# Patient Record
Sex: FEMALE | Race: WHITE | NOT HISPANIC OR LATINO | Employment: OTHER | URBAN - METROPOLITAN AREA
[De-identification: names, ages, dates, MRNs, and addresses within clinical notes are randomized per-mention and may not be internally consistent; named-entity substitution may affect disease eponyms.]

---

## 2017-01-09 ENCOUNTER — HOSPITAL ENCOUNTER (OUTPATIENT)
Dept: PHYSICAL THERAPY | Facility: HOSPITAL | Age: 72
Setting detail: THERAPIES SERIES
Discharge: HOME | End: 2017-01-09

## 2017-01-09 DIAGNOSIS — M76.62 TENDONITIS, ACHILLES, LEFT: ICD-10-CM

## 2017-01-09 DIAGNOSIS — G89.29 CHRONIC PAIN OF LEFT ANKLE: Primary | ICD-10-CM

## 2017-01-09 DIAGNOSIS — M25.572 CHRONIC PAIN OF LEFT ANKLE: Primary | ICD-10-CM

## 2017-01-09 PROCEDURE — G8979 MOBILITY GOAL STATUS: HCPCS

## 2017-01-09 PROCEDURE — 97110 THERAPEUTIC EXERCISES: CPT

## 2017-01-09 PROCEDURE — G8978 MOBILITY CURRENT STATUS: HCPCS

## 2017-01-09 PROCEDURE — 97161 PT EVAL LOW COMPLEX 20 MIN: CPT

## 2017-01-09 PROCEDURE — G8980 MOBILITY D/C STATUS: HCPCS

## 2017-01-09 NOTE — PROGRESS NOTES
Outpatient Physical Therapy Ortho Initial Evaluation  [facility]     Patient Name: Iris Yee  : 1945  MRN: [id number]  Today's Date: 2017      Visit Date: 2017    Patient Active Problem List   Diagnosis   • History of pulmonary infarction   • Hypothyroid   • ASCUS favor benign   • Ankle pain   • Achilles tendinitis of left lower extremity   • Tendonitis, Achilles, left        Past Medical History   Diagnosis Date   • Clotting disorder    • Disease of thyroid gland    • Hx of blood clots    • Ovarian cyst    • Pulmonary embolism, bilateral    • Thrombopenia         Past Surgical History   Procedure Laterality Date   • Cholecystectomy     • Augmentation mammaplasty     • Breast biopsy         Visit Dx:     ICD-10-CM ICD-9-CM   1. Chronic pain of left ankle M25.572 719.47    G89.29 338.29   2. Tendonitis, Achilles, left M76.62 726.71             Patient History       17 1500          History    Chief Complaint Difficulty Walking;Difficulty with daily activities;Joint stiffness;Muscle weakness;Pain  -CN      Type of Pain Ankle pain  -CN      Date Current Problem(s) Began 16  -CN      Brief Description of Current Complaint Pt reports history of L ankle pain which began in August after pt began walking on treadmill daily. Pt had PT several months after onset which helped relieve pain and restore function, however pt never attained full recovery. Pt went back to MD and had MRI which showed small tear in achilles tendon. Pt has been in walking book and night splint to immobilize and help with tendon healing. Pt does not have difficulty ambulating in boot unless she is up for long periods of time.   -CN      Previous treatment for THIS PROBLEM --   PT  -CN      Onset Date- PT 17  -CN      Patient/Caregiver Goals Relieve pain;Return to prior level of function;Improve mobility;Improve strength  -CN      What clinical tests have you had for this problem? MRI  -CN       Results of Clinical Tests Small tear in achilles tendon  -CN      Pain     Pain Location Ankle  -CN      Pain at Present 2  -CN      Pain at Best 0  -CN      Pain at Worst 2  -CN      Pain Frequency Intermittent  -CN      Pain Description Aching;Burning  -CN      What Performance Factors Make the Current Problem(s) WORSE? walking, standing are worse. Careful with all other activities.   -CN      What Performance Factors Make the Current Problem(s) BETTER? Ice, boot  -CN      Is your sleep disturbed? Yes   Careful when turning over  -CN      Difficulties at work?  for [employer], even amount of sitting to standing. Hurts when up on it more.   -CN      Difficulties with ADL's? Try to limit amount of time standing while cooking meals, etc.   -CN      Difficulties with recreational activities? Walking, shopping  -CN      Fall Risk Assessment    Any falls in the past year: Yes  -CN      Number of falls reported in the last 12 months 1  -CN      Factors that contributed to the fall: Slippery surface  -CN      Daily Activities    Primary Language English  -CN      Are you able to read Yes  -CN      Are you able to write Yes  -CN      How does patient learn best? Reading  -CN      Teaching needs identified Home Exercise Program;Management of Condition  -CN      Pt Participated in POC and Goals Yes  -CN      Safety    Are you being hurt, hit, or frightened by anyone at home or in your life? No  -CN      Are you being neglected by a caregiver No  -CN        User Key  (r) = Recorded By, (t) = Taken By, (c) = Cosigned By    Initials Name Provider Type    CN Sweta Orellana, PT Physical Therapist                PT Ortho       01/09/17 1500    Posture/Observations    Observations Edema  -CN    Posture/Observations Comments L hip elevated secondary to boot  -CN    Sensation    Sensation WNL? WNL  -CN    Foot/Ankle Palpation    Medial Malleolus Left:;Tender;Swollen  -CN    Medial Gastroc  Left:;Tender;Guarded/taut  -CN    Soleus Left:;Tender;Guarded/taut  -CN    Achilles' Tendon Left:;Tender;Guarded/taut;Swollen  -CN    Left Ankle    Dorsiflexion ROM Details 5/10  -CN    Plantarflexion ROM Details 52/56  -CN    Inversion ROM Details 38/41  -CN    Eversion ROM Details 22/24  -CN    Right Ankle    Dorsiflexion ROM Details 6/9  -CN    Plantarflexion ROM Details 55/57  -CN    Inversion ROM Details 38/43  -CN    Eversion ROM Details 25/29  -CN    Left Ankle/Foot    Ankle PF Gross Movement (4/5) good  -CN    Ankle Dorsiflexion Gross Movement (4/5) good  -CN    Subtalar Inversion Gross Movement (4+/5) good plus  -CN    Subtalar Eversion Gross Movement (4/5) good  -CN    Right Ankle/Foot    Ankle PF Gross Movement (5/5) normal  -CN    Ankle Dorsiflexion Gross Movement (5/5) normal  -CN    Subtalar Inversion Gross Movement (5/5) normal  -CN    Subtalar Eversion Gross Movement (4+/5) good plus  -CN    Lower Extremity Flexibility    Gastrocnemius Mildly limited  -CN    Soleus Mildly limited  -CN    Gait Assessment/Treatment    Gait, Comment Pt demonstrates increased hip and knee flexion with gait with slight circumduction.   -CN      User Key  (r) = Recorded By, (t) = Taken By, (c) = Cosigned By    Initials Name Provider Type    GIBRAN Orellana, EDITH Physical Therapist                            Therapy Education       01/09/17 1731    Therapy Education    Given HEP;Symptoms/condition management;Pain management   Anatomy, eval findings, goals of PT, eccentric contractions  -CN    Program New  -CN    How Provided Verbal;Demonstration;Written  -CN    Provided to Patient  -CN    Level of Understanding Teach back education performed  -CN      User Key  (r) = Recorded By, (t) = Taken By, (c) = Cosigned By    Initials Name Provider Type    GIBRAN Orellana PT Physical Therapist                PT OP Goals       01/09/17 1700          PT Short Term Goals    STG Date to Achieve 01/30/17  -CN      STG 1  Patient will be independent with education for symptom management, joint protection and strategies to minimize stress on affected tissues  -CN      STG 1 Progress New  -CN      STG 2 Pt will report subjective pain at /10 or less with ADLs and ambulation.    -CN      STG 2 Progress New  -CN      STG 3 Pt able to transition out of the boot for 1/2 of the day without more than 3/10 pain  -CN      STG 3 Progress New  -CN      Long Term Goals    LTG Date to Achieve 02/20/17  -CN      LTG 1 Pt will report subjective reports of tenderness L achilles tendon reduced by 50%.   -CN      LTG 1 Progress New  -CN      LTG 2 Pt to improve gross ankle strength on left side to 4+/5 in order to increase stability and function.  -CN      LTG 2 Progress New  -CN      LTG 3 Pt will improve LEFS score to 80% for overall functional improvement.  -CN      LTG 3 Progress New  -CN      Time Calculation    PT Goal Re-Cert Due Date 02/09/17  -CN        User Key  (r) = Recorded By, (t) = Taken By, (c) = Cosigned By    Initials Name Provider Type    CN Sweta Orelalna, PT Physical Therapist                PT Assessment/Plan       01/09/17 1738          PT Assessment    Functional Limitations Decreased safety during functional activities;Limitations in functional capacity and performance;Impaired gait;Performance in leisure activities;Impaired locomotion;Limitations in community activities;Performance in work activities  -CN      Impairments Balance;Range of motion;Impaired muscle endurance;Muscle strength;Pain;Gait  -CN      Assessment Comments Pt is a 71 year old female presenting to therapy with c/o L ankle pain secondary to small Achilles tear and tendonitis which began in August. Pt previously had PT which helped with pain reduction and functional activities, however never fully recovered. Pt went back to MD and had MRI showing small tear in Achilles tendon and has been in walking boot and night splint for immobilization for 6 weeks.  Pt demonstrates ROM measured at DF=5/10, PF=52/56, IN=38/41 and EV=22/24 as well as impaired ankle strength. Pt demonstrates edema in inferior aspect of L achilles. Pt ambulating with boot and exhibitis increased hip and knee flexion to increase toe clearance. Pt scored 67.5% on the LEFS where 100% is no disability. Pt would benefit from skilled PT to address the deficits and return to PLOF.   -CN      Please refer to paper survey for additional self-reported information Yes  -CN      Rehab Potential Good  -CN      Patient/caregiver participated in establishment of treatment plan and goals Yes  -CN      Patient would benefit from skilled therapy intervention Yes  -CN      PT Plan    PT Frequency 2x/week  -CN      Predicted Duration of Therapy Intervention (days/wks) 6 weeks  -CN      Planned CPT's? PT EVAL: 50252;PT MANUAL THERAPY EA 15 MIN: 59927;PT RE-EVAL: 24509;PT THER PROC EA 15 MIN: 81263;PT GAIT TRAINING EA 15 MIN: 10847;PT ULTRASOUND EA 15 MIN: 84709;PT HOT OR COLD PACK TREAT MCARE;PT IONTOPHORESIS EA 15 MIN: 34619  -CN      Physical Therapy Interventions (Optional Details) ROM (Range of Motion);stair training;strengthening;stretching;modalities;manual therapy techniques;gait training;patient/family education;postural re-education;joint mobilization;home exercise program  -CN      PT Plan Comments PT to treat 2x/week for 6 weeks consisting of flexibility, stabilization and stregnthening activities. Assess tolerance and adherence to HEP.   -CN        User Key  (r) = Recorded By, (t) = Taken By, (c) = Cosigned By    Initials Name Provider Type    GIBRAN Orellana, EDITH Physical Therapist                  Exercises       01/09/17 1700          Exercise 1    Exercise Name 1 See exercise flowsheet in chart.   -CN        User Key  (r) = Recorded By, (t) = Taken By, (c) = Cosigned By    Initials Name Provider Type    GIBRAN Orellana, PT Physical Therapist                              Outcome  Measures       01/09/17 1700          Functional Assessment    Outcome Measure Options Lower Extremity Functional Scale (LEFS)   67.5% where 100% is no disability  -CN        User Key  (r) = Recorded By, (t) = Taken By, (c) = Cosigned By    Initials Name Provider Type    CN Sweta Orellana, PT Physical Therapist            Time Calculation:   Start Time: 1545  Stop Time: 1630  Time Calculation (min): 45 min     Therapy Charges for Today     Code Description Service Date Service Provider Modifiers Qty    02901019306 HC PT EVAL LOW COMPLEXITY 2 1/9/2017 Sweta Orellana, PT GP 1    01152560928 HC PT THER PROC EA 15 MIN 1/9/2017 Sweta Orellana, PT GP 1    69343498764 HC PT MOBILITY PROJECTED 1/9/2017 Sweta Orellana, PT GP, CJ 1    33475879001 HC PT MOBILITY DISCHARGE 1/9/2017 Sweta Orellana, PT GP, CJ 1          PT G-Codes  PT Professional Judgement Used?: Yes  Outcome Measure Options: Lower Extremity Functional Scale (LEFS)  Score: 67.5% where 100% is no disability  Functional Limitation: Mobility: Walking and moving around  Mobility: Walking and Moving Around Goal Status (): At least 20 percent but less than 40 percent impaired, limited or restricted  Mobility: Walking and Moving Around Discharge Status (): At least 20 percent but less than 40 percent impaired, limited or restricted         Sweta Orellana PT  1/9/2017

## 2017-01-16 ENCOUNTER — APPOINTMENT (OUTPATIENT)
Dept: PHYSICAL THERAPY | Facility: HOSPITAL | Age: 72
End: 2017-01-16

## 2017-01-19 ENCOUNTER — OFFICE VISIT (OUTPATIENT)
Dept: ORTHOPEDIC SURGERY | Facility: CLINIC | Age: 72
End: 2017-01-19

## 2017-01-19 VITALS — HEIGHT: 68 IN | TEMPERATURE: 98.9 F | BODY MASS INDEX: 32.13 KG/M2 | WEIGHT: 212 LBS

## 2017-01-19 DIAGNOSIS — M76.62 ACHILLES TENDINITIS OF LEFT LOWER EXTREMITY: Primary | ICD-10-CM

## 2017-01-19 PROCEDURE — 99212 OFFICE O/P EST SF 10 MIN: CPT | Performed by: ORTHOPAEDIC SURGERY

## 2017-01-19 NOTE — MR AVS SNAPSHOT
Iris Yee   1/19/2017 3:15 PM   Office Visit    Dept Phone:  [phone]   Encounter #:  [id number]    Provider:  Jose Daniel Jones MD   Department:  [facility] MEDICAL [facility]                Your Full Care Plan              Your Updated Medication List          This list is accurate as of: 1/19/17  4:17 PM.  Always use your most recent med list.                aspirin 81 MG EC tablet       betamethasone valerate 0.1 % cream   Commonly known as:  VALISONE       cetirizine 10 MG tablet   Commonly known as:  zyrTEC       diclofenac 1 % gel gel   Commonly known as:  VOLTAREN   Apply 4 g topically 2 (Two) Times a Day. Small amount to affected area       FLUCELVAX QUADRIVALENT 0.5 ML suspension prefilled syringe injection   Generic drug:  Influenza Vac Subunit Quad       levothyroxine 100 MCG tablet   Commonly known as:  SYNTHROID, LEVOTHROID       neomycin-polymyxin-hydrocortisone 3.5-26269-6 otic suspension   Commonly known as:  CORTISPORIN       PREVNAR 13 vaccine   Generic drug:  pneumococcal conj. 13-valent       XARELTO 20 MG tablet   Generic drug:  rivaroxaban               You Were Diagnosed With        Codes Comments    Achilles tendinitis of left lower extremity    -  Primary ICD-10-CM: M76.62  ICD-9-CM: 726.71       Instructions     None    Patient Instructions History      Upcoming Appointments     Visit Type Date Time Department    FOLLOW UP 1/19/2017  3:15 PM [facility]      [employer] Signup     Our records indicate that you have an active [employer] account.    You can view your After Visit Summary by going to [facility] and logging in with your [employer] username and password.  If you don't have a [employer] username and password but a parent or guardian has access to your record, the parent or guardian should login with their own [employer] username and password and access your record to view the After  "Visit Summary.    If you have questions, you can email [email or web address] or call [phone] to talk to our MyChart staff.  Remember, [employer] is NOT to be used for urgent needs.  For medical emergencies, dial 911.               Other Info from Your Visit           Allergies     Penicillins      Sulfur        Reason for Visit     Left Foot - Follow-up           Vital Signs     Temperature Height Weight Last Menstrual Period Body Mass Index Smoking Status    98.9 °F (37.2 °C) 68\" (172.7 cm) 212 lb (96.2 kg) (LMP Unknown) 32.23 kg/m2 Former Smoker      Problems and Diagnoses Noted     Achilles tendinitis        "

## 2017-01-19 NOTE — PROGRESS NOTES
"Ankle Follow Up      Patient: Iris Yee    YOB: 1945 71 y.o. female    Chief Complaints: Ankle feels better    History of Present Illness: All is up with improvement in the aching pain in the posterior aspect of her left Achilles is been going on now for at least 3 or 4 months.  She's been using her boot now for about 8 weeks and states that she has only minor intermittent achy pain in the posterior aspect of her left Achilles.  She is seen Dr. Lozada and a started formal physical therapy again.  She feels like her swelling has diminished  HPI    ROS: ankle pain  Past Medical History   Diagnosis Date   • Clotting disorder    • Disease of thyroid gland    • Hx of blood clots 2011   • Ovarian cyst    • Pulmonary embolism, bilateral    • Thrombopenia        Physical Exam:   Vitals:    01/19/17 1539   Temp: 98.9 °F (37.2 °C)   Weight: 212 lb (96.2 kg)   Height: 68\" (172.7 cm)     Well developed with normal mood.  Minimal discomfort at the mid substance of the left Achilles.  Neutral dorsiflexion a heel cord with 5 out of 5 plantarflexion strength no palpable defects.  There is only mild fullness to the mid substance of the Achilles      Radiology: None performed      Assessment/Plan:  Left non-insertional Achilles tendinosis seems to be improving albeit slowly.  She rates her current pain as 0 out of 10.  She will start weaning out of her boot continue use of athletic shoes with somewhat of an elevated heel.  She will continue with physical therapy and stretching exercises.  If symptoms worsen she will see Dr. Lozada for further nonoperative modalities as she is not interested in surgery at this time.  If nonoperative modalities fail I will see her back to evaluate for surgical treatment.  "

## 2017-02-27 ENCOUNTER — OFFICE VISIT (OUTPATIENT)
Dept: OBSTETRICS AND GYNECOLOGY | Facility: CLINIC | Age: 72
End: 2017-02-27

## 2017-02-27 VITALS
HEIGHT: 68 IN | WEIGHT: 227 LBS | SYSTOLIC BLOOD PRESSURE: 138 MMHG | DIASTOLIC BLOOD PRESSURE: 90 MMHG | BODY MASS INDEX: 34.4 KG/M2

## 2017-02-27 DIAGNOSIS — E66.9 OBESITY (BMI 30-39.9): ICD-10-CM

## 2017-02-27 DIAGNOSIS — Z13.9 SCREENING: ICD-10-CM

## 2017-02-27 DIAGNOSIS — R87.610 PAP SMEAR ABNORMALITY OF CERVIX WITH ASCUS FAVORING BENIGN: ICD-10-CM

## 2017-02-27 DIAGNOSIS — Z87.09 HISTORY OF PULMONARY INFARCTION: ICD-10-CM

## 2017-02-27 DIAGNOSIS — N95.0 POSTMENOPAUSAL BLEEDING: Primary | ICD-10-CM

## 2017-02-27 DIAGNOSIS — E03.9 ACQUIRED HYPOTHYROIDISM: ICD-10-CM

## 2017-02-27 LAB
BILIRUB BLD-MCNC: NEGATIVE MG/DL
CLARITY, POC: CLEAR
COLOR UR: YELLOW
GLUCOSE UR STRIP-MCNC: NEGATIVE MG/DL
KETONES UR QL: NEGATIVE
LEUKOCYTE EST, POC: ABNORMAL
NITRITE UR-MCNC: NEGATIVE MG/ML
PH UR: 5 [PH] (ref 5–8)
PROT UR STRIP-MCNC: NEGATIVE MG/DL
RBC # UR STRIP: ABNORMAL /UL
SP GR UR: 1.01 (ref 1–1.03)
UROBILINOGEN UR QL: NORMAL

## 2017-02-27 PROCEDURE — 81002 URINALYSIS NONAUTO W/O SCOPE: CPT | Performed by: OBSTETRICS & GYNECOLOGY

## 2017-02-27 PROCEDURE — 99214 OFFICE O/P EST MOD 30 MIN: CPT | Performed by: OBSTETRICS & GYNECOLOGY

## 2017-02-27 RX ORDER — KETOCONAZOLE 20 MG/ML
SHAMPOO TOPICAL
Status: ON HOLD | COMMUNITY
Start: 2016-12-22 | End: 2018-12-14

## 2017-02-27 RX ORDER — SULFAMETHOXAZOLE AND TRIMETHOPRIM 800; 160 MG/1; MG/1
TABLET ORAL
COMMUNITY
Start: 2016-12-27 | End: 2017-02-27

## 2017-02-27 NOTE — PROGRESS NOTES
[facility]     2017      Patient:  Iris Yee   MR#:[id number]    Office note    CC: vaginal spotting    Subjective     History of Present Illness  71 y.o. female  with a 6 month history of intermittent very minimal vaginal spotting mostly in the morning.  No activity seems to increase or diminish the bleeding.    Patient with a history of bilateral pulmonary infarcts 4-5 years ago that was started on surround toe at that time.  Previously had no problems or complications from the drug.    Family history is reviewed and no verónica factors, negative history of endometrial cancer in family     Patient Active Problem List   Diagnosis   • History of pulmonary infarction   • Hypothyroid   • ASCUS favor benign   • Ankle pain   • Achilles tendinitis of left lower extremity   • Tendonitis, Achilles, left       Past Medical History   Diagnosis Date   • Clotting disorder    • Hx of blood clots    • Hypothyroid    • Ovarian cyst    • Pulmonary embolism, bilateral    • Thrombopenia        Past Surgical History   Procedure Laterality Date   • Cholecystectomy     • Augmentation mammaplasty     • Breast biopsy         The following portions of the patient's history were reviewed and updated as appropriate: allergies, current medications, past family history, past medical history, past social history, past surgical history and problem list.    Review of Systems   Constitutional: Negative.    Respiratory: Negative.    Cardiovascular: Negative.    Gastrointestinal: Negative.    Genitourinary: Positive for vaginal bleeding.   Psychiatric/Behavioral: Negative.        BP Readings from Last 3 Encounters:   17 138/90   16 122/72   16 124/78      Wt Readings from Last 3 Encounters:   17 227 lb (103 kg)   17 212 lb (96.2 kg)   16 225 lb (102 kg)      BMI: Estimated body mass index is 34.52 kg/(m^2) as calculated from the following:    Height as of this  "encounter: 68\" (172.7 cm).    Weight as of this encounter: 227 lb (103 kg).  BSA: Estimated body surface area is 2.16 meters squared as calculated from the following:    Height as of this encounter: 68\" (172.7 cm).    Weight as of this encounter: 227 lb (103 kg).    Objective   Physical Exam   Constitutional: She is oriented to person, place, and time. She appears well-developed and well-nourished.   Cardiovascular: Normal rate and regular rhythm.    Pulmonary/Chest: Effort normal and breath sounds normal.   Abdominal: Soft. Bowel sounds are normal. She exhibits no distension and no mass. There is no tenderness.   Genitourinary: Vagina normal and uterus normal.   Genitourinary Comments: Scant minimal spotting appearing to originate from atrophic vaginitis.  Cervix is exceptionally atrophic    Bimanual exam is limited by habitus   Neurological: She is alert and oriented to person, place, and time.   Psychiatric: She has a normal mood and affect. Her behavior is normal. Judgment and thought content normal.   Nursing note and vitals reviewed.        Assessment/Plan     1.  Postmenopausal bleeding (new)-further workup is required plan gynecologic ultrasound and possible endometrial biopsy pending endometrial lining  2.  History of pulmonary infarct-stable on Xarelto   3.  Obesity (chronic) - no further workup  4.  History of ASCUS Pap smear HPV negative follow-up as scheduled  5.  BP elevation -       Jarrod Vasquez MD   2/27/2017 11:55 AM  "

## 2017-02-28 ENCOUNTER — PROCEDURE VISIT (OUTPATIENT)
Dept: OBSTETRICS AND GYNECOLOGY | Facility: CLINIC | Age: 72
End: 2017-02-28

## 2017-02-28 DIAGNOSIS — N95.0 POSTMENOPAUSAL BLEEDING: ICD-10-CM

## 2017-02-28 PROCEDURE — 76830 TRANSVAGINAL US NON-OB: CPT | Performed by: OBSTETRICS & GYNECOLOGY

## 2017-02-28 NOTE — PROGRESS NOTES
[facility]  Ultrasound Note     2017    Patient:  Irsi Yee      MR#:[id number]    71 y.o.      Patient Active Problem List   Diagnosis   • History of pulmonary infarction   • Hypothyroid   • Pap smear abnormality of cervix with ASCUS favoring benign   • Ankle pain   • Achilles tendinitis of left lower extremity   • Tendonitis, Achilles, left   • Postmenopausal bleeding       [See the scanned report in the media tab for more details]    Impression    1.  Normal uterus: 4.8 x 4.5 x 2.3  2.  Endometrium 4.3 mm  3.  Myometrium unremarkable  4.  Right ovary not visualized, left ovary normal  5.  Essentially unremarkable pelvic ultrasound    Discussed findings with the patient         Jarrod Vasquez MD  2017 11:36 AM

## 2017-03-06 ENCOUNTER — DOCUMENTATION (OUTPATIENT)
Dept: PHYSICAL THERAPY | Facility: HOSPITAL | Age: 72
End: 2017-03-06

## 2017-03-06 DIAGNOSIS — M76.62 ACHILLES TENDINITIS OF LEFT LOWER EXTREMITY: ICD-10-CM

## 2017-03-06 DIAGNOSIS — M25.572 LEFT ANKLE PAIN, UNSPECIFIED CHRONICITY: ICD-10-CM

## 2017-03-06 DIAGNOSIS — G89.29 CHRONIC PAIN OF LEFT ANKLE: Primary | ICD-10-CM

## 2017-03-06 DIAGNOSIS — M76.62 TENDONITIS, ACHILLES, LEFT: ICD-10-CM

## 2017-03-06 DIAGNOSIS — M25.572 CHRONIC PAIN OF LEFT ANKLE: Primary | ICD-10-CM

## 2017-03-06 NOTE — THERAPY DISCHARGE NOTE
Outpatient Physical Therapy Discharge Summary         Patient Name: Iris Yee  : 1945  MRN: [id number]    Today's Date: 3/6/2017    Visit Dx:    ICD-10-CM ICD-9-CM   1. Chronic pain of left ankle M25.572 719.47    G89.29 338.29   2. Tendonitis, Achilles, left M76.62 726.71   3. Left ankle pain, unspecified chronicity M25.572 719.47   4. Achilles tendinitis of left lower extremity M76.62 726.71             PT OP Goals       17 0900       PT Short Term Goals    STG Date to Achieve 17  -CN     STG 1 Patient will be independent with education for symptom management, joint protection and strategies to minimize stress on affected tissues  -CN     STG 1 Progress Not Met  -CN     STG 2 Pt will report subjective pain at /10 or less with ADLs and ambulation.    -CN     STG 2 Progress Not Met  -CN     STG 3 Pt able to transition out of the boot for 1/2 of the day without more than 3/10 pain  -CN     STG 3 Progress Not Met  -CN     Long Term Goals    LTG Date to Achieve 17  -CN     LTG 1 Pt will report subjective reports of tenderness L achilles tendon reduced by 50%.   -CN     LTG 1 Progress Not Met  -CN     LTG 2 Pt to improve gross ankle strength on left side to 4+/5 in order to increase stability and function.  -CN     LTG 2 Progress Not Met  -CN     LTG 3 Pt will improve LEFS score to 80% for overall functional improvement.  -CN     LTG 3 Progress Not Met  -CN       User Key  (r) = Recorded By, (t) = Taken By, (c) = Cosigned By    Initials Name Provider Type    GIBRAN Orellana, PT Physical Therapist          OP PT Discharge Summary  Date of Discharge: 17  Reason for Discharge: other (comment) (Pt did not return after evaluation)  Outcomes Achieved: Other (Pt did not achieve goals as she did not return to PT after evaluation. )  Discharge Destination: Home with home program  Discharge Instructions: Pt referred to PT for treatment of L ankle pain secondary to Achilles  tear and tendonitis. Pt did not return to PT after evaluation, however given initial HEP and advised on stretching and low level exercises to initiate.       Time Calculation:                    Sweta Orellana, PT  3/6/2017

## 2017-06-08 ENCOUNTER — HOSPITAL ENCOUNTER (OUTPATIENT)
Dept: GENERAL RADIOLOGY | Facility: HOSPITAL | Age: 72
Discharge: HOME | End: 2017-06-08

## 2017-06-08 ENCOUNTER — TRANSCRIBE ORDERS (OUTPATIENT)
Dept: ADMINISTRATIVE | Facility: HOSPITAL | Age: 72
End: 2017-06-08

## 2017-06-08 ENCOUNTER — HOSPITAL ENCOUNTER (OUTPATIENT)
Dept: CARDIOLOGY | Facility: HOSPITAL | Age: 72
Discharge: HOME | End: 2017-06-08
Admitting: INTERNAL MEDICINE

## 2017-06-08 DIAGNOSIS — M79.89 RIGHT LEG SWELLING: ICD-10-CM

## 2017-06-08 DIAGNOSIS — M79.604 RIGHT LEG PAIN: Primary | ICD-10-CM

## 2017-06-08 DIAGNOSIS — M79.604 RIGHT LEG PAIN: ICD-10-CM

## 2017-06-08 DIAGNOSIS — M25.561 RIGHT KNEE PAIN, UNSPECIFIED CHRONICITY: ICD-10-CM

## 2017-06-08 LAB
BH CV LOWER VASCULAR LEFT COMMON FEMORAL AUGMENT: NORMAL
BH CV LOWER VASCULAR LEFT COMMON FEMORAL COMPETENT: NORMAL
BH CV LOWER VASCULAR LEFT COMMON FEMORAL COMPRESS: NORMAL
BH CV LOWER VASCULAR LEFT COMMON FEMORAL PHASIC: NORMAL
BH CV LOWER VASCULAR LEFT COMMON FEMORAL SPONT: NORMAL
BH CV LOWER VASCULAR RIGHT COMMON FEMORAL AUGMENT: NORMAL
BH CV LOWER VASCULAR RIGHT COMMON FEMORAL COMPETENT: NORMAL
BH CV LOWER VASCULAR RIGHT COMMON FEMORAL COMPRESS: NORMAL
BH CV LOWER VASCULAR RIGHT COMMON FEMORAL PHASIC: NORMAL
BH CV LOWER VASCULAR RIGHT COMMON FEMORAL SPONT: NORMAL
BH CV LOWER VASCULAR RIGHT DISTAL FEMORAL COMPRESS: NORMAL
BH CV LOWER VASCULAR RIGHT GASTRONEMIUS COMPRESS: NORMAL
BH CV LOWER VASCULAR RIGHT GREATER SAPH AK COMPRESS: NORMAL
BH CV LOWER VASCULAR RIGHT GREATER SAPH BK COMPRESS: NORMAL
BH CV LOWER VASCULAR RIGHT MID FEMORAL AUGMENT: NORMAL
BH CV LOWER VASCULAR RIGHT MID FEMORAL COMPETENT: NORMAL
BH CV LOWER VASCULAR RIGHT MID FEMORAL COMPRESS: NORMAL
BH CV LOWER VASCULAR RIGHT MID FEMORAL PHASIC: NORMAL
BH CV LOWER VASCULAR RIGHT MID FEMORAL SPONT: NORMAL
BH CV LOWER VASCULAR RIGHT PERONEAL COMPRESS: NORMAL
BH CV LOWER VASCULAR RIGHT POPLITEAL AUGMENT: NORMAL
BH CV LOWER VASCULAR RIGHT POPLITEAL COMPETENT: NORMAL
BH CV LOWER VASCULAR RIGHT POPLITEAL COMPRESS: NORMAL
BH CV LOWER VASCULAR RIGHT POPLITEAL PHASIC: NORMAL
BH CV LOWER VASCULAR RIGHT POPLITEAL SPONT: NORMAL
BH CV LOWER VASCULAR RIGHT POSTERIOR TIBIAL COMPRESS: NORMAL
BH CV LOWER VASCULAR RIGHT PROXIMAL FEMORAL COMPRESS: NORMAL
BH CV LOWER VASCULAR RIGHT SAPHENOFEMORAL JUNCTION AUGMENT: NORMAL
BH CV LOWER VASCULAR RIGHT SAPHENOFEMORAL JUNCTION COMPETENT: NORMAL
BH CV LOWER VASCULAR RIGHT SAPHENOFEMORAL JUNCTION COMPRESS: NORMAL
BH CV LOWER VASCULAR RIGHT SAPHENOFEMORAL JUNCTION PHASIC: NORMAL
BH CV LOWER VASCULAR RIGHT SAPHENOFEMORAL JUNCTION SPONT: NORMAL

## 2017-06-08 PROCEDURE — 93971 EXTREMITY STUDY: CPT

## 2017-06-08 PROCEDURE — 73560 X-RAY EXAM OF KNEE 1 OR 2: CPT

## 2017-06-15 ENCOUNTER — TRANSCRIBE ORDERS (OUTPATIENT)
Dept: ADMINISTRATIVE | Facility: HOSPITAL | Age: 72
End: 2017-06-15

## 2017-06-15 DIAGNOSIS — Z12.31 VISIT FOR SCREENING MAMMOGRAM: Primary | ICD-10-CM

## 2017-06-30 ENCOUNTER — OFFICE VISIT (OUTPATIENT)
Dept: ORTHOPEDIC SURGERY | Facility: CLINIC | Age: 72
End: 2017-06-30

## 2017-06-30 VITALS — WEIGHT: 220 LBS | HEIGHT: 67 IN | BODY MASS INDEX: 34.53 KG/M2 | TEMPERATURE: 97.8 F

## 2017-06-30 DIAGNOSIS — M17.11 PRIMARY OSTEOARTHRITIS OF RIGHT KNEE: ICD-10-CM

## 2017-06-30 DIAGNOSIS — M70.51 KNEE BURSITIS, RIGHT: Primary | ICD-10-CM

## 2017-06-30 PROCEDURE — 20610 DRAIN/INJ JOINT/BURSA W/O US: CPT | Performed by: ORTHOPAEDIC SURGERY

## 2017-06-30 PROCEDURE — 20605 DRAIN/INJ JOINT/BURSA W/O US: CPT | Performed by: ORTHOPAEDIC SURGERY

## 2017-06-30 PROCEDURE — 99214 OFFICE O/P EST MOD 30 MIN: CPT | Performed by: ORTHOPAEDIC SURGERY

## 2017-06-30 RX ADMIN — METHYLPREDNISOLONE ACETATE 160 MG: 80 INJECTION, SUSPENSION INTRA-ARTICULAR; INTRALESIONAL; INTRAMUSCULAR; SOFT TISSUE at 15:18

## 2017-06-30 RX ADMIN — METHYLPREDNISOLONE ACETATE 80 MG: 80 INJECTION, SUSPENSION INTRA-ARTICULAR; INTRALESIONAL; INTRAMUSCULAR; SOFT TISSUE at 15:19

## 2017-06-30 RX ADMIN — BUPIVACAINE HYDROCHLORIDE 2 ML: 5 INJECTION, SOLUTION PERINEURAL at 15:18

## 2017-06-30 RX ADMIN — BUPIVACAINE HYDROCHLORIDE 1 ML: 5 INJECTION, SOLUTION PERINEURAL at 15:19

## 2017-06-30 RX ADMIN — LIDOCAINE HYDROCHLORIDE 2 ML: 10 INJECTION, SOLUTION INFILTRATION; PERINEURAL at 15:18

## 2017-06-30 RX ADMIN — LIDOCAINE HYDROCHLORIDE 1 ML: 10 INJECTION, SOLUTION INFILTRATION; PERINEURAL at 15:19

## 2017-07-04 RX ORDER — LIDOCAINE HYDROCHLORIDE 10 MG/ML
2 INJECTION, SOLUTION INFILTRATION; PERINEURAL
Status: COMPLETED | OUTPATIENT
Start: 2017-06-30 | End: 2017-06-30

## 2017-07-04 RX ORDER — LIDOCAINE HYDROCHLORIDE 10 MG/ML
1 INJECTION, SOLUTION INFILTRATION; PERINEURAL
Status: COMPLETED | OUTPATIENT
Start: 2017-06-30 | End: 2017-06-30

## 2017-07-04 RX ORDER — BUPIVACAINE HYDROCHLORIDE 5 MG/ML
1 INJECTION, SOLUTION PERINEURAL
Status: COMPLETED | OUTPATIENT
Start: 2017-06-30 | End: 2017-06-30

## 2017-07-04 RX ORDER — METHYLPREDNISOLONE ACETATE 80 MG/ML
160 INJECTION, SUSPENSION INTRA-ARTICULAR; INTRALESIONAL; INTRAMUSCULAR; SOFT TISSUE
Status: COMPLETED | OUTPATIENT
Start: 2017-06-30 | End: 2017-06-30

## 2017-07-04 RX ORDER — BUPIVACAINE HYDROCHLORIDE 5 MG/ML
2 INJECTION, SOLUTION PERINEURAL
Status: COMPLETED | OUTPATIENT
Start: 2017-06-30 | End: 2017-06-30

## 2017-07-04 RX ORDER — METHYLPREDNISOLONE ACETATE 80 MG/ML
80 INJECTION, SUSPENSION INTRA-ARTICULAR; INTRALESIONAL; INTRAMUSCULAR; SOFT TISSUE
Status: COMPLETED | OUTPATIENT
Start: 2017-06-30 | End: 2017-06-30

## 2017-07-04 NOTE — PROGRESS NOTES
Patient: Iris Yee    YOB: 1945    Medical Record Number: [id number]    Chief Complaints:  Right knee pain    History of Present Illness:     71 y.o. female patient who presents for evaluation of her right knee.  She reports an approximately two-month history of acute onset pain although she does not recall any specific inciting event or factor.  She describes her pain as moderate, constant, and both aching and burning.  The pain is along the medial side of the knee extending down the leg.  Denies any shooting pain, numbness, tingling or weakness.  Tylenol helps somewhat.  She rates her pain as 7 out of 10 in severity.  She has noticed some associated swelling.  Denies any catching or locking but has been experiencing occasional clicking in the knee.    Allergies:   Allergies   Allergen Reactions   • Penicillins Other (See Comments)     Passes out   • Sulfur Nausea And Vomiting       Medications:   Home Medications    Current Outpatient Prescriptions:   •  betamethasone valerate (VALISONE) 0.1 % cream, , Disp: , Rfl:   •  cetirizine (ZyrTEC) 10 MG tablet, Take 10 mg by mouth daily., Disp: , Rfl:   •  diclofenac (VOLTAREN) 1 % gel gel, Apply 4 g topically 2 (Two) Times a Day. Small amount to affected area, Disp: 100 g, Rfl: 1  •  ketoconazole (NIZORAL) 2 % shampoo, , Disp: , Rfl:   •  levothyroxine (SYNTHROID, LEVOTHROID) 100 MCG tablet, , Disp: , Rfl:   •  mupirocin (BACTROBAN) 2 % ointment, , Disp: , Rfl:   •  neomycin-polymyxin-hydrocortisone (CORTISPORIN) 3.5-26586-1 otic suspension, , Disp: , Rfl:   •  triamcinolone (KENALOG) 0.1 % ointment, , Disp: , Rfl:   •  XARELTO 20 MG tablet, , Disp: , Rfl:     Past Medical History:   Diagnosis Date   • Clotting disorder    • Hx of blood clots 2011   • Hypothyroid    • Ovarian cyst    • Pulmonary embolism, bilateral    • Thrombopenia        Past Surgical History:   Procedure Laterality Date   • AUGMENTATION MAMMAPLASTY     • BREAST BIOPSY     •  "CHOLECYSTECTOMY         Social History     Occupational History   • PRINCIPAL      Social History Main Topics   • Smoking status: Former Smoker     Quit date: 6/27/1966   • Smokeless tobacco: Never Used   • Alcohol use 1.8 oz/week     3 Glasses of wine per week      Comment: occasional   • Drug use: No   • Sexual activity: Yes     Partners: Male     Birth control/ protection: None      Social History     Social History Narrative       Family History   Problem Relation Age of Onset   • Hypertension Mother    • Hypertension Father    • Stroke Paternal Aunt        Review of Systems:      Constitutional: Denies fever, shaking or chills   Eyes: Denies change in visual acuity   HEENT: Denies nasal congestion or sore throat   Respiratory: Denies cough or shortness of breath   Cardiovascular: Denies chest pain or edema  Endocrine: Denies tremors, palpitations, intolerance of heat or cold, polyuria, polydipsia.  GI: Denies abdominal pain, nausea, vomiting, bloody stools or diarrhea  : Denies frequency, urgency, incontinence, retention, or nocturia.  Musculoskeletal: Denies numbness tingling or loss of motor function except as above  Integument: Denies rash, lesion or ulceration   Neurologic: Denies headache or focal weakness, deficits  Heme: Denies epistaxis, spontaneous or excessive bleeding, epistaxis, hematuria, melena, fatigue, enlarged or tender lymph nodes.      All other pertinent positives and negatives as noted above in HPI.    Physical Exam: 71 y.o. female  Vitals:    06/30/17 1025   Temp: 97.8 °F (36.6 °C)   Weight: 220 lb (99.8 kg)   Height: 67\" (170.2 cm)       General:  Patient is awake and alert.  Appears in no acute distress or discomfort.    Psych:  Affect and demeanor are appropriate.    Eyes:  Conjunctiva and sclera appear grossly normal.  Eyes track well and EOM seem to be intact.    Ears:  No gross abnormalities.  Hearing adequate for the exam.    Cardiovascular:  Regular rate and rhythm.    Lungs:  " Good chest expansion.  Breathing unlabored.    Spine:  Back appears grossly normal.  No palpable masses or adenopathy.  Good motion.  Straight leg raise and crossed straight leg raise manuever are both negative for lower leg and/or knee pain.    Extremities:  Skin is benign.  No obvious gross abnormalities about right knee.  No palpable masses or adenopathy.  Moderate tenderness noted over medial joint line.  Moderate tenderness over the pes bursa.  Motion is full and symmetric to the contralateral side.  No instability.  Strength is well preserved including hip flexion, knee extension, ankle and toe plantarflexion, ankle inversion and eversion.  Good sensory function throughout the leg and foot.  Palpable pulses.  Brisk cap refill.  Good skin turgor.         Radiology:  Outside AP and lateral views of the right knee are reviewed to evaluate the patient's complaint.  No comparison films are immediately available.  The x-rays show moderate degenerative arthritis including joint space narrowing, osteophyte formation, and subchondral sclerosis.  The majority of the degenerative changes appear to involve the medial compartment.    Assessment/Plan:  Right knee osteoarthritis with associated pes bursitis    We discussed treatment options in detail including the risks, benefits, and alternatives of conservative treatment versus surgical options.  Regarding conservative treatment, we discussed appropriate activity modifications, anti-inflammatories, injections (including both corticosteroids and viscosupplementation), and physical therapy.  We also discussed the option of an arthroplasty and all that would entail.  I have recommended that we start with a conservative approach and the patient agrees.    The patient has acknowledged understanding of the information and elected for injections of both the knee and pes bursa.  The risks, benefits, and alternatives were discussed and she consented.  Going forward, I will release  her to follow-up with me as needed.  If her symptoms persist or recur, I told her that I will be happy to see her back at any point.    Large Joint Arthrocentesis  Date/Time: 6/30/2017 3:18 PM  Consent given by: patient  Site marked: site marked  Timeout: Immediately prior to procedure a time out was called to verify the correct patient, procedure, equipment, support staff and site/side marked as required   Supporting Documentation  Indications: pain and joint swelling   Procedure Details  Location: knee - R knee  Preparation: Patient was prepped and draped in the usual sterile fashion  Needle size: 25 G (21 G)  Approach: anterolateral  Medications administered: 2 mL lidocaine 1 %; 160 mg methylPREDNISolone acetate 80 MG/ML; 2 mL bupivacaine  Patient tolerance: patient tolerated the procedure well with no immediate complications    Medium Joint Arthrocentesis  Date/Time: 6/30/2017 3:19 PM  Consent given by: patient  Site marked: site marked  Timeout: Immediately prior to procedure a time out was called to verify the correct patient, procedure, equipment, support staff and site/side marked as required   Supporting Documentation  Indications: pain   Procedure Details  Location: Right knee pes bursa.  Preparation: Patient was prepped and draped in the usual sterile fashion  Needle size: 25 G  Approach: anteromedial  Medications administered: 1 mL lidocaine 1 %; 80 mg methylPREDNISolone acetate 80 MG/ML; 1 mL bupivacaine  Aspirate amount: 20 mL  Aspirate: bloody  Patient tolerance: patient tolerated the procedure well with no immediate complications          Clarence Suarez MD    06/30/2017    CC to Sri Conner MD

## 2017-07-14 ENCOUNTER — TELEPHONE (OUTPATIENT)
Dept: ORTHOPEDIC SURGERY | Facility: CLINIC | Age: 72
End: 2017-07-14

## 2017-07-14 DIAGNOSIS — G89.29 CHRONIC PAIN OF RIGHT KNEE: Primary | ICD-10-CM

## 2017-07-14 DIAGNOSIS — M25.561 CHRONIC PAIN OF RIGHT KNEE: Primary | ICD-10-CM

## 2017-07-21 ENCOUNTER — HOSPITAL ENCOUNTER (OUTPATIENT)
Dept: MRI IMAGING | Facility: HOSPITAL | Age: 72
Discharge: HOME | End: 2017-07-21
Attending: ORTHOPAEDIC SURGERY | Admitting: ORTHOPAEDIC SURGERY

## 2017-07-21 DIAGNOSIS — G89.29 CHRONIC PAIN OF RIGHT KNEE: ICD-10-CM

## 2017-07-21 DIAGNOSIS — M25.561 CHRONIC PAIN OF RIGHT KNEE: ICD-10-CM

## 2017-07-21 PROCEDURE — 73721 MRI JNT OF LWR EXTRE W/O DYE: CPT

## 2017-07-24 ENCOUNTER — HOSPITAL ENCOUNTER (OUTPATIENT)
Dept: MAMMOGRAPHY | Facility: HOSPITAL | Age: 72
Discharge: HOME | End: 2017-07-24
Attending: OBSTETRICS & GYNECOLOGY | Admitting: OBSTETRICS & GYNECOLOGY

## 2017-07-24 DIAGNOSIS — Z12.31 VISIT FOR SCREENING MAMMOGRAM: ICD-10-CM

## 2017-07-24 PROCEDURE — G0202 SCR MAMMO BI INCL CAD: HCPCS

## 2017-07-24 PROCEDURE — 77063 BREAST TOMOSYNTHESIS BI: CPT

## 2017-07-25 ENCOUNTER — TELEPHONE (OUTPATIENT)
Dept: ORTHOPEDIC SURGERY | Facility: CLINIC | Age: 72
End: 2017-07-25

## 2017-07-26 ENCOUNTER — TELEPHONE (OUTPATIENT)
Dept: OBSTETRICS AND GYNECOLOGY | Facility: CLINIC | Age: 72
End: 2017-07-26

## 2017-07-26 NOTE — TELEPHONE ENCOUNTER
----- Message from Jarrod Vasquez MD sent at 7/26/2017 11:00 AM EDT -----  Call patient: Normal mammogram

## 2017-07-27 ENCOUNTER — TELEPHONE (OUTPATIENT)
Dept: ORTHOPEDIC SURGERY | Facility: CLINIC | Age: 72
End: 2017-07-27

## 2017-07-27 NOTE — TELEPHONE ENCOUNTER
I spoke with her and answered all questions.  She tells me that her pain is almost resolved at this point.  We went over the results of her MRI including the stress fracture.  She will follow up as needed.

## 2017-08-14 ENCOUNTER — TELEPHONE (OUTPATIENT)
Dept: ORTHOPEDIC SURGERY | Facility: CLINIC | Age: 72
End: 2017-08-14

## 2017-10-05 ENCOUNTER — TRANSCRIBE ORDERS (OUTPATIENT)
Dept: ADMINISTRATIVE | Facility: HOSPITAL | Age: 72
End: 2017-10-05

## 2017-10-05 DIAGNOSIS — Z13.820 OSTEOPOROSIS SCREENING: ICD-10-CM

## 2017-10-05 DIAGNOSIS — R00.2 PALPITATIONS: ICD-10-CM

## 2017-10-05 DIAGNOSIS — Z78.0 POSTMENOPAUSAL STATE: ICD-10-CM

## 2017-10-05 DIAGNOSIS — Z12.39 BREAST SCREENING: Primary | ICD-10-CM

## 2017-10-09 ENCOUNTER — HOSPITAL ENCOUNTER (OUTPATIENT)
Dept: CARDIOLOGY | Facility: HOSPITAL | Age: 72
Discharge: HOME | End: 2017-10-09

## 2017-10-09 ENCOUNTER — HOSPITAL ENCOUNTER (OUTPATIENT)
Dept: CARDIOLOGY | Facility: HOSPITAL | Age: 72
Discharge: HOME | End: 2017-10-09
Admitting: INTERNAL MEDICINE

## 2017-10-09 VITALS — WEIGHT: 220 LBS | HEIGHT: 67 IN | BODY MASS INDEX: 34.53 KG/M2

## 2017-10-09 DIAGNOSIS — R00.2 PALPITATIONS: ICD-10-CM

## 2017-10-09 LAB
ASCENDING AORTA: 3.3 CM
BH CV ECHO MEAS - ACS: 1.9 CM
BH CV ECHO MEAS - AO MAX PG (FULL): 0.6 MMHG
BH CV ECHO MEAS - AO MAX PG: 6.6 MMHG
BH CV ECHO MEAS - AO MEAN PG (FULL): 0 MMHG
BH CV ECHO MEAS - AO MEAN PG: 3 MMHG
BH CV ECHO MEAS - AO ROOT AREA (BSA CORRECTED): 1.5
BH CV ECHO MEAS - AO ROOT AREA: 8 CM^2
BH CV ECHO MEAS - AO ROOT DIAM: 3.2 CM
BH CV ECHO MEAS - AO V2 MAX: 128 CM/SEC
BH CV ECHO MEAS - AO V2 MEAN: 83.9 CM/SEC
BH CV ECHO MEAS - AO V2 VTI: 28.9 CM
BH CV ECHO MEAS - ASC AORTA: 3.3 CM
BH CV ECHO MEAS - AVA(I,A): 3.3 CM^2
BH CV ECHO MEAS - AVA(I,D): 3.3 CM^2
BH CV ECHO MEAS - AVA(V,A): 3.3 CM^2
BH CV ECHO MEAS - AVA(V,D): 3.3 CM^2
BH CV ECHO MEAS - BSA(HAYCOCK): 2.2 M^2
BH CV ECHO MEAS - BSA: 2.1 M^2
BH CV ECHO MEAS - BZI_BMI: 34.5 KILOGRAMS/M^2
BH CV ECHO MEAS - BZI_METRIC_HEIGHT: 170.2 CM
BH CV ECHO MEAS - BZI_METRIC_WEIGHT: 99.8 KG
BH CV ECHO MEAS - CONTRAST EF (2CH): 55.5 ML/M^2
BH CV ECHO MEAS - CONTRAST EF 4CH: 63.7 ML/M^2
BH CV ECHO MEAS - EDV(CUBED): 103.8 ML
BH CV ECHO MEAS - EDV(MOD-SP2): 137 ML
BH CV ECHO MEAS - EDV(MOD-SP4): 124 ML
BH CV ECHO MEAS - EDV(TEICH): 102.4 ML
BH CV ECHO MEAS - EF(CUBED): 62.1 %
BH CV ECHO MEAS - EF(MOD-SP2): 55.5 %
BH CV ECHO MEAS - EF(MOD-SP4): 63.7 %
BH CV ECHO MEAS - EF(TEICH): 53.7 %
BH CV ECHO MEAS - ESV(CUBED): 39.3 ML
BH CV ECHO MEAS - ESV(MOD-SP2): 61 ML
BH CV ECHO MEAS - ESV(MOD-SP4): 45 ML
BH CV ECHO MEAS - ESV(TEICH): 47.4 ML
BH CV ECHO MEAS - FS: 27.7 %
BH CV ECHO MEAS - IVS/LVPW: 1.1
BH CV ECHO MEAS - IVSD: 1 CM
BH CV ECHO MEAS - LAT PEAK E' VEL: 7 CM/SEC
BH CV ECHO MEAS - LV DIASTOLIC VOL/BSA (35-75): 58.9 ML/M^2
BH CV ECHO MEAS - LV MASS(C)D: 153.4 GRAMS
BH CV ECHO MEAS - LV MASS(C)DI: 72.9 GRAMS/M^2
BH CV ECHO MEAS - LV MAX PG: 6 MMHG
BH CV ECHO MEAS - LV MEAN PG: 3 MMHG
BH CV ECHO MEAS - LV SYSTOLIC VOL/BSA (12-30): 21.4 ML/M^2
BH CV ECHO MEAS - LV V1 MAX: 122 CM/SEC
BH CV ECHO MEAS - LV V1 MEAN: 77.8 CM/SEC
BH CV ECHO MEAS - LV V1 VTI: 27.5 CM
BH CV ECHO MEAS - LVIDD: 4.7 CM
BH CV ECHO MEAS - LVIDS: 3.4 CM
BH CV ECHO MEAS - LVLD AP2: 8.2 CM
BH CV ECHO MEAS - LVLD AP4: 8.2 CM
BH CV ECHO MEAS - LVLS AP2: 6.8 CM
BH CV ECHO MEAS - LVLS AP4: 6.9 CM
BH CV ECHO MEAS - LVOT AREA (M): 3.5 CM^2
BH CV ECHO MEAS - LVOT AREA: 3.5 CM^2
BH CV ECHO MEAS - LVOT DIAM: 2.1 CM
BH CV ECHO MEAS - LVPWD: 0.9 CM
BH CV ECHO MEAS - MED PEAK E' VEL: 8 CM/SEC
BH CV ECHO MEAS - MR MAX PG: 25.6 MMHG
BH CV ECHO MEAS - MR MAX VEL: 253 CM/SEC
BH CV ECHO MEAS - MV A DUR: 0.15 SEC
BH CV ECHO MEAS - MV A MAX VEL: 96.1 CM/SEC
BH CV ECHO MEAS - MV DEC SLOPE: 368 CM/SEC^2
BH CV ECHO MEAS - MV DEC TIME: 0.26 SEC
BH CV ECHO MEAS - MV E MAX VEL: 74.7 CM/SEC
BH CV ECHO MEAS - MV E/A: 0.78
BH CV ECHO MEAS - MV MAX PG: 6.2 MMHG
BH CV ECHO MEAS - MV MEAN PG: 2 MMHG
BH CV ECHO MEAS - MV P1/2T MAX VEL: 90.8 CM/SEC
BH CV ECHO MEAS - MV P1/2T: 72.3 MSEC
BH CV ECHO MEAS - MV V2 MAX: 124 CM/SEC
BH CV ECHO MEAS - MV V2 MEAN: 57.4 CM/SEC
BH CV ECHO MEAS - MV V2 VTI: 29 CM
BH CV ECHO MEAS - MVA P1/2T LCG: 2.4 CM^2
BH CV ECHO MEAS - MVA(P1/2T): 3 CM^2
BH CV ECHO MEAS - MVA(VTI): 3.3 CM^2
BH CV ECHO MEAS - PA ACC TIME: 0.1 SEC
BH CV ECHO MEAS - PA MAX PG (FULL): 2.3 MMHG
BH CV ECHO MEAS - PA MAX PG: 3.8 MMHG
BH CV ECHO MEAS - PA PR(ACCEL): 36.3 MMHG
BH CV ECHO MEAS - PA V2 MAX: 97.7 CM/SEC
BH CV ECHO MEAS - PULM A REVS DUR: 0.19 SEC
BH CV ECHO MEAS - PULM A REVS VEL: 34.4 CM/SEC
BH CV ECHO MEAS - PULM DIAS VEL: 42.2 CM/SEC
BH CV ECHO MEAS - PULM S/D: 1.3
BH CV ECHO MEAS - PULM SYS VEL: 56 CM/SEC
BH CV ECHO MEAS - PVA(V,A): 2 CM^2
BH CV ECHO MEAS - PVA(V,D): 2 CM^2
BH CV ECHO MEAS - QP/QS: 0.56
BH CV ECHO MEAS - RAP SYSTOLE: 3 MMHG
BH CV ECHO MEAS - RV MAX PG: 1.5 MMHG
BH CV ECHO MEAS - RV MEAN PG: 1 MMHG
BH CV ECHO MEAS - RV V1 MAX: 61.5 CM/SEC
BH CV ECHO MEAS - RV V1 MEAN: 41 CM/SEC
BH CV ECHO MEAS - RV V1 VTI: 16.9 CM
BH CV ECHO MEAS - RVOT AREA: 3.1 CM^2
BH CV ECHO MEAS - RVOT DIAM: 2 CM
BH CV ECHO MEAS - RVSP: 16.2 MMHG
BH CV ECHO MEAS - SI(AO): 110.4 ML/M^2
BH CV ECHO MEAS - SI(CUBED): 30.6 ML/M^2
BH CV ECHO MEAS - SI(LVOT): 45.2 ML/M^2
BH CV ECHO MEAS - SI(MOD-SP2): 36.1 ML/M^2
BH CV ECHO MEAS - SI(MOD-SP4): 37.5 ML/M^2
BH CV ECHO MEAS - SI(TEICH): 26.1 ML/M^2
BH CV ECHO MEAS - SUP REN AO DIAM: 1.8 CM
BH CV ECHO MEAS - SV(AO): 232.4 ML
BH CV ECHO MEAS - SV(CUBED): 64.5 ML
BH CV ECHO MEAS - SV(LVOT): 95.2 ML
BH CV ECHO MEAS - SV(MOD-SP2): 76 ML
BH CV ECHO MEAS - SV(MOD-SP4): 79 ML
BH CV ECHO MEAS - SV(RVOT): 53.1 ML
BH CV ECHO MEAS - SV(TEICH): 54.9 ML
BH CV ECHO MEAS - TAPSE (>1.6): 2.4 CM2
BH CV ECHO MEAS - TR MAX VEL: 182 CM/SEC
BH CV VAS BP RIGHT ARM: NORMAL MMHG
BH CV XLRA - RV BASE: 2.1 CM
BH CV XLRA - TDI S': 13 CM/SEC
E/E' RATIO: 11
LEFT ATRIUM VOLUME INDEX: 17 ML/M2

## 2017-10-09 PROCEDURE — 93226 XTRNL ECG REC<48 HR SCAN A/R: CPT

## 2017-10-09 PROCEDURE — 25010000002 PERFLUTREN (DEFINITY) 8.476 MG IN SODIUM CHLORIDE 0.9 % 10 ML INJECTION: Performed by: INTERNAL MEDICINE

## 2017-10-09 PROCEDURE — 93225 XTRNL ECG REC<48 HRS REC: CPT

## 2017-10-09 PROCEDURE — 93306 TTE W/DOPPLER COMPLETE: CPT

## 2017-10-09 PROCEDURE — 93306 TTE W/DOPPLER COMPLETE: CPT | Performed by: INTERNAL MEDICINE

## 2017-10-09 RX ADMIN — SODIUM CHLORIDE 3 ML: 9 INJECTION INTRAMUSCULAR; INTRAVENOUS; SUBCUTANEOUS at 10:50

## 2017-10-12 PROCEDURE — 93227 XTRNL ECG REC<48 HR R&I: CPT | Performed by: INTERNAL MEDICINE

## 2017-11-06 ENCOUNTER — HOSPITAL ENCOUNTER (OUTPATIENT)
Dept: BONE DENSITY | Facility: HOSPITAL | Age: 72
Discharge: HOME | End: 2017-11-06
Admitting: INTERNAL MEDICINE

## 2017-11-06 DIAGNOSIS — Z13.820 OSTEOPOROSIS SCREENING: ICD-10-CM

## 2017-11-06 DIAGNOSIS — Z78.0 POSTMENOPAUSAL STATE: ICD-10-CM

## 2017-11-06 PROCEDURE — 77080 DXA BONE DENSITY AXIAL: CPT

## 2017-11-16 ENCOUNTER — OFFICE VISIT (OUTPATIENT)
Dept: OBSTETRICS AND GYNECOLOGY | Facility: CLINIC | Age: 72
End: 2017-11-16

## 2017-11-16 VITALS
BODY MASS INDEX: 33.59 KG/M2 | SYSTOLIC BLOOD PRESSURE: 138 MMHG | WEIGHT: 214 LBS | HEIGHT: 67 IN | DIASTOLIC BLOOD PRESSURE: 70 MMHG

## 2017-11-16 DIAGNOSIS — Z01.419 WELL WOMAN EXAM WITH ROUTINE GYNECOLOGICAL EXAM: Primary | ICD-10-CM

## 2017-11-16 PROCEDURE — 99397 PER PM REEVAL EST PAT 65+ YR: CPT | Performed by: OBSTETRICS & GYNECOLOGY

## 2018-01-26 ENCOUNTER — HOSPITAL ENCOUNTER (OUTPATIENT)
Dept: GENERAL RADIOLOGY | Facility: HOSPITAL | Age: 73
Discharge: HOME | End: 2018-01-26
Admitting: INTERNAL MEDICINE

## 2018-01-26 ENCOUNTER — HOSPITAL ENCOUNTER (OUTPATIENT)
Dept: GENERAL RADIOLOGY | Facility: HOSPITAL | Age: 73
Discharge: HOME | End: 2018-01-26

## 2018-01-26 DIAGNOSIS — S93.401A SPRAIN OF LIGAMENT OF RIGHT ANKLE: ICD-10-CM

## 2018-01-26 PROCEDURE — 73610 X-RAY EXAM OF ANKLE: CPT

## 2018-01-26 PROCEDURE — 73630 X-RAY EXAM OF FOOT: CPT

## 2018-05-11 ENCOUNTER — TELEPHONE (OUTPATIENT)
Dept: ORTHOPEDIC SURGERY | Facility: CLINIC | Age: 73
End: 2018-05-11

## 2018-11-07 ENCOUNTER — PREP FOR SURGERY (OUTPATIENT)
Dept: OTHER | Facility: HOSPITAL | Age: 73
End: 2018-11-07

## 2018-11-07 DIAGNOSIS — K21.9 GASTROESOPHAGEAL REFLUX DISEASE, ESOPHAGITIS PRESENCE NOT SPECIFIED: ICD-10-CM

## 2018-11-07 DIAGNOSIS — Z12.11 SCREEN FOR COLON CANCER: Primary | ICD-10-CM

## 2018-11-13 ENCOUNTER — PREP FOR SURGERY (OUTPATIENT)
Dept: OTHER | Facility: HOSPITAL | Age: 73
End: 2018-11-13

## 2018-11-13 DIAGNOSIS — K21.9 GASTROESOPHAGEAL REFLUX DISEASE, ESOPHAGITIS PRESENCE NOT SPECIFIED: ICD-10-CM

## 2018-11-13 DIAGNOSIS — Z12.11 SCREEN FOR COLON CANCER: Primary | ICD-10-CM

## 2018-12-04 ENCOUNTER — TELEPHONE (OUTPATIENT)
Dept: GASTROENTEROLOGY | Facility: CLINIC | Age: 73
End: 2018-12-04

## 2018-12-04 NOTE — TELEPHONE ENCOUNTER
Spoke with Kezia at Dr Conner's office regarding Dr Dooley's request for patient to hold her Xarelto for 48 hours prior to her colonoscopy scheduled 12/14/18.  Kezia will check with Dr Conner and fax a medical clearance.

## 2018-12-04 NOTE — TELEPHONE ENCOUNTER
----- Message from Jose Daniel PERDOMO MD sent at 11/8/2018  5:36 PM EST -----  Regarding: RE: OA PAPERWORK  Okay for open access colonoscopy (off Xarelto ×48 hours if okayed by prescribing M.D.)  ----- Message -----  From: Paz Golden RN  Sent: 11/8/2018   9:47 AM  To: Jose Daniel PERDOMO MD  Subject: RE: OA PAPERWORK                                 Spoke with patient and she stated that her GERD is better and she does not feel that she needs an EGD, just a colonoscopy.  Please note:  Patient takes Xarelto daily.  ----- Message -----  From: Jose Daniel Dooley MD  Sent: 11/7/2018   4:35 PM  To: Paz Golden RN  Subject: RE: OA PAPERWORK                                 Okay for open access colonoscopy, patient lists reflux as a current problem but not on any medication to treat this.  Would inquire as to whether she has been treated in the past.  If so and still symptomatic, can add EGD.  ----- Message -----  From: Paz Golden RN  Sent: 11/7/2018   9:02 AM  To: Jose Daniel PERDOMO MD  Subject: OA PAPERWORK                                     Open Access/Recall paperwork scanned in under the media tab.  Please review and return to Paz.

## 2018-12-10 NOTE — TELEPHONE ENCOUNTER
Received surgical clearance letter from Dr Conner.  Informed patient that Dr Dooley recommends that she hold her Xarelto for 2 days prior to her scheduled scope on 12/14/18(and this was okayed by Dr Conner).  Patient voiced understanding.

## 2018-12-14 ENCOUNTER — HOSPITAL ENCOUNTER (OUTPATIENT)
Facility: HOSPITAL | Age: 73
Setting detail: HOSPITAL OUTPATIENT SURGERY
Discharge: HOME | End: 2018-12-14
Attending: INTERNAL MEDICINE | Admitting: INTERNAL MEDICINE

## 2018-12-14 ENCOUNTER — ANESTHESIA (OUTPATIENT)
Dept: GASTROENTEROLOGY | Facility: HOSPITAL | Age: 73
End: 2018-12-14

## 2018-12-14 ENCOUNTER — ANESTHESIA EVENT (OUTPATIENT)
Dept: GASTROENTEROLOGY | Facility: HOSPITAL | Age: 73
End: 2018-12-14

## 2018-12-14 VITALS
TEMPERATURE: 98.1 F | HEIGHT: 67 IN | DIASTOLIC BLOOD PRESSURE: 80 MMHG | SYSTOLIC BLOOD PRESSURE: 125 MMHG | WEIGHT: 228.4 LBS | OXYGEN SATURATION: 96 % | RESPIRATION RATE: 18 BRPM | BODY MASS INDEX: 35.85 KG/M2 | HEART RATE: 70 BPM

## 2018-12-14 DIAGNOSIS — K21.9 GASTROESOPHAGEAL REFLUX DISEASE, ESOPHAGITIS PRESENCE NOT SPECIFIED: ICD-10-CM

## 2018-12-14 DIAGNOSIS — Z12.11 SCREEN FOR COLON CANCER: ICD-10-CM

## 2018-12-14 PROCEDURE — 43239 EGD BIOPSY SINGLE/MULTIPLE: CPT | Performed by: INTERNAL MEDICINE

## 2018-12-14 PROCEDURE — 88305 TISSUE EXAM BY PATHOLOGIST: CPT | Performed by: INTERNAL MEDICINE

## 2018-12-14 PROCEDURE — 87081 CULTURE SCREEN ONLY: CPT | Performed by: INTERNAL MEDICINE

## 2018-12-14 PROCEDURE — 45380 COLONOSCOPY AND BIOPSY: CPT | Performed by: INTERNAL MEDICINE

## 2018-12-14 PROCEDURE — S0260 H&P FOR SURGERY: HCPCS | Performed by: INTERNAL MEDICINE

## 2018-12-14 PROCEDURE — 25010000002 PROPOFOL 10 MG/ML EMULSION: Performed by: ANESTHESIOLOGY

## 2018-12-14 RX ORDER — LIDOCAINE HYDROCHLORIDE 10 MG/ML
0.5 INJECTION, SOLUTION INFILTRATION; PERINEURAL ONCE AS NEEDED
Status: DISCONTINUED | OUTPATIENT
Start: 2018-12-14 | End: 2018-12-14 | Stop reason: HOSPADM

## 2018-12-14 RX ORDER — PROPOFOL 10 MG/ML
VIAL (ML) INTRAVENOUS AS NEEDED
Status: DISCONTINUED | OUTPATIENT
Start: 2018-12-14 | End: 2018-12-14 | Stop reason: SURG

## 2018-12-14 RX ORDER — SODIUM CHLORIDE, SODIUM LACTATE, POTASSIUM CHLORIDE, CALCIUM CHLORIDE 600; 310; 30; 20 MG/100ML; MG/100ML; MG/100ML; MG/100ML
1000 INJECTION, SOLUTION INTRAVENOUS CONTINUOUS
Status: DISCONTINUED | OUTPATIENT
Start: 2018-12-14 | End: 2018-12-14 | Stop reason: HOSPADM

## 2018-12-14 RX ORDER — LIDOCAINE HYDROCHLORIDE 20 MG/ML
INJECTION, SOLUTION INFILTRATION; PERINEURAL AS NEEDED
Status: DISCONTINUED | OUTPATIENT
Start: 2018-12-14 | End: 2018-12-14 | Stop reason: SURG

## 2018-12-14 RX ORDER — PROPOFOL 10 MG/ML
VIAL (ML) INTRAVENOUS CONTINUOUS PRN
Status: DISCONTINUED | OUTPATIENT
Start: 2018-12-14 | End: 2018-12-14 | Stop reason: SURG

## 2018-12-14 RX ORDER — SODIUM CHLORIDE 0.9 % (FLUSH) 0.9 %
3 SYRINGE (ML) INJECTION AS NEEDED
Status: DISCONTINUED | OUTPATIENT
Start: 2018-12-14 | End: 2018-12-14 | Stop reason: HOSPADM

## 2018-12-14 RX ADMIN — PROPOFOL 120 MG: 10 INJECTION, EMULSION INTRAVENOUS at 11:13

## 2018-12-14 RX ADMIN — LIDOCAINE HYDROCHLORIDE 60 MG: 20 INJECTION, SOLUTION INFILTRATION; PERINEURAL at 11:12

## 2018-12-14 RX ADMIN — SODIUM CHLORIDE, POTASSIUM CHLORIDE, SODIUM LACTATE AND CALCIUM CHLORIDE: 600; 310; 30; 20 INJECTION, SOLUTION INTRAVENOUS at 10:55

## 2018-12-14 RX ADMIN — PROPOFOL 160 MCG/KG/MIN: 10 INJECTION, EMULSION INTRAVENOUS at 11:13

## 2018-12-14 RX ADMIN — SODIUM CHLORIDE, POTASSIUM CHLORIDE, SODIUM LACTATE AND CALCIUM CHLORIDE 1000 ML: 600; 310; 30; 20 INJECTION, SOLUTION INTRAVENOUS at 10:29

## 2018-12-14 NOTE — ANESTHESIA PREPROCEDURE EVALUATION
Anesthesia Evaluation     NPO Solid Status: > 8 hours  NPO Liquid Status: > 8 hours           Airway   Mallampati: II  Dental      Pulmonary    (+) pulmonary embolism,   Cardiovascular - normal exam        Neuro/Psych  GI/Hepatic/Renal/Endo    (+)  GERD,      Musculoskeletal     Abdominal    Substance History      OB/GYN          Other                      Anesthesia Plan    ASA 3     MAC     intravenous induction   Anesthetic plan, all risks, benefits, and alternatives have been provided, discussed and informed consent has been obtained with: patient.

## 2018-12-14 NOTE — ANESTHESIA POSTPROCEDURE EVALUATION
Patient: Iris Yee    Procedure Summary     Date:  12/14/18 Room / Location:   [facility]    Anesthesia Start:  1107 Anesthesia Stop:  1147    Procedures:       COLONOSCOPY into cecum/termial ileum with polypectomy (N/A )      ESOPHAGOGASTRODUODENOSCOPY with biopsy (N/A Esophagus) Diagnosis:       Esophagitis      Hiatal hernia      Gastritis      Duodenitis      Diverticulosis      Tortuous colon      Colon polyps      Hemorrhoids      (Screen for colon cancer [Z12.11])      (Gastroesophageal reflux disease, esophagitis presence not specified [K21.9])    Surgeon:  Jose Daniel Dooley MD Provider:  Dayo Fuentes MD    Anesthesia Type:  MAC ASA Status:  3          Anesthesia Type: MAC  Last vitals  BP   125/80 (12/14/18 1205)   Temp   36.7 °C (98.1 °F) (12/14/18 1156)   Pulse   70 (12/14/18 1205)   Resp   18 (12/14/18 1205)     SpO2   96 % (12/14/18 1205)     Post Anesthesia Care and Evaluation      Comments: Patient discharged before being evaluated by an Anesthesiologist.  No apparent complications per the record.  THIS CASE WAS NOT MEDICALLY DIRECTED

## 2018-12-14 NOTE — H&P
"[facility]  Pre Procedure History & Physical    Chief Complaint:   GERD, screening for colorectal cancer    Subjective     HPI:   This 73-year-old female presents to the endoscopy suite for EGD and colonoscopy.  She's had previous colonoscopy dating back 10 years or more.  Her upper examination is on the basis of reflux.    Past Medical History:   Past Medical History:   Diagnosis Date   • Clotting disorder (CMS/HCC)    • GERD (gastroesophageal reflux disease)    • Hx of blood clots 2011   • Hypothyroid    • Ovarian cyst    • PONV (postoperative nausea and vomiting)    • Pulmonary embolism, bilateral (CMS/HCC)    • Thrombopenia (CMS/HCC)        Past Surgical History:  Past Surgical History:   Procedure Laterality Date   • AUGMENTATION MAMMAPLASTY     • BREAST BIOPSY     • CHOLECYSTECTOMY     • EXPLORATORY LAPAROTOMY      bleeding from ruptured ovarian cyst   • HERNIA REPAIR      umbilical x 2       Family History:  Family History   Problem Relation Age of Onset   • Hypertension Mother    • Hypertension Father    • Stroke Paternal Aunt        Social History:   reports that she quit smoking about 52 years ago. she has never used smokeless tobacco. She reports that she drinks about 1.8 oz of alcohol per week. She reports that she does not use drugs.    Medications:   Medications Prior to Admission   Medication Sig Dispense Refill Last Dose   • levothyroxine (SYNTHROID, LEVOTHROID) 100 MCG tablet    12/13/2018 at 2100   • XARELTO 20 MG tablet    12/12/2018 at 2100       Allergies:  Penicillins and Sulfur    ROS:    Pertinent items are noted in HPI, all other systems reviewed and negative     Objective     Blood pressure 169/95, pulse 80, temperature 98.1 °F (36.7 °C), temperature source Oral, resp. rate 16, height 170.2 cm (67\"), weight 104 kg (228 lb 6.4 oz), SpO2 96 %, not currently breastfeeding.    Physical Exam   Constitutional: Pt is oriented to person, place, and time and well-developed, " well-nourished, and in no distress.   Mouth/Throat: Oropharynx is clear and moist.   Neck: Normal range of motion.   Cardiovascular: Normal rate, regular rhythm and normal heart sounds.    Pulmonary/Chest: Effort normal and breath sounds normal.   Abdominal: Soft. Nontender  Skin: Skin is warm and dry.   Psychiatric: Mood, memory, affect and judgment normal.     Assessment/Plan     Diagnosis:  GERD  Screening for colorectal cancer    Anticipated Surgical Procedure:  EGD, colonoscopy    The risks, benefits, and alternatives of this procedure have been discussed with the patient or the responsible party- the patient understands and agrees to proceed.

## 2018-12-15 LAB — UREASE TISS QL: NEGATIVE

## 2018-12-17 LAB
CYTO UR: NORMAL
LAB AP CASE REPORT: NORMAL
PATH REPORT.FINAL DX SPEC: NORMAL
PATH REPORT.GROSS SPEC: NORMAL

## 2018-12-19 ENCOUNTER — TELEPHONE (OUTPATIENT)
Dept: GASTROENTEROLOGY | Facility: CLINIC | Age: 73
End: 2018-12-19

## 2018-12-19 RX ORDER — FLUCONAZOLE 100 MG/1
100 TABLET ORAL DAILY
Qty: 21 TABLET | Refills: 0 | Status: SHIPPED | OUTPATIENT
Start: 2018-12-19 | End: 2020-01-03

## 2018-12-19 NOTE — TELEPHONE ENCOUNTER
----- Message from Jose Daniel PERDOMO MD sent at 12/17/2018 12:40 PM EST -----  Regarding: Biopsy results  Okay to call results, recommend initiation of PPI or H2 blocker.  Would also treat with antifungal such as Diflucan 100 mg daily for 3 weeks.  Would offer follow-up colonoscopy in 5 years.  Office follow-up annually or sooner as needed.  ----- Message -----  From: Lab, Background User  Sent: 12/15/2018  10:55 AM  To: Jose Daniel PERDOMO MD

## 2018-12-19 NOTE — TELEPHONE ENCOUNTER
Called pt and advised per Dr Dooely that the duodenal bx was benign.  The stomach bx was also benign and was neg for h pylori.  The ge junction bx showed mod acute and chronic inflammation and was positive for candida .  The colon bx and colon polyp were both benign.      He recommends to initiate a ppi or h2 blocker.  Pt has started Tagamet.  Advised pt he will treat the fungus with diflucan 100mg po daily for 3 wks.  He recommends a repeat c/s in 5 yrs.  F/u yearly.  Pt verb understanding.    Diflucan sent to pt's pharmacy.     C/s placed in recall for 12/14/2023 and f/u placed in recall for 12/14/2019.

## 2019-10-09 ENCOUNTER — TELEPHONE (OUTPATIENT)
Dept: OBSTETRICS AND GYNECOLOGY | Age: 74
End: 2019-10-09

## 2019-10-09 ENCOUNTER — TRANSCRIBE ORDERS (OUTPATIENT)
Dept: ADMINISTRATIVE | Facility: HOSPITAL | Age: 74
End: 2019-10-09

## 2019-10-09 DIAGNOSIS — Z12.31 SCREENING MAMMOGRAM, ENCOUNTER FOR: Primary | ICD-10-CM

## 2019-11-04 ENCOUNTER — TRANSCRIBE ORDERS (OUTPATIENT)
Dept: ADMINISTRATIVE | Facility: HOSPITAL | Age: 74
End: 2019-11-04

## 2019-11-04 ENCOUNTER — HOSPITAL ENCOUNTER (OUTPATIENT)
Dept: GENERAL RADIOLOGY | Facility: HOSPITAL | Age: 74
Discharge: HOME | End: 2019-11-04
Admitting: INTERNAL MEDICINE

## 2019-11-04 ENCOUNTER — HOSPITAL ENCOUNTER (OUTPATIENT)
Dept: GENERAL RADIOLOGY | Facility: HOSPITAL | Age: 74
Discharge: HOME | End: 2019-11-04

## 2019-11-04 DIAGNOSIS — M40.00 ACQUIRED POSTURAL KYPHOSIS: ICD-10-CM

## 2019-11-04 DIAGNOSIS — Z78.0 POSTMENOPAUSAL: Primary | ICD-10-CM

## 2019-11-04 PROCEDURE — 72072 X-RAY EXAM THORAC SPINE 3VWS: CPT

## 2019-11-04 PROCEDURE — 72050 X-RAY EXAM NECK SPINE 4/5VWS: CPT

## 2019-12-10 ENCOUNTER — HOSPITAL ENCOUNTER (OUTPATIENT)
Dept: BONE DENSITY | Facility: HOSPITAL | Age: 74
Discharge: HOME | End: 2019-12-10

## 2019-12-10 ENCOUNTER — HOSPITAL ENCOUNTER (OUTPATIENT)
Dept: MAMMOGRAPHY | Facility: HOSPITAL | Age: 74
Discharge: HOME | End: 2019-12-10
Admitting: OBSTETRICS & GYNECOLOGY

## 2019-12-10 DIAGNOSIS — Z12.31 SCREENING MAMMOGRAM, ENCOUNTER FOR: ICD-10-CM

## 2019-12-10 DIAGNOSIS — M40.00 ACQUIRED POSTURAL KYPHOSIS: ICD-10-CM

## 2019-12-10 DIAGNOSIS — Z78.0 POSTMENOPAUSAL: ICD-10-CM

## 2019-12-10 PROCEDURE — 77063 BREAST TOMOSYNTHESIS BI: CPT

## 2019-12-10 PROCEDURE — 77067 SCR MAMMO BI INCL CAD: CPT

## 2019-12-10 PROCEDURE — 77080 DXA BONE DENSITY AXIAL: CPT

## 2019-12-11 ENCOUNTER — TELEPHONE (OUTPATIENT)
Dept: OBSTETRICS AND GYNECOLOGY | Age: 74
End: 2019-12-11

## 2019-12-11 NOTE — TELEPHONE ENCOUNTER
----- Message from Jarrod Vasquez MD sent at 12/11/2019  8:04 AM EST -----  Call patient: Normal mammogram

## 2020-01-03 ENCOUNTER — OFFICE VISIT (OUTPATIENT)
Dept: OBSTETRICS AND GYNECOLOGY | Age: 75
End: 2020-01-03

## 2020-01-03 VITALS
BODY MASS INDEX: 30.29 KG/M2 | HEIGHT: 67 IN | WEIGHT: 193 LBS | SYSTOLIC BLOOD PRESSURE: 136 MMHG | DIASTOLIC BLOOD PRESSURE: 82 MMHG

## 2020-01-03 DIAGNOSIS — M85.852 OSTEOPENIA OF BOTH HIPS: ICD-10-CM

## 2020-01-03 DIAGNOSIS — Z01.419 WELL FEMALE EXAM WITH ROUTINE GYNECOLOGICAL EXAM: Primary | ICD-10-CM

## 2020-01-03 DIAGNOSIS — M85.851 OSTEOPENIA OF BOTH HIPS: ICD-10-CM

## 2020-01-03 PROBLEM — Z12.11 SCREEN FOR COLON CANCER: Status: RESOLVED | Noted: 2018-11-13 | Resolved: 2020-01-03

## 2020-01-03 PROCEDURE — 99397 PER PM REEVAL EST PAT 65+ YR: CPT | Performed by: OBSTETRICS & GYNECOLOGY

## 2020-01-03 RX ORDER — LEVOTHYROXINE SODIUM 112 MCG
112 TABLET ORAL NIGHTLY
COMMUNITY
Start: 2019-10-27

## 2020-01-03 RX ORDER — ALENDRONATE SODIUM 70 MG/1
TABLET ORAL
COMMUNITY
Start: 2019-12-23 | End: 2020-01-03

## 2020-01-03 NOTE — PROGRESS NOTES
Routine Annual Visit    1/3/2020    Patient: Iris Yee          MR#:[id number]      History of Present Illness    Chief Complaint   Patient presents with   • Gynecologic Exam     Annual.  Last pap 16, ASCUS/hpv negative. Last mammo 12/10/19, negative.  Dexa 12/10/19. (Couldn't take Fosamax, unable to tolerate.)Colonoscopy .  Retired 2 yrs ago.        74 y.o. female  who presents for annual exam.    The patient presents for routine annual exam feeling well without major complaints.  She was prescribed Fosamax last year for DEXA scan showing fairly significant osteopenia.  She was unable to tolerate the medication because of jaw pain.  She has been vigilant about weightbearing exercise calcium and vitamin D intake.    No LMP recorded (lmp unknown). Patient is postmenopausal.  Obstetric History:  OB History        3    Para   3    Term   3            AB        Living   3       SAB        TAB        Ectopic        Molar        Multiple        Live Births   3               Menstrual History:     No LMP recorded (lmp unknown). Patient is postmenopausal.       Sexual History:       ________________________________________  Patient Active Problem List   Diagnosis   • History of pulmonary infarction   • Acquired hypothyroidism   • Pap smear abnormality of cervix with ASCUS favoring benign   • Postmenopausal bleeding   • Gastroesophageal reflux disease   • Osteopenia of both hips       Past Medical History:   Diagnosis Date   • Clotting disorder (CMS/HCC)    • GERD (gastroesophageal reflux disease)    • Hx of blood clots    • Hypothyroid    • Ovarian cyst    • PONV (postoperative nausea and vomiting)    • Pulmonary embolism, bilateral (CMS/HCC)    • Thrombopenia (CMS/HCC)        Past Surgical History:   Procedure Laterality Date   • AUGMENTATION MAMMAPLASTY     • BREAST BIOPSY     • CHOLECYSTECTOMY     • COLONOSCOPY N/A 2018    Procedure: COLONOSCOPY into cecum/termial ileum  "with polypectomy;  Surgeon: Jose Daniel Dooley MD;  Location: [facility] ENDOSCOPY;  Service: Gastroenterology   • ENDOSCOPY N/A 2018    Procedure: ESOPHAGOGASTRODUODENOSCOPY with biopsy;  Surgeon: Jose Daniel Dooley MD;  Location: [facility] ENDOSCOPY;  Service: Gastroenterology   • EXPLORATORY LAPAROTOMY      bleeding from ruptured ovarian cyst   • HERNIA REPAIR      umbilical x 2       Social History     Tobacco Use   Smoking Status Former Smoker   • Last attempt to quit: 1966   • Years since quittin.5   Smokeless Tobacco Never Used       Family History   Problem Relation Age of Onset   • Hypertension Mother    • Hypertension Father    • Stroke Paternal Aunt        Prior to Admission medications    Medication Sig Start Date End Date Taking? Authorizing Provider   SYNTHROID 112 MCG tablet Take 112 mcg by mouth Daily. 10/27/19  Yes Dany Brewster MD   XARELTO 20 MG tablet  16  Yes Dany Brewster MD   alendronate (FOSAMAX) 70 MG tablet TK 1 T PO ONCE A WEEK 12/23/19 1/3/20  Dany Brewster MD   fluconazole (DIFLUCAN) 100 MG tablet Take 1 tablet by mouth Daily. 12/19/18 1/3/20  Jose Daniel Dooley MD   levothyroxine (SYNTHROID, LEVOTHROID) 100 MCG tablet  5/13/16 1/3/20  Dany Brewster MD     ________________________________________    Current contraception: post menopausal status  History of abnormal Pap smear: no  Family history of uterine or ovarian cancer: no  Family History of colon cancer/colon polyps: no  History of abnormal mammogram: no  History of abnormal lipids: no    The following portions of the patient's history were reviewed and updated as appropriate: allergies, current medications, past family history, past medical history, past social history, past surgical history and problem list.    Review of Systems    Pertinent items are noted in HPI.     Objective   Physical Exam    /82   Ht 170.2 cm (67\")   Wt 87.5 kg (193 lb)   LMP  (LMP Unknown)   " "Breastfeeding No   BMI 30.23 kg/m²    BP Readings from Last 3 Encounters:   01/03/20 136/82   12/14/18 125/80   11/16/17 138/70      Wt Readings from Last 3 Encounters:   01/03/20 87.5 kg (193 lb)   12/14/18 104 kg (228 lb 6.4 oz)   11/16/17 97.1 kg (214 lb)        BMI: Estimated body mass index is 30.23 kg/m² as calculated from the following:    Height as of this encounter: 170.2 cm (67\").    Weight as of this encounter: 87.5 kg (193 lb).       General: alert, appears stated age and cooperative   Heart: regular rate and rhythm, S1, S2 normal, no murmur, click, rub or gallop   Lungs: clear to auscultation bilaterally   Abdomen: soft, non-tender, without masses, no organomegaly   Breast: inspection negative, no nipple discharge or bleeding, no masses or nodularity palpable   Vulva: normal and bartholin's, Urethra, [place]'s normal   Vagina: normal mucosa, normal discharge, atrophic appearance    Cervix: no lesions   Uterus: normal size   Adnexa: exam limited by habitus     As part of wellness and prevention, the following topics were discussed with the patient:     []  Nutrition  []  Physical activity/regular exercise   []  Healthy weight  []  Injury prevention  []  Smoking cessation  []  Substance misuse/abuse  []  Sexual behavior  []  STD prevention  []  Contaception  []  Dental health  []  Mental health  []  Immunization  [x]  Encouraged SBE        Assessment:    Iris was seen today for gynecologic exam.    Diagnoses and all orders for this visit:    Well female exam with routine gynecological exam    Osteopenia of both hips          Plan:  Return in about 2 years (around 1/3/2022) for Annual GYN exam.      Jarrod Vasquez MD  1/3/2020 9:09 AM  "

## 2020-12-14 ENCOUNTER — APPOINTMENT (OUTPATIENT)
Dept: CT IMAGING | Facility: HOSPITAL | Age: 75
End: 2020-12-14

## 2020-12-14 ENCOUNTER — HOSPITAL ENCOUNTER (EMERGENCY)
Facility: HOSPITAL | Age: 75
Discharge: HOME | End: 2020-12-14
Attending: EMERGENCY MEDICINE | Admitting: EMERGENCY MEDICINE

## 2020-12-14 VITALS
HEIGHT: 67 IN | BODY MASS INDEX: 31.39 KG/M2 | WEIGHT: 200 LBS | RESPIRATION RATE: 16 BRPM | OXYGEN SATURATION: 97 % | HEART RATE: 99 BPM | TEMPERATURE: 98.1 F | DIASTOLIC BLOOD PRESSURE: 72 MMHG | SYSTOLIC BLOOD PRESSURE: 151 MMHG

## 2020-12-14 DIAGNOSIS — K57.12 DIVERTICULITIS OF SMALL INTESTINE WITHOUT PERFORATION OR ABSCESS WITHOUT BLEEDING: Primary | ICD-10-CM

## 2020-12-14 LAB
ALBUMIN SERPL-MCNC: 3.9 G/DL (ref 3.5–5.2)
ALBUMIN/GLOB SERPL: 1.3 G/DL
ALP SERPL-CCNC: 91 U/L (ref 39–117)
ALT SERPL W P-5'-P-CCNC: 21 U/L (ref 1–33)
ANION GAP SERPL CALCULATED.3IONS-SCNC: 9.1 MMOL/L (ref 5–15)
AST SERPL-CCNC: 27 U/L (ref 1–32)
BACTERIA UR QL AUTO: ABNORMAL /HPF
BASOPHILS # BLD AUTO: 0.04 10*3/MM3 (ref 0–0.2)
BASOPHILS NFR BLD AUTO: 0.4 % (ref 0–1.5)
BILIRUB SERPL-MCNC: 0.9 MG/DL (ref 0–1.2)
BILIRUB UR QL STRIP: NEGATIVE
BUN SERPL-MCNC: 11 MG/DL (ref 8–23)
BUN/CREAT SERPL: 14.3 (ref 7–25)
CALCIUM SPEC-SCNC: 9 MG/DL (ref 8.6–10.5)
CHLORIDE SERPL-SCNC: 102 MMOL/L (ref 98–107)
CLARITY UR: CLEAR
CO2 SERPL-SCNC: 24.9 MMOL/L (ref 22–29)
COLOR UR: YELLOW
CREAT SERPL-MCNC: 0.77 MG/DL (ref 0.57–1)
DEPRECATED RDW RBC AUTO: 44.4 FL (ref 37–54)
EOSINOPHIL # BLD AUTO: 0.06 10*3/MM3 (ref 0–0.4)
EOSINOPHIL NFR BLD AUTO: 0.7 % (ref 0.3–6.2)
ERYTHROCYTE [DISTWIDTH] IN BLOOD BY AUTOMATED COUNT: 12.5 % (ref 12.3–15.4)
GFR SERPL CREATININE-BSD FRML MDRD: 73 ML/MIN/1.73
GLOBULIN UR ELPH-MCNC: 2.9 GM/DL
GLUCOSE SERPL-MCNC: 106 MG/DL (ref 65–99)
GLUCOSE UR STRIP-MCNC: NEGATIVE MG/DL
HCT VFR BLD AUTO: 40.7 % (ref 34–46.6)
HGB BLD-MCNC: 13.7 G/DL (ref 12–15.9)
HGB UR QL STRIP.AUTO: ABNORMAL
HOLD SPECIMEN: NORMAL
HOLD SPECIMEN: NORMAL
HYALINE CASTS UR QL AUTO: ABNORMAL /LPF
IMM GRANULOCYTES # BLD AUTO: 0.04 10*3/MM3 (ref 0–0.05)
IMM GRANULOCYTES NFR BLD AUTO: 0.4 % (ref 0–0.5)
KETONES UR QL STRIP: NEGATIVE
LEUKOCYTE ESTERASE UR QL STRIP.AUTO: ABNORMAL
LIPASE SERPL-CCNC: 18 U/L (ref 13–60)
LYMPHOCYTES # BLD AUTO: 1.04 10*3/MM3 (ref 0.7–3.1)
LYMPHOCYTES NFR BLD AUTO: 11.4 % (ref 19.6–45.3)
MCH RBC QN AUTO: 32.8 PG (ref 26.6–33)
MCHC RBC AUTO-ENTMCNC: 33.7 G/DL (ref 31.5–35.7)
MCV RBC AUTO: 97.4 FL (ref 79–97)
MONOCYTES # BLD AUTO: 0.92 10*3/MM3 (ref 0.1–0.9)
MONOCYTES NFR BLD AUTO: 10.1 % (ref 5–12)
NEUTROPHILS NFR BLD AUTO: 7.04 10*3/MM3 (ref 1.7–7)
NEUTROPHILS NFR BLD AUTO: 77 % (ref 42.7–76)
NITRITE UR QL STRIP: NEGATIVE
NRBC BLD AUTO-RTO: 0 /100 WBC (ref 0–0.2)
PH UR STRIP.AUTO: 6.5 [PH] (ref 5–8)
PLATELET # BLD AUTO: 181 10*3/MM3 (ref 140–450)
PMV BLD AUTO: 11.4 FL (ref 6–12)
POTASSIUM SERPL-SCNC: 4.1 MMOL/L (ref 3.5–5.2)
PROT SERPL-MCNC: 6.8 G/DL (ref 6–8.5)
PROT UR QL STRIP: NEGATIVE
RBC # BLD AUTO: 4.18 10*6/MM3 (ref 3.77–5.28)
RBC # UR: ABNORMAL /HPF
REF LAB TEST METHOD: ABNORMAL
SODIUM SERPL-SCNC: 136 MMOL/L (ref 136–145)
SP GR UR STRIP: 1.01 (ref 1–1.03)
SQUAMOUS #/AREA URNS HPF: ABNORMAL /HPF
UROBILINOGEN UR QL STRIP: ABNORMAL
WBC # BLD AUTO: 9.14 10*3/MM3 (ref 3.4–10.8)
WBC UR QL AUTO: ABNORMAL /HPF
WHOLE BLOOD HOLD SPECIMEN: NORMAL
WHOLE BLOOD HOLD SPECIMEN: NORMAL

## 2020-12-14 PROCEDURE — 81001 URINALYSIS AUTO W/SCOPE: CPT

## 2020-12-14 PROCEDURE — 74177 CT ABD & PELVIS W/CONTRAST: CPT

## 2020-12-14 PROCEDURE — 80053 COMPREHEN METABOLIC PANEL: CPT

## 2020-12-14 PROCEDURE — 25010000002 CEFTRIAXONE PER 250 MG: Performed by: EMERGENCY MEDICINE

## 2020-12-14 PROCEDURE — 83690 ASSAY OF LIPASE: CPT

## 2020-12-14 PROCEDURE — 96365 THER/PROPH/DIAG IV INF INIT: CPT

## 2020-12-14 PROCEDURE — 99283 EMERGENCY DEPT VISIT LOW MDM: CPT

## 2020-12-14 PROCEDURE — 25010000002 IOPAMIDOL 61 % SOLUTION: Performed by: EMERGENCY MEDICINE

## 2020-12-14 PROCEDURE — 85025 COMPLETE CBC W/AUTO DIFF WBC: CPT

## 2020-12-14 RX ORDER — CEFTRIAXONE SODIUM 1 G/50ML
1 INJECTION, SOLUTION INTRAVENOUS ONCE
Status: DISCONTINUED | OUTPATIENT
Start: 2020-12-14 | End: 2020-12-14

## 2020-12-14 RX ORDER — SODIUM CHLORIDE 0.9 % (FLUSH) 0.9 %
10 SYRINGE (ML) INJECTION AS NEEDED
Status: DISCONTINUED | OUTPATIENT
Start: 2020-12-14 | End: 2020-12-14 | Stop reason: HOSPADM

## 2020-12-14 RX ORDER — METRONIDAZOLE 500 MG/1
500 TABLET ORAL 3 TIMES DAILY
Qty: 21 TABLET | Refills: 0 | Status: SHIPPED | OUTPATIENT
Start: 2020-12-14 | End: 2021-06-14

## 2020-12-14 RX ORDER — ONDANSETRON 4 MG/1
4 TABLET, ORALLY DISINTEGRATING ORAL EVERY 6 HOURS PRN
Qty: 14 TABLET | Refills: 0 | Status: SHIPPED | OUTPATIENT
Start: 2020-12-14 | End: 2021-06-14

## 2020-12-14 RX ORDER — CIPROFLOXACIN 500 MG/1
500 TABLET, FILM COATED ORAL 2 TIMES DAILY
Qty: 20 TABLET | Refills: 0 | Status: SHIPPED | OUTPATIENT
Start: 2020-12-14 | End: 2021-06-14

## 2020-12-14 RX ORDER — CEFTRIAXONE SODIUM 1 G/50ML
1 INJECTION, SOLUTION INTRAVENOUS ONCE
Status: COMPLETED | OUTPATIENT
Start: 2020-12-14 | End: 2020-12-14

## 2020-12-14 RX ADMIN — CEFTRIAXONE SODIUM 1 G: 1 INJECTION, SOLUTION INTRAVENOUS at 14:27

## 2020-12-14 RX ADMIN — IOPAMIDOL 100 ML: 612 INJECTION, SOLUTION INTRAVENOUS at 13:57

## 2020-12-14 NOTE — DISCHARGE INSTRUCTIONS
Take medications as directed and follow-up with your family doctor.  Please return to the emergency department symptoms worsen with high fever, increasing pain or vomiting.  Drink clear liquids for the next 2 to 3 days and then slowly increase your diet.

## 2020-12-14 NOTE — ED TRIAGE NOTES
Right sided flank and abd pain started thurs night.  Nausea today.  Denies urinary problems.  Had 101 temp earlier today.  Did not take tyl    Patient was placed in face mask during first look triage.  Patient was wearing a face mask throughout encounter.  I wore personal protective equipment throughout the encounter.  Hand hygiene was performed before and after patient encounter.

## 2020-12-14 NOTE — ED PROVIDER NOTES
EMERGENCY DEPARTMENT ENCOUNTER  Patient was placed in face mask in first look and the following protective measures were taken unless additional measures were taken and documented below in the ED course. Patient was wearing facemask when I entered the room and throughout our encounter. I wore full protective equipment throughout this patient encounter including a face mask, and gloves. Hand hygiene was performed before donning protective equipment and after removal when leaving the room.    Room Number:  31/31  Date of encounter:  12/14/2020  PCP: Sri Conner MD    HPI:  Context: Iris Yee is a 75 y.o. female who presents to the ED c/o chief complaint of lower back pain radiating to her lower abdomen since Thursday.  Yesterday, patient developed epigastric pain that radiated to her lower back.  She states this occurred after eating and she been afraid to eat today secondary to the pain.  She describes the pain as a sharp pain with nausea.  She denies vomiting, diarrhea, constipation.  She has noted that her urine has been dark for the past couple days and developed a temperature of 101 today.  She states the pain is been constant and is worse with movement and is better lying still.  He denies chills, diaphoresis or any new exposure to COVID-19.    MEDICAL HISTORY REVIEW  Reviewed in EPIC    PAST MEDICAL HISTORY  Active Ambulatory Problems     Diagnosis Date Noted   • History of pulmonary infarction 06/27/2016   • Acquired hypothyroidism 06/27/2016   • Pap smear abnormality of cervix with ASCUS favoring benign 07/01/2016   • Postmenopausal bleeding 02/27/2017   • Gastroesophageal reflux disease 11/13/2018   • Osteopenia of both hips 01/03/2020     Resolved Ambulatory Problems     Diagnosis Date Noted   • Ankle pain 09/26/2016   • Achilles tendinitis of left lower extremity 09/26/2016   • Tendonitis, Achilles, left 12/15/2016   • Screen for colon cancer 11/13/2018     Past Medical History:   Diagnosis  Date   • Clotting disorder (CMS/HCC)    • GERD (gastroesophageal reflux disease)    • Hx of blood clots    • Hypothyroid    • Ovarian cyst    • PONV (postoperative nausea and vomiting)    • Pulmonary embolism, bilateral (CMS/HCC)    • Thrombopenia (CMS/HCC)        PAST SURGICAL HISTORY  Past Surgical History:   Procedure Laterality Date   • AUGMENTATION MAMMAPLASTY     • BREAST BIOPSY     • CHOLECYSTECTOMY     • COLONOSCOPY N/A 2018    Procedure: COLONOSCOPY into cecum/termial ileum with polypectomy;  Surgeon: Sandra Dooley MD;  Location: [facility] ENDOSCOPY;  Service: Gastroenterology   • ENDOSCOPY N/A 2018    Procedure: ESOPHAGOGASTRODUODENOSCOPY with biopsy;  Surgeon: Sandra Dooley MD;  Location: [facility] ENDOSCOPY;  Service: Gastroenterology   • EXPLORATORY LAPAROTOMY      bleeding from ruptured ovarian cyst   • HERNIA REPAIR      umbilical x 2       FAMILY HISTORY  Family History   Problem Relation Age of Onset   • Hypertension Mother    • Hypertension Father    • Stroke Paternal Aunt        SOCIAL HISTORY  Social History     Socioeconomic History   • Marital status:      Spouse name: SANDRA   • Number of children: 3   • Years of education: COLLEGE   • Highest education level: Not on file   Occupational History   • Occupation: RETIRED   Tobacco Use   • Smoking status: Former Smoker     Quit date: 1966     Years since quittin.5   • Smokeless tobacco: Never Used   Substance and Sexual Activity   • Alcohol use: Yes     Alcohol/week: 3.0 standard drinks     Types: 3 Glasses of wine per week     Comment: occasional   • Drug use: No   • Sexual activity: Yes     Partners: Male     Birth control/protection: Post-menopausal       ALLERGIES  Penicillins and Sulfur    The patient's allergies have been reviewed    REVIEW OF SYSTEMS  All systems reviewed and negative except for those discussed in HPI.     PHYSICAL EXAM  I have reviewed the triage vital signs and nursing  notes.  ED Triage Vitals   Temp Heart Rate Resp BP SpO2   12/14/20 1139 12/14/20 1139 12/14/20 1139 12/14/20 1203 12/14/20 1139   98.1 °F (36.7 °C) 99 16 136/80 94 %      Temp src Heart Rate Source Patient Position BP Location FiO2 (%)   12/14/20 1139 12/14/20 1139 -- -- --   Tympanic Monitor          General: Mild distress  HENT: NCAT, PERRL, Nares patent  Eyes: no scleral icterus  Neck: trachea midline, no ROM limitations  CV: regular rhythm, regular rate  Respiratory: normal effort, CTAB  Abdomen: NABS, soft, mild epigastric and right upper quadrant tenderness without rebound or CVA tenderness.  He does have mild right lower quadrant tenderness without rebound.  : deferred  Musculoskeletal: no deformity  Neuro: alert, moves all extremities, follows commands  Skin: warm, dry    LAB RESULTS  Recent Results (from the past 24 hour(s))   Comprehensive Metabolic Panel    Collection Time: 12/14/20 12:06 PM    Specimen: Blood   Result Value Ref Range    Glucose 106 (H) 65 - 99 mg/dL    BUN 11 8 - 23 mg/dL    Creatinine 0.77 0.57 - 1.00 mg/dL    Sodium 136 136 - 145 mmol/L    Potassium 4.1 3.5 - 5.2 mmol/L    Chloride 102 98 - 107 mmol/L    CO2 24.9 22.0 - 29.0 mmol/L    Calcium 9.0 8.6 - 10.5 mg/dL    Total Protein 6.8 6.0 - 8.5 g/dL    Albumin 3.90 3.50 - 5.20 g/dL    ALT (SGPT) 21 1 - 33 U/L    AST (SGOT) 27 1 - 32 U/L    Alkaline Phosphatase 91 39 - 117 U/L    Total Bilirubin 0.9 0.0 - 1.2 mg/dL    eGFR Non African Amer 73 >60 mL/min/1.73    Globulin 2.9 gm/dL    A/G Ratio 1.3 g/dL    BUN/Creatinine Ratio 14.3 7.0 - 25.0    Anion Gap 9.1 5.0 - 15.0 mmol/L   Lipase    Collection Time: 12/14/20 12:06 PM    Specimen: Blood   Result Value Ref Range    Lipase 18 13 - 60 U/L   Light Blue Top    Collection Time: 12/14/20 12:06 PM   Result Value Ref Range    Extra Tube hold for add-on    Green Top (Gel)    Collection Time: 12/14/20 12:06 PM   Result Value Ref Range    Extra Tube Hold for add-ons.    Lavender Top     Collection Time: 12/14/20 12:06 PM   Result Value Ref Range    Extra Tube hold for add-on    Gold Top - SST    Collection Time: 12/14/20 12:06 PM   Result Value Ref Range    Extra Tube Hold for add-ons.    CBC Auto Differential    Collection Time: 12/14/20 12:06 PM    Specimen: Blood   Result Value Ref Range    WBC 9.14 3.40 - 10.80 10*3/mm3    RBC 4.18 3.77 - 5.28 10*6/mm3    Hemoglobin 13.7 12.0 - 15.9 g/dL    Hematocrit 40.7 34.0 - 46.6 %    MCV 97.4 (H) 79.0 - 97.0 fL    MCH 32.8 26.6 - 33.0 pg    MCHC 33.7 31.5 - 35.7 g/dL    RDW 12.5 12.3 - 15.4 %    RDW-SD 44.4 37.0 - 54.0 fl    MPV 11.4 6.0 - 12.0 fL    Platelets 181 140 - 450 10*3/mm3    Neutrophil % 77.0 (H) 42.7 - 76.0 %    Lymphocyte % 11.4 (L) 19.6 - 45.3 %    Monocyte % 10.1 5.0 - 12.0 %    Eosinophil % 0.7 0.3 - 6.2 %    Basophil % 0.4 0.0 - 1.5 %    Immature Grans % 0.4 0.0 - 0.5 %    Neutrophils, Absolute 7.04 (H) 1.70 - 7.00 10*3/mm3    Lymphocytes, Absolute 1.04 0.70 - 3.10 10*3/mm3    Monocytes, Absolute 0.92 (H) 0.10 - 0.90 10*3/mm3    Eosinophils, Absolute 0.06 0.00 - 0.40 10*3/mm3    Basophils, Absolute 0.04 0.00 - 0.20 10*3/mm3    Immature Grans, Absolute 0.04 0.00 - 0.05 10*3/mm3    nRBC 0.0 0.0 - 0.2 /100 WBC   Urinalysis With Microscopic If Indicated (No Culture) - Urine, Clean Catch    Collection Time: 12/14/20 12:16 PM    Specimen: Urine, Clean Catch   Result Value Ref Range    Color, UA Yellow Yellow, Straw    Appearance, UA Clear Clear    pH, UA 6.5 5.0 - 8.0    Specific Gravity, UA 1.013 1.005 - 1.030    Glucose, UA Negative Negative    Ketones, UA Negative Negative    Bilirubin, UA Negative Negative    Blood, UA Small (1+) (A) Negative    Protein, UA Negative Negative    Leuk Esterase, UA Moderate (2+) (A) Negative    Nitrite, UA Negative Negative    Urobilinogen, UA 1.0 E.U./dL 0.2 - 1.0 E.U./dL   Urinalysis, Microscopic Only - Urine, Clean Catch    Collection Time: 12/14/20 12:16 PM    Specimen: Urine, Clean Catch   Result Value  Ref Range    RBC, UA 6-12 (A) None Seen, 0-2 /HPF    WBC, UA 21-30 (A) None Seen, 0-2 /HPF    Bacteria, UA None Seen None Seen /HPF    Squamous Epithelial Cells, UA 3-6 (A) None Seen, 0-2 /HPF    Hyaline Casts, UA 3-6 None Seen /LPF    Methodology Automated Microscopy        I ordered the above labs and reviewed the results.    RADIOLOGY  Ct Abdomen Pelvis With Contrast    Result Date: 12/14/2020  CT ABDOMEN AND PELVIS WITH CONTRAST  HISTORY: Right flank pain. Back pain.  TECHNIQUE: Axial CT images of the abdomen and pelvis were obtained following administration of intravenous contrast. The patient was not given oral contrast Coronal and sagittal reformats were obtained.  COMPARISON: None  FINDINGS: There is an acute inflammatory process seen within the bowel loops within the left midabdomen. There appears to be a diverticulum in this region which is thick walled and with mucosal hyperenhancement. Surrounding mesenteric stranding is identified. This is favored to represent an area of small bowel diverticulitis. A couple of additional small bowel diverticula identified. No evidence of bowel obstruction. Colonic diverticuli are present. Small amount of layering fluid is seen within the pelvis.  The liver demonstrates normal attenuation. Gallbladder is surgically absent. Small hypoattenuating caudate lobe lesion measuring 12 mm too small to accurately characterize however, favored to represent a cyst. Mild intra and extrahepatic biliary dilatation likely represents benign biliary ectasia. No diverticuli are identified. The pancreas is normal without ductal dilatation. The spleen is normal. Bilateral adrenal glands are normal. Both kidneys are normal in size and attenuation. Bilateral renal cortical and parapelvic cysts are present. The urinary bladder is partially distended and normal. The uterus is anteverted and normal. No abnormal adnexal mass. Partly imaged right breast implant.       Thick walled inflamed  diverticulum seen within the proximal small bowel suggestive of small bowel diverticulitis. Small ascites and mesenteric stranding is present.  These findings were discussed with Dr. Rajan by telephone at the time of dictation.  Radiation dose reduction techniques were utilized, including automated exposure control and exposure modulation based on body size.         I ordered the above noted radiological studies. I reviewed the images and results. I agree with the radiologist interpretation.    PROCEDURES  Procedures    MEDICATIONS GIVEN IN ER  Medications   iopamidol (ISOVUE-300) 61 % injection 100 mL (100 mL Intravenous Given by Other 12/14/20 1357)   cefTRIAXone (ROCEPHIN) IVPB 1 g (0 g Intravenous Stopped 12/14/20 0705)       PROGRESS, DATA ANALYSIS, CONSULTS, AND MEDICAL DECISION MAKING  A complete history and physical exam have been performed.  All available laboratory and imaging results have been reviewed by myself prior to disposition.    MDM  After the initial H&P, I discussed pertinent information from history and physical exam with patient/family.  Discussed differential diagnosis.  Discussed plan for ED evaluation/work-up/treatment.  All questions answered.  Patient/family is agreeable with plan.  ED Course as of Dec 14 2104   Mon Dec 14, 2020   1301 Patient does not want pain medications at this time.  Her urine does suggest a UTI.  However, she has no bacteria in her urine.  Her CBC is normal as are her liver enzymes.  We will obtain a CT abdomen pelvis with IV contrast to rule out colitis.    [DE]   1697 I discussed the case with Dr. Garcia, radiology.  Patient CT the abdomen pelvis shows a small bowel diverticulitis without perforation or abscess.  She reviewed a previous CTA of the abdomen pelvis which did show the small bowel diverticulosis in 2011.    [DE]   7596 I have updated patient on the results of her CAT scan and need for antibiotics with close follow-up.  Patient states she does not  want any pain medications at home.  We will discharge patient on antibiotics and nausea medications.    [DE]      ED Course User Index  [DE] Lon Rajan MD       AS OF 21:04 EST VITALS:    BP - 151/72  HR - 99  TEMP - 98.1 °F (36.7 °C) (Tympanic)  O2 SATS - 97%    DIAGNOSIS  Final diagnoses:   Diverticulitis of small intestine without perforation or abscess without bleeding         DISPOSITION    DISCHARGE    Patient discharged in stable condition.    Reviewed implications of results, diagnosis, meds, responsibility to follow up, warning signs and symptoms of possible worsening, potential complications and reasons to return to ER.    Patient/Family voiced understanding of above instructions.    Discussed plan for discharge, as there is no emergent indication for admission. Patient referred to primary care provider for BP management due to today's BP. Pt/family is agreeable and understands need for follow up and repeat testing.  Pt is aware that discharge does not mean that nothing is wrong but it indicates no emergency is present that requires admission and they must continue care with follow-up as given below or physician of their choice.     FOLLOW-UP  Sri Conner MD  [address] [id number] [phone]    Schedule an appointment as soon as possible for a visit           Medication List      New Prescriptions    ciprofloxacin 500 MG tablet  Commonly known as: CIPRO  Take 1 tablet by mouth 2 (Two) Times a Day.     metroNIDAZOLE 500 MG tablet  Commonly known as: FLAGYL  Take 1 tablet by mouth 3 (Three) Times a Day.     ondansetron ODT 4 MG disintegrating tablet  Commonly known as: ZOFRAN-ODT  Place 1 tablet on the tongue Every 6 (Six) Hours As Needed for Nausea.           Where to Get Your Medications      These medications were sent to [id number] - [place], [place] - [address]  AT [facility] OF [facility] &  - [phone] [facility] [phone]   [address] [facility] [address]    Phone: [phone]   · ciprofloxacin 500 MG tablet  · metroNIDAZOLE 500 MG tablet  · ondansetron ODT 4 MG disintegrating tablet          Lon Rajan MD  12/14/20 5432

## 2021-01-11 ENCOUNTER — OFFICE VISIT (OUTPATIENT)
Dept: ORTHOPEDIC SURGERY | Facility: CLINIC | Age: 76
End: 2021-01-11

## 2021-01-11 VITALS — WEIGHT: 205 LBS | HEIGHT: 66 IN | BODY MASS INDEX: 32.95 KG/M2 | TEMPERATURE: 97.2 F

## 2021-01-11 DIAGNOSIS — M17.10 ARTHRITIS OF KNEE: ICD-10-CM

## 2021-01-11 DIAGNOSIS — M25.562 LEFT KNEE PAIN, UNSPECIFIED CHRONICITY: Primary | ICD-10-CM

## 2021-01-11 PROCEDURE — 99203 OFFICE O/P NEW LOW 30 MIN: CPT | Performed by: ORTHOPAEDIC SURGERY

## 2021-01-11 PROCEDURE — 73562 X-RAY EXAM OF KNEE 3: CPT | Performed by: ORTHOPAEDIC SURGERY

## 2021-01-11 PROCEDURE — 20610 DRAIN/INJ JOINT/BURSA W/O US: CPT | Performed by: ORTHOPAEDIC SURGERY

## 2021-01-11 RX ORDER — METHYLPREDNISOLONE ACETATE 80 MG/ML
80 INJECTION, SUSPENSION INTRA-ARTICULAR; INTRALESIONAL; INTRAMUSCULAR; SOFT TISSUE
Status: COMPLETED | OUTPATIENT
Start: 2021-01-11 | End: 2021-01-11

## 2021-01-11 RX ADMIN — METHYLPREDNISOLONE ACETATE 80 MG: 80 INJECTION, SUSPENSION INTRA-ARTICULAR; INTRALESIONAL; INTRAMUSCULAR; SOFT TISSUE at 09:45

## 2021-01-11 NOTE — PROGRESS NOTES
Patient:Iris Yee    YOB: 1945    Medical Record Number:[id number]    Chief Complaints: New complaint of left knee pain    History of Present Illness:     75 y.o. female patient who presents for her left knee.  This is a new issue.  It started bothering her about 6 weeks ago without any discrete injury.  Her pain is mostly lateral and posterior.  She describes her pain is moderate, intermittent and aching.  Denies any mechanical catching or popping.  No locking.  Symptoms are worse with twisting maneuvers.  Rest and ice help somewhat.    Allergies:  Allergies   Allergen Reactions   • Penicillins Other (See Comments)     Passes out   • Sulfur Nausea And Vomiting     Sulpha       Home Medications:    Current Outpatient Medications:   •  ciprofloxacin (CIPRO) 500 MG tablet, Take 1 tablet by mouth 2 (Two) Times a Day., Disp: 20 tablet, Rfl: 0  •  metroNIDAZOLE (FLAGYL) 500 MG tablet, Take 1 tablet by mouth 3 (Three) Times a Day., Disp: 21 tablet, Rfl: 0  •  ondansetron ODT (ZOFRAN-ODT) 4 MG disintegrating tablet, Place 1 tablet on the tongue Every 6 (Six) Hours As Needed for Nausea., Disp: 14 tablet, Rfl: 0  •  SYNTHROID 112 MCG tablet, Take 112 mcg by mouth Daily., Disp: , Rfl:   •  XARELTO 20 MG tablet, , Disp: , Rfl:     Past Medical History:   Diagnosis Date   • Clotting disorder (CMS/HCC)    • GERD (gastroesophageal reflux disease)    • Hx of blood clots 2011   • Hypothyroid    • Ovarian cyst    • PONV (postoperative nausea and vomiting)    • Pulmonary embolism, bilateral (CMS/HCC)    • Thrombopenia (CMS/HCC)        Past Surgical History:   Procedure Laterality Date   • AUGMENTATION MAMMAPLASTY     • BREAST BIOPSY     • CHOLECYSTECTOMY     • COLONOSCOPY N/A 12/14/2018    Procedure: COLONOSCOPY into cecum/termial ileum with polypectomy;  Surgeon: Jose Daniel Dooley MD;  Location: [facility] ENDOSCOPY;  Service: Gastroenterology   • ENDOSCOPY N/A 12/14/2018    Procedure:  "ESOPHAGOGASTRODUODENOSCOPY with biopsy;  Surgeon: Jose Daniel Dooley MD;  Location: [facility] ENDOSCOPY;  Service: Gastroenterology   • EXPLORATORY LAPAROTOMY      bleeding from ruptured ovarian cyst   • HERNIA REPAIR      umbilical x 2       Social History     Occupational History   • Occupation: RETIRED   Tobacco Use   • Smoking status: Former Smoker     Quit date: 1966     Years since quittin.5   • Smokeless tobacco: Never Used   Substance and Sexual Activity   • Alcohol use: Yes     Alcohol/week: 3.0 standard drinks     Types: 3 Glasses of wine per week     Comment: occasional   • Drug use: No   • Sexual activity: Yes     Partners: Male     Birth control/protection: Post-menopausal      Social History     Social History Narrative   • Not on file       Family History   Problem Relation Age of Onset   • Hypertension Mother    • Hypertension Father    • Stroke Paternal Aunt        Review of Systems:      Constitutional: Denies fever, shaking or chills   Eyes: Denies change in visual acuity   HEENT: Denies nasal congestion or sore throat   Respiratory: Denies cough or shortness of breath   Cardiovascular: Denies chest pain or edema  Endocrine: Denies tremors, palpitations, intolerance of heat or cold, polyuria, polydipsia.  GI: Denies abdominal pain, nausea, vomiting, bloody stools or diarrhea  : Denies frequency, urgency, incontinence, retention, or nocturia.  Musculoskeletal: Denies numbness, tingling or loss of motor function except as above  Integument: Denies rash, lesion or ulceration   Neurologic: Denies headache or focal weakness, deficits  Heme: Denies spontaneous or excessive bleeding, epistaxis, hematuria, melena, fatigue, enlarged or tender lymph nodes.      All other pertinent positives and negatives as noted above in HPI.    Physical Exam:75 y.o. female  Vitals:    21 0928   Temp: 97.2 °F (36.2 °C)   Weight: 93 kg (205 lb)   Height: 167.6 cm (66\")       General:  Patient is awake and " alert.  Appears in no acute distress or discomfort.    Psych:  Affect and demeanor are appropriate.    Extremities: Left knee is examined.  Skin is benign.  No palpable masses or effusion.  Mild posterior tenderness.  Moderate lateral joint line tenderness.  Good knee motion but hyperflexion is uncomfortable.  Lateral Chester's causes her significant pain.  No instability.  Her calf is soft.  Normal motor and sensory function throughout the leg and foot.  Brisk capillary refill.    Imaging: AP, merchant and lateral views of the left knee are ordered and reviewed.  No comparison films are available.  She has advanced right knee arthritis.  The left knee has mild medial and patellofemoral compartment osteoarthritis with joint space narrowing, subchondral sclerosis and early osteophyte formation.  No other significant findings.    Assessment/Plan: Left knee osteoarthritis, possible degenerative meniscal tear    We discussed the natural history of this condition and her options.  She would prefer to pursue conservative treatment options.  I recommended an injection.  The risk, benefits and alternatives were discussed.  She consented and the injection was performed as described below.  Going forward, she will follow-up with me as needed.    Clarence Suarez MD    01/11/2021    Large Joint Arthrocentesis: L knee  Date/Time: 1/11/2021 9:45 AM  Consent given by: patient  Site marked: site marked  Timeout: Immediately prior to procedure a time out was called to verify the correct patient, procedure, equipment, support staff and site/side marked as required   Supporting Documentation  Indications: pain   Procedure Details  Location: knee - L knee  Preparation: Patient was prepped and draped in the usual sterile fashion  Approach: anterolateral  Medications administered: 2 mL lidocaine (cardiac); 80 mg methylPREDNISolone acetate 80 MG/ML  Patient tolerance: patient tolerated the procedure well with no immediate  complications

## 2021-01-26 ENCOUNTER — TELEPHONE (OUTPATIENT)
Dept: ORTHOPEDIC SURGERY | Facility: CLINIC | Age: 76
End: 2021-01-26

## 2021-01-26 DIAGNOSIS — M25.562 LEFT KNEE PAIN, UNSPECIFIED CHRONICITY: Primary | ICD-10-CM

## 2021-01-26 NOTE — TELEPHONE ENCOUNTER
Okay to order MRI as requested.  Please tell her that I will call her once I see the results.  Thank you.

## 2021-01-26 NOTE — TELEPHONE ENCOUNTER
Provider: KAMLA SWEENEY MD  Caller: HELEN ALCOCER  Relationship to Patient: SELF  Phone Number: [phone]  Reason for Call: AT LAST VISIT SHE DISCUSSED MRI AND HAS DECIDED TO GO FORWARD, PLEASE SCHEDULE  When was the patient last seen: 1/11/2021

## 2021-02-09 ENCOUNTER — HOSPITAL ENCOUNTER (OUTPATIENT)
Dept: MRI IMAGING | Facility: HOSPITAL | Age: 76
Discharge: HOME | End: 2021-02-09
Admitting: ORTHOPAEDIC SURGERY

## 2021-02-09 DIAGNOSIS — M25.562 LEFT KNEE PAIN, UNSPECIFIED CHRONICITY: ICD-10-CM

## 2021-02-09 PROCEDURE — 73721 MRI JNT OF LWR EXTRE W/O DYE: CPT

## 2021-02-11 ENCOUNTER — TELEPHONE (OUTPATIENT)
Dept: ORTHOPEDIC SURGERY | Facility: CLINIC | Age: 76
End: 2021-02-11

## 2021-02-12 ENCOUNTER — TELEPHONE (OUTPATIENT)
Dept: ORTHOPEDIC SURGERY | Facility: CLINIC | Age: 76
End: 2021-02-12

## 2021-02-12 NOTE — TELEPHONE ENCOUNTER
I spoke with her and we discussed her MRI results.  She says she is not really having very much pain right now.  Her primary complaint is that she cannot walk long distances.  I explained that a scope to clean out the meniscus may or may not help.  She does have arthritis and it is hard to tell how much of her symptomatology is coming from that as opposed to the meniscus tear.  For now, she says she can live with it.  She says she will call me if she changes her mind.

## 2021-03-04 DIAGNOSIS — Z23 IMMUNIZATION DUE: ICD-10-CM

## 2021-03-22 ENCOUNTER — OFFICE VISIT (OUTPATIENT)
Dept: ORTHOPEDIC SURGERY | Facility: CLINIC | Age: 76
End: 2021-03-22

## 2021-03-22 VITALS — TEMPERATURE: 98.6 F | HEIGHT: 67 IN | WEIGHT: 207 LBS | BODY MASS INDEX: 32.49 KG/M2

## 2021-03-22 DIAGNOSIS — M17.10 ARTHRITIS OF KNEE: Primary | ICD-10-CM

## 2021-03-22 PROCEDURE — 73562 X-RAY EXAM OF KNEE 3: CPT | Performed by: ORTHOPAEDIC SURGERY

## 2021-03-22 PROCEDURE — 99214 OFFICE O/P EST MOD 30 MIN: CPT | Performed by: ORTHOPAEDIC SURGERY

## 2021-03-23 PROBLEM — M17.10 ARTHRITIS OF KNEE: Status: ACTIVE | Noted: 2021-03-23

## 2021-03-23 RX ORDER — MELOXICAM 15 MG/1
15 TABLET ORAL ONCE
Status: CANCELLED | OUTPATIENT
Start: 2021-06-24 | End: 2021-03-23

## 2021-03-23 RX ORDER — PREGABALIN 75 MG/1
150 CAPSULE ORAL ONCE
Status: CANCELLED | OUTPATIENT
Start: 2021-06-24 | End: 2021-03-23

## 2021-03-23 RX ORDER — ACETAMINOPHEN 325 MG/1
1000 TABLET ORAL ONCE
Status: CANCELLED | OUTPATIENT
Start: 2021-06-24 | End: 2021-03-23

## 2021-03-23 RX ORDER — CEFAZOLIN SODIUM 2 G/100ML
2 INJECTION, SOLUTION INTRAVENOUS ONCE
Status: CANCELLED | OUTPATIENT
Start: 2021-06-24 | End: 2021-03-23

## 2021-03-23 NOTE — PROGRESS NOTES
Patient: Iris Yee    YOB: 1945    Medical Record Number: [id number]    Chief Complaints: Follow-up regarding bilateral knee pain    History of Present Illness:     75 y.o. female patient who presents for follow-up of both knees but particularly the left.   She reports progressively worsening pain and dysfunction.  Conservative treatment has been ineffective thus far.  The last injection did not help nearly as much.   She reports moderate constant aching and/or throbbing pain.  Denies any catching, popping, locking or other mechanical symptoms.  She reports difficulty walking prolonged distances and has expressed interest in pursuing surgical options.  She recently got an MRI of the left knee and we had discussed the results over the phone previously.    Allergies:   Allergies   Allergen Reactions   • Penicillins Other (See Comments)     Passes out   • Sulfur Nausea And Vomiting     Sulpha       Home Medications:    Current Outpatient Medications:   •  Multiple Vitamins-Minerals (ZINC PO), Take  by mouth., Disp: , Rfl:   •  SYNTHROID 112 MCG tablet, Take 112 mcg by mouth Daily., Disp: , Rfl:   •  vitamin D3 125 MCG (5000 UT) capsule capsule, Take 5,000 Units by mouth Daily., Disp: , Rfl:   •  XARELTO 20 MG tablet, , Disp: , Rfl:   •  ciprofloxacin (CIPRO) 500 MG tablet, Take 1 tablet by mouth 2 (Two) Times a Day., Disp: 20 tablet, Rfl: 0  •  metroNIDAZOLE (FLAGYL) 500 MG tablet, Take 1 tablet by mouth 3 (Three) Times a Day., Disp: 21 tablet, Rfl: 0  •  ondansetron ODT (ZOFRAN-ODT) 4 MG disintegrating tablet, Place 1 tablet on the tongue Every 6 (Six) Hours As Needed for Nausea., Disp: 14 tablet, Rfl: 0    Past Medical History:   Diagnosis Date   • Clotting disorder (CMS/HCC)    • GERD (gastroesophageal reflux disease)    • Hx of blood clots 2011   • Hypothyroid    • Ovarian cyst    • PONV (postoperative nausea and vomiting)    • Pulmonary embolism, bilateral (CMS/HCC)    • Thrombopenia  (CMS/HCC)        Past Surgical History:   Procedure Laterality Date   • AUGMENTATION MAMMAPLASTY     • BREAST BIOPSY     • CHOLECYSTECTOMY     • COLONOSCOPY N/A 2018    Procedure: COLONOSCOPY into cecum/termial ileum with polypectomy;  Surgeon: Jose Daniel Dooley MD;  Location: [facility] ENDOSCOPY;  Service: Gastroenterology   • ENDOSCOPY N/A 2018    Procedure: ESOPHAGOGASTRODUODENOSCOPY with biopsy;  Surgeon: Jose Daniel Dooley MD;  Location: [facility] ENDOSCOPY;  Service: Gastroenterology   • EXPLORATORY LAPAROTOMY      bleeding from ruptured ovarian cyst   • HERNIA REPAIR      umbilical x 2       Social History     Occupational History   • Occupation: RETIRED   Tobacco Use   • Smoking status: Former Smoker     Quit date: 1966     Years since quittin.7   • Smokeless tobacco: Never Used   Substance and Sexual Activity   • Alcohol use: Yes     Alcohol/week: 3.0 standard drinks     Types: 3 Glasses of wine per week     Comment: occasional   • Drug use: No   • Sexual activity: Yes     Partners: Male     Birth control/protection: Post-menopausal      Social History     Social History Narrative   • Not on file       Family History   Problem Relation Age of Onset   • Hypertension Mother    • Hypertension Father    • Stroke Paternal Aunt        Review of Systems:      Constitutional: Denies fever, shaking or chills   Eyes: Denies change in visual acuity   HEENT: Denies nasal congestion or sore throat   Respiratory: Denies cough or shortness of breath   Cardiovascular: Denies chest pain or edema  Endocrine: Denies tremors, palpitations, intolerance of heat or cold, polyuria, polydipsia.  GI: Denies abdominal pain, nausea, vomiting, bloody stools or diarrhea  : Denies frequency, urgency, incontinence, retention, or nocturia.  Musculoskeletal: Denies numbness, tingling or loss of motor function except as above  Integument: Denies rash, lesion or ulceration   Neurologic: Denies headache or focal  "weakness, deficits  Heme: Denies spontaneous or excessive bleeding, epistaxis, hematuria, melena, fatigue, enlarged or tender lymph nodes.      All other pertinent positives and negatives as noted above in HPI.    Physical Exam:   75 y.o. female  Vitals:    03/22/21 1619   Temp: 98.6 °F (37 °C)   TempSrc: Temporal   Weight: 93.9 kg (207 lb)   Height: 170.2 cm (67\")     General:  Patient is awake and alert.  Appears in no acute distress or discomfort.    Psych:  Affect and demeanor are appropriate.    Eyes:  Conjunctiva and sclera appear grossly normal.  Eyes track well and EOM seem to be intact.    Ears:  No gross abnormalities.  Hearing adequate for the exam.    Cardiovascular:  Regular rate and rhythm.    Lungs:  Good chest expansion.  Breathing unlabored.    Extremities:  Left knee is examined.  Skin is benign.  Moderate medial compartment tenderness.  Motion is from full extension to about 120 degrees of flexion.  Normal motor and sensory function in the lower leg and foot.  Palpable pedal pulses.  Brisk capillary refill.    Imaging: Previous x-rays of the left knee are reviewed and show mild to moderate medial and patellofemoral compartment osteoarthritis.  No profound degenerative changes.    MRI left knee is reviewed along with the social report.  The radiologist findings are listed below.  My interpretation is that she has a complex medial meniscal tear with significant medial compartment osteoarthritis with full-thickness chondral loss on both sides of the joint.  She also appears to have chondral loss in the lateral compartment consistent with more diffuse osteoarthritis.     IMPRESSION:  Large complex left medial meniscus tear. Chronic appearing  chondral loss in the medial compartment and to a lesser extent the  lateral compartment    Assessment/Plan:  Left knee osteoarthritis with medial meniscal tear    We discussed the natural history of this condition and all available treatment options, both " surgical and nonsurgical.  I told her that I do not think it is likely that an arthroscopy would provide her lasting benefit.  She appears to have significant arthritis and most of her symptoms match up with the arthritis more so than the meniscus tear.  The risk, benefits and alternatives to a total knee arthroplasty were carefully discussed all questions posed by the patient were answered in detail.  She wants to move forward with a total knee arthroplasty.  We will look at getting this scheduled.  I will have him follow-up with either myself or Adilene for a preoperative visit.  She will need medical clearance and permission to come off the Xarelto.    Clarence Suarez MD    03/22/2021

## 2021-04-16 ENCOUNTER — TRANSCRIBE ORDERS (OUTPATIENT)
Dept: ADMINISTRATIVE | Facility: HOSPITAL | Age: 76
End: 2021-04-16

## 2021-04-16 ENCOUNTER — HOSPITAL ENCOUNTER (OUTPATIENT)
Dept: CT IMAGING | Facility: HOSPITAL | Age: 76
Discharge: HOME | End: 2021-04-16
Admitting: INTERNAL MEDICINE

## 2021-04-16 DIAGNOSIS — R10.9 RIGHT FLANK PAIN: Primary | ICD-10-CM

## 2021-04-16 DIAGNOSIS — R10.9 RIGHT FLANK PAIN: ICD-10-CM

## 2021-04-16 PROCEDURE — 74176 CT ABD & PELVIS W/O CONTRAST: CPT

## 2021-04-16 NOTE — NURSING NOTE
"Per call from Dr. Rinaldi, confirmed no obstruction; there are bilateral renal cysts; RN called pt provider and left general message on v.mail requesting return call; Discussed that there was nothing found acute with patient, stated that sometimes the MD wants to discuss findings with patient; pt waited another (approx.) 20 minutes, then wanted to leave; stated \"they have my number\" if any further discussion was needed with the MD  "

## 2021-06-08 ENCOUNTER — TRANSCRIBE ORDERS (OUTPATIENT)
Dept: PREADMISSION TESTING | Facility: HOSPITAL | Age: 76
End: 2021-06-08

## 2021-06-08 DIAGNOSIS — Z01.818 OTHER SPECIFIED PRE-OPERATIVE EXAMINATION: Primary | ICD-10-CM

## 2021-06-14 ENCOUNTER — PRE-ADMISSION TESTING (OUTPATIENT)
Dept: PREADMISSION TESTING | Facility: HOSPITAL | Age: 76
End: 2021-06-14

## 2021-06-14 ENCOUNTER — TRANSCRIBE ORDERS (OUTPATIENT)
Dept: ADMINISTRATIVE | Facility: HOSPITAL | Age: 76
End: 2021-06-14

## 2021-06-14 VITALS
HEIGHT: 66 IN | RESPIRATION RATE: 16 BRPM | HEART RATE: 75 BPM | BODY MASS INDEX: 35.03 KG/M2 | DIASTOLIC BLOOD PRESSURE: 84 MMHG | SYSTOLIC BLOOD PRESSURE: 136 MMHG | WEIGHT: 218 LBS | TEMPERATURE: 97.1 F | OXYGEN SATURATION: 98 %

## 2021-06-14 DIAGNOSIS — M17.10 ARTHRITIS OF KNEE: ICD-10-CM

## 2021-06-14 DIAGNOSIS — Z12.31 ENCOUNTER FOR SCREENING MAMMOGRAM FOR BREAST CANCER: Primary | ICD-10-CM

## 2021-06-14 LAB
ANION GAP SERPL CALCULATED.3IONS-SCNC: 8.7 MMOL/L (ref 5–15)
BACTERIA UR QL AUTO: ABNORMAL /HPF
BILIRUB UR QL STRIP: NEGATIVE
BUN SERPL-MCNC: 22 MG/DL (ref 8–23)
BUN/CREAT SERPL: 26.8 (ref 7–25)
CALCIUM SPEC-SCNC: 9 MG/DL (ref 8.6–10.5)
CHLORIDE SERPL-SCNC: 106 MMOL/L (ref 98–107)
CLARITY UR: CLEAR
CO2 SERPL-SCNC: 25.3 MMOL/L (ref 22–29)
COLOR UR: YELLOW
CREAT SERPL-MCNC: 0.82 MG/DL (ref 0.57–1)
DEPRECATED RDW RBC AUTO: 48 FL (ref 37–54)
ERYTHROCYTE [DISTWIDTH] IN BLOOD BY AUTOMATED COUNT: 13 % (ref 12.3–15.4)
GFR SERPL CREATININE-BSD FRML MDRD: 68 ML/MIN/1.73
GLUCOSE SERPL-MCNC: 94 MG/DL (ref 65–99)
GLUCOSE UR STRIP-MCNC: NEGATIVE MG/DL
HCT VFR BLD AUTO: 43.3 % (ref 34–46.6)
HGB BLD-MCNC: 14.4 G/DL (ref 12–15.9)
HGB UR QL STRIP.AUTO: ABNORMAL
HYALINE CASTS UR QL AUTO: ABNORMAL /LPF
KETONES UR QL STRIP: NEGATIVE
LEUKOCYTE ESTERASE UR QL STRIP.AUTO: ABNORMAL
MCH RBC QN AUTO: 33 PG (ref 26.6–33)
MCHC RBC AUTO-ENTMCNC: 33.3 G/DL (ref 31.5–35.7)
MCV RBC AUTO: 99.3 FL (ref 79–97)
NITRITE UR QL STRIP: NEGATIVE
PH UR STRIP.AUTO: 6 [PH] (ref 5–8)
PLATELET # BLD AUTO: 184 10*3/MM3 (ref 140–450)
PMV BLD AUTO: 11.7 FL (ref 6–12)
POTASSIUM SERPL-SCNC: 4.4 MMOL/L (ref 3.5–5.2)
PROT UR QL STRIP: NEGATIVE
QT INTERVAL: 413 MS
RBC # BLD AUTO: 4.36 10*6/MM3 (ref 3.77–5.28)
RBC # UR: ABNORMAL /HPF
REF LAB TEST METHOD: ABNORMAL
SODIUM SERPL-SCNC: 140 MMOL/L (ref 136–145)
SP GR UR STRIP: 1.02 (ref 1–1.03)
SQUAMOUS #/AREA URNS HPF: ABNORMAL /HPF
UROBILINOGEN UR QL STRIP: ABNORMAL
WBC # BLD AUTO: 4.37 10*3/MM3 (ref 3.4–10.8)
WBC UR QL AUTO: ABNORMAL /HPF

## 2021-06-14 PROCEDURE — 80048 BASIC METABOLIC PNL TOTAL CA: CPT

## 2021-06-14 PROCEDURE — 87086 URINE CULTURE/COLONY COUNT: CPT

## 2021-06-14 PROCEDURE — 36415 COLL VENOUS BLD VENIPUNCTURE: CPT

## 2021-06-14 PROCEDURE — 93010 ELECTROCARDIOGRAM REPORT: CPT | Performed by: INTERNAL MEDICINE

## 2021-06-14 PROCEDURE — 93005 ELECTROCARDIOGRAM TRACING: CPT

## 2021-06-14 PROCEDURE — 81001 URINALYSIS AUTO W/SCOPE: CPT

## 2021-06-14 PROCEDURE — 85027 COMPLETE CBC AUTOMATED: CPT

## 2021-06-14 RX ORDER — MELATONIN
1000 EVERY MORNING
COMMUNITY

## 2021-06-14 ASSESSMENT — KOOS JR
KOOS JR SCORE: 36.931
KOOS JR SCORE: 20

## 2021-06-15 LAB — BACTERIA SPEC AEROBE CULT: NORMAL

## 2021-06-18 ENCOUNTER — OFFICE VISIT (OUTPATIENT)
Dept: ORTHOPEDIC SURGERY | Facility: CLINIC | Age: 76
End: 2021-06-18

## 2021-06-18 VITALS — HEIGHT: 66 IN | WEIGHT: 221 LBS | TEMPERATURE: 98.4 F | BODY MASS INDEX: 35.52 KG/M2

## 2021-06-18 DIAGNOSIS — Z01.818 PREOP EXAMINATION: Primary | ICD-10-CM

## 2021-06-18 PROCEDURE — 77077 JOINT SURVEY SINGLE VIEW: CPT | Performed by: ORTHOPAEDIC SURGERY

## 2021-06-18 PROCEDURE — S0260 H&P FOR SURGERY: HCPCS | Performed by: NURSE PRACTITIONER

## 2021-06-18 NOTE — PROGRESS NOTES
History & Physical       Patient: Iris Yee    YOB: 1945    Medical Record Number: [id number]    Chief Complaints: Left knee endstage osteoarthritis    History of Present Illness: 75 y.o. female presents today in anticipation of upcoming knee replacement surgery.  Patient has a long history of worsening symptoms.  Describes the pain as severe, constant, and typically dull and achy.  She has tried and failed prolonged conservative treatment.  She is struggling with routine daily activities.  This has become a significant issue for overall quality of life.  She cannot walk any prolonged distances without having to rest.  Reports she has experienced skin irritation with inexpensive post type earrings in the past.       Allergies:   Allergies   Allergen Reactions   • Penicillins Other (See Comments)     Passes out   • Sulfa Antibiotics Nausea And Vomiting       Medications:   Home Medications:    Current Outpatient Medications:   •  Chlorhexidine Gluconate 2 % pads, Apply  topically. As directed pre op, Disp: , Rfl:   •  cholecalciferol (VITAMIN D3) 25 MCG (1000 UT) tablet, Take 1,000 Units by mouth Daily., Disp: , Rfl:   •  Multiple Vitamins-Minerals (ZINC PO), Take 1 tablet by mouth Every Morning., Disp: , Rfl:   •  mupirocin (BACTROBAN) 2 % nasal ointment, into the nostril(s) as directed by provider., Disp: , Rfl:   •  SYNTHROID 112 MCG tablet, Take 112 mcg by mouth Every Night., Disp: , Rfl:   •  XARELTO 20 MG tablet, Take 20 mg by mouth Every Night. PT STATES TO HOLD 2 DAYS BEFORE SURGERY, Disp: , Rfl:   No current facility-administered medications for this visit.    Facility-Administered Medications Ordered in Other Visits:   •  Chlorhexidine Gluconate 2 % pads 1 each, 1 each, Apply externally, Take As Directed, Clarence Suarez MD    Past Medical History:   Diagnosis Date   • Arthritis    • Bulging lumbar disc     L 3-4   • Clotting disorder (CMS/[facility])    • GERD (gastroesophageal reflux  disease)    • History of kidney stones    • History of MRSA infection 2020    MICHEL EARS   • History of transfusion    • Hx of blood clots    • Hypothyroid    • Left knee pain    • Ovarian cyst    • PONV (postoperative nausea and vomiting)    • Pulmonary embolism, bilateral (CMS/HCC)    • Thrombopenia (CMS/HCC)           Past Surgical History:   Procedure Laterality Date   • AUGMENTATION MAMMAPLASTY     • BREAST BIOPSY     • CHOLECYSTECTOMY     • COLONOSCOPY N/A 2018    Procedure: COLONOSCOPY into cecum/termial ileum with polypectomy;  Surgeon: Jose Daniel Dooley MD;  Location: [facility] ENDOSCOPY;  Service: Gastroenterology   • ENDOSCOPY N/A 2018    Procedure: ESOPHAGOGASTRODUODENOSCOPY with biopsy;  Surgeon: Jose Daniel Dooley MD;  Location: [facility] ENDOSCOPY;  Service: Gastroenterology   • EXPLORATORY LAPAROTOMY      bleeding from ruptured ovarian cyst   • HERNIA REPAIR      umbilical x 2          Social History     Occupational History   • Occupation: RETIRED   Tobacco Use   • Smoking status: Former Smoker     Packs/day: 1.00     Years: 10.00     Pack years: 10.00     Types: Cigarettes     Quit date: 1966     Years since quittin.0   • Smokeless tobacco: Never Used   Substance and Sexual Activity   • Alcohol use: Yes     Alcohol/week: 3.0 standard drinks     Types: 3 Glasses of wine per week     Comment: occasional   • Drug use: No   • Sexual activity: Defer     Partners: Male     Birth control/protection: Post-menopausal      Social History     Social History Narrative   • Not on file          Family History   Problem Relation Age of Onset   • Hypertension Mother    • Hypertension Father    • Stroke Paternal Aunt    • Malig Hyperthermia Neg Hx        Review of Systems:  A 14 point review of systems is reviewed with the patient.  Pertinent positives are listed above.  All others are negative.    Physical Exam: 75 y.o. female    Vitals:    21 1336   Temp: 98.4 °F (36.9 °C)  "  Weight: 100 kg (221 lb)   Height: 167.6 cm (66\")       General:  Patient is awake and alert.  Appears in no acute distress or discomfort.    Psych:  Affect and demeanor are appropriate.    Eyes:  Conjunctiva and sclera appear grossly normal.  Eyes track well and EOM seem to be intact.    Ears:  No gross abnormalities.  Hearing adequate for the exam.    Cardiovascular:  Regular rate and rhythm.    Lungs:  Good chest expansion.  Breathing unlabored.    Lymph:  No palpable adenopathy about neck or axilla.    Left lower extremity:  Skin benign and intact without evidence for swelling, masses or atrophy.  No palpable masses.  Focal tenderness noted over medial joint line.  ROM is from full extension to 120° of flexion.   Knee is stable on exam.  Good strength throughout the lower leg and foot.  Intact sensation throughout.  Palpable pedal pulses with brisk cap refill.    Diagnostic Tests:  Lab Results   Component Value Date    GLUCOSE 94 06/14/2021    CALCIUM 9.0 06/14/2021     06/14/2021    K 4.4 06/14/2021    CO2 25.3 06/14/2021     06/14/2021    BUN 22 06/14/2021    CREATININE 0.82 06/14/2021    EGFRIFNONA 68 06/14/2021    BCR 26.8 (H) 06/14/2021    ANIONGAP 8.7 06/14/2021     Lab Results   Component Value Date    WBC 4.37 06/14/2021    HGB 14.4 06/14/2021    HCT 43.3 06/14/2021    MCV 99.3 (H) 06/14/2021     06/14/2021     No results found for: INR, PROTIME    Imaging:  Previous x-rays of the knee are reviewed.  Full length alignment x-ray of the left leg is ordered and reviewed by me.  This is compared to previous x-rays.  The x-rays show mild to moderate medial and patellofemoral compartment osteoarthritis.  The weightbearing axis passes through the lateral aspect of the medial compartment.    The left knee MRI report from 2/9/2021 is reviewed.  The findings are as follows:    IMPRESSION:  Large complex left medial meniscus tear. Chronic appearing chondral loss in the medial compartment and to " a lesser extent the lateral compartment.    Assessment:  1.  Left knee osteoarthritis with medial meniscal tear  2.  History of DVT  3.  Possible allergy to nickel    Plan: We will plan on proceeding with a left total knee arthroplasty at the patient's request.  I reviewed details of procedure with patient today and discussed all the risks, benefits, alternatives, and limitations of the procedure in laymen's terms with the risks including but not limited to:  neurovascular damage resulting in permanent dysfunction or footdrop and potential need for further surgery, bleeding, infection, hematoma, chronic pain, worsening of pain, persistent symptoms potentially necessitating revision, prosthesis related problems including loosening or allergy, swelling, loss of motion and arthrofibrosis, weakness, stiffness, instability, DVT, pulmonary embolus, death, stroke, complex regional pain syndrome, and need for additional procedures.  Patient verbalized understanding, and was given the opportunity to ask and have all questions answered today.  No guarantees were given regarding results of surgery.      We discussed her lab results.  Patient states she has a history of blood in her urine and is followed for this by Dr. Conner.  Patient is currently anticoagulated on Xarelto 20 mg daily for DVT prophylaxis.  She will discontinue this medication 2 days prior to surgery.  Patient is planning for hospital dismissal same day of surgery.    Adilene Mena, APRN     06/18/21

## 2021-06-22 ENCOUNTER — TELEPHONE (OUTPATIENT)
Dept: ORTHOPEDIC SURGERY | Facility: HOSPITAL | Age: 76
End: 2021-06-22

## 2021-06-22 ENCOUNTER — LAB (OUTPATIENT)
Dept: LAB | Facility: HOSPITAL | Age: 76
End: 2021-06-22

## 2021-06-22 DIAGNOSIS — Z01.818 OTHER SPECIFIED PRE-OPERATIVE EXAMINATION: ICD-10-CM

## 2021-06-22 LAB — SARS-COV-2 ORF1AB RESP QL NAA+PROBE: NOT DETECTED

## 2021-06-22 PROCEDURE — C9803 HOPD COVID-19 SPEC COLLECT: HCPCS

## 2021-06-22 PROCEDURE — U0004 COV-19 TEST NON-CDC HGH THRU: HCPCS

## 2021-06-22 NOTE — TELEPHONE ENCOUNTER
Pain Risk:  ? Currently on narcotics  ? ETOH > 3 drinks/day  ? Pain management patient  ? Current cannaboid use  No issues   Cardiac-Neuro Risk:  ? Arrhythmias  ? Stent/MI  ? Pacer  ? Heart Failure  ? Stroke with residual Choose an item.  No issues  Respiratory Risk:  ? Sleep apnea  ? CPAP machine use  ? Nightly snoring  ? Asthma/COPD  No issues  Diabetic Risk:  HgA1C (within 90 days prior to PAT):  Click or tap here to enter text.  ? Insulin use  ? More than 1 diabetic medication  No issues  Urinary retention:  No    Caregiver 24-48hrs post-discharge: Home with     Home needs:  ? None/will need before discharge  ? Have walker and/or cane  ? Steps 3 steps to enter     Discharge Plan:  Home with [facility]    Prescriptions: Meds to bed:    Enter Pharmacy    Home medications:   ? Blood thinner/anti-coag therapy  Xarelto-Stopped today  ? BPH or diuretic- Flomax  ? BP meds    Educate patient on spinal anesthesia/pain control:  ? patient verbalize understanding    Educate patient on hospital course/timeline:  ?  patient verbalize understanding    Joint Care Class:  ?  yes ? no

## 2021-06-24 ENCOUNTER — ANESTHESIA (OUTPATIENT)
Dept: PERIOP | Facility: HOSPITAL | Age: 76
End: 2021-06-24

## 2021-06-24 ENCOUNTER — HOSPITAL ENCOUNTER (OUTPATIENT)
Facility: HOSPITAL | Age: 76
Discharge: HOME | End: 2021-06-24
Attending: ORTHOPAEDIC SURGERY | Admitting: ORTHOPAEDIC SURGERY

## 2021-06-24 ENCOUNTER — ANESTHESIA EVENT (OUTPATIENT)
Dept: PERIOP | Facility: HOSPITAL | Age: 76
End: 2021-06-24

## 2021-06-24 ENCOUNTER — APPOINTMENT (OUTPATIENT)
Dept: GENERAL RADIOLOGY | Facility: HOSPITAL | Age: 76
End: 2021-06-24

## 2021-06-24 VITALS
RESPIRATION RATE: 16 BRPM | TEMPERATURE: 97.2 F | SYSTOLIC BLOOD PRESSURE: 126 MMHG | WEIGHT: 220.46 LBS | BODY MASS INDEX: 35.43 KG/M2 | HEIGHT: 66 IN | OXYGEN SATURATION: 94 % | HEART RATE: 67 BPM | DIASTOLIC BLOOD PRESSURE: 73 MMHG

## 2021-06-24 DIAGNOSIS — M17.10 ARTHRITIS OF KNEE: ICD-10-CM

## 2021-06-24 PROCEDURE — 25010000002 HYDROMORPHONE PER 4 MG: Performed by: NURSE ANESTHETIST, CERTIFIED REGISTERED

## 2021-06-24 PROCEDURE — 25010000002 ROPIVACAINE PER 1 MG: Performed by: ORTHOPAEDIC SURGERY

## 2021-06-24 PROCEDURE — 97161 PT EVAL LOW COMPLEX 20 MIN: CPT

## 2021-06-24 PROCEDURE — 25010000003 CEFAZOLIN IN DEXTROSE 2-4 GM/100ML-% SOLUTION: Performed by: ORTHOPAEDIC SURGERY

## 2021-06-24 PROCEDURE — 25010000002 KETOROLAC TROMETHAMINE PER 15 MG: Performed by: ORTHOPAEDIC SURGERY

## 2021-06-24 PROCEDURE — 25010000002 ROPIVACAINE PER 1 MG: Performed by: ANESTHESIOLOGY

## 2021-06-24 PROCEDURE — G0378 HOSPITAL OBSERVATION PER HR: HCPCS

## 2021-06-24 PROCEDURE — 97530 THERAPEUTIC ACTIVITIES: CPT

## 2021-06-24 PROCEDURE — C1889 IMPLANT/INSERT DEVICE, NOC: HCPCS | Performed by: ORTHOPAEDIC SURGERY

## 2021-06-24 PROCEDURE — 73560 X-RAY EXAM OF KNEE 1 OR 2: CPT

## 2021-06-24 PROCEDURE — 27447 TOTAL KNEE ARTHROPLASTY: CPT | Performed by: NURSE PRACTITIONER

## 2021-06-24 PROCEDURE — 25010000002 FENTANYL CITRATE (PF) 50 MCG/ML SOLUTION: Performed by: ANESTHESIOLOGY

## 2021-06-24 PROCEDURE — 25010000002 MORPHINE PER 10 MG: Performed by: ORTHOPAEDIC SURGERY

## 2021-06-24 PROCEDURE — 25010000002 MIDAZOLAM PER 1 MG: Performed by: ANESTHESIOLOGY

## 2021-06-24 PROCEDURE — 25010000002 VANCOMYCIN 10 G RECONSTITUTED SOLUTION: Performed by: ORTHOPAEDIC SURGERY

## 2021-06-24 PROCEDURE — 25010000002 PROPOFOL 10 MG/ML EMULSION: Performed by: NURSE ANESTHETIST, CERTIFIED REGISTERED

## 2021-06-24 PROCEDURE — 25010000002 ONDANSETRON PER 1 MG: Performed by: NURSE ANESTHETIST, CERTIFIED REGISTERED

## 2021-06-24 PROCEDURE — 25010000002 EPINEPHRINE 30 MG/30ML SOLUTION: Performed by: ORTHOPAEDIC SURGERY

## 2021-06-24 PROCEDURE — 27447 TOTAL KNEE ARTHROPLASTY: CPT | Performed by: ORTHOPAEDIC SURGERY

## 2021-06-24 PROCEDURE — 25010000002 FENTANYL CITRATE (PF) 50 MCG/ML SOLUTION: Performed by: NURSE ANESTHETIST, CERTIFIED REGISTERED

## 2021-06-24 PROCEDURE — 76942 ECHO GUIDE FOR BIOPSY: CPT | Performed by: ORTHOPAEDIC SURGERY

## 2021-06-24 PROCEDURE — 97110 THERAPEUTIC EXERCISES: CPT

## 2021-06-24 PROCEDURE — C1776 JOINT DEVICE (IMPLANTABLE): HCPCS | Performed by: ORTHOPAEDIC SURGERY

## 2021-06-24 PROCEDURE — 25010000003 MEPIVACAINE PER 10 ML: Performed by: ANESTHESIOLOGY

## 2021-06-24 PROCEDURE — 25010000002 PHENYLEPHRINE PER 1 ML: Performed by: NURSE ANESTHETIST, CERTIFIED REGISTERED

## 2021-06-24 PROCEDURE — 25010000002 ONDANSETRON PER 1 MG: Performed by: ORTHOPAEDIC SURGERY

## 2021-06-24 PROCEDURE — C1713 ANCHOR/SCREW BN/BN,TIS/BN: HCPCS | Performed by: ORTHOPAEDIC SURGERY

## 2021-06-24 DEVICE — LEGION NARROW CRUCIATE RETAINING                                    OXINIUM SIZE 5N LEFT
Type: IMPLANTABLE DEVICE | Site: KNEE | Status: FUNCTIONAL
Brand: LEGION

## 2021-06-24 DEVICE — CMT BONE PALACOS R HI/VISC 1X40: Type: IMPLANTABLE DEVICE | Site: KNEE | Status: FUNCTIONAL

## 2021-06-24 DEVICE — GENESIS II NON-POROUS TIBIAL                                    BASEPLATE SIZE 4 LEFT
Type: IMPLANTABLE DEVICE | Site: KNEE | Status: FUNCTIONAL
Brand: GENESIS II

## 2021-06-24 DEVICE — GEN II 7.5MM RESUR PAT 35MM
Type: IMPLANTABLE DEVICE | Site: KNEE | Status: FUNCTIONAL
Brand: GENESIS II

## 2021-06-24 DEVICE — IMPLANTABLE DEVICE: Type: IMPLANTABLE DEVICE | Site: KNEE | Status: FUNCTIONAL

## 2021-06-24 DEVICE — DEV CONTRL TISS STRATAFIX SPIRAL MNCRYL UD 3/0 PLS 30CM: Type: IMPLANTABLE DEVICE | Site: KNEE | Status: FUNCTIONAL

## 2021-06-24 DEVICE — LEGION HIGHLY CROSS LINKED                                    POLYETHYLENE DISHED INSERT SIZE 3-4 13MM
Type: IMPLANTABLE DEVICE | Site: KNEE | Status: FUNCTIONAL
Brand: LEGION

## 2021-06-24 DEVICE — DEV CONTRL TISS STRATAFIX SYMM PDS PLUS VIL CT-1 45CM: Type: IMPLANTABLE DEVICE | Site: KNEE | Status: FUNCTIONAL

## 2021-06-24 RX ORDER — SODIUM CHLORIDE 0.9 % (FLUSH) 0.9 %
3 SYRINGE (ML) INJECTION EVERY 12 HOURS SCHEDULED
Status: DISCONTINUED | OUTPATIENT
Start: 2021-06-24 | End: 2021-06-24 | Stop reason: HOSPADM

## 2021-06-24 RX ORDER — ONDANSETRON 2 MG/ML
INJECTION INTRAMUSCULAR; INTRAVENOUS AS NEEDED
Status: DISCONTINUED | OUTPATIENT
Start: 2021-06-24 | End: 2021-06-24 | Stop reason: SURG

## 2021-06-24 RX ORDER — HYDROCODONE BITARTRATE AND ACETAMINOPHEN 7.5; 325 MG/1; MG/1
1 TABLET ORAL EVERY 4 HOURS PRN
Status: DISCONTINUED | OUTPATIENT
Start: 2021-06-24 | End: 2021-06-24 | Stop reason: HOSPADM

## 2021-06-24 RX ORDER — DIPHENHYDRAMINE HCL 25 MG
25 CAPSULE ORAL
Status: DISCONTINUED | OUTPATIENT
Start: 2021-06-24 | End: 2021-06-24 | Stop reason: HOSPADM

## 2021-06-24 RX ORDER — SODIUM CHLORIDE, SODIUM LACTATE, POTASSIUM CHLORIDE, CALCIUM CHLORIDE 600; 310; 30; 20 MG/100ML; MG/100ML; MG/100ML; MG/100ML
9 INJECTION, SOLUTION INTRAVENOUS CONTINUOUS
Status: DISCONTINUED | OUTPATIENT
Start: 2021-06-24 | End: 2021-06-24 | Stop reason: HOSPADM

## 2021-06-24 RX ORDER — DOCUSATE SODIUM 100 MG/1
100 CAPSULE, LIQUID FILLED ORAL 2 TIMES DAILY
Qty: 60 CAPSULE | Refills: 0 | Status: SHIPPED | OUTPATIENT
Start: 2021-06-24 | End: 2021-12-09

## 2021-06-24 RX ORDER — FAMOTIDINE 10 MG/ML
20 INJECTION, SOLUTION INTRAVENOUS ONCE
Status: COMPLETED | OUTPATIENT
Start: 2021-06-24 | End: 2021-06-24

## 2021-06-24 RX ORDER — FENTANYL CITRATE 50 UG/ML
50 INJECTION, SOLUTION INTRAMUSCULAR; INTRAVENOUS
Status: DISCONTINUED | OUTPATIENT
Start: 2021-06-24 | End: 2021-06-24 | Stop reason: HOSPADM

## 2021-06-24 RX ORDER — ROPIVACAINE HYDROCHLORIDE 5 MG/ML
INJECTION, SOLUTION EPIDURAL; INFILTRATION; PERINEURAL
Status: COMPLETED | OUTPATIENT
Start: 2021-06-24 | End: 2021-06-24

## 2021-06-24 RX ORDER — IBUPROFEN 600 MG/1
600 TABLET ORAL ONCE AS NEEDED
Status: DISCONTINUED | OUTPATIENT
Start: 2021-06-24 | End: 2021-06-24 | Stop reason: HOSPADM

## 2021-06-24 RX ORDER — LIDOCAINE HYDROCHLORIDE 10 MG/ML
0.5 INJECTION, SOLUTION EPIDURAL; INFILTRATION; INTRACAUDAL; PERINEURAL ONCE AS NEEDED
Status: DISCONTINUED | OUTPATIENT
Start: 2021-06-24 | End: 2021-06-24 | Stop reason: HOSPADM

## 2021-06-24 RX ORDER — ONDANSETRON 4 MG/1
4 TABLET, FILM COATED ORAL ONCE AS NEEDED
Status: DISCONTINUED | OUTPATIENT
Start: 2021-06-24 | End: 2021-06-24 | Stop reason: HOSPADM

## 2021-06-24 RX ORDER — CEFAZOLIN SODIUM 2 G/100ML
2 INJECTION, SOLUTION INTRAVENOUS ONCE
Status: COMPLETED | OUTPATIENT
Start: 2021-06-24 | End: 2021-06-24

## 2021-06-24 RX ORDER — HYDROMORPHONE HCL 110MG/55ML
PATIENT CONTROLLED ANALGESIA SYRINGE INTRAVENOUS AS NEEDED
Status: DISCONTINUED | OUTPATIENT
Start: 2021-06-24 | End: 2021-06-24 | Stop reason: SURG

## 2021-06-24 RX ORDER — LABETALOL HYDROCHLORIDE 5 MG/ML
5 INJECTION, SOLUTION INTRAVENOUS
Status: DISCONTINUED | OUTPATIENT
Start: 2021-06-24 | End: 2021-06-24 | Stop reason: HOSPADM

## 2021-06-24 RX ORDER — FENTANYL CITRATE 50 UG/ML
INJECTION, SOLUTION INTRAMUSCULAR; INTRAVENOUS
Status: COMPLETED | OUTPATIENT
Start: 2021-06-24 | End: 2021-06-24

## 2021-06-24 RX ORDER — HYDROCODONE BITARTRATE AND ACETAMINOPHEN 7.5; 325 MG/1; MG/1
2 TABLET ORAL EVERY 4 HOURS PRN
Status: DISCONTINUED | OUTPATIENT
Start: 2021-06-24 | End: 2021-06-24 | Stop reason: HOSPADM

## 2021-06-24 RX ORDER — HYDROCODONE BITARTRATE AND ACETAMINOPHEN 7.5; 325 MG/1; MG/1
1 TABLET ORAL ONCE AS NEEDED
Status: DISCONTINUED | OUTPATIENT
Start: 2021-06-24 | End: 2021-06-24 | Stop reason: HOSPADM

## 2021-06-24 RX ORDER — PREGABALIN 75 MG/1
150 CAPSULE ORAL ONCE
Status: DISCONTINUED | OUTPATIENT
Start: 2021-06-24 | End: 2021-06-24 | Stop reason: HOSPADM

## 2021-06-24 RX ORDER — ONDANSETRON 4 MG/1
4 TABLET, FILM COATED ORAL EVERY 8 HOURS PRN
Qty: 12 TABLET | Refills: 0 | Status: SHIPPED | OUTPATIENT
Start: 2021-06-24 | End: 2021-07-07

## 2021-06-24 RX ORDER — NALOXONE HCL 0.4 MG/ML
0.2 VIAL (ML) INJECTION AS NEEDED
Status: DISCONTINUED | OUTPATIENT
Start: 2021-06-24 | End: 2021-06-24 | Stop reason: HOSPADM

## 2021-06-24 RX ORDER — MIDAZOLAM HYDROCHLORIDE 1 MG/ML
INJECTION INTRAMUSCULAR; INTRAVENOUS
Status: COMPLETED | OUTPATIENT
Start: 2021-06-24 | End: 2021-06-24

## 2021-06-24 RX ORDER — KETOROLAC TROMETHAMINE 30 MG/ML
15 INJECTION, SOLUTION INTRAMUSCULAR; INTRAVENOUS ONCE AS NEEDED
Status: DISCONTINUED | OUTPATIENT
Start: 2021-06-24 | End: 2021-06-24 | Stop reason: HOSPADM

## 2021-06-24 RX ORDER — MELOXICAM 15 MG/1
15 TABLET ORAL ONCE
Status: COMPLETED | OUTPATIENT
Start: 2021-06-24 | End: 2021-06-24

## 2021-06-24 RX ORDER — HYDROCODONE BITARTRATE AND ACETAMINOPHEN 7.5; 325 MG/1; MG/1
TABLET ORAL
Qty: 42 TABLET | Refills: 0 | Status: SHIPPED | OUTPATIENT
Start: 2021-06-24 | End: 2021-07-07

## 2021-06-24 RX ORDER — ACETAMINOPHEN 325 MG/1
1000 TABLET ORAL ONCE
Status: COMPLETED | OUTPATIENT
Start: 2021-06-24 | End: 2021-06-24

## 2021-06-24 RX ORDER — GLYCOPYRROLATE 0.2 MG/ML
INJECTION INTRAMUSCULAR; INTRAVENOUS AS NEEDED
Status: DISCONTINUED | OUTPATIENT
Start: 2021-06-24 | End: 2021-06-24 | Stop reason: SURG

## 2021-06-24 RX ORDER — ONDANSETRON 2 MG/ML
4 INJECTION INTRAMUSCULAR; INTRAVENOUS ONCE AS NEEDED
Status: DISCONTINUED | OUTPATIENT
Start: 2021-06-24 | End: 2021-06-24 | Stop reason: HOSPADM

## 2021-06-24 RX ORDER — EPHEDRINE SULFATE 50 MG/ML
5 INJECTION, SOLUTION INTRAVENOUS ONCE AS NEEDED
Status: DISCONTINUED | OUTPATIENT
Start: 2021-06-24 | End: 2021-06-24 | Stop reason: HOSPADM

## 2021-06-24 RX ORDER — MAGNESIUM HYDROXIDE 1200 MG/15ML
LIQUID ORAL AS NEEDED
Status: DISCONTINUED | OUTPATIENT
Start: 2021-06-24 | End: 2021-06-24 | Stop reason: HOSPADM

## 2021-06-24 RX ORDER — PROMETHAZINE HYDROCHLORIDE 25 MG/1
25 TABLET ORAL ONCE AS NEEDED
Status: DISCONTINUED | OUTPATIENT
Start: 2021-06-24 | End: 2021-06-24 | Stop reason: HOSPADM

## 2021-06-24 RX ORDER — PROPOFOL 10 MG/ML
VIAL (ML) INTRAVENOUS AS NEEDED
Status: DISCONTINUED | OUTPATIENT
Start: 2021-06-24 | End: 2021-06-24 | Stop reason: SURG

## 2021-06-24 RX ORDER — DIPHENHYDRAMINE HYDROCHLORIDE 50 MG/ML
12.5 INJECTION INTRAMUSCULAR; INTRAVENOUS
Status: DISCONTINUED | OUTPATIENT
Start: 2021-06-24 | End: 2021-06-24 | Stop reason: HOSPADM

## 2021-06-24 RX ORDER — SODIUM CHLORIDE 0.9 % (FLUSH) 0.9 %
3-10 SYRINGE (ML) INJECTION AS NEEDED
Status: DISCONTINUED | OUTPATIENT
Start: 2021-06-24 | End: 2021-06-24 | Stop reason: HOSPADM

## 2021-06-24 RX ORDER — TRANEXAMIC ACID 100 MG/ML
INJECTION, SOLUTION INTRAVENOUS AS NEEDED
Status: DISCONTINUED | OUTPATIENT
Start: 2021-06-24 | End: 2021-06-24 | Stop reason: SURG

## 2021-06-24 RX ORDER — ACETAMINOPHEN 500 MG
500 TABLET ORAL EVERY 6 HOURS PRN
COMMUNITY
End: 2021-06-24 | Stop reason: HOSPADM

## 2021-06-24 RX ORDER — MIDAZOLAM HYDROCHLORIDE 1 MG/ML
0.5 INJECTION INTRAMUSCULAR; INTRAVENOUS
Status: DISCONTINUED | OUTPATIENT
Start: 2021-06-24 | End: 2021-06-24 | Stop reason: HOSPADM

## 2021-06-24 RX ORDER — PROMETHAZINE HYDROCHLORIDE 25 MG/1
25 SUPPOSITORY RECTAL ONCE AS NEEDED
Status: DISCONTINUED | OUTPATIENT
Start: 2021-06-24 | End: 2021-06-24 | Stop reason: HOSPADM

## 2021-06-24 RX ORDER — FLUMAZENIL 0.1 MG/ML
0.2 INJECTION INTRAVENOUS AS NEEDED
Status: DISCONTINUED | OUTPATIENT
Start: 2021-06-24 | End: 2021-06-24 | Stop reason: HOSPADM

## 2021-06-24 RX ORDER — ONDANSETRON 2 MG/ML
4 INJECTION INTRAMUSCULAR; INTRAVENOUS ONCE AS NEEDED
Status: COMPLETED | OUTPATIENT
Start: 2021-06-24 | End: 2021-06-24

## 2021-06-24 RX ORDER — LIDOCAINE HYDROCHLORIDE 20 MG/ML
INJECTION, SOLUTION INFILTRATION; PERINEURAL AS NEEDED
Status: DISCONTINUED | OUTPATIENT
Start: 2021-06-24 | End: 2021-06-24 | Stop reason: SURG

## 2021-06-24 RX ORDER — OXYCODONE AND ACETAMINOPHEN 10; 325 MG/1; MG/1
1 TABLET ORAL EVERY 4 HOURS PRN
Status: DISCONTINUED | OUTPATIENT
Start: 2021-06-24 | End: 2021-06-24 | Stop reason: HOSPADM

## 2021-06-24 RX ORDER — HYDROMORPHONE HYDROCHLORIDE 1 MG/ML
0.5 INJECTION, SOLUTION INTRAMUSCULAR; INTRAVENOUS; SUBCUTANEOUS
Status: DISCONTINUED | OUTPATIENT
Start: 2021-06-24 | End: 2021-06-24 | Stop reason: HOSPADM

## 2021-06-24 RX ADMIN — SODIUM CHLORIDE, POTASSIUM CHLORIDE, SODIUM LACTATE AND CALCIUM CHLORIDE: 600; 310; 30; 20 INJECTION, SOLUTION INTRAVENOUS at 08:38

## 2021-06-24 RX ADMIN — ACETAMINOPHEN 975 MG: 325 TABLET, FILM COATED ORAL at 06:04

## 2021-06-24 RX ADMIN — ROPIVACAINE HYDROCHLORIDE 30 ML: 5 INJECTION, SOLUTION EPIDURAL; INFILTRATION; PERINEURAL at 06:18

## 2021-06-24 RX ADMIN — FENTANYL CITRATE 100 MCG: 50 INJECTION INTRAMUSCULAR; INTRAVENOUS at 06:59

## 2021-06-24 RX ADMIN — FENTANYL CITRATE 50 MCG: 50 INJECTION, SOLUTION INTRAMUSCULAR; INTRAVENOUS at 09:16

## 2021-06-24 RX ADMIN — PHENYLEPHRINE HYDROCHLORIDE 100 MCG: 10 INJECTION INTRAVENOUS at 07:13

## 2021-06-24 RX ADMIN — FENTANYL CITRATE 50 MCG: 50 INJECTION, SOLUTION INTRAMUSCULAR; INTRAVENOUS at 09:22

## 2021-06-24 RX ADMIN — TRANEXAMIC ACID 1000 MG: 100 INJECTION, SOLUTION INTRAVENOUS at 08:17

## 2021-06-24 RX ADMIN — ONDANSETRON 4 MG: 2 INJECTION INTRAMUSCULAR; INTRAVENOUS at 07:09

## 2021-06-24 RX ADMIN — HYDROMORPHONE HYDROCHLORIDE 0.5 MG: 2 INJECTION, SOLUTION INTRAMUSCULAR; INTRAVENOUS; SUBCUTANEOUS at 07:27

## 2021-06-24 RX ADMIN — LIDOCAINE HYDROCHLORIDE 40 MG: 20 INJECTION, SOLUTION INFILTRATION; PERINEURAL at 07:01

## 2021-06-24 RX ADMIN — PROPOFOL 230 MG: 10 INJECTION, EMULSION INTRAVENOUS at 07:01

## 2021-06-24 RX ADMIN — FENTANYL CITRATE 50 MCG: 50 INJECTION INTRAMUSCULAR; INTRAVENOUS at 06:18

## 2021-06-24 RX ADMIN — GLYCOPYRROLATE 0.2 MG: 0.2 INJECTION INTRAMUSCULAR; INTRAVENOUS at 07:09

## 2021-06-24 RX ADMIN — VANCOMYCIN HYDROCHLORIDE 1500 MG: 10 INJECTION, POWDER, LYOPHILIZED, FOR SOLUTION INTRAVENOUS at 06:00

## 2021-06-24 RX ADMIN — HYDROMORPHONE HYDROCHLORIDE 0.5 MG: 1 INJECTION, SOLUTION INTRAMUSCULAR; INTRAVENOUS; SUBCUTANEOUS at 09:29

## 2021-06-24 RX ADMIN — SODIUM CHLORIDE, POTASSIUM CHLORIDE, SODIUM LACTATE AND CALCIUM CHLORIDE 9 ML/HR: 600; 310; 30; 20 INJECTION, SOLUTION INTRAVENOUS at 06:19

## 2021-06-24 RX ADMIN — FAMOTIDINE 20 MG: 10 INJECTION INTRAVENOUS at 06:19

## 2021-06-24 RX ADMIN — CEFAZOLIN SODIUM 2 G: 2 INJECTION, SOLUTION INTRAVENOUS at 07:01

## 2021-06-24 RX ADMIN — CEFAZOLIN SODIUM 2 G: 2 INJECTION, SOLUTION INTRAVENOUS at 06:50

## 2021-06-24 RX ADMIN — MEPIVACAINE HYDROCHLORIDE 10 ML: 15 INJECTION, SOLUTION EPIDURAL; INFILTRATION at 06:18

## 2021-06-24 RX ADMIN — MELOXICAM 15 MG: 15 TABLET ORAL at 06:04

## 2021-06-24 RX ADMIN — PHENYLEPHRINE HYDROCHLORIDE 200 MCG: 10 INJECTION INTRAVENOUS at 07:19

## 2021-06-24 RX ADMIN — SODIUM CHLORIDE, POTASSIUM CHLORIDE, SODIUM LACTATE AND CALCIUM CHLORIDE 500 ML: 600; 310; 30; 20 INJECTION, SOLUTION INTRAVENOUS at 06:00

## 2021-06-24 RX ADMIN — KETOROLAC TROMETHAMINE 15 MG: 30 INJECTION, SOLUTION INTRAMUSCULAR at 09:19

## 2021-06-24 RX ADMIN — HYDROMORPHONE HYDROCHLORIDE 0.5 MG: 2 INJECTION, SOLUTION INTRAMUSCULAR; INTRAVENOUS; SUBCUTANEOUS at 07:38

## 2021-06-24 RX ADMIN — ONDANSETRON 4 MG: 2 INJECTION INTRAMUSCULAR; INTRAVENOUS at 14:58

## 2021-06-24 RX ADMIN — MIDAZOLAM 2 MG: 1 INJECTION INTRAMUSCULAR; INTRAVENOUS at 06:18

## 2021-06-24 NOTE — ANESTHESIA PROCEDURE NOTES
Peripheral Block    Pre-sedation assessment completed: 6/24/2021 6:18 AM    Patient reassessed immediately prior to procedure    Patient location during procedure: holding area  Start time: 6/24/2021 6:18 AM  Stop time: 6/24/2021 6:28 AM  Reason for block: at surgeon's request and post-op pain management  Performed by  Anesthesiologist: Teo Arguelles MD  Preanesthetic Checklist  Completed: patient identified, IV checked, site marked, risks and benefits discussed, surgical consent, monitors and equipment checked, pre-op evaluation and timeout performed  Prep:  Pt Position: supine  Sterile barriers:cap, gloves, gown, mask and sterile barriers  Prep: ChloraPrep  Patient monitoring: blood pressure monitoring, continuous pulse oximetry and EKG  Procedure  Sedation:yes    Guidance:ultrasound guided  ULTRASOUND INTERPRETATION.  Using ultrasound guidance a 21 G gauge needle was placed in close proximity to the femoral nerve, at which point, under ultrasound guidance anesthetic was injected in the area of the nerve and spread of the anesthesia was seen on ultrasound in close proximity thereto.  There were no abnormalities seen on ultrasound; a digital image was taken; and the patient tolerated the procedure with no complications. Images:still images obtained    Laterality:left  Block Type:adductor canal block  Injection Technique:single-shot  Needle Type:echogenic  Needle Gauge:21 G      Medications Used: ropivacaine (NAROPIN) 0.5 % injection, 30 mL  mepivacaine (CARBOCAINE) 1.5 % injection, 10 mL      Post Assessment  Injection Assessment: negative aspiration for heme, no paresthesia on injection and incremental injection  Patient Tolerance:comfortable throughout block  Complications:no

## 2021-06-24 NOTE — ANESTHESIA PREPROCEDURE EVALUATION
Anesthesia Evaluation     Patient summary reviewed and Nursing notes reviewed   history of anesthetic complications: PONV               Airway   Mallampati: II  TM distance: >3 FB  Neck ROM: full  Dental      Pulmonary    (+) a smoker Former,   Cardiovascular - negative cardio ROS    ECG reviewed  PT is on anticoagulation therapy  Rhythm: regular  Rate: normal        Neuro/Psych- negative ROS  GI/Hepatic/Renal/Endo    (+) morbid obesity, GERD,  renal disease stones,     Musculoskeletal     Abdominal    Substance History - negative use     OB/GYN negative ob/gyn ROS         Other   arthritis,                    Anesthesia Plan    ASA 3     general with block   (H/O PE treated with xarelto which was held for this procedure   H/O N/V  BMI  Left ACBx PSR for POPC    I have reviewed the patient's history with the patient and the chart, including all pertinent laboratory results and imaging. I have explained the risks of anesthesia including but not limited to dental damage, corneal abrasion, nerve injury, MI, stroke, and death. Questions asked and answered. Anesthetic plan discussed with patient and team as indicated. Patient expressed understanding of the above.  )  intravenous induction     Anesthetic plan, all risks, benefits, and alternatives have been provided, discussed and informed consent has been obtained with: patient.

## 2021-06-24 NOTE — ANESTHESIA PROCEDURE NOTES
Airway  Urgency: elective    Date/Time: 6/24/2021 7:03 AM  Airway not difficult    General Information and Staff    Patient location during procedure: OR  Anesthesiologist: Teo Arguelles MD  CRNA: Emily Montague CRNA    Indications and Patient Condition  Indications for airway management: airway protection    Preoxygenated: yes  Mask difficulty assessment: 1 - vent by mask    Final Airway Details  Final airway type: supraglottic airway      Successful airway: unique  Size 4    Number of attempts at approach: 1  Assessment: lips, teeth, and gum same as pre-op    Additional Comments  Smooth IV/mask induction and placement of LMA. Atraumatic, lips/teeth/mouth intact, as preop. +ETCO2, bilateral breath sounds and equal.

## 2021-06-24 NOTE — ANESTHESIA POSTPROCEDURE EVALUATION
Patient: Iris Yee    Procedure Summary     Date: 06/24/21 Room / Location: [facility] OR [address] [place] [address] MAIN OR    Anesthesia Start: 0653 Anesthesia Stop: 0858    Procedure: TOTAL KNEE ARTHROPLASTY (Left Knee) Diagnosis:       Arthritis of knee      (Arthritis of knee [M17.10])    Surgeons: Clarence Suarez MD Provider: Teo Arguelles MD    Anesthesia Type: general with block ASA Status: 3          Anesthesia Type: general with block    Vitals  Vitals Value Taken Time   /68 06/24/21 0930   Temp 36.4 °C (97.6 °F) 06/24/21 0900   Pulse 68 06/24/21 0936   Resp 14 06/24/21 0930   SpO2 97 % 06/24/21 0936   Vitals shown include unvalidated device data.        Post Anesthesia Care and Evaluation    Patient location during evaluation: bedside  Patient participation: complete - patient participated  Level of consciousness: sleepy but conscious  Pain score: 0  Pain management: adequate  Airway patency: patent  Anesthetic complications: No anesthetic complications    Cardiovascular status: acceptable  Respiratory status: acceptable  Hydration status: acceptable    Comments: /68   Pulse 72   Temp 36.4 °C (97.6 °F) (Oral)   Resp 14   Wt 100 kg (220 lb 14.4 oz)   LMP  (LMP Unknown)   SpO2 96%   BMI 35.65 kg/m²

## 2021-06-24 NOTE — ADDENDUM NOTE
Addendum  created 06/24/21 1020 by Teo Arguelles MD    Attestation recorded in Intraprocedure, Intraprocedure Attestations filed

## 2021-06-25 ENCOUNTER — TELEPHONE (OUTPATIENT)
Dept: ORTHOPEDIC SURGERY | Facility: CLINIC | Age: 76
End: 2021-06-25

## 2021-06-25 RX ORDER — PROMETHAZINE HYDROCHLORIDE 12.5 MG/1
12.5 TABLET ORAL EVERY 6 HOURS PRN
Qty: 30 TABLET | Refills: 0 | Status: SHIPPED | OUTPATIENT
Start: 2021-06-25 | End: 2021-07-07

## 2021-06-25 NOTE — TELEPHONE ENCOUNTER
Patient had LTKA on 6/24 and her daughter Nel called and stated that since patient has come home from the hospital she has been vomiting and the zofran is not helping. Please advise

## 2021-06-25 NOTE — TELEPHONE ENCOUNTER
I spoke with her.  It sounds like she is doing okay.  I did agree to call in a prescription for Phenergan for her.  Risks were discussed.

## 2021-06-28 ENCOUNTER — TELEPHONE (OUTPATIENT)
Dept: ORTHOPEDIC SURGERY | Facility: HOSPITAL | Age: 76
End: 2021-06-28

## 2021-06-28 NOTE — TELEPHONE ENCOUNTER
Post op day 4  Discharge Instructions:  Ask patient about his or her discharge instructions  ?  Patient confirmed understanding   ?  Further instruction needed   What, if any, recommendations, teaching, or interventions did you provide? Click or tap here to enter text.  Health status:  Pain controlled Yes   She has not taken any pain medication for several days   Recommended interventions:  Choose an item.  Incision/dressing status   ?  Clean without redness, drainage, odor  ?  Redness    ?  Drainage - color Click or tap here to enter text.  ?  Odor  MELISSA - Green light blinking Choose an item.  Difficulties urination No  Last BM 6/28/2021 (if no BM by day 3-recommend OTC suppository or fleets enema)  Medications:  ?Medications reviewed with patient/family/caregiver  Patient taking medications as prescribed?   Choose an item.  If not taking medications as prescribed, note specific medicine(s) and reason for each:  Click or tap here to enter text.  Hospital Follow Up Plan:  Follow up Appointment with Orthopedic surgeon:  ?Has f/u appointment                ?Scheduled f/u appointment  Home Care ordered at discharge?    Yes        Home Care started, or contact made?    Yes   If no, action taken: Click or tap here to enter text.  DME obtained/used in home?         Yes   Other information:  She had some issues with quite a bit of N/V after arriving home. Her daughter had called me to see what she could do. Advised to call MD for Phenergan if she is unable to keep anything down. Pt had not gotten the Rx filled but said she is doing much better. She stopped taking all pain medication, even Tylenol and that has helped. She doesn’t have much of an appetite but she is drinking a lot of fluids. She has taken the stool softeners and has had a BM the past 2 days. She has been able to work with PT and is progressing well. She is keeping ice on her knee and it has helped with some of the pain and swelling. She has no  questions/concerns for me at this time. She has my contact information should she need anything. She verbalized understanding.

## 2021-07-06 ENCOUNTER — TELEPHONE (OUTPATIENT)
Dept: ORTHOPEDIC SURGERY | Facility: HOSPITAL | Age: 76
End: 2021-07-06

## 2021-07-06 NOTE — TELEPHONE ENCOUNTER
Called and spoke with Ms. Yee as she is SP LTK. She states that she seems to be doing better. She is working with PT and they say she is progressing as she should. She does have most of her appetite back and is keeping food down, so that helps to make her stronger. She is having no issues with B/B. She states she is still having some pain but it's easing. She is to F/U with MD tomorrow and get the bandage removed. She states she is ready for that. She has no questions/concerns for me at this time. My contact information was given should she need anything. She verbalized understanding.

## 2021-07-07 ENCOUNTER — OFFICE VISIT (OUTPATIENT)
Dept: ORTHOPEDIC SURGERY | Facility: CLINIC | Age: 76
End: 2021-07-07

## 2021-07-07 VITALS — TEMPERATURE: 96.8 F | WEIGHT: 220 LBS | BODY MASS INDEX: 36.65 KG/M2 | HEIGHT: 65 IN

## 2021-07-07 DIAGNOSIS — Z09 SURGERY FOLLOW-UP: Primary | ICD-10-CM

## 2021-07-07 PROCEDURE — 73562 X-RAY EXAM OF KNEE 3: CPT | Performed by: ORTHOPAEDIC SURGERY

## 2021-07-07 PROCEDURE — 99024 POSTOP FOLLOW-UP VISIT: CPT | Performed by: ORTHOPAEDIC SURGERY

## 2021-07-07 NOTE — PROGRESS NOTES
Iris Yee     : 1945     MRN: [id number]     DATE: 2021    DIAGNOSIS: Follow-up 2 weeks status post total knee arthroplasty    SUBJECTIVE:  Patient returns today for 2 week follow up of recent knee replacement. Patient reports doing well with no unusual complaints.  Patient reports compliance with the anticoagulation.    OBJECTIVE:     Exam: The incision is healing appropriately.  No sign of infection.  Range of motion is progressing better than expected--3-105.  The calf is soft and nontender with a negative Homans sign.    DIAGNOSTIC STUDIES     Xrays: AP and lateral views of the left knee were ordered and reviewed for evaluation of recent knee replacement. They demonstrate a well positioned, well aligned knee replacement without complicating factors noted. In comparison with previous films there has been interval implant placement.    ASSESSMENT: 2 week status post left knee replacement.    PLAN:   1) Order given for outpatient PT   2) Continue to ice as needed.   3) Continue anticoagulation as discussed   4) Follow-up in 6 weeks     Clarence Suarez MD    2021

## 2021-08-06 ENCOUNTER — OFFICE VISIT (OUTPATIENT)
Dept: ORTHOPEDIC SURGERY | Facility: CLINIC | Age: 76
End: 2021-08-06

## 2021-08-06 VITALS — HEIGHT: 65 IN | WEIGHT: 214.6 LBS | TEMPERATURE: 98.4 F | BODY MASS INDEX: 35.75 KG/M2

## 2021-08-06 DIAGNOSIS — Z09 SURGERY FOLLOW-UP: Primary | ICD-10-CM

## 2021-08-06 PROCEDURE — 73562 X-RAY EXAM OF KNEE 3: CPT | Performed by: NURSE PRACTITIONER

## 2021-08-06 PROCEDURE — 99024 POSTOP FOLLOW-UP VISIT: CPT | Performed by: NURSE PRACTITIONER

## 2021-08-06 NOTE — PROGRESS NOTES
Iris Yee : 1945 MRN: [id number] DATE: 2021    DIAGNOSIS: 6 week follow up left TKA      SUBJECTIVE:  Patient returns today for 6 week follow up of left total knee replacement. Patient reports doing well with no unusual complaints.     Vitals:    21 1333   Temp: 98.4 °F (36.9 °C)       OBJECTIVE:     Exam:  The incision is well healed. No sign of infection. Range of motion is measured at near full extension to 115° of flexion. The calf is soft and nontender with a negative Homans sign.  Gait is reciprocal heel-to-toe and only mildly antalgic.    DIAGNOSTIC STUDIES    Xrays: 3 views of the left knee (AP, lateral, and sunrise) were ordered and reviewed for evaluation of recent knee replacement. They demonstrate a well positioned, well aligned knee replacement without complicating factors noted. In comparison with previous films there has been no change.    ASSESSMENT:  6 week status post left knee replacement.    PLAN:   1) Continue with PT exercises as prescribed  2) we discussed the need for prophylactic antibiotics prior to dental appointments.  3) Follow up in 6 weeks with Dr. Suarez.    Adilene Mena, APRN  2021  Answers for HPI/ROS submitted by the patient on 2021  Please describe your symptoms.: Post op check  Have you had these symptoms before?: Yes  How long have you been having these symptoms?: Greater than 2 weeks  Please list any medications you are currently taking for this condition.: None  Please describe any probable cause for these symptoms. : Knee replacement  What is the primary reason for your visit?: Other

## 2021-09-07 ENCOUNTER — IMMUNIZATION (OUTPATIENT)
Dept: VACCINE CLINIC | Facility: HOSPITAL | Age: 76
End: 2021-09-07

## 2021-09-07 PROCEDURE — 91300 HC SARSCOV02 VAC 30MCG/0.3ML IM: CPT | Performed by: INTERNAL MEDICINE

## 2021-09-07 PROCEDURE — 0001A: CPT | Performed by: INTERNAL MEDICINE

## 2021-09-17 ENCOUNTER — OFFICE VISIT (OUTPATIENT)
Dept: ORTHOPEDIC SURGERY | Facility: CLINIC | Age: 76
End: 2021-09-17

## 2021-09-17 VITALS — HEIGHT: 65 IN | TEMPERATURE: 98 F | WEIGHT: 214 LBS | BODY MASS INDEX: 35.65 KG/M2

## 2021-09-17 DIAGNOSIS — Z09 SURGERY FOLLOW-UP: Primary | ICD-10-CM

## 2021-09-17 PROCEDURE — 99212 OFFICE O/P EST SF 10 MIN: CPT | Performed by: ORTHOPAEDIC SURGERY

## 2021-09-17 PROCEDURE — 73562 X-RAY EXAM OF KNEE 3: CPT | Performed by: ORTHOPAEDIC SURGERY

## 2021-09-17 RX ORDER — CEPHALEXIN 500 MG/1
CAPSULE ORAL
Qty: 4 CAPSULE | Refills: 2 | Status: SHIPPED | OUTPATIENT
Start: 2021-09-17 | End: 2022-01-26

## 2021-09-17 NOTE — PROGRESS NOTES
"Iris Yee : 1945 MRN: [id number] DATE: 2021     DIAGNOSIS: 3 month follow up left TKA      SUBJECTIVE:  Patient returns today for 3 month follow up of left total knee replacement.  Patient reports doing well with no unusual complaints.     Vitals:    21 1331   Temp: 98 °F (36.7 °C)       OBJECTIVE:     Exam:  The incision is well healed. No sign of infection. Range of motion is measured at 0 to 120. The calf is soft and nontender with a negative Homans sign.  Gait is reciprocal heel-to-toe and nonantalgic.    DIAGNOSTIC STUDIES    Xrays: AP, lateral and merchant's views of the left knee are ordered and reviewed for evaluation of recent knee replacement. They demonstrate a well positioned, well aligned knee replacement without complicating factors noted.  In comparison with previous films there has been no change.    ASSESSMENT: 3 months status post left knee replacement.    PLAN:   1) Continue with PT exercises at home  2) Follow up in 6 months  3) Antibiotic prophylaxis recommendations were discussed.  She has an upcoming dental appointment.  I agreed to give her a prescription for Keflex.  She has a penicillin allergy but she says it was 60 years ago and it caused her to \"pass out\".  She thinks she has tolerated Keflex in the past.  Risk of the medicine were discussed.    Clarence Suarez MD    2021   "

## 2021-09-20 ENCOUNTER — TELEPHONE (OUTPATIENT)
Dept: ORTHOPEDIC SURGERY | Facility: CLINIC | Age: 76
End: 2021-09-20

## 2021-09-20 NOTE — TELEPHONE ENCOUNTER
Please let her know that she was given Keflex prior to her knee surgery.  Unless she has a history of allergy to Keflex, not just penicillins, it should be safe for her to take the Keflex again.

## 2021-09-20 NOTE — TELEPHONE ENCOUNTER
Caller: HELEN ALCOCER  Relationship to Patient: SELF    Phone Number: [phone]  Reason for Call: PT CALLED STATING THAT SHE WENT TO DENTIST AND WAS PRESCRIBED KEFLEX. PT BELIEVES SHE MAY HAVE AN ALLERGY TO KEFLEX. PT STATES THAT SHE BELIEVES DR. SWEENEY MAY HAVE USED KEFLEX DURING HER SX. PT STATES SHE NEEDS TO KNOW SO SHE WILL KNOW WHETHER SHE SHOULD TAKE WHAT DENTIST PRESCRIBED TO HER.

## 2021-09-21 ENCOUNTER — HOSPITAL ENCOUNTER (OUTPATIENT)
Dept: MAMMOGRAPHY | Facility: HOSPITAL | Age: 76
Discharge: HOME | End: 2021-09-21
Admitting: INTERNAL MEDICINE

## 2021-09-21 DIAGNOSIS — Z12.31 ENCOUNTER FOR SCREENING MAMMOGRAM FOR BREAST CANCER: ICD-10-CM

## 2021-09-21 PROCEDURE — 77063 BREAST TOMOSYNTHESIS BI: CPT

## 2021-09-21 PROCEDURE — 77067 SCR MAMMO BI INCL CAD: CPT

## 2021-09-28 ENCOUNTER — IMMUNIZATION (OUTPATIENT)
Dept: VACCINE CLINIC | Facility: HOSPITAL | Age: 76
End: 2021-09-28

## 2021-09-28 PROCEDURE — 91300 HC SARSCOV02 VAC 30MCG/0.3ML IM: CPT | Performed by: INTERNAL MEDICINE

## 2021-09-28 PROCEDURE — 0002A: CPT | Performed by: INTERNAL MEDICINE

## 2021-10-04 ENCOUNTER — TRANSCRIBE ORDERS (OUTPATIENT)
Dept: ADMINISTRATIVE | Facility: HOSPITAL | Age: 76
End: 2021-10-04

## 2021-10-04 DIAGNOSIS — R92.8 ABNORMAL MAMMOGRAM: Primary | ICD-10-CM

## 2021-11-02 ENCOUNTER — HOSPITAL ENCOUNTER (OUTPATIENT)
Dept: ULTRASOUND IMAGING | Facility: HOSPITAL | Age: 76
Discharge: HOME | End: 2021-11-02

## 2021-11-02 ENCOUNTER — HOSPITAL ENCOUNTER (OUTPATIENT)
Dept: MAMMOGRAPHY | Facility: HOSPITAL | Age: 76
Discharge: HOME | End: 2021-11-02

## 2021-11-02 DIAGNOSIS — R92.8 ABNORMAL MAMMOGRAM: ICD-10-CM

## 2021-11-02 PROCEDURE — 77065 DX MAMMO INCL CAD UNI: CPT

## 2021-11-02 PROCEDURE — G0279 TOMOSYNTHESIS, MAMMO: HCPCS

## 2021-11-02 PROCEDURE — 76642 ULTRASOUND BREAST LIMITED: CPT

## 2021-11-18 ENCOUNTER — HOSPITAL ENCOUNTER (OUTPATIENT)
Dept: MAMMOGRAPHY | Facility: HOSPITAL | Age: 76
Discharge: HOME | End: 2021-11-18

## 2021-11-18 ENCOUNTER — HOSPITAL ENCOUNTER (OUTPATIENT)
Dept: ULTRASOUND IMAGING | Facility: HOSPITAL | Age: 76
Discharge: HOME | End: 2021-11-18

## 2021-11-18 VITALS
DIASTOLIC BLOOD PRESSURE: 90 MMHG | HEIGHT: 65 IN | HEART RATE: 74 BPM | BODY MASS INDEX: 34.99 KG/M2 | RESPIRATION RATE: 16 BRPM | TEMPERATURE: 98 F | OXYGEN SATURATION: 98 % | SYSTOLIC BLOOD PRESSURE: 160 MMHG | WEIGHT: 210 LBS

## 2021-11-18 DIAGNOSIS — Z98.890 STATUS POST BIOPSY: ICD-10-CM

## 2021-11-18 DIAGNOSIS — R92.8 ABNORMAL MAMMOGRAM: ICD-10-CM

## 2021-11-18 PROCEDURE — 88305 TISSUE EXAM BY PATHOLOGIST: CPT | Performed by: INTERNAL MEDICINE

## 2021-11-18 PROCEDURE — 88360 TUMOR IMMUNOHISTOCHEM/MANUAL: CPT | Performed by: INTERNAL MEDICINE

## 2021-11-18 PROCEDURE — 0 LIDOCAINE 1 % SOLUTION: Performed by: RADIOLOGY

## 2021-11-18 PROCEDURE — A4648 IMPLANTABLE TISSUE MARKER: HCPCS

## 2021-11-18 PROCEDURE — 88342 IMHCHEM/IMCYTCHM 1ST ANTB: CPT | Performed by: INTERNAL MEDICINE

## 2021-11-18 PROCEDURE — 77065 DX MAMMO INCL CAD UNI: CPT

## 2021-11-18 PROCEDURE — 88341 IMHCHEM/IMCYTCHM EA ADD ANTB: CPT | Performed by: INTERNAL MEDICINE

## 2021-11-18 RX ORDER — LIDOCAINE HYDROCHLORIDE 10 MG/ML
10 INJECTION, SOLUTION INFILTRATION; PERINEURAL ONCE
Status: COMPLETED | OUTPATIENT
Start: 2021-11-18 | End: 2021-11-18

## 2021-11-18 RX ORDER — LIDOCAINE HYDROCHLORIDE AND EPINEPHRINE 10; 10 MG/ML; UG/ML
10 INJECTION, SOLUTION INFILTRATION; PERINEURAL ONCE
Status: COMPLETED | OUTPATIENT
Start: 2021-11-18 | End: 2021-11-18

## 2021-11-18 RX ORDER — DIAZEPAM 5 MG/1
5 TABLET ORAL ONCE AS NEEDED
Status: DISCONTINUED | OUTPATIENT
Start: 2021-11-18 | End: 2021-11-19 | Stop reason: HOSPADM

## 2021-11-18 RX ADMIN — LIDOCAINE HYDROCHLORIDE,EPINEPHRINE BITARTRATE 8 ML: 10; .01 INJECTION, SOLUTION INFILTRATION; PERINEURAL at 15:48

## 2021-11-18 RX ADMIN — LIDOCAINE HYDROCHLORIDE 10 ML: 10 INJECTION, SOLUTION INFILTRATION; PERINEURAL at 15:48

## 2021-11-18 NOTE — H&P
Name: Iris Yee ADMIT: 2021   : 1945  PCP: Sri Conner MD    MRN: [id number] LOS: 0 days   AGE/SEX: 76 y.o. female  ROOM: Room/bed info not found       Chief complaint 2 right breast lesions    Present Illness or Internal History:  Patient is a 76 y.o. female presents with 2 right breast lesions.     Past Surgical History:  Past Surgical History:   Procedure Laterality Date   • AUGMENTATION MAMMAPLASTY     • BREAST BIOPSY Left    • CHOLECYSTECTOMY     • COLONOSCOPY N/A 2018    Procedure: COLONOSCOPY into cecum/termial ileum with polypectomy;  Surgeon: Jose Daniel Dooley MD;  Location: [facility];  Service: Gastroenterology   • ENDOSCOPY N/A 2018    Procedure: ESOPHAGOGASTRODUODENOSCOPY with biopsy;  Surgeon: Jose Daniel Dooley MD;  Location: [facility];  Service: Gastroenterology   • EXPLORATORY LAPAROTOMY      bleeding from ruptured ovarian cyst   • HERNIA REPAIR      umbilical x 2   • TOTAL KNEE ARTHROPLASTY Left 2021    Procedure: TOTAL KNEE ARTHROPLASTY;  Surgeon: Clarence Suarez MD;  Location: [facility] OR;  Service: Orthopedics;  Laterality: Left;       Past Medical History:  Past Medical History:   Diagnosis Date   • Arthritis    • Arthritis of back    • Arthritis of neck    • Bulging lumbar disc     L 3-4   • Bursitis of hip    • Clotting disorder (HCC)    • Fracture, tibia and fibula     Stress fracture   • GERD (gastroesophageal reflux disease)    • Hip arthrosis    • History of kidney stones    • History of MRSA infection     MICHEL EARS   • History of transfusion    • Hx of blood clots    • Hypothyroid    • Knee swelling    • Left knee pain    • Lumbosacral disc disease    • Neck strain    • Neuroma of foot     Right foot   • Ovarian cyst    • Periarthritis of shoulder    • PONV (postoperative nausea and vomiting)    • Pulmonary embolism, bilateral (HCC)    • Scoliosis    • Stress fracture    • Tear of meniscus  of knee    • Thrombopenia (HCC)        Home Medications:  (Not in a hospital admission)      Allergies:  Penicillins and Sulfa antibiotics    Family History:  Family History   Problem Relation Age of Onset   • Hypertension Mother    • Osteoporosis Mother    • Scoliosis Mother    • Hypertension Father    • Stroke Paternal Aunt    • Malig Hyperthermia Neg Hx        Social History:  Social History     Tobacco Use   • Smoking status: Former Smoker     Packs/day: 1.00     Years: 10.00     Pack years: 10.00     Types: Cigarettes     Start date: 1964     Quit date: 1976     Years since quittin.2   • Smokeless tobacco: Never Used   Vaping Use   • Vaping Use: Never used   Substance Use Topics   • Alcohol use: Yes     Alcohol/week: 3.0 standard drinks     Types: 3 Glasses of wine per week     Comment: occasional   • Drug use: No        Objective     Physical Exam:    No exam performed today,    Vital Signs  Temp:  [98 °F (36.7 °C)] 98 °F (36.7 °C)  Heart Rate:  [85] 85  Resp:  [18] 18  BP: (154)/(90) 154/90    Anticipated Surgical Procedure:  ultrasound guided right breast biopsy with clip placement x 2 sites    The risks, benefits and alternatives of this procedure have been discussed with the patient or responsible party: Yes        Tye Ardon Jr., MD  21  14:40 EST

## 2021-11-18 NOTE — NURSING NOTE
Biopsy site to right outer breast clear with Exofin dry and intact. No firmness or swelling noted at or around biopsy site. (Two locations were sampled but Dr. Ardon was able to approach both through one skin nick). Denies pain. Ice pack with protective covering applied to biopsy site. Discharge instructions discussed with understanding voiced by patient. Copies provided to patient. No distress noted. To home via private vehicle.

## 2021-11-22 LAB
LAB AP CASE REPORT: NORMAL
LAB AP CLINICAL INFORMATION: NORMAL
LAB AP DIAGNOSIS COMMENT: NORMAL
LAB AP SPECIAL STAINS: NORMAL
LAB AP SYNOPTIC CHECKLIST: NORMAL
PATH REPORT.FINAL DX SPEC: NORMAL
PATH REPORT.GROSS SPEC: NORMAL

## 2021-11-23 ENCOUNTER — TELEPHONE (OUTPATIENT)
Dept: SURGERY | Facility: CLINIC | Age: 76
End: 2021-11-23

## 2021-11-23 NOTE — TELEPHONE ENCOUNTER
New patient appointment with Dr. Cardoso is scheduled on 12/9/2021 @ 9:00am.    Called and spoke to patient, patient expressed v/u of appointment times.    Sent patient a reminder letter in the mail

## 2021-11-30 ENCOUNTER — TELEPHONE (OUTPATIENT)
Dept: SURGERY | Facility: CLINIC | Age: 76
End: 2021-11-30

## 2021-11-30 NOTE — TELEPHONE ENCOUNTER
Breast MRI is scheduled on 12/3/2021 @ 4:45pm, arrive @ 4:15pm.    Called and spoke to patient, patient expressed v/u of appointment times.

## 2021-12-01 ENCOUNTER — TELEPHONE (OUTPATIENT)
Dept: ORTHOPEDIC SURGERY | Facility: CLINIC | Age: 76
End: 2021-12-01

## 2021-12-01 NOTE — TELEPHONE ENCOUNTER
CALLED TO INFORM PT THIS WOULD FINE SHE WOULD JUST NEED TO MAKE THE FACILITY AWARE OF THE REPLACEMENT. SHE GAVE VERBAL UNDERSTANDING.

## 2021-12-01 NOTE — TELEPHONE ENCOUNTER
Caller: HELEN ALCOCER    Relationship: SELF    Best call back number:     What is the best time to reach you: ANYTIME    What was the call regarding: THE PATIENT HAS A KNEE REPLACEMENT ON 6/24/21 AND IS SCHEDULED TO HAVE A CHEST MRI ON 12/3/21. THE PATIENT WOULD LIKE TO KNOW IF IT IS SAFE FOR HER TO GET THE MRI WITH HER KNEE REPLACEMENT.    Do you require a callback: YES

## 2021-12-03 ENCOUNTER — HOSPITAL ENCOUNTER (OUTPATIENT)
Dept: MRI IMAGING | Facility: HOSPITAL | Age: 76
Discharge: HOME | End: 2021-12-03
Admitting: SURGERY

## 2021-12-03 DIAGNOSIS — Z17.0 MALIGNANT NEOPLASM OF UPPER-OUTER QUADRANT OF RIGHT BREAST IN FEMALE, ESTROGEN RECEPTOR POSITIVE (HCC): ICD-10-CM

## 2021-12-03 DIAGNOSIS — C50.411 MALIGNANT NEOPLASM OF UPPER-OUTER QUADRANT OF RIGHT BREAST IN FEMALE, ESTROGEN RECEPTOR POSITIVE (HCC): ICD-10-CM

## 2021-12-03 LAB — CREAT BLDA-MCNC: 1.1 MG/DL (ref 0.6–1.3)

## 2021-12-03 PROCEDURE — C8937 CAD BREAST MRI: HCPCS

## 2021-12-03 PROCEDURE — 82565 ASSAY OF CREATININE: CPT

## 2021-12-03 PROCEDURE — C8908 MRI W/O FOL W/CONT, BREAST,: HCPCS

## 2021-12-03 PROCEDURE — 0 GADOBENATE DIMEGLUMINE 529 MG/ML SOLUTION: Performed by: SURGERY

## 2021-12-03 PROCEDURE — A9577 INJ MULTIHANCE: HCPCS | Performed by: SURGERY

## 2021-12-03 RX ADMIN — GADOBENATE DIMEGLUMINE 20 ML: 529 INJECTION, SOLUTION INTRAVENOUS at 17:25

## 2021-12-09 ENCOUNTER — PREP FOR SURGERY (OUTPATIENT)
Dept: OTHER | Facility: HOSPITAL | Age: 76
End: 2021-12-09

## 2021-12-09 ENCOUNTER — OFFICE VISIT (OUTPATIENT)
Dept: SURGERY | Facility: CLINIC | Age: 76
End: 2021-12-09

## 2021-12-09 ENCOUNTER — TELEPHONE (OUTPATIENT)
Dept: SURGERY | Facility: CLINIC | Age: 76
End: 2021-12-09

## 2021-12-09 VITALS
HEART RATE: 77 BPM | WEIGHT: 223 LBS | OXYGEN SATURATION: 97 % | HEIGHT: 66 IN | SYSTOLIC BLOOD PRESSURE: 144 MMHG | DIASTOLIC BLOOD PRESSURE: 76 MMHG | BODY MASS INDEX: 35.84 KG/M2

## 2021-12-09 DIAGNOSIS — Z17.0 MALIGNANT NEOPLASM OF UPPER-OUTER QUADRANT OF RIGHT BREAST IN FEMALE, ESTROGEN RECEPTOR POSITIVE: Primary | ICD-10-CM

## 2021-12-09 DIAGNOSIS — Z17.0 MALIGNANT NEOPLASM OF UPPER-OUTER QUADRANT OF RIGHT BREAST IN FEMALE, ESTROGEN RECEPTOR POSITIVE (HCC): Primary | ICD-10-CM

## 2021-12-09 DIAGNOSIS — C50.411 MALIGNANT NEOPLASM OF UPPER-OUTER QUADRANT OF RIGHT BREAST IN FEMALE, ESTROGEN RECEPTOR POSITIVE: Primary | ICD-10-CM

## 2021-12-09 DIAGNOSIS — C50.411 MALIGNANT NEOPLASM OF UPPER-OUTER QUADRANT OF RIGHT BREAST IN FEMALE, ESTROGEN RECEPTOR POSITIVE (HCC): Primary | ICD-10-CM

## 2021-12-09 PROCEDURE — 99205 OFFICE O/P NEW HI 60 MIN: CPT | Performed by: SURGERY

## 2021-12-09 PROCEDURE — G2212 PROLONG OUTPT/OFFICE VIS: HCPCS | Performed by: SURGERY

## 2021-12-09 RX ORDER — DIAZEPAM 5 MG/1
10 TABLET ORAL ONCE
Status: CANCELLED | OUTPATIENT
Start: 2022-01-03 | End: 2021-12-09

## 2021-12-09 RX ORDER — CETIRIZINE HYDROCHLORIDE 10 MG/1
10 TABLET ORAL NIGHTLY
COMMUNITY

## 2021-12-09 NOTE — PROGRESS NOTES
[facility]     Referring Provider: Sri Conner MD     Chief complaint: Newly diagnosed breast cancer     HPI: Ms. Iris Yee is a 75 yo woman, seen at the request of Dr. Sri Conner, for a new diagnosis of right breast cancer. This was initially detected as an imaging abnormality on routine screening. Her work-up is detailed in the oncologic history below. Prior to the biopsies, she denies any breast lumps, pain, skin changes, or nipple discharge. She has a past history of a benign left breast surgical biopsy in 2000. She also has a past history of prepectoral saline implants placed in 1976. She has a past history of an unprovoked pulmonary embolus in 2011, for which she is on Xarelto. She held this over the summer for her knee replacement surgery without any issues. She denies any family history of breast or ovarian cancer, however her brother did have prostate cancer. She was joined today in clinic by her two daughters, Nel and Makenna. She gave consent for both of them to be present during her examination and participate in the discussion.       Oncology/Hematology History Overview Note   12/10/2019, Screening MMG with Jorge ([facility]):  Negative mammogram with breast implants in place and showing no change from 07/24/2017.  BI-RADS 1: Negative.     Malignant neoplasm of upper-outer quadrant of right breast in female, estrogen receptor positive (HCC)   9/20/2021 Initial Diagnosis    Malignant neoplasm of upper-outer quadrant of right breast in female, estrogen receptor positive (HCC)     9/21/2021 Imaging    Screening MMG with Jorge ([facility]):  Scattered areas of fibroglandular density. There are bilateral retroglandular silicone breast implants with capsular calcifications. There is an area of architectural distortion in the slightly upper central middle to anterior right breast, best appreciated on Tomosynthesis. There are no suspicious masses, calcifications, or areas of architectural  distortion in the left breast.  BI-RADS 0: Incomplete.     11/2/2021 Imaging    Right Diagnostic MMG with [facility] & [facility]  MMG:  With additional imaging there is persistence of the area of architectural distortion in the middle third of the right breast lateral to the plane of the nipple.   US:  At the 11 o'clock position on the order 3 cm from the nipple there is a 1.2 cm ill-defined hypoechoic region with posterior acoustic shadowing and mild detectable internal vascularity.   There is an adjacent 0.6 cm ill-defined hypoechoic lesion that is also seen at the 11 o'clock position on the order of 3 cm from the nipple that demonstrates posterior acoustic shadowing.  BI-RADS 4: Suspicious.     11/18/2021 Biopsy    Right Breast, US-Guided Biopsy x 2 ( [facility]):    1. Right Breast at 11:00 o'clock, 3 cm from Nipple (not for calcifications), Core Biopsy:                 A. Multifocal atypical lobular hyperplasia (ALH) with foci of usual ductal hyperplasia (UDH).               B. No ductal carcinoma in situ nor invasive carcinoma identified  -Tribell clip.     2. Right Breast mass at 11:00 o'clock, 3 cm from Nipple (not for calcifications), Core Biopsy:                 A. Invasive lobular carcinoma:                            1. Overall Elberon grade I (tubular score=3, nuclear score=1, mitotic score=1).                            2. Invasive carcinoma measures at least 14 mm.                3. No lymphovascular space invasion identified.  B. Associated atypical lobular hyperplasia (ALH) noted.  C. No definitive in situ carcinoma component identified.  D. Focal columnar cell change, usual duct hyperplasia (UDH) and micropapilloma noted.  -Bowtie clip.     ER+ (%, strong)  MS+ (%, strong)  HER2 negative (IHC 1+)  Ki-67 7%     12/3/2021 Imaging    Bilateral Breast MRI ( [facility]):  In the right breast centered at the 11:30 position centered on the order of 3 cm from the nipple there are 2 focal  signal voids that are  by 1.1 cm. This represents the tri-bell-shaped and bowtie shaped metallic clips. The bowtie-shaped metallic clip is the more medial and slightly posterior metallic clip and represents the site of  invasive lobular carcinoma. There is evidence of an irregularly-shaped T1 hyperintense peripherally enhancing mass that surrounds the biopsy clips that measures on the order of 3.1 cm in anterior to posterior dimension, 1.5 cm in the superior to inferior dimension, and 2.6 cm in the medial to lateral dimension, and is consistent with a hematoma cavity. The bowtie-shaped metallic clip which represents the biopsy-proven site of malignancy is on the order of 0.6 cm anterior to an area of architectural distortion with mild enhancement measures on the order of 1.3 cm in superior to inferior dimension, 1.1 cm in the medial to lateral dimension and 1.0 cm in anterior to posterior dimension. This is most consistent with residual malignancy at the biopsy-proven site of malignancy. The tri-bell-shaped metallic clip is not surrounded by any abnormal enhancement but is within a hematoma  cavity.   No other areas of abnormal enhancement or morphology are seen in the right breast. I see no evidence for abnormal right breast skin, nipple or chest wall enhancement and there is no evidence for right axillary or  internal mammary chain adenopathy.   There are no areas of abnormal enhancement or morphology in the left breast. I see no evidence for abnormal left breast skin, nipple or chest wall enhancement and there is no evidence for left axillary or internal mammary chain adenopathy.  Bilateral retroglandular silicone implants are noted. The left silicone implant shows irregularity at the posterior margin of the implant, but no evidence for free silicone is appreciated.  BI-RADS 6: Known malignancy.         Review of Systems   Constitutional: Negative for appetite change, chills, diaphoresis, fatigue, fever  and unexpected weight change.   HENT:   Positive for tinnitus. Negative for hearing loss, lump/mass, mouth sores, nosebleeds, sore throat, trouble swallowing and voice change.    Eyes: Negative for eye problems and icterus.   Respiratory: Negative for chest tightness, cough, hemoptysis, shortness of breath and wheezing.    Cardiovascular: Negative for chest pain, leg swelling and palpitations.   Gastrointestinal: Negative for abdominal distention, abdominal pain, blood in stool, constipation, diarrhea, nausea, rectal pain and vomiting.   Endocrine: Negative for hot flashes.   Genitourinary: Positive for nocturia. Negative for bladder incontinence, difficulty urinating, dyspareunia, dysuria, frequency, hematuria, menstrual problem, pelvic pain, vaginal bleeding and vaginal discharge.    Musculoskeletal: Positive for arthralgias and back pain. Negative for flank pain, gait problem, myalgias, neck pain and neck stiffness.   Skin: Negative for itching, rash and wound.   Neurological: Positive for dizziness and headaches. Negative for extremity weakness, gait problem, light-headedness, numbness, seizures and speech difficulty.   Hematological: Negative for adenopathy. Bruises/bleeds easily.   Psychiatric/Behavioral: Negative for confusion, decreased concentration, depression, sleep disturbance and suicidal ideas. The patient is not nervous/anxious.    I personally reviewed and updated the ROS.      Medications:    Current Outpatient Medications:   •  cephalexin (KEFLEX) 500 MG capsule, 4 pills one hour prior to procedure, Disp: 4 capsule, Rfl: 2  •  cetirizine (zyrTEC) 10 MG tablet, Take 10 mg by mouth Daily., Disp: , Rfl:   •  cholecalciferol (VITAMIN D3) 25 MCG (1000 UT) tablet, Take 1,000 Units by mouth Daily., Disp: , Rfl:   •  rivaroxaban (Xarelto) 20 MG tablet, Take 1 tablet by mouth Daily With Dinner., Disp: 30 tablet, Rfl: 0  •  SYNTHROID 112 MCG tablet, Take 112 mcg by mouth Every Night., Disp: , Rfl:   •   docusate sodium (COLACE) 100 MG capsule, Take 1 capsule by mouth 2 (Two) Times a Day., Disp: 60 capsule, Rfl: 0  •  Multiple Vitamins-Minerals (ZINC PO), Take 1 tablet by mouth Every Morning., Disp: , Rfl:   No current facility-administered medications for this visit.    Facility-Administered Medications Ordered in Other Visits:   •  Chlorhexidine Gluconate 2 % pads 1 each, 1 each, Apply externally, Take As Directed, Clarence Suarez MD      Allergies   Allergen Reactions   • Penicillins Other (See Comments)     Passes out   • Sulfa Antibiotics Nausea And Vomiting       Past Medical History:   Diagnosis Date   • Arthritis    • Arthritis of back 2015   • Arthritis of neck    • Bulging lumbar disc     L 3-4   • Bursitis of hip    • Clotting disorder (HCC)    • Fracture, tibia and fibula     Stress fracture   • GERD (gastroesophageal reflux disease)    • Hip arthrosis    • History of kidney stones    • History of MRSA infection 2020    MICHEL EARS   • History of transfusion 1971   • Hx of blood clots 2011   • Hypothyroid    • Knee swelling    • Left knee pain    • Lumbosacral disc disease    • Malignant neoplasm of upper-outer quadrant of right breast in female, estrogen receptor positive (HCC) 12/9/2021   • Neck strain    • Neuroma of foot 2000    Right foot   • Ovarian cyst    • Periarthritis of shoulder    • PONV (postoperative nausea and vomiting)    • Pulmonary embolism, bilateral (HCC)    • Scoliosis 2020   • Stress fracture    • Tear of meniscus of knee    • Thrombopenia (HCC)        Past Surgical History:   Procedure Laterality Date   • AUGMENTATION MAMMAPLASTY  1976   • BREAST EXCISIONAL BIOPSY Left 2000   • CHOLECYSTECTOMY     • COLONOSCOPY N/A 12/14/2018    Procedure: COLONOSCOPY into cecum/termial ileum with polypectomy;  Surgeon: Jose Daniel Dooley MD;  Location: [facility] ENDOSCOPY;  Service: Gastroenterology   • ENDOSCOPY N/A 12/14/2018    Procedure: ESOPHAGOGASTRODUODENOSCOPY with biopsy;  Surgeon:  Sandra Dooley MD;  Location: [facility] ENDOSCOPY;  Service: Gastroenterology   • EXPLORATORY LAPAROTOMY      bleeding from ruptured ovarian cyst   • HERNIA REPAIR      umbilical x 2   • TOTAL KNEE ARTHROPLASTY Left 2021    Procedure: TOTAL KNEE ARTHROPLASTY;  Surgeon: Clarence Suarez MD;  Location: [facility] MAIN OR;  Service: Orthopedics;  Laterality: Left;       Family History   Problem Relation Age of Onset   • Hypertension Mother    • Osteoporosis Mother    • Scoliosis Mother    • Hypertension Father    • Stroke Paternal Aunt    • Cervical cancer Maternal Grandmother 68   • Prostate cancer Brother 74   • Malig Hyperthermia Neg Hx        Social History     Socioeconomic History   • Marital status:      Spouse name: SANDRA   • Number of children: 3   • Years of education: COLLEGE   Tobacco Use   • Smoking status: Former Smoker     Packs/day: 1.00     Years: 10.00     Pack years: 10.00     Types: Cigarettes     Start date: 1964     Quit date: 1976     Years since quittin.3   • Smokeless tobacco: Never Used   Vaping Use   • Vaping Use: Never used   Substance and Sexual Activity   • Alcohol use: Yes     Alcohol/week: 3.0 standard drinks     Types: 3 Glasses of wine per week     Comment: occasional   • Drug use: No   • Sexual activity: Not Currently     Partners: Male     Birth control/protection: Post-menopausal, None     Patient drinks 3-5 servings of caffeine per day.       GYNECOLOGIC HISTORY:   . P: 3. AB: 2.  Last menstrual period: Postmenopausal  Age at menarche: 16  Age at first childbirth: 21  Lactation/How lon weeks   Age at menopause: 57  Total years of oral contraceptive use: 3 months.   Total years of hormone replacement therapy: none       PHYSICAL EXAMINATION:   Vitals:    21 0855   BP: 144/76   Pulse: 77   SpO2: 97%     ECOG 0 - Asymptomatic  General: NAD, well appearing  Psych: a&o x 3, normal mood and affect  Eyes: EOMI, no scleral icterus  ENMT: neck  supple without masses or thyromegaly, mucus membranes moist  Resp: normal effort, CTAB  CV: RRR, no murmurs, no edema  GI: soft, NT, ND  MSK: normal gait, normal ROM in bilateral shoulders  Lymph nodes: no cervical, supraclavicular or axillary lymphadenopathy  Breast: moderate size, grade 3 ptosis, prepectoral implant  Right: No visible abnormalities on inspection while seated, with arms raised or hands on hips. At 11:30, 3 cm FN, there are 2 nodular areas (postbiopsy hematomas). No skin changes, or nipple abnormalities.  Left: Superior periareolar scar, otherwise no visible abnormalities on inspection while seated, with arms raised or hands on hips. No masses, skin changes, or nipple abnormalities.    Right breast, in-office ultrasound: At 11:30, 3 cm FN, there are 2 postbiopsy hematomas with at least one biopsy clip visualized. These are located about 10 mm deep to the skin.       I have independently reviewed her imaging and here are my findings:   In the right upper outer breast, there initially were 2 adjacent hypoechoic areas. One of these measures 20 mm and represents the invasive cancer marked with a bowtie clip. The second area measures about 6 mm and represents the ALH marked with a tribell clip. The 2 clips are about 1 cm apart on mammogram, with the bowtie/cancer clip located slightly more medial/superior. On MRI at the cancer location, there is a 3.1 cm hematoma cavity with a 1.3 cm area of enhancement/architectural distortion with the clip located anteriorly      Assessment:  76 y.o. F with a new diagnosis of right breast cancer: Low grade, invasive lobular carcinoma, ER/VA+, Her2 negative, with associated atypical lobular hyperplasia; clinical T1cN0, anatomic stage IA, prognostic stage IA.      Discussion:  I had an extensive discussion with the patient and her family about the nature of her breast cancer diagnosis. We reviewed the components of breast tissue including ducts and lobules. We reviewed  her pathology report in detail. We reviewed breast cancer histology, including stage, grade, ER/AR receptors, HER2 receptors and how this applies to her diagnosis. We reviewed the basics of systemic and local/regional management of breast cancer.      We discussed that in her case, systemic treatment would likely involve endocrine therapy. She will be referred to medical oncology postoperatively to discuss this further. We reviewed potential surgical treatments to include partial mastectomy and mastectomy and discussed the rationale associated with each approach. Regarding radiation therapy, we discussed that radiation is indicated in most cases of breast conservation and in only limited circumstances following mastectomy. We discussed that the primary goal of adjuvant radiation is to decrease the likelihood of local recurrence. She will be referred to radiation oncology postoperatively to discuss the risks and benefits of treatment and whether it is indicated.     In her case, she is a good candidate for lumpectomy and she would like to proceed with breast conservation. We discussed that lumpectomy would require preoperative wire-localization, and in her case bracketing between 2 wires. We also discussed the risk of positive margins and that she must have negative margins for lumpectomy to be an appropriate oncologic procedure. I will make every effort to obtain negative margins at her initial operation, but there is a 10-15% chance that she will require a second operation for re-excision, or possibly a total mastectomy. We will not know the margin status until after her final pathology has returned.     She would like to have her implants removed due to their age. Because of this and the volume of tissue that will need to be resected relative to her breast size, this case would best be done with the assistance of plastic surgery for oncoplastic closure.    We discussed that most breast cancer is not hereditary,  however given her family history of prostate cancer, this may play a role in her case. Genetic testing is warranted and was sent today. This could affect surgical decision making. Should the results show a pathologic mutation, I would recommend a mastectomy on the cancer side and a contralateral risk-reduction mastectomy. She understands that this would not be for the treatment of her right breast cancer and will not improve her prognosis long term. This would be to reduce her risk of a second, primary breast cancer. If she is negative for a mutation, then I would not recommend a bilateral mastectomy, since her risk of a second primary cancer would be relatively low and endocrine therapy will further reduce her risk.     We discussed axillary staging. I reviewed with her the data (CALGB 9343) that suggests in women over 70 with small ER+ tumors who will receive adjuvant endocrine therapy, the risks of axillary staging may outweigh the benefits, given that there will be no change in adjuvant therapy or outcome regardless of axillary status. This was explained in detail to the patient and she declined axillary staging.      I described additional risks and potential complications associated with surgery, including, but not limited to, bleeding, infection, deformity/poor cosmetic result, chronic pain, numbness, seroma, hematoma, deep venous thrombosis, skin flap necrosis, disease recurrence and the possibility of requiring additional surgery. We also discussed other treatment options including the option of not undergoing any surgical treatment and the risks associated with this including disease progression. She expressed an understanding of these factors and wished to proceed.    Plan:  A multidisciplinary plan has been formulated for the patient:      (1) Breast Surgical Oncology:  -F/u genetic testing. I will call her with results.  -Plastic surgery referral.   -Hold Xarelto perioperatively.  -Right  needle-localized, bracketed, partial mastectomy with oncoplastic closure.    (2) Medical Oncology:  -Will refer postoperatively.    (3) Radiation Oncology:  -Will refer postoperatively.    Adela Cardoso MD    I spent 110 minutes caring for Iris on this date of service. This time includes time spent by me in the following activities: preparing for the visit, performing a medically appropriate examination and/or evaluation , counseling and educating the patient/family/caregiver, ordering medications, tests, or procedures, referring and communicating with other health care professionals , documenting information in the medical record and independently interpreting results and communicating that information with the patient/family/caregiver.      CC:  MD Jarrod Hernandez MD Ryan Wermeling, MD

## 2021-12-13 ENCOUNTER — PATIENT ROUNDING (BHMG ONLY) (OUTPATIENT)
Dept: SURGERY | Facility: CLINIC | Age: 76
End: 2021-12-13

## 2021-12-13 ENCOUNTER — TELEPHONE (OUTPATIENT)
Dept: ORTHOPEDIC SURGERY | Facility: CLINIC | Age: 76
End: 2021-12-13

## 2021-12-13 NOTE — TELEPHONE ENCOUNTER
Caller: HELEN ALCOCER    Relationship: SELF    Best call back number: [phone]    What is the best time to reach you: ANY    What was the call regarding: DR SWEENEY PREFORMED KNEE REPLACEMENT SURGERY IN June OF 2021. SHE WILL HAVE BREAST CANCER SURGERY ON 1/3/2022. SHE WOULD LIKE TO KNOW IF SHE SHOULD HAVE ANTIBIOTICS PRIOR TO HER SURGERY. THE DR PREFORMING THE SURGERY SAID THAT HE USUALLY PRESCRIBES ANTIBIOTICS FOLLOWING THE SURGERY, BUT INSTRUCTED THE PATIENT TO CONSULT W/ DR SWEENEY.    Do you require a callback: YES

## 2021-12-13 NOTE — PROGRESS NOTES
December 13, 2021    Hello, may I speak with Iris Yee?    My name is Lia.    I am  with [facility]  [address] [facility] [address] [phone].    Before we get started may I verify your date of birth? 1945    I am calling to officially welcome you to our practice and ask about your recent visit. Is this a good time to talk? Yes.    Tell me about your visit with us. What things went well? Dr. Cardoso explained everything well. Entire staff was very warm and welcoming       We're always looking for ways to make our patients' experiences even better. Do you have recommendations on ways we may improve?  No.    Overall were you satisfied with your first visit to our practice? Yes.       I appreciate you taking the time to speak with me today. Is there anything else I can do for you? No.    Thank you, and have a great day.

## 2021-12-14 ENCOUNTER — TELEPHONE (OUTPATIENT)
Dept: SURGERY | Facility: CLINIC | Age: 76
End: 2021-12-14

## 2021-12-14 ENCOUNTER — TRANSCRIBE ORDERS (OUTPATIENT)
Dept: SURGERY | Facility: CLINIC | Age: 76
End: 2021-12-14

## 2021-12-14 DIAGNOSIS — Z17.0 MALIGNANT NEOPLASM OF UPPER-OUTER QUADRANT OF RIGHT BREAST IN FEMALE, ESTROGEN RECEPTOR POSITIVE (HCC): Primary | ICD-10-CM

## 2021-12-14 DIAGNOSIS — C50.411 MALIGNANT NEOPLASM OF UPPER-OUTER QUADRANT OF RIGHT BREAST IN FEMALE, ESTROGEN RECEPTOR POSITIVE (HCC): Primary | ICD-10-CM

## 2021-12-14 NOTE — TELEPHONE ENCOUNTER
Please let her know that her surgeon will give her antibiotics with the surgery and that is adequate coverage.  There is no need for her to take any additional antibiotics other than those given to her at the time of surgery.  Thanks.

## 2021-12-14 NOTE — TELEPHONE ENCOUNTER
Appointment with Dr. Butcher is scheduled on 12/13/2021 @ 3:00pm.    Surgery is scheduled on 1/3/2022 @ 9:00am, NL @ 7:30am, patient arrival 5:30am.    PAT is scheduled on 12/28/2021 @ 3:00pm.    Called and spoke to patient, patient expressed v/u of appointment time.    Sent patient a reminder letter in the mail.

## 2021-12-17 ENCOUNTER — TELEPHONE (OUTPATIENT)
Dept: SURGERY | Facility: CLINIC | Age: 76
End: 2021-12-17

## 2021-12-17 ENCOUNTER — NURSE NAVIGATOR (OUTPATIENT)
Dept: OTHER | Facility: HOSPITAL | Age: 76
End: 2021-12-17

## 2021-12-17 NOTE — PROGRESS NOTES
Referral received from Dr. Cardoso's office. I called Ms. Yee and introduced myself and navigational services. She states the plan is to have a lumpectomy on Jan 3rd. She has no questions or concerns at this time. She has a good understanding of her pathology and treatment options and  is comfortable with her plan of care.     She stated her two daughters are very supportive and she has no resource needs at this time.     We discussed integrative therapies and other services at the [facility]. She verbalized interest in receiving a navigation folder outlining services. I verified her mailing address and will send out a navigation folder with the following information:     [facility],  [facility] Breast Cancer Support Group, Cancer and Restorative Exercise (CARE), [employer] Exercise program, Guide for the Newly Diagnosed, [employer], [facility], Massage Therapy, Reiki Therapy, [employer] [place], Cancer Nutrition, and Survivorship Clinic.    She verbalized appreciation for navigational services and she has my contact information and will call with any questions that arise.

## 2021-12-20 ENCOUNTER — TELEPHONE (OUTPATIENT)
Dept: SURGERY | Facility: CLINIC | Age: 76
End: 2021-12-20

## 2021-12-28 ENCOUNTER — PRE-ADMISSION TESTING (OUTPATIENT)
Dept: PREADMISSION TESTING | Facility: HOSPITAL | Age: 76
End: 2021-12-28

## 2021-12-28 VITALS
DIASTOLIC BLOOD PRESSURE: 85 MMHG | WEIGHT: 220 LBS | OXYGEN SATURATION: 95 % | HEIGHT: 65 IN | SYSTOLIC BLOOD PRESSURE: 138 MMHG | HEART RATE: 91 BPM | RESPIRATION RATE: 20 BRPM | BODY MASS INDEX: 36.65 KG/M2 | TEMPERATURE: 97.2 F

## 2021-12-28 DIAGNOSIS — C50.411 MALIGNANT NEOPLASM OF UPPER-OUTER QUADRANT OF RIGHT BREAST IN FEMALE, ESTROGEN RECEPTOR POSITIVE (HCC): ICD-10-CM

## 2021-12-28 DIAGNOSIS — Z17.0 MALIGNANT NEOPLASM OF UPPER-OUTER QUADRANT OF RIGHT BREAST IN FEMALE, ESTROGEN RECEPTOR POSITIVE (HCC): ICD-10-CM

## 2021-12-28 LAB
ANION GAP SERPL CALCULATED.3IONS-SCNC: 11.5 MMOL/L (ref 5–15)
BASOPHILS # BLD AUTO: 0.04 10*3/MM3 (ref 0–0.2)
BASOPHILS NFR BLD AUTO: 0.6 % (ref 0–1.5)
BUN SERPL-MCNC: 21 MG/DL (ref 8–23)
BUN/CREAT SERPL: 18.6 (ref 7–25)
CALCIUM SPEC-SCNC: 9.5 MG/DL (ref 8.6–10.5)
CHLORIDE SERPL-SCNC: 102 MMOL/L (ref 98–107)
CO2 SERPL-SCNC: 24.5 MMOL/L (ref 22–29)
CREAT SERPL-MCNC: 1.13 MG/DL (ref 0.57–1)
DEPRECATED RDW RBC AUTO: 46.3 FL (ref 37–54)
EOSINOPHIL # BLD AUTO: 0.09 10*3/MM3 (ref 0–0.4)
EOSINOPHIL NFR BLD AUTO: 1.4 % (ref 0.3–6.2)
ERYTHROCYTE [DISTWIDTH] IN BLOOD BY AUTOMATED COUNT: 13.1 % (ref 12.3–15.4)
GFR SERPL CREATININE-BSD FRML MDRD: 47 ML/MIN/1.73
GLUCOSE SERPL-MCNC: 94 MG/DL (ref 65–99)
HCT VFR BLD AUTO: 42 % (ref 34–46.6)
HGB BLD-MCNC: 14.3 G/DL (ref 12–15.9)
IMM GRANULOCYTES # BLD AUTO: 0.03 10*3/MM3 (ref 0–0.05)
IMM GRANULOCYTES NFR BLD AUTO: 0.5 % (ref 0–0.5)
LYMPHOCYTES # BLD AUTO: 0.92 10*3/MM3 (ref 0.7–3.1)
LYMPHOCYTES NFR BLD AUTO: 14.3 % (ref 19.6–45.3)
MCH RBC QN AUTO: 32.9 PG (ref 26.6–33)
MCHC RBC AUTO-ENTMCNC: 34 G/DL (ref 31.5–35.7)
MCV RBC AUTO: 96.8 FL (ref 79–97)
MONOCYTES # BLD AUTO: 0.61 10*3/MM3 (ref 0.1–0.9)
MONOCYTES NFR BLD AUTO: 9.5 % (ref 5–12)
NEUTROPHILS NFR BLD AUTO: 4.75 10*3/MM3 (ref 1.7–7)
NEUTROPHILS NFR BLD AUTO: 73.7 % (ref 42.7–76)
NRBC BLD AUTO-RTO: 0 /100 WBC (ref 0–0.2)
PLATELET # BLD AUTO: 195 10*3/MM3 (ref 140–450)
PMV BLD AUTO: 11.4 FL (ref 6–12)
POTASSIUM SERPL-SCNC: 4 MMOL/L (ref 3.5–5.2)
QT INTERVAL: 388 MS
RBC # BLD AUTO: 4.34 10*6/MM3 (ref 3.77–5.28)
SODIUM SERPL-SCNC: 138 MMOL/L (ref 136–145)
WBC NRBC COR # BLD: 6.44 10*3/MM3 (ref 3.4–10.8)

## 2021-12-28 PROCEDURE — 36415 COLL VENOUS BLD VENIPUNCTURE: CPT

## 2021-12-28 PROCEDURE — 93010 ELECTROCARDIOGRAM REPORT: CPT | Performed by: INTERNAL MEDICINE

## 2021-12-28 PROCEDURE — 93005 ELECTROCARDIOGRAM TRACING: CPT

## 2021-12-28 PROCEDURE — 85025 COMPLETE CBC W/AUTO DIFF WBC: CPT

## 2021-12-28 PROCEDURE — 80048 BASIC METABOLIC PNL TOTAL CA: CPT

## 2021-12-28 RX ORDER — ACETAMINOPHEN 500 MG
1000 TABLET ORAL EVERY 6 HOURS PRN
COMMUNITY
End: 2022-01-18

## 2021-12-28 RX ORDER — CALCIUM CARBONATE 200(500)MG
1 TABLET,CHEWABLE ORAL AS NEEDED
COMMUNITY

## 2021-12-28 NOTE — DISCHARGE INSTRUCTIONS
Take the following medications the morning of surgery:    none    If you are on prescription narcotic pain medication to control your pain you may also take that medication the morning of surgery.    General Instructions:  • Do not eat solid food after midnight the night before surgery.  • You may drink clear liquids day of surgery but must stop at least one hour before your hospital arrival time.  • It is beneficial for you to have a clear drink that contains carbohydrates the day of surgery.  We suggest a 12 to 20 ounce bottle of Gatorade or Powerade for non-diabetic patients or a 12 to 20 ounce bottle of G2 or Powerade Zero for diabetic patients. (Pediatric patients, are not advised to drink a 12 to 20 ounce carbohydrate drink)    Clear liquids are liquids you can see through.  Nothing red in color.     Plain water                               Sports drinks  Sodas                                   Gelatin (Jell-O)  Fruit juices without pulp such as white grape juice and apple juice  Popsicles that contain no fruit or yogurt  Tea or coffee (no cream or milk added)  Gatorade / Powerade  G2 / Powerade Zero    • Infants may have breast milk up to four hours before surgery.  • Infants drinking formula may drink formula up to six hours before surgery.   • Patients who avoid smoking, chewing tobacco and alcohol for 4 weeks prior to surgery have a reduced risk of post-operative complications.  Quit smoking as many days before surgery as you can.  • Do not smoke, use chewing tobacco or drink alcohol the day of surgery.   • If applicable bring your C-PAP/ BI-PAP machine.  • Bring any papers given to you in the doctor’s office.  • Wear clean comfortable clothes.  • Do not wear contact lenses, false eyelashes or make-up.  Bring a case for your glasses.   • Bring crutches or walker if applicable.  • Remove all piercings.  Leave jewelry and any other valuables at home.  • Hair extensions with metal clips must be removed prior  to surgery.  • The Pre-Admission Testing nurse will instruct you to bring medications if unable to obtain an accurate list in Pre-Admission Testing.        If you were given a blood bank ID arm band remember to bring it with you the day of surgery.    Preventing a Surgical Site Infection:  • For 2 to 3 days before surgery, avoid shaving with a razor because the razor can irritate skin and make it easier to develop an infection.    • Any areas of open skin can increase the risk of a post-operative wound infection by allowing bacteria to enter and travel throughout the body.  Notify your surgeon if you have any skin wounds / rashes even if it is not near the expected surgical site.  The area will need assessed to determine if surgery should be delayed until it is healed.  • The night prior to surgery shower using a fresh bar of anti-bacterial soap (such as Dial) and clean washcloth.  Sleep in a clean bed with clean clothing.  Do not allow pets to sleep with you.  • Shower on the morning of surgery using a fresh bar of anti-bacterial soap (such as Dial) and clean washcloth.  Dry with a clean towel and dress in clean clothing.  • Ask your surgeon if you will be receiving antibiotics prior to surgery.  • Make sure you, your family, and all healthcare providers clean their hands with soap and water or an alcohol based hand  before caring for you or your wound.    Day of surgery:1/3/2022    0530  Your arrival time is approximately two hours before your scheduled surgery time.  Upon arrival, a Pre-op nurse and Anesthesiologist will review your health history, obtain vital signs, and answer questions you may have.  The only belongings needed at this time will be a list of your home medications and if applicable your C-PAP/BI-PAP machine.  A Pre-op nurse will start an IV and you may receive medication in preparation for surgery, including something to help you relax.     Please be aware that surgery does come with  discomfort.  We want to make every effort to control your discomfort so please discuss any uncontrolled symptoms with your nurse.   Your doctor will most likely have prescribed pain medications.      If you are going home after surgery you will receive individualized written care instructions before being discharged.  A responsible adult must drive you to and from the hospital on the day of your surgery and stay with you for 24 hours.  Discharge prescriptions can be filled by the hospital pharmacy during regular pharmacy hours.  If you are having surgery late in the day/evening your prescription may be e-prescribed to your pharmacy.  Please verify your pharmacy hours or chose a 24 hour pharmacy to avoid not having access to your prescription because your pharmacy has closed for the day.    If you are staying overnight following surgery, you will be transported to your hospital room following the recovery period.  [facility] has all private rooms.    If you have any questions please call Pre-Admission Testing at [phone].  Deductibles and co-payments are collected on the day of service. Please be prepared to pay the required co-pay, deductible or deposit on the day of service as defined by your plan.    Patient Education for Self-Quarantine Process    • Following your COVID testing, we strongly recommend that you wear a mask when you are with other people and practice social distancing.   • Limit your activities to only required outings.  • Wash your hands with soap and water frequently for at least 20 seconds.   • Avoid touching your eyes, nose and mouth with unwashed hands.  • Do not share anything - utensils, drinking glasses, food from the same bowl.   • Sanitize household surfaces daily. Include all high touch areas (door handles, light switches, phones, countertops, etc.)    Call your surgeon immediately if you experience any of the following symptoms:  • Sore Throat  • Shortness of Breath  or difficulty breathing  • Cough  • Chills  • Body soreness or muscle pain  • Headache  • Fever  • New loss of taste or smell  • Do not arrive for your surgery ill.  Your procedure will need to be rescheduled to another time.  You will need to call your physician before the day of surgery to avoid any unnecessary exposure to hospital staff as well as other patients.

## 2021-12-31 ENCOUNTER — LAB (OUTPATIENT)
Dept: LAB | Facility: HOSPITAL | Age: 76
End: 2021-12-31

## 2021-12-31 DIAGNOSIS — Z17.0 MALIGNANT NEOPLASM OF UPPER-OUTER QUADRANT OF RIGHT BREAST IN FEMALE, ESTROGEN RECEPTOR POSITIVE: ICD-10-CM

## 2021-12-31 DIAGNOSIS — C50.411 MALIGNANT NEOPLASM OF UPPER-OUTER QUADRANT OF RIGHT BREAST IN FEMALE, ESTROGEN RECEPTOR POSITIVE: ICD-10-CM

## 2021-12-31 LAB — SARS-COV-2 ORF1AB RESP QL NAA+PROBE: NOT DETECTED

## 2021-12-31 PROCEDURE — C9803 HOPD COVID-19 SPEC COLLECT: HCPCS

## 2021-12-31 PROCEDURE — U0004 COV-19 TEST NON-CDC HGH THRU: HCPCS

## 2022-01-03 ENCOUNTER — APPOINTMENT (OUTPATIENT)
Dept: GENERAL RADIOLOGY | Facility: HOSPITAL | Age: 77
End: 2022-01-03

## 2022-01-03 ENCOUNTER — ANESTHESIA EVENT (OUTPATIENT)
Dept: PERIOP | Facility: HOSPITAL | Age: 77
End: 2022-01-03

## 2022-01-03 ENCOUNTER — HOSPITAL ENCOUNTER (OUTPATIENT)
Facility: HOSPITAL | Age: 77
Discharge: HOME | End: 2022-01-04
Attending: SURGERY | Admitting: SURGERY

## 2022-01-03 ENCOUNTER — HOSPITAL ENCOUNTER (OUTPATIENT)
Dept: MAMMOGRAPHY | Facility: HOSPITAL | Age: 77
Discharge: HOME | End: 2022-01-03

## 2022-01-03 ENCOUNTER — ANESTHESIA (OUTPATIENT)
Dept: PERIOP | Facility: HOSPITAL | Age: 77
End: 2022-01-03

## 2022-01-03 DIAGNOSIS — C50.411 MALIGNANT NEOPLASM OF UPPER-OUTER QUADRANT OF RIGHT BREAST IN FEMALE, ESTROGEN RECEPTOR POSITIVE: ICD-10-CM

## 2022-01-03 DIAGNOSIS — Z17.0 MALIGNANT NEOPLASM OF UPPER-OUTER QUADRANT OF RIGHT BREAST IN FEMALE, ESTROGEN RECEPTOR POSITIVE: ICD-10-CM

## 2022-01-03 PROCEDURE — 25010000002 NEOSTIGMINE 5 MG/10ML SOLUTION: Performed by: NURSE ANESTHETIST, CERTIFIED REGISTERED

## 2022-01-03 PROCEDURE — 25010000002 ONDANSETRON PER 1 MG: Performed by: NURSE ANESTHETIST, CERTIFIED REGISTERED

## 2022-01-03 PROCEDURE — 25010000002 FENTANYL CITRATE (PF) 50 MCG/ML SOLUTION: Performed by: NURSE ANESTHETIST, CERTIFIED REGISTERED

## 2022-01-03 PROCEDURE — 88305 TISSUE EXAM BY PATHOLOGIST: CPT | Performed by: PLASTIC SURGERY

## 2022-01-03 PROCEDURE — 19301 PARTIAL MASTECTOMY: CPT | Performed by: SURGERY

## 2022-01-03 PROCEDURE — 88307 TISSUE EXAM BY PATHOLOGIST: CPT | Performed by: SURGERY

## 2022-01-03 PROCEDURE — C1889 IMPLANT/INSERT DEVICE, NOC: HCPCS | Performed by: SURGERY

## 2022-01-03 PROCEDURE — 25010000002 ROPIVACAINE PER 1 MG: Performed by: PLASTIC SURGERY

## 2022-01-03 PROCEDURE — 25010000002 PROPOFOL 10 MG/ML EMULSION: Performed by: NURSE ANESTHETIST, CERTIFIED REGISTERED

## 2022-01-03 PROCEDURE — G0378 HOSPITAL OBSERVATION PER HR: HCPCS

## 2022-01-03 PROCEDURE — 63710000001 ONDANSETRON PER 8 MG: Performed by: PLASTIC SURGERY

## 2022-01-03 PROCEDURE — 88342 IMHCHEM/IMCYTCHM 1ST ANTB: CPT | Performed by: SURGERY

## 2022-01-03 PROCEDURE — 88302 TISSUE EXAM BY PATHOLOGIST: CPT | Performed by: SURGERY

## 2022-01-03 PROCEDURE — 76098 X-RAY EXAM SURGICAL SPECIMEN: CPT

## 2022-01-03 PROCEDURE — 25010000002 DEXAMETHASONE PER 1 MG: Performed by: NURSE ANESTHETIST, CERTIFIED REGISTERED

## 2022-01-03 PROCEDURE — C1819 TISSUE LOCALIZATION-EXCISION: HCPCS

## 2022-01-03 PROCEDURE — 25010000002 GENTAMICIN PER 80 MG: Performed by: PLASTIC SURGERY

## 2022-01-03 DEVICE — LIGACLIP MCA MULTIPLE CLIP APPLIERS, 20 SMALL CLIPS
Type: IMPLANTABLE DEVICE | Site: BREAST | Status: FUNCTIONAL
Brand: LIGACLIP

## 2022-01-03 RX ORDER — NEOSTIGMINE METHYLSULFATE 0.5 MG/ML
INJECTION, SOLUTION INTRAVENOUS AS NEEDED
Status: DISCONTINUED | OUTPATIENT
Start: 2022-01-03 | End: 2022-01-03 | Stop reason: SURG

## 2022-01-03 RX ORDER — NALOXONE HCL 0.4 MG/ML
0.2 VIAL (ML) INJECTION AS NEEDED
Status: DISCONTINUED | OUTPATIENT
Start: 2022-01-03 | End: 2022-01-03 | Stop reason: HOSPADM

## 2022-01-03 RX ORDER — CETIRIZINE HYDROCHLORIDE 10 MG/1
10 TABLET ORAL NIGHTLY
Status: DISCONTINUED | OUTPATIENT
Start: 2022-01-03 | End: 2022-01-04 | Stop reason: HOSPADM

## 2022-01-03 RX ORDER — PROMETHAZINE HYDROCHLORIDE 25 MG/1
25 TABLET ORAL ONCE AS NEEDED
Status: DISCONTINUED | OUTPATIENT
Start: 2022-01-03 | End: 2022-01-03 | Stop reason: HOSPADM

## 2022-01-03 RX ORDER — EPHEDRINE SULFATE 50 MG/ML
INJECTION, SOLUTION INTRAVENOUS AS NEEDED
Status: DISCONTINUED | OUTPATIENT
Start: 2022-01-03 | End: 2022-01-03 | Stop reason: SURG

## 2022-01-03 RX ORDER — OXYCODONE AND ACETAMINOPHEN 7.5; 325 MG/1; MG/1
1 TABLET ORAL EVERY 4 HOURS PRN
Status: DISCONTINUED | OUTPATIENT
Start: 2022-01-03 | End: 2022-01-03 | Stop reason: HOSPADM

## 2022-01-03 RX ORDER — FENTANYL CITRATE 50 UG/ML
INJECTION, SOLUTION INTRAMUSCULAR; INTRAVENOUS AS NEEDED
Status: DISCONTINUED | OUTPATIENT
Start: 2022-01-03 | End: 2022-01-03 | Stop reason: SURG

## 2022-01-03 RX ORDER — DEXAMETHASONE SODIUM PHOSPHATE 10 MG/ML
INJECTION INTRAMUSCULAR; INTRAVENOUS AS NEEDED
Status: DISCONTINUED | OUTPATIENT
Start: 2022-01-03 | End: 2022-01-03 | Stop reason: SURG

## 2022-01-03 RX ORDER — MIDAZOLAM HYDROCHLORIDE 1 MG/ML
0.5 INJECTION INTRAMUSCULAR; INTRAVENOUS
Status: DISCONTINUED | OUTPATIENT
Start: 2022-01-03 | End: 2022-01-03 | Stop reason: HOSPADM

## 2022-01-03 RX ORDER — SODIUM CHLORIDE 0.9 % (FLUSH) 0.9 %
3 SYRINGE (ML) INJECTION EVERY 12 HOURS SCHEDULED
Status: DISCONTINUED | OUTPATIENT
Start: 2022-01-03 | End: 2022-01-03 | Stop reason: HOSPADM

## 2022-01-03 RX ORDER — SODIUM CHLORIDE 0.9 % (FLUSH) 0.9 %
3-10 SYRINGE (ML) INJECTION AS NEEDED
Status: DISCONTINUED | OUTPATIENT
Start: 2022-01-03 | End: 2022-01-03 | Stop reason: HOSPADM

## 2022-01-03 RX ORDER — ROCURONIUM BROMIDE 10 MG/ML
INJECTION, SOLUTION INTRAVENOUS AS NEEDED
Status: DISCONTINUED | OUTPATIENT
Start: 2022-01-03 | End: 2022-01-03 | Stop reason: SURG

## 2022-01-03 RX ORDER — SCOLOPAMINE TRANSDERMAL SYSTEM 1 MG/1
1 PATCH, EXTENDED RELEASE TRANSDERMAL ONCE
Status: DISCONTINUED | OUTPATIENT
Start: 2022-01-03 | End: 2022-01-03

## 2022-01-03 RX ORDER — LABETALOL HYDROCHLORIDE 5 MG/ML
5 INJECTION, SOLUTION INTRAVENOUS
Status: DISCONTINUED | OUTPATIENT
Start: 2022-01-03 | End: 2022-01-03 | Stop reason: HOSPADM

## 2022-01-03 RX ORDER — SODIUM CHLORIDE 0.9 % (FLUSH) 0.9 %
3 SYRINGE (ML) INJECTION EVERY 12 HOURS SCHEDULED
Status: DISCONTINUED | OUTPATIENT
Start: 2022-01-03 | End: 2022-01-04 | Stop reason: HOSPADM

## 2022-01-03 RX ORDER — HYDROMORPHONE HYDROCHLORIDE 1 MG/ML
0.5 INJECTION, SOLUTION INTRAMUSCULAR; INTRAVENOUS; SUBCUTANEOUS
Status: DISCONTINUED | OUTPATIENT
Start: 2022-01-03 | End: 2022-01-03 | Stop reason: HOSPADM

## 2022-01-03 RX ORDER — FENTANYL CITRATE 50 UG/ML
50 INJECTION, SOLUTION INTRAMUSCULAR; INTRAVENOUS
Status: DISCONTINUED | OUTPATIENT
Start: 2022-01-03 | End: 2022-01-03 | Stop reason: HOSPADM

## 2022-01-03 RX ORDER — HYDRALAZINE HYDROCHLORIDE 20 MG/ML
5 INJECTION INTRAMUSCULAR; INTRAVENOUS
Status: DISCONTINUED | OUTPATIENT
Start: 2022-01-03 | End: 2022-01-03 | Stop reason: HOSPADM

## 2022-01-03 RX ORDER — DIPHENHYDRAMINE HCL 25 MG
25 CAPSULE ORAL
Status: DISCONTINUED | OUTPATIENT
Start: 2022-01-03 | End: 2022-01-03 | Stop reason: HOSPADM

## 2022-01-03 RX ORDER — DROPERIDOL 2.5 MG/ML
0.62 INJECTION, SOLUTION INTRAMUSCULAR; INTRAVENOUS ONCE AS NEEDED
Status: DISCONTINUED | OUTPATIENT
Start: 2022-01-03 | End: 2022-01-03 | Stop reason: HOSPADM

## 2022-01-03 RX ORDER — ONDANSETRON 2 MG/ML
INJECTION INTRAMUSCULAR; INTRAVENOUS AS NEEDED
Status: DISCONTINUED | OUTPATIENT
Start: 2022-01-03 | End: 2022-01-03 | Stop reason: SURG

## 2022-01-03 RX ORDER — SODIUM CHLORIDE, SODIUM LACTATE, POTASSIUM CHLORIDE, CALCIUM CHLORIDE 600; 310; 30; 20 MG/100ML; MG/100ML; MG/100ML; MG/100ML
125 INJECTION, SOLUTION INTRAVENOUS CONTINUOUS
Status: DISCONTINUED | OUTPATIENT
Start: 2022-01-03 | End: 2022-01-03 | Stop reason: HOSPADM

## 2022-01-03 RX ORDER — GLYCOPYRROLATE 0.2 MG/ML
INJECTION INTRAMUSCULAR; INTRAVENOUS AS NEEDED
Status: DISCONTINUED | OUTPATIENT
Start: 2022-01-03 | End: 2022-01-03 | Stop reason: SURG

## 2022-01-03 RX ORDER — HYDROCODONE BITARTRATE AND ACETAMINOPHEN 7.5; 325 MG/1; MG/1
1 TABLET ORAL ONCE AS NEEDED
Status: DISCONTINUED | OUTPATIENT
Start: 2022-01-03 | End: 2022-01-03 | Stop reason: HOSPADM

## 2022-01-03 RX ORDER — SODIUM CHLORIDE, SODIUM LACTATE, POTASSIUM CHLORIDE, CALCIUM CHLORIDE 600; 310; 30; 20 MG/100ML; MG/100ML; MG/100ML; MG/100ML
9 INJECTION, SOLUTION INTRAVENOUS CONTINUOUS
Status: DISCONTINUED | OUTPATIENT
Start: 2022-01-03 | End: 2022-01-03 | Stop reason: HOSPADM

## 2022-01-03 RX ORDER — DOXYCYCLINE 100 MG/1
100 CAPSULE ORAL EVERY 12 HOURS SCHEDULED
Status: DISCONTINUED | OUTPATIENT
Start: 2022-01-03 | End: 2022-01-04 | Stop reason: HOSPADM

## 2022-01-03 RX ORDER — SODIUM CHLORIDE 0.9 % (FLUSH) 0.9 %
3-10 SYRINGE (ML) INJECTION AS NEEDED
Status: DISCONTINUED | OUTPATIENT
Start: 2022-01-03 | End: 2022-01-04 | Stop reason: HOSPADM

## 2022-01-03 RX ORDER — LIDOCAINE HYDROCHLORIDE 20 MG/ML
INJECTION, SOLUTION INFILTRATION; PERINEURAL AS NEEDED
Status: DISCONTINUED | OUTPATIENT
Start: 2022-01-03 | End: 2022-01-03 | Stop reason: SURG

## 2022-01-03 RX ORDER — HYDROMORPHONE HYDROCHLORIDE 1 MG/ML
0.25 INJECTION, SOLUTION INTRAMUSCULAR; INTRAVENOUS; SUBCUTANEOUS
Status: DISCONTINUED | OUTPATIENT
Start: 2022-01-03 | End: 2022-01-04 | Stop reason: HOSPADM

## 2022-01-03 RX ORDER — ACETAMINOPHEN 325 MG/1
650 TABLET ORAL EVERY 4 HOURS PRN
Status: DISCONTINUED | OUTPATIENT
Start: 2022-01-03 | End: 2022-01-04 | Stop reason: HOSPADM

## 2022-01-03 RX ORDER — NALOXONE HCL 0.4 MG/ML
0.1 VIAL (ML) INJECTION
Status: DISCONTINUED | OUTPATIENT
Start: 2022-01-03 | End: 2022-01-04 | Stop reason: HOSPADM

## 2022-01-03 RX ORDER — OXYCODONE HYDROCHLORIDE 5 MG/1
10 TABLET ORAL ONCE
Status: COMPLETED | OUTPATIENT
Start: 2022-01-03 | End: 2022-01-03

## 2022-01-03 RX ORDER — GABAPENTIN 100 MG/1
100 CAPSULE ORAL EVERY 8 HOURS SCHEDULED
Status: DISCONTINUED | OUTPATIENT
Start: 2022-01-03 | End: 2022-01-04 | Stop reason: HOSPADM

## 2022-01-03 RX ORDER — PROMETHAZINE HYDROCHLORIDE 25 MG/1
25 SUPPOSITORY RECTAL ONCE AS NEEDED
Status: DISCONTINUED | OUTPATIENT
Start: 2022-01-03 | End: 2022-01-03 | Stop reason: HOSPADM

## 2022-01-03 RX ORDER — EPHEDRINE SULFATE 50 MG/ML
5 INJECTION, SOLUTION INTRAVENOUS ONCE AS NEEDED
Status: DISCONTINUED | OUTPATIENT
Start: 2022-01-03 | End: 2022-01-03 | Stop reason: HOSPADM

## 2022-01-03 RX ORDER — ROPIVACAINE HYDROCHLORIDE 5 MG/ML
INJECTION, SOLUTION EPIDURAL; INFILTRATION; PERINEURAL AS NEEDED
Status: DISCONTINUED | OUTPATIENT
Start: 2022-01-03 | End: 2022-01-03 | Stop reason: HOSPADM

## 2022-01-03 RX ORDER — GABAPENTIN 300 MG/1
300 CAPSULE ORAL ONCE
Status: COMPLETED | OUTPATIENT
Start: 2022-01-03 | End: 2022-01-03

## 2022-01-03 RX ORDER — DIAZEPAM 5 MG/1
10 TABLET ORAL ONCE
Status: COMPLETED | OUTPATIENT
Start: 2022-01-03 | End: 2022-01-03

## 2022-01-03 RX ORDER — ACETAMINOPHEN 500 MG
1000 TABLET ORAL ONCE
Status: COMPLETED | OUTPATIENT
Start: 2022-01-03 | End: 2022-01-03

## 2022-01-03 RX ORDER — MAGNESIUM HYDROXIDE 1200 MG/15ML
LIQUID ORAL AS NEEDED
Status: DISCONTINUED | OUTPATIENT
Start: 2022-01-03 | End: 2022-01-03 | Stop reason: HOSPADM

## 2022-01-03 RX ORDER — PROPOFOL 10 MG/ML
VIAL (ML) INTRAVENOUS AS NEEDED
Status: DISCONTINUED | OUTPATIENT
Start: 2022-01-03 | End: 2022-01-03 | Stop reason: SURG

## 2022-01-03 RX ORDER — IBUPROFEN 600 MG/1
600 TABLET ORAL ONCE AS NEEDED
Status: DISCONTINUED | OUTPATIENT
Start: 2022-01-03 | End: 2022-01-03 | Stop reason: HOSPADM

## 2022-01-03 RX ORDER — HYDROCODONE BITARTRATE AND ACETAMINOPHEN 5; 325 MG/1; MG/1
1 TABLET ORAL EVERY 4 HOURS PRN
Status: DISCONTINUED | OUTPATIENT
Start: 2022-01-03 | End: 2022-01-04 | Stop reason: HOSPADM

## 2022-01-03 RX ORDER — ONDANSETRON 2 MG/ML
4 INJECTION INTRAMUSCULAR; INTRAVENOUS ONCE AS NEEDED
Status: DISCONTINUED | OUTPATIENT
Start: 2022-01-03 | End: 2022-01-03 | Stop reason: HOSPADM

## 2022-01-03 RX ORDER — TRANEXAMIC ACID 100 MG/ML
INJECTION, SOLUTION INTRAVENOUS AS NEEDED
Status: DISCONTINUED | OUTPATIENT
Start: 2022-01-03 | End: 2022-01-03 | Stop reason: HOSPADM

## 2022-01-03 RX ORDER — DIPHENHYDRAMINE HYDROCHLORIDE 50 MG/ML
12.5 INJECTION INTRAMUSCULAR; INTRAVENOUS
Status: DISCONTINUED | OUTPATIENT
Start: 2022-01-03 | End: 2022-01-03 | Stop reason: HOSPADM

## 2022-01-03 RX ORDER — ONDANSETRON HYDROCHLORIDE 8 MG/1
8 TABLET, FILM COATED ORAL ONCE
Status: COMPLETED | OUTPATIENT
Start: 2022-01-03 | End: 2022-01-03

## 2022-01-03 RX ORDER — LEVOTHYROXINE SODIUM 112 UG/1
112 TABLET ORAL NIGHTLY
Status: DISCONTINUED | OUTPATIENT
Start: 2022-01-03 | End: 2022-01-04 | Stop reason: HOSPADM

## 2022-01-03 RX ORDER — FLUMAZENIL 0.1 MG/ML
0.2 INJECTION INTRAVENOUS AS NEEDED
Status: DISCONTINUED | OUTPATIENT
Start: 2022-01-03 | End: 2022-01-03 | Stop reason: HOSPADM

## 2022-01-03 RX ORDER — FAMOTIDINE 10 MG/ML
20 INJECTION, SOLUTION INTRAVENOUS ONCE
Status: COMPLETED | OUTPATIENT
Start: 2022-01-03 | End: 2022-01-03

## 2022-01-03 RX ORDER — CELECOXIB 200 MG/1
400 CAPSULE ORAL ONCE
Status: COMPLETED | OUTPATIENT
Start: 2022-01-03 | End: 2022-01-03

## 2022-01-03 RX ORDER — LIDOCAINE HYDROCHLORIDE 10 MG/ML
0.5 INJECTION, SOLUTION EPIDURAL; INFILTRATION; INTRACAUDAL; PERINEURAL ONCE AS NEEDED
Status: COMPLETED | OUTPATIENT
Start: 2022-01-03 | End: 2022-01-03

## 2022-01-03 RX ORDER — KETAMINE HYDROCHLORIDE 10 MG/ML
INJECTION INTRAMUSCULAR; INTRAVENOUS AS NEEDED
Status: DISCONTINUED | OUTPATIENT
Start: 2022-01-03 | End: 2022-01-03 | Stop reason: SURG

## 2022-01-03 RX ADMIN — SODIUM CHLORIDE, PRESERVATIVE FREE 3 ML: 5 INJECTION INTRAVENOUS at 21:16

## 2022-01-03 RX ADMIN — LIDOCAINE HYDROCHLORIDE 100 MG: 20 INJECTION, SOLUTION INFILTRATION; PERINEURAL at 09:00

## 2022-01-03 RX ADMIN — ONDANSETRON 4 MG: 2 INJECTION INTRAMUSCULAR; INTRAVENOUS at 11:38

## 2022-01-03 RX ADMIN — CETIRIZINE HYDROCHLORIDE 10 MG: 10 TABLET ORAL at 21:11

## 2022-01-03 RX ADMIN — EPHEDRINE SULFATE 5 MG: 50 INJECTION INTRAVENOUS at 09:18

## 2022-01-03 RX ADMIN — NEOSTIGMINE METHYLSULFATE 4 MG: 0.5 INJECTION INTRAVENOUS at 11:38

## 2022-01-03 RX ADMIN — LIDOCAINE HYDROCHLORIDE 0.5 ML: 10 INJECTION, SOLUTION EPIDURAL; INFILTRATION; INTRACAUDAL; PERINEURAL at 07:45

## 2022-01-03 RX ADMIN — LEVOTHYROXINE SODIUM 112 MCG: 0.11 TABLET ORAL at 21:11

## 2022-01-03 RX ADMIN — OXYCODONE HYDROCHLORIDE 10 MG: 5 TABLET ORAL at 08:36

## 2022-01-03 RX ADMIN — PROPOFOL 200 MG: 10 INJECTION, EMULSION INTRAVENOUS at 09:00

## 2022-01-03 RX ADMIN — DIAZEPAM 10 MG: 5 TABLET ORAL at 06:33

## 2022-01-03 RX ADMIN — KETAMINE HYDROCHLORIDE 10 MG: 10 INJECTION INTRAMUSCULAR; INTRAVENOUS at 10:11

## 2022-01-03 RX ADMIN — ROCURONIUM BROMIDE 20 MG: 50 INJECTION INTRAVENOUS at 10:35

## 2022-01-03 RX ADMIN — DOXYCYCLINE 200 MG: 100 INJECTION, POWDER, LYOPHILIZED, FOR SOLUTION INTRAVENOUS at 08:35

## 2022-01-03 RX ADMIN — KETAMINE HYDROCHLORIDE 10 MG: 10 INJECTION INTRAMUSCULAR; INTRAVENOUS at 11:17

## 2022-01-03 RX ADMIN — KETAMINE HYDROCHLORIDE 30 MG: 10 INJECTION INTRAMUSCULAR; INTRAVENOUS at 09:00

## 2022-01-03 RX ADMIN — SODIUM CHLORIDE, POTASSIUM CHLORIDE, SODIUM LACTATE AND CALCIUM CHLORIDE: 600; 310; 30; 20 INJECTION, SOLUTION INTRAVENOUS at 10:41

## 2022-01-03 RX ADMIN — GABAPENTIN 100 MG: 100 CAPSULE ORAL at 21:11

## 2022-01-03 RX ADMIN — DOXYCYCLINE 100 MG: 100 CAPSULE ORAL at 21:11

## 2022-01-03 RX ADMIN — EPHEDRINE SULFATE 5 MG: 50 INJECTION INTRAVENOUS at 10:49

## 2022-01-03 RX ADMIN — SCOPALAMINE 1 PATCH: 1 PATCH, EXTENDED RELEASE TRANSDERMAL at 07:00

## 2022-01-03 RX ADMIN — FAMOTIDINE 20 MG: 10 INJECTION INTRAVENOUS at 07:01

## 2022-01-03 RX ADMIN — FENTANYL CITRATE 50 MCG: 0.05 INJECTION, SOLUTION INTRAMUSCULAR; INTRAVENOUS at 10:17

## 2022-01-03 RX ADMIN — PROPOFOL 25 MCG/KG/MIN: 10 INJECTION, EMULSION INTRAVENOUS at 09:05

## 2022-01-03 RX ADMIN — SODIUM CHLORIDE, POTASSIUM CHLORIDE, SODIUM LACTATE AND CALCIUM CHLORIDE 125 ML/HR: 600; 310; 30; 20 INJECTION, SOLUTION INTRAVENOUS at 08:36

## 2022-01-03 RX ADMIN — FENTANYL CITRATE 50 MCG: 0.05 INJECTION, SOLUTION INTRAMUSCULAR; INTRAVENOUS at 09:00

## 2022-01-03 RX ADMIN — CELECOXIB 400 MG: 200 CAPSULE ORAL at 06:33

## 2022-01-03 RX ADMIN — ACETAMINOPHEN 1000 MG: 500 TABLET ORAL at 06:33

## 2022-01-03 RX ADMIN — ONDANSETRON HYDROCHLORIDE 8 MG: 8 TABLET, FILM COATED ORAL at 06:33

## 2022-01-03 RX ADMIN — FENTANYL CITRATE 50 MCG: 50 INJECTION INTRAMUSCULAR; INTRAVENOUS at 12:37

## 2022-01-03 RX ADMIN — ACETAMINOPHEN 650 MG: 325 TABLET, FILM COATED ORAL at 21:11

## 2022-01-03 RX ADMIN — GLYCOPYRROLATE 0.6 MG: 0.2 INJECTION INTRAMUSCULAR; INTRAVENOUS at 11:38

## 2022-01-03 RX ADMIN — GABAPENTIN 300 MG: 300 CAPSULE ORAL at 08:36

## 2022-01-03 RX ADMIN — DEXAMETHASONE SODIUM PHOSPHATE 8 MG: 10 INJECTION INTRAMUSCULAR; INTRAVENOUS at 09:05

## 2022-01-03 RX ADMIN — GLYCOPYRROLATE 0.1 MG: 0.2 INJECTION INTRAMUSCULAR; INTRAVENOUS at 09:00

## 2022-01-03 RX ADMIN — HYDROCODONE BITARTRATE AND ACETAMINOPHEN 1 TABLET: 5; 325 TABLET ORAL at 19:44

## 2022-01-03 RX ADMIN — ROCURONIUM BROMIDE 50 MG: 50 INJECTION INTRAVENOUS at 09:00

## 2022-01-03 RX ADMIN — PROPOFOL 50 MG: 10 INJECTION, EMULSION INTRAVENOUS at 10:34

## 2022-01-03 NOTE — ANESTHESIA PREPROCEDURE EVALUATION
" Anesthesia Evaluation     Patient summary reviewed   history of anesthetic complications (She has never tried a scop patch): PONV  NPO Solid Status: > 8 hours  NPO Liquid Status: > 2 hours           Airway   Mallampati: I  TM distance: >3 FB  Neck ROM: full  Dental      Pulmonary     breath sounds clear to auscultation  (+) pulmonary embolism (Hx of PEs and \"unknown clotting d/o.\" No issues since using xarelto), a smoker Former,   (-) shortness of breath  Cardiovascular   Exercise tolerance: good (4-7 METS)    ECG reviewed  Rhythm: regular  Rate: normal    (-) angina, HAWKINS    ROS comment: \"Normal\" echo in 2017    Neuro/Psych  GI/Hepatic/Renal/Endo    (+) obesity,  GERD well controlled,  thyroid problem hypothyroidism    Musculoskeletal     Abdominal    Substance History      OB/GYN          Other   arthritis,    history of cancer                    Anesthesia Plan    ASA 2     general     intravenous induction     Anesthetic plan, all risks, benefits, and alternatives have been provided, discussed and informed consent has been obtained with: patient and other.      "

## 2022-01-03 NOTE — ANESTHESIA PROCEDURE NOTES
Airway  Urgency: elective    Date/Time: 1/3/2022 9:03 AM  Airway not difficult    General Information and Staff    Patient location during procedure: OR  Anesthesiologist: Teo Arguelles MD  CRNA: Rosario Botello CRNA    Indications and Patient Condition  Indications for airway management: airway protection    Preoxygenated: yes  MILS maintained throughout  Mask difficulty assessment: 2 - vent by mask + OA or adjuvant +/- NMBA    Final Airway Details  Final airway type: endotracheal airway      Successful airway: ETT  Cuffed: yes   Successful intubation technique: direct laryngoscopy  Facilitating devices/methods: cricoid pressure  Endotracheal tube insertion site: oral  Blade: Ben  Blade size: 3  ETT size (mm): 7.0  Cormack-Lehane Classification: grade IIa - partial view of glottis  Placement verified by: chest auscultation and capnometry   Cuff volume (mL): 6  Measured from: teeth  ETT/EBT  to teeth (cm): 21  Number of attempts at approach: 1  Assessment: lips, teeth, and gum same as pre-op and atraumatic intubation

## 2022-01-03 NOTE — OP NOTE
Pre-Operative Diagnosis: Breast asymmetry following right partial mastectomy, bilateral implant capsular contracture    Post-Operative Diagnosis: Same    Procedure Performed:   1.  Oncoplastic reconstruction of the right breast with breast parenchymal flaps 200 cm²  2.  Left breast reduction for symmetry  3.  Bilateral total capsulectomies with implant removal    Surgeon: GENTRY Butcher MD    Assistant: None    Anesthesia: General    Estimated Blood Loss: 20    Specimens: Left breast tissue 163 g, right breast tissue 106 g, bilateral implant capsules    Complications: None    Indications: She presented referral from Dr. Cardoso after diagnosis of right breast cancer.  She has a history of breast augmentation back in the 70s and has significant capsular contracture and capsular calcification.  We discussed that she is a candidate for breast conservation therapy but given the tumor position in the subareolar location and the need to remove the calcified implants that an oncoplastic technique would be required.  Discussed that the subareolar position did put the right nipple at a bit more risk for partial or total loss but the delay effect from the implant would likely mitigate this somewhat.    We discussed risks, benefits and alternatives including but not limited to: bleeding, infection, asymmetry, poor or slow wound healing, need for further surgery, possible recurrence.  The patient elected to proceed.    Description of Procedure: The patient was met in the preoperative holding area.  All questions were answered and informed consent was assured.      Daley pattern reductions were drawn on each breast and on the right side this included the areas of needle localization.  She was transferred to the operating room placed supine on the table.    After induction of appropriate anesthesia, a timeout was performed correctly identifying the patient, operative site, and procedure to be performed.  All present were in  agreement.    The marks were confirmed with Dr. Cardoso and adjusted just a bit to include the overlying skin that she desired to remove.    I was able to proceed on the left side during the lumpectomy and a superior pedicle was developed and nipple outlined with a 42 mm cookie cutter and the pedicle de-epithelialized.  The implant capsule was accessed through the inferior lateral limb of the Wise pattern and the capsule was identified in a subglandular position had a ruptured silicone implant.  A total capsulectomy was performed and there was minimal spilling of the silicone into the breast space.  The capsule had extreme amounts of calcium but no concerning lumps or masses.  The inferior tissue was debulked to match the desired size and was over resected relative to the right to account for radiation changes on the right breast.  Copious irrigation was performed and immaculate hemostasis achieved and a drain was placed.  Topical TXA was placed.  Closure performed with 2-0 PDS in the vertical pillars, 3-0 Monocryl deep dermal layer and 4 Monocryl in the intracuticular.    Attention was turned to the right breast and the implant was able to be removed en bloc with the capsule through the lumpectomy incision.  There was some undermining of the nipple areolar complex but did appear viable with fairly normal bleeding and normal capillary refill.  A superior parenchymal flap was developed beneath a 1 cm thick skin flap this was able to be transposed just a little bit inferiorly to reinforce the central fullness of the breast under the NAC.  The Daley pattern incision was de-epithelialized and a wide inferior central pedicle was maintained and the medial and lateral flaps were only divided from this pedicle just as far as needed to transpose to the breast meridian.  Copious irrigation was performed and immaculate hemostasis achieved and a drain was placed.  Topical TXA was placed.  Closure was performed with 2-0 PDS in the  vertical pillars and in the parenchymal flap to anchor to the base of the pedicle without constricting or twisting the pedicle.  The nipple did appear viable throughout closure.  Skin was closed with 3-0 Monocryl deep dermal layer and 4-0 Monocryl in the intracuticular layer.  All incisions were dressed with Dermabond and Nitropaste was placed on the nipples and the T-junction.  She was placed in a well-padded Ace wrap.    The patient was then aroused from anesthesia with ease and transferred to the postoperative care area in good condition. All sponge, needle, and instrument counts were correct.

## 2022-01-03 NOTE — ANESTHESIA POSTPROCEDURE EVALUATION
Patient: Iris Yee    Procedure Summary     Date: 01/03/22 Room / Location: [facility] OR 09 / [facility] MAIN OR    Anesthesia Start: 0854 Anesthesia Stop: 1206    Procedures:       Right needle-localized, bracketed, partial mastectomy (Right Breast)      RIGHT ONCOPLASTIC REDUCTION AND LEFT REDUCTION FOR SYMMETRY, BILATERAL IMPLANT REMOVAL AND  BILATERAL CAPSULECTOMIES (Bilateral Chest) Diagnosis:       Malignant neoplasm of upper-outer quadrant of right breast in female, estrogen receptor positive (HCC)      (Malignant neoplasm of upper-outer quadrant of right breast in female, estrogen receptor positive (HCC) [C50.411, Z17.0])    Surgeons: Adela Cardoso MD; Delbert Butcher MD Provider: Teo Arguelles MD    Anesthesia Type: general ASA Status: 2          Anesthesia Type: general    Vitals  Vitals Value Taken Time   /79 01/03/22 1216   Temp 36.3 °C (97.3 °F) 01/03/22 1205   Pulse 74 01/03/22 1219   Resp 18 01/03/22 1215   SpO2 95 % 01/03/22 1219   Vitals shown include unvalidated device data.        Post Anesthesia Care and Evaluation    Patient location during evaluation: PACU  Patient participation: complete - patient participated  Level of consciousness: awake and alert  Pain management: adequate  Airway patency: patent  Anesthetic complications: No anesthetic complications    Cardiovascular status: acceptable  Respiratory status: acceptable  Hydration status: acceptable    Comments: --------------------            01/03/22               1215     --------------------   BP:       141/79     Pulse:      77       Resp:       18       Temp:                SpO2:      94%      --------------------

## 2022-01-03 NOTE — OP NOTE
Operative Report    Patient Name:  Iris Yee  YOB: 1945    Date of Surgery:  1/3/2022    Pre-op Diagnosis:   Malignant neoplasm of upper-outer quadrant of right breast in female, estrogen receptor positive (HCC) [C50.411, Z17.0]       Post-Op Diagnosis:  Malignant neoplasm of upper-outer quadrant of right breast in female, estrogen receptor positive (HCC) [C50.411, Z17.0]    Procedure:  Right needle-localized, bracketed, partial mastectomy      Staff:  Surgeon:  Adela Cardoso MD      Anesthesia: General    Estimated Blood Loss: 10 mL    Implants:    Implant Name Type Inv. Item Serial No.  Lot No. LRB No. Used Action   CLIPAPPLR M/ ENDO LIGACLIP 9 3/8IN SM - [id number] Implant CLIPAPPLR M/ ENDO LIGACLIP 9 3/8IN   [facility]  [employer] . Right 1 Implanted       Specimen:          Specimens     ID Source Type Tests Collected By Collected At Frozen?    A Breast, Right Tissue · TISSUE PATHOLOGY EXAM   Adela Cardoso MD 1/3/22 0940 No    Description: Right partial mastectomy- ink marks margin    Comment: Fresh for permanent  72 grams    B Breast, Right Tissue · TISSUE PATHOLOGY EXAM   Adela Cardoso MD 1/3/22 0948 No    Description: Right additional Medial/Superior margin - ink marks margin    Comment: Fresh for permanent  15 grams    C Breast, Right Tissue · TISSUE PATHOLOGY EXAM   Adela Cardoso MD 1/3/22 0951 No    Description: Right additional Inferior/Lateral margin - ink marks margin    Comment: Fresh for permanent  14 grams    D Breast, Left Tissue · TISSUE PATHOLOGY EXAM   Delbert Butcher MD 1/3/22 1002 No    Description: Left  breast tissue    This specimen was not marked as sent.          Findings: Both wires and both clips seen in specimen radiograph.    Complications: None     Indications: The patient is a 76 y.o. F with a new diagnosis of right breast cancer: Low grade, invasive lobular carcinoma, ER/CO+, Her2 negative, with  associated atypical lobular hyperplasia; clinical T1cN0, anatomic stage IA, prognostic stage IA. She is a good candidate for lumpectomy and she desires breast conservation. This required preoperative wire-localization, and in her case bracketing between 2 wires given the extent of disease. Because of her old ruptured implants, this case will be done in conjunction with Dr. Butcher for oncoplastic closure. Preoperatively, we reviewed the data that suggests in women over 70 with small ER+ tumors who will receive adjuvant endocrine therapy, the risks of axillary staging may outweigh the benefits, given that there will be no change in adjuvant therapy or outcome regardless of axillary status. This was explained in detail to the patient and she declined axillary staging. The risks and benefits of surgery were discussed preoperatively and she understood and agreed to proceed.      Description of Procedure:  Preoperatively, the patient was taken to the radiology department and the lesion was localized, and bracketed between 2 needles/wires. I reviewed the post-localization mammogram to determine the trajectory of the wires. The patient was identified in the preoperative area and brought to the operating room and placed supine on the table. Patient verification was performed and general anesthesia was induced. The patient was prepped and draped in sterile fashion with the wires/needles prepped into the field. A time out was performed and all were in agreement. I confirmed that the patient had received preoperative antibiotics.      Preoperatively, Dr. Butcher marked bilateral mastopexy incisions. The needles were located within the superior aspect of the right mastopexy keyhole. I made an incision with a scalpel, and carried down through the dermis with sharp dissection. This included a small ellipse of skin surrounding the 2 needles. Flaps were raised according to the planned dissection. An anterior flap was raised  first. Dissection was then carried out superiorly, inferiorly, medially, and laterally according to the planned area of resection. The posterior dissection extended to the implant capsule. The specimen was lifted off the underlying capsule and removed. The specimen was oriented with paint (red - superior, green - anterior, blue - inferior, yellow - medial, orange - lateral, black - posterior). Specimen radiograph showed both wires and both clips. An additional superior and medial margin was taken as a single specimen. An additional inferior and lateral margin was taken as a single specimen. These were inked with the corresponding colors and ink marked the true margins.      The breast cavity was irrigated and hemostasis achieved. Marking clips were placed in the breast cavity and it was packed with a wet sponge. Dr. Butcher then proceeded with closure. Counts were correct at the end of my portion of the case.    Adela Cardoso MD     Date: 1/3/2022  Time: 10:07 EST

## 2022-01-04 VITALS
HEIGHT: 65 IN | DIASTOLIC BLOOD PRESSURE: 66 MMHG | TEMPERATURE: 97 F | HEART RATE: 62 BPM | SYSTOLIC BLOOD PRESSURE: 111 MMHG | OXYGEN SATURATION: 95 % | RESPIRATION RATE: 16 BRPM | WEIGHT: 221.6 LBS | BODY MASS INDEX: 36.92 KG/M2

## 2022-01-04 PROCEDURE — 99024 POSTOP FOLLOW-UP VISIT: CPT | Performed by: SURGERY

## 2022-01-04 PROCEDURE — G0378 HOSPITAL OBSERVATION PER HR: HCPCS

## 2022-01-04 PROCEDURE — 25010000002 ENOXAPARIN PER 10 MG: Performed by: PLASTIC SURGERY

## 2022-01-04 RX ORDER — AMOXICILLIN 250 MG
2 CAPSULE ORAL DAILY PRN
Qty: 30 TABLET | Refills: 1 | Status: SHIPPED | OUTPATIENT
Start: 2022-01-04 | End: 2022-01-18

## 2022-01-04 RX ORDER — ONDANSETRON 8 MG/1
8 TABLET, ORALLY DISINTEGRATING ORAL EVERY 8 HOURS PRN
Qty: 10 TABLET | Refills: 1 | Status: SHIPPED | OUTPATIENT
Start: 2022-01-04 | End: 2022-01-18

## 2022-01-04 RX ORDER — HYDROCODONE BITARTRATE AND ACETAMINOPHEN 5; 325 MG/1; MG/1
1-2 TABLET ORAL EVERY 4 HOURS PRN
Qty: 20 TABLET | Refills: 0 | Status: SHIPPED | OUTPATIENT
Start: 2022-01-04 | End: 2022-01-18

## 2022-01-04 RX ORDER — DOCUSATE SODIUM 250 MG
250 CAPSULE ORAL DAILY PRN
Qty: 30 CAPSULE | Refills: 1 | Status: SHIPPED | OUTPATIENT
Start: 2022-01-04 | End: 2022-01-18

## 2022-01-04 RX ORDER — DOXYCYCLINE 100 MG/1
100 CAPSULE ORAL 2 TIMES DAILY
Qty: 6 CAPSULE | Refills: 0 | Status: SHIPPED | OUTPATIENT
Start: 2022-01-04 | End: 2022-01-07

## 2022-01-04 RX ORDER — GABAPENTIN 100 MG/1
100 CAPSULE ORAL EVERY 8 HOURS
Qty: 60 CAPSULE | Refills: 2 | Status: SHIPPED | OUTPATIENT
Start: 2022-01-04 | End: 2022-01-18

## 2022-01-04 RX ORDER — ACETAMINOPHEN 325 MG/1
650 TABLET ORAL EVERY 4 HOURS PRN
Qty: 60 TABLET | Refills: 0 | Status: SHIPPED | OUTPATIENT
Start: 2022-01-04

## 2022-01-04 RX ADMIN — HYDROCODONE BITARTRATE AND ACETAMINOPHEN 1 TABLET: 5; 325 TABLET ORAL at 01:57

## 2022-01-04 RX ADMIN — DOXYCYCLINE 100 MG: 100 CAPSULE ORAL at 09:07

## 2022-01-04 RX ADMIN — GABAPENTIN 100 MG: 100 CAPSULE ORAL at 06:43

## 2022-01-04 RX ADMIN — HYDROCODONE BITARTRATE AND ACETAMINOPHEN 1 TABLET: 5; 325 TABLET ORAL at 06:43

## 2022-01-04 RX ADMIN — ENOXAPARIN SODIUM 40 MG: 40 INJECTION SUBCUTANEOUS at 09:08

## 2022-01-04 NOTE — PLAN OF CARE
Goal Outcome Evaluation:  Plan of Care Reviewed With: patient           Outcome Summary: VSS, up with standby asst, saline locked, voiding freely, dressing CDI, 2 ALVERTO drains, pain managed with PRN Norco5, IS given and able to get to 2000, daughter at bedside,no c/o nausea.

## 2022-01-04 NOTE — PROGRESS NOTES
Breast Surgery Progress Note    Chief complaint: sp surgery    Subjective:  Ms. Yee did well overnight. She has eaten, has been up out of bed, and her pain is controlled.     Objective:  Vitals:    01/04/22 0458   BP: 111/66   Pulse: 62   Resp: 16   Temp: 97 °F (36.1 °C)   SpO2: 95%       Physical Exam:  General: NAD  HEENT: NCAT, EOMI  Lungs: respirations nonlabored  Chest: Bilateral mastopexy incisions c/d/i, flaps and NACs healthy    Assessment:  76 y.o. female POD 1 right partial mastectomy with oncoplastic closure    Plan:  -Discharge home later this morning per Dr. Butcher.   -She already has a follow-up appointment with me scheduled.  -I will call her with pathology results.    Adela Cardoso MD

## 2022-01-04 NOTE — PROGRESS NOTES
Case Management Discharge Note      Final Note: Discharged home. [place], [place]         Transportation Services  Private: Car    Final Discharge Disposition Code: 01 - home or self-care

## 2022-01-04 NOTE — PROGRESS NOTES
SUBJECTIVE:   Did well overnight.  No nausea, has walked and voided.  Tolerating some PO intake.      OBJECTIVE:  Vitals:    01/04/22 0458   BP: 111/66   Pulse: 62   Resp: 16   Temp: 97 °F (36.1 °C)   SpO2: 95%     Physical Exam  Resting in bed, NAD  rrr  No dyspnea  Bilateral breast incisions c/d/i, both NACs viable, minimal bruising, no fluid collection or erythema, drains serosanguinous    PLAN:  POD1 right partial mastectyom, bilateral implant removal and capsulectomy, oncoplastic reconstruction, left breast reduction  - doing well, ok for discharge this morning  - leave wrap in place  - drain care teaching  - minimal arm movment  - f/u with me Monday      Dc Dx: right breast cancer  Dc Condition: good

## 2022-01-06 ENCOUNTER — TELEPHONE (OUTPATIENT)
Dept: SURGERY | Facility: CLINIC | Age: 77
End: 2022-01-06

## 2022-01-06 DIAGNOSIS — Z17.0 MALIGNANT NEOPLASM OF UPPER-OUTER QUADRANT OF RIGHT BREAST IN FEMALE, ESTROGEN RECEPTOR POSITIVE: Primary | ICD-10-CM

## 2022-01-06 DIAGNOSIS — C50.411 MALIGNANT NEOPLASM OF UPPER-OUTER QUADRANT OF RIGHT BREAST IN FEMALE, ESTROGEN RECEPTOR POSITIVE: Primary | ICD-10-CM

## 2022-01-06 LAB
LAB AP CASE REPORT: NORMAL
LAB AP CASE REPORT: NORMAL
LAB AP CLINICAL INFORMATION: NORMAL
LAB AP CLINICAL INFORMATION: NORMAL
LAB AP DIAGNOSIS COMMENT: NORMAL
LAB AP DIAGNOSIS COMMENT: NORMAL
LAB AP SPECIAL STAINS: NORMAL
LAB AP SPECIAL STAINS: NORMAL
LAB AP SYNOPTIC CHECKLIST: NORMAL
LAB AP SYNOPTIC CHECKLIST: NORMAL
PATH REPORT.FINAL DX SPEC: NORMAL
PATH REPORT.FINAL DX SPEC: NORMAL
PATH REPORT.GROSS SPEC: NORMAL
PATH REPORT.GROSS SPEC: NORMAL

## 2022-01-06 NOTE — TELEPHONE ENCOUNTER
I called Ms. Yee to discuss her pathology report. She is recovering well from surgery. I will place referrals for med/onc and rad/onc.     Adela Cardoso MD

## 2022-01-18 ENCOUNTER — LAB (OUTPATIENT)
Dept: LAB | Facility: HOSPITAL | Age: 77
End: 2022-01-18

## 2022-01-18 ENCOUNTER — CONSULT (OUTPATIENT)
Dept: ONCOLOGY | Facility: CLINIC | Age: 77
End: 2022-01-18

## 2022-01-18 VITALS
RESPIRATION RATE: 16 BRPM | WEIGHT: 223.5 LBS | BODY MASS INDEX: 37.24 KG/M2 | DIASTOLIC BLOOD PRESSURE: 84 MMHG | HEART RATE: 81 BPM | HEIGHT: 65 IN | SYSTOLIC BLOOD PRESSURE: 127 MMHG | OXYGEN SATURATION: 96 % | TEMPERATURE: 97.3 F

## 2022-01-18 DIAGNOSIS — Z17.0 MALIGNANT NEOPLASM OF UPPER-OUTER QUADRANT OF RIGHT BREAST IN FEMALE, ESTROGEN RECEPTOR POSITIVE: Primary | ICD-10-CM

## 2022-01-18 DIAGNOSIS — C50.411 MALIGNANT NEOPLASM OF UPPER-OUTER QUADRANT OF RIGHT BREAST IN FEMALE, ESTROGEN RECEPTOR POSITIVE: Primary | ICD-10-CM

## 2022-01-18 DIAGNOSIS — Z09 ENCOUNTER FOR FOLLOW-UP EXAMINATION AFTER COMPLETED TREATMENT FOR CONDITIONS OTHER THAN MALIGNANT NEOPLASM: ICD-10-CM

## 2022-01-18 DIAGNOSIS — C50.919 MALIGNANT NEOPLASM OF FEMALE BREAST, UNSPECIFIED ESTROGEN RECEPTOR STATUS, UNSPECIFIED LATERALITY, UNSPECIFIED SITE OF BREAST: Primary | ICD-10-CM

## 2022-01-18 LAB
BASOPHILS # BLD AUTO: 0.05 10*3/MM3 (ref 0–0.2)
BASOPHILS NFR BLD AUTO: 0.7 % (ref 0–1.5)
DEPRECATED RDW RBC AUTO: 50.1 FL (ref 37–54)
EOSINOPHIL # BLD AUTO: 0.35 10*3/MM3 (ref 0–0.4)
EOSINOPHIL NFR BLD AUTO: 5.1 % (ref 0.3–6.2)
ERYTHROCYTE [DISTWIDTH] IN BLOOD BY AUTOMATED COUNT: 13.9 % (ref 12.3–15.4)
HCT VFR BLD AUTO: 41.8 % (ref 34–46.6)
HGB BLD-MCNC: 14.1 G/DL (ref 12–15.9)
IMM GRANULOCYTES # BLD AUTO: 0.03 10*3/MM3 (ref 0–0.05)
IMM GRANULOCYTES NFR BLD AUTO: 0.4 % (ref 0–0.5)
LYMPHOCYTES # BLD AUTO: 1.22 10*3/MM3 (ref 0.7–3.1)
LYMPHOCYTES NFR BLD AUTO: 17.9 % (ref 19.6–45.3)
MCH RBC QN AUTO: 32.8 PG (ref 26.6–33)
MCHC RBC AUTO-ENTMCNC: 33.7 G/DL (ref 31.5–35.7)
MCV RBC AUTO: 97.2 FL (ref 79–97)
MONOCYTES # BLD AUTO: 0.74 10*3/MM3 (ref 0.1–0.9)
MONOCYTES NFR BLD AUTO: 10.9 % (ref 5–12)
NEUTROPHILS NFR BLD AUTO: 4.43 10*3/MM3 (ref 1.7–7)
NEUTROPHILS NFR BLD AUTO: 65 % (ref 42.7–76)
NRBC BLD AUTO-RTO: 0 /100 WBC (ref 0–0.2)
PLATELET # BLD AUTO: 185 10*3/MM3 (ref 140–450)
PMV BLD AUTO: 11 FL (ref 6–12)
RBC # BLD AUTO: 4.3 10*6/MM3 (ref 3.77–5.28)
WBC NRBC COR # BLD: 6.82 10*3/MM3 (ref 3.4–10.8)

## 2022-01-18 PROCEDURE — 36415 COLL VENOUS BLD VENIPUNCTURE: CPT

## 2022-01-18 PROCEDURE — 85025 COMPLETE CBC W/AUTO DIFF WBC: CPT

## 2022-01-18 PROCEDURE — 99205 OFFICE O/P NEW HI 60 MIN: CPT | Performed by: INTERNAL MEDICINE

## 2022-01-18 RX ORDER — ANASTROZOLE 1 MG/1
1 TABLET ORAL DAILY
Qty: 90 TABLET | Refills: 3 | Status: SHIPPED | OUTPATIENT
Start: 2022-01-18 | End: 2022-02-17

## 2022-01-18 RX ORDER — HYDROXYZINE HYDROCHLORIDE 10 MG/1
TABLET, FILM COATED ORAL
COMMUNITY
Start: 2022-01-17 | End: 2022-01-26

## 2022-01-18 NOTE — PROGRESS NOTES
Subjective     REASON FOR CONSULTATION:  pT2Nx grade 1 lobular carcinoma right breast strongly ER/OR positive HER2 negative postlumpectomy1/3/2022  Provide an opinion on any further workup or treatment                             REQUESTING PHYSICIAN: MD Sri Willis MD    RECORDS OBTAINED:  Records of the patients history including those obtained from the referring provider were reviewed and summarized in detail.    HISTORY OF PRESENT ILLNESS:  The patient is a 76 y.o. year old female who is here for an opinion about the above issue.    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    Patient reports she was doing her mammograms every 2 years for the last couple of years and had a benign breast biopsy on the left about 10 years ago but otherwise had not had any callbacks or other abnormalities on her imaging.    Patient had diagnostic imaging and a biopsyOn 11/18/2021 which revealed atypical lobular hyperplasia at 11:00 and invasive lobular carcinoma grade 1 measuring 14 mm at 11:00 ER % OR % HER2 negative Ki-67 of 7%  Patient was referred to Dr. Adela Cardoso who ordered an MRI which confirmed unifocal disease on the right and a biopsy cavity from the [facility] biopsy with no suspicious enhancement and no obvious adenopathy and a normal left breast.  She then proceeded with a lumpectomy on 1/3/2022 which showed residual 24 mm  low-grade lobular carcinoma with associated lobular carcinoma in situ with no lymphovascular invasion and clear margins no sentinel nodes were removed.  Implants which had been in for over 40 years were both removed    She has had a little problem with some  infection in the inframammary area bilaterally but is on antibiotic and this is improving.  She is here to discuss adjuvant treatment options    She is  5 para 3 with 2 miscarriages-first childbirth was at age 21 she did not breast-feed.  Menarche was age 16 menopause at 57 and she took hormones for a few months and stopped.    Family history is completely negative for malignancy except in a maternal grandmother who may have had cervical cancer her 47 gene Invitae panel was negative    She is not a smoker or drinker  She has been on anticoagulation for 7 to 8 years for unprovoked massive PE with a negative work-up  She has had breast implants since  and they were removed at the time of surgery  She has not had a heart attack or stroke  Past Medical History:   Diagnosis Date   • Arthritis    • Arthritis of back    • Arthritis of neck    • Bulging lumbar disc     L 3-4   • Bursitis of hip    • Clotting disorder (HCC)    • Fracture, tibia and fibula     Stress fracture   • GERD (gastroesophageal reflux disease)    • Hip arthrosis    • History of kidney stones    • History of MRSA infection     MICHEL EARS   • History of transfusion     no reaction   • Hx of blood clots    • Hypothyroid    • Knee swelling    • Left knee pain    • Lumbosacral disc disease    • Malignant neoplasm of upper-outer quadrant of right breast in female, estrogen receptor positive ([facility]) 2021   • Neck strain    • Neuroma of foot     Right foot   • Ovarian cyst    • Periarthritis of shoulder    • PONV (postoperative nausea and vomiting)     states gets really sick lasted for 4 days aafter surgery the last time    • Pulmonary embolism, bilateral (HCC)    • Scoliosis    • Stress fracture    • Tear of meniscus of knee    • Thrombopenia (HCC)         Past Surgical History:   Procedure Laterality Date   • APPENDECTOMY     • AUGMENTATION MAMMAPLASTY     • BREAST AUGMENTATION      years ago has implants   • BREAST  EXCISIONAL BIOPSY Right 10/2021   • BREAST EXCISIONAL BIOPSY Left    • BREAST LUMPECTOMY Right 1/3/2022    Procedure: Right needle-localized, bracketed, partial mastectomy;  Surgeon: Adela Cardoso MD;  Location: [facility] MAIN OR;  Service: General;  Laterality: Right;   • BREAST SURGERY Bilateral 1/3/2022    Procedure: RIGHT ONCOPLASTIC REDUCTION AND LEFT REDUCTION FOR SYMMETRY, BILATERAL IMPLANT REMOVAL AND  BILATERAL CAPSULECTOMIES;  Surgeon: Delbert Butcher MD;  Location: [facility] MAIN OR;  Service: Plastics;  Laterality: Bilateral;   • CHOLECYSTECTOMY     • COLONOSCOPY N/A 12/14/2018    Procedure: COLONOSCOPY into cecum/termial ileum with polypectomy;  Surgeon: Jose Daniel Dooley MD;  Location: [facility] ENDOSCOPY;  Service: Gastroenterology   • ENDOSCOPY N/A 12/14/2018    Procedure: ESOPHAGOGASTRODUODENOSCOPY with biopsy;  Surgeon: Jose Daniel Dooley MD;  Location: [facility] ENDOSCOPY;  Service: Gastroenterology   • EXPLORATORY LAPAROTOMY      bleeding from ruptured ovarian cyst   • HERNIA REPAIR      umbilical x 2   • OVARIAN CYST DRAINAGE/EXCISION      ruptured ovarian cyst with vblood accumulatine in abdomin   • TONSILLECTOMY     • TOTAL KNEE ARTHROPLASTY Left 6/24/2021    Procedure: TOTAL KNEE ARTHROPLASTY;  Surgeon: Clarence Suarez MD;  Location: [facility] MAIN OR;  Service: Orthopedics;  Laterality: Left;   • UMBILICAL HERNIA REPAIR      x2        Current Outpatient Medications on File Prior to Visit   Medication Sig Dispense Refill   • acetaminophen (TYLENOL) 325 MG tablet Take 2 tablets by mouth Every 4 (Four) Hours As Needed for Mild Pain . 60 tablet 0   • calcium carbonate (TUMS) 500 MG chewable tablet Chew 1 tablet As Needed for Indigestion or Heartburn.     • cephalexin (KEFLEX) 500 MG capsule 4 pills one hour prior to procedure (Patient taking differently: 4 pills one hour prior to procedure prior to dental procedures) 4 capsule 2   • cetirizine (zyrTEC) 10 MG tablet Take 10 mg by mouth  Every Night.     • cholecalciferol (VITAMIN D3) 25 MCG (1000 UT) tablet Take 1,000 Units by mouth Every Morning.     • hydrOXYzine (ATARAX) 10 MG tablet      • rivaroxaban (Xarelto) 20 MG tablet Take 1 tablet by mouth Daily With Dinner. 30 tablet 0   • SYNTHROID 112 MCG tablet Take 112 mcg by mouth Every Night.     • [DISCONTINUED] acetaminophen (TYLENOL) 500 MG tablet Take 1,000 mg by mouth Every 6 (Six) Hours As Needed for Mild Pain .     • [DISCONTINUED] docusate sodium (COLACE) 250 MG capsule Take 1 capsule by mouth Daily As Needed for Constipation. 30 capsule 1   • [DISCONTINUED] gabapentin (Neurontin) 100 MG capsule Take 1 capsule by mouth Every 8 (Eight) Hours. 60 capsule 2   • [DISCONTINUED] HYDROcodone-acetaminophen (NORCO) 5-325 MG per tablet Take 1-2 tablets by mouth Every 4 (Four) Hours As Needed (Pain). 20 tablet 0   • [DISCONTINUED] ondansetron ODT (Zofran ODT) 8 MG disintegrating tablet Place 1 tablet on the tongue Every 8 (Eight) Hours As Needed for Nausea or Vomiting. 10 tablet 1   • [DISCONTINUED] sennosides-docusate (PERICOLACE) 8.6-50 MG per tablet Take 2 tablets by mouth Daily As Needed for Constipation. 30 tablet 1     Current Facility-Administered Medications on File Prior to Visit   Medication Dose Route Frequency Provider Last Rate Last Admin   • Chlorhexidine Gluconate 2 % pads 1 each  1 each Apply externally Take As Directed Clarence Suarez MD            ALLERGIES:    Allergies   Allergen Reactions   • Penicillins Other (See Comments)     Passes out   • Sulfa Antibiotics Nausea And Vomiting        Social History     Socioeconomic History   • Marital status:      Spouse name: SANDRA   • Number of children: 3   • Years of education: COLLEGE   Tobacco Use   • Smoking status: Former Smoker     Packs/day: 1.00     Years: 10.00     Pack years: 10.00     Types: Cigarettes     Start date: 1964     Quit date: 1976     Years since quittin.4   • Smokeless tobacco: Never Used  "  Vaping Use   • Vaping Use: Never used   Substance and Sexual Activity   • Alcohol use: Yes     Alcohol/week: 3.0 standard drinks     Types: 3 Glasses of wine per week   • Drug use: Never        Family History   Problem Relation Age of Onset   • Hypertension Mother    • Osteoporosis Mother    • Scoliosis Mother    • Hypertension Father    • Stroke Paternal Aunt    • Cervical cancer Maternal Grandmother 68   • Prostate cancer Brother 74   • Malig Hyperthermia Neg Hx         Review of Systems   Gastrointestinal:        Reflux symptoms   Skin: Positive for color change (Itching and redness from her surgery).   Neurological: Positive for headaches (Migraines).        Objective     Vitals:    01/18/22 1345   BP: 127/84   Pulse: 81   Resp: 16   Temp: 97.3 °F (36.3 °C)   TempSrc: Temporal   SpO2: 96%   Weight: 101 kg (223 lb 8 oz)   Height: 165.2 cm (65.04\")  Comment: New Ht No Shoes   PainSc: 0-No pain     Current Status 1/18/2022   ECOG score 0       Physical Exam       CONSTITUTIONAL:  Vital signs reviewed.  No distress, looks comfortable.  EYES:  Conjunctivae and lids unremarkable.  PERRLA  EARS,NOSE,MOUTH,THROAT:  Ears and nose appear unremarkable.  Lips, teeth, gums appear unremarkable.  RESPIRATORY:  Normal respiratory effort.  Lungs clear to auscultation bilaterally.  BREASTS: Right breast shows a lift and no definite lumpectomy scar left breast also shows left with some redness and a small furuncle below the left breast  CARDIOVASCULAR:  Normal S1, S2.  No murmurs rubs or gallops.  No significant lower extremity edema.  GASTROINTESTINAL: Abdomen appears unremarkable.  Nontender.  No hepatomegaly.  No splenomegaly.  LYMPHATIC:  No cervical, supraclavicular, axillary lymphadenopathy.  SKIN:  Warm.  No rashes.  PSYCHIATRIC:  Normal judgment and insight.  Normal mood and affect.      RECENT LABS:  Hematology WBC   Date Value Ref Range Status   01/18/2022 6.82 3.40 - 10.80 10*3/mm3 Final     RBC   Date Value Ref Range " Status   01/18/2022 4.30 3.77 - 5.28 10*6/mm3 Final     Hemoglobin   Date Value Ref Range Status   01/18/2022 14.1 12.0 - 15.9 g/dL Final     Hematocrit   Date Value Ref Range Status   01/18/2022 41.8 34.0 - 46.6 % Final     Platelets   Date Value Ref Range Status   01/18/2022 185 140 - 450 10*3/mm3 Final        Synoptic Checklist     INVASIVE CARCINOMA OF THE BREAST: Resection  8th Edition - Protocol posted: 6/30/2021  INVASIVE CARCINOMA OF THE BREAST: COMPLETE EXCISION - All Specimens  SPECIMEN   Procedure  Excision (less than total mastectomy)    Specimen Laterality  Right    TUMOR   Tumor Site  Upper outer quadrant    Histologic Type  Invasive lobular carcinoma    Histologic Grade (Amite Histologic Score)     Glandular (Acinar) / Tubular Differentiation  Score 3    Nuclear Pleomorphism  Score 1    Mitotic Rate  Score 1    Overall Grade  Grade 1 (scores of 3, 4 or 5)    Tumor Size  Greatest dimension of largest invasive focus (Millimeters): 24 mm   Tumor Focality  Single focus of invasive carcinoma    Ductal Carcinoma In Situ (DCIS)  Not identified    Lobular Carcinoma In Situ (LCIS)  Present    Tumor Extent     Lymphovascular Invasion  Not identified    Dermal Lymphovascular Invasion  Not identified    Microcalcifications  Present in non-neoplastic tissue    Treatment Effect in the Breast  No known presurgical therapy    MARGINS   Margin Status for Invasive Carcinoma  All margins negative for invasive carcinoma    Distance from Invasive Carcinoma to Closest Margin  2 mm   Closest Margin(s) to Invasive Carcinoma  Posterior    Distance from Invasive Carcinoma to Anterior Margin  20 mm   Distance from Invasive Carcinoma to Posterior Margin  2 mm   Distance from Invasive Carcinoma to Superior Margin  53 mm   Distance from Invasive Carcinoma to Inferior Margin  15.3 mm   Distance from Invasive Carcinoma to Medial Margin  28 mm   Distance from Invasive Carcinoma to Lateral Margin  16.2 mm   REGIONAL LYMPH NODES    Regional Lymph Node Status  Not applicable (no regional lymph nodes submitted or found)    PATHOLOGIC STAGE CLASSIFICATION (pTNM, AJCC 8th Edition)      pT Category  pT2    pN Category  pN not assigned (no nodes submitted or found)    Breast Biomarker Testing Performed on Previous Biopsy     Estrogen Receptor (ER) Status  Positive (greater than 10% of cells demonstrate nuclear positivity)    Percentage of Cells with Nuclear Positivity  %    Breast Biomarker Testing Performed on Previous Biopsy     Progesterone Receptor (PgR) Status  Positive    Percentage of Cells with Nuclear Positivity  %    Breast Biomarker Testing Performed on Previous Biopsy     HER2 (by immunohistochemistry)  Negative (Score 1+)    Breast Biomarker Testing Performed on Previous Biopsy     Ki-67 Percentage of Positive Nuclei  7 %            Assessment/Plan   1.pT2Nx grade 1 invasive lobular carcinoma left breast strongly ER/NE positive HER2 negative postlumpectomy with associated LCIS and ALH    2. DVT and PE on lifelong anticoagulation with Xarelto    3, hypothyroidism on treatment    4, 37 gene Invitae panel negative    5. osteopenia in the hips on calcium and vitamin D    Plan  Explained in detail the rationale for surgery and adjuvant treatment with micrometastasis that can give rise to recurrence of her cancer if not treated adequately.  We discussed the fact that the use of hormonal blockade would decrease the risk of recurrence by 30 to 40% depending on grade and histology .    Based on her thrombotic risk and anticoagulation I have recommended avoiding tamoxifen and using aromatase inhibitor and we will start with Arimidex which we would recommend for 5 years at least.  The side effects and toxicities of the Aromatase inhibitors was discussed with the patient including, hot flashes, mood swings and hair thinning.Significant arthralgias and worsening bone density were also discussed. Baseline bone density evaluation was  ordered.    She has been referred to radiation and they will decide whether radiation is indicated and if so she will start her Arimidex towards the end of radiation but if no radiation is planned she can start in about 2 weeks    She is agreeable and I told her to call if she has problems with the hormonal blockade    I spent 60 total minutes, face-to-face, caring for Iris today.  Greater than 50% of this time involved counseling and/or coordination of care as documented within this note.    The patient and her daughter are comfortable with the decision to proceed with hormonal blockade    She will receive return in 3 to 4 months to assess her tolerance with a DEXA scan and we will consider the use of Prolia if her bone density is worse

## 2022-01-20 ENCOUNTER — OFFICE VISIT (OUTPATIENT)
Dept: SURGERY | Facility: CLINIC | Age: 77
End: 2022-01-20

## 2022-01-20 VITALS
BODY MASS INDEX: 37.15 KG/M2 | HEART RATE: 72 BPM | SYSTOLIC BLOOD PRESSURE: 130 MMHG | HEIGHT: 65 IN | RESPIRATION RATE: 17 BRPM | OXYGEN SATURATION: 97 % | DIASTOLIC BLOOD PRESSURE: 82 MMHG | WEIGHT: 223 LBS

## 2022-01-20 DIAGNOSIS — C50.411 MALIGNANT NEOPLASM OF UPPER-OUTER QUADRANT OF RIGHT BREAST IN FEMALE, ESTROGEN RECEPTOR POSITIVE: ICD-10-CM

## 2022-01-20 DIAGNOSIS — Z17.0 MALIGNANT NEOPLASM OF UPPER-OUTER QUADRANT OF RIGHT BREAST IN FEMALE, ESTROGEN RECEPTOR POSITIVE: ICD-10-CM

## 2022-01-20 DIAGNOSIS — Z48.89 POSTOPERATIVE VISIT: Primary | ICD-10-CM

## 2022-01-20 PROCEDURE — 99024 POSTOP FOLLOW-UP VISIT: CPT | Performed by: SURGERY

## 2022-01-20 NOTE — PROGRESS NOTES
[facility]     Referring Provider: Sri Conner MD     Chief complaint: Postoperative visit     HPI:   12/9/21:  Ms. Iris Yee is a 75 yo woman, seen at the request of Dr. Sri Conner, for a new diagnosis of right breast cancer. This was initially detected as an imaging abnormality on routine screening. Her work-up is detailed in the oncologic history below. Prior to the biopsies, she denies any breast lumps, pain, skin changes, or nipple discharge. She has a past history of a benign left breast surgical biopsy in 2000. She also has a past history of prepectoral saline implants placed in 1976. She has a past history of an unprovoked pulmonary embolus in 2011, for which she is on Xarelto. She held this over the summer for her knee replacement surgery without any issues. She denies any family history of breast or ovarian cancer, however her brother did have prostate cancer.      1/20/22, Interval History:  She underwent right partial mastectomy with oncoplastic closure and left reduction for symmetry on 1/3/22. See surgery & pathology details below in oncologic history. She has been recovering well, aside from itching and a generalized rash. She has already seen Dr. Jaramillo postoperatively and discussed eventually starting Arimidex.       Oncology/Hematology History Overview Note   12/10/2019, Screening MMG with Jorge ( [facility]):  Negative mammogram with breast implants in place and showing no change from 07/24/2017.  BI-RADS 1: Negative.     Malignant neoplasm of upper-outer quadrant of right breast in female, estrogen receptor positive (HCC)   9/20/2021 Initial Diagnosis    Malignant neoplasm of upper-outer quadrant of right breast in female, estrogen receptor positive (HCC)     9/21/2021 Imaging    Screening MMG with Jorge ( [facility]):  Scattered areas of fibroglandular density. There are bilateral retroglandular silicone breast implants with capsular calcifications. There is an area of architectural  distortion in the slightly upper central middle to anterior right breast, best appreciated on Tomosynthesis. There are no suspicious masses, calcifications, or areas of architectural distortion in the left breast.  BI-RADS 0: Incomplete.     11/2/2021 Imaging    Right Diagnostic MMG with Jorge & [facility]):  MMG:  With additional imaging there is persistence of the area of architectural distortion in the middle third of the right breast lateral to the plane of the nipple.   US:  At the 11 o'clock position on the order 3 cm from the nipple there is a 1.2 cm ill-defined hypoechoic region with posterior acoustic shadowing and mild detectable internal vascularity.   There is an adjacent 0.6 cm ill-defined hypoechoic lesion that is also seen at the 11 o'clock position on the order of 3 cm from the nipple that demonstrates posterior acoustic shadowing.  BI-RADS 4: Suspicious.     11/18/2021 Biopsy    Right Breast, US-Guided Biopsy x 2 ( [facility]):    1. Right Breast at 11:00 o'clock, 3 cm from Nipple (not for calcifications), Core Biopsy:                 A. Multifocal atypical lobular hyperplasia (ALH) with foci of usual ductal hyperplasia (UDH).               B. No ductal carcinoma in situ nor invasive carcinoma identified  -Tribell clip.     2. Right Breast mass at 11:00 o'clock, 3 cm from Nipple (not for calcifications), Core Biopsy:                 A. Invasive lobular carcinoma:                            1. Overall Maryann grade I (tubular score=3, nuclear score=1, mitotic score=1).                            2. Invasive carcinoma measures at least 14 mm.                3. No lymphovascular space invasion identified.  B. Associated atypical lobular hyperplasia (ALH) noted.  C. No definitive in situ carcinoma component identified.  D. Focal columnar cell change, usual duct hyperplasia (UDH) and micropapilloma noted.  -Bowtie clip.     ER+ (%, strong)  NH+ (%, strong)  HER2 negative (IHC  1+)  Ki-67 7%     12/3/2021 Imaging    Bilateral Breast [facility] ([facility]):  In the right breast centered at the 11:30 position centered on the order of 3 cm from the nipple there are 2 focal signal voids that are  by 1.1 cm. This represents the tri-bell-shaped and bowtie shaped metallic clips. The bowtie-shaped metallic clip is the more medial and slightly posterior metallic clip and represents the site of  invasive lobular carcinoma. There is evidence of an irregularly-shaped T1 hyperintense peripherally enhancing mass that surrounds the biopsy clips that measures on the order of 3.1 cm in anterior to posterior dimension, 1.5 cm in the superior to inferior dimension, and 2.6 cm in the medial to lateral dimension, and is consistent with a hematoma cavity. The bowtie-shaped metallic clip which represents the biopsy-proven site of malignancy is on the order of 0.6 cm anterior to an area of architectural distortion with mild enhancement measures on the order of 1.3 cm in superior to inferior dimension, 1.1 cm in the medial to lateral dimension and 1.0 cm in anterior to posterior dimension. This is most consistent with residual malignancy at the biopsy-proven site of malignancy. The tri-bell-shaped metallic clip is not surrounded by any abnormal enhancement but is within a hematoma  cavity.   No other areas of abnormal enhancement or morphology are seen in the right breast. I see no evidence for abnormal right breast skin, nipple or chest wall enhancement and there is no evidence for right axillary or  internal mammary chain adenopathy.   There are no areas of abnormal enhancement or morphology in the left breast. I see no evidence for abnormal left breast skin, nipple or chest wall enhancement and there is no evidence for left axillary or internal mammary chain adenopathy.  Bilateral retroglandular silicone implants are noted. The left silicone implant shows irregularity at the posterior margin of the implant, but  no evidence for free silicone is appreciated.  BI-RADS 6: Known malignancy.     12/9/2021 Genetic Testing    [employer] Breast [facility] G[facility] Cancers Panel (37 genes):    Negative     1/3/2022 Surgery    Right needle-localized, bracketed, partial mastectomy with oncoplastic closure and left reduction for symmetry    1. Right Breast, Oriented Needle Localization Lumpectomy (69 grams):  INVASIVE LOBULAR CARCINOMA.               A. Tumor site:  Upper outer quadrant (11:00 o'clock, 11:30).               B. Histologic grade:  Maryann score I (tubules=3, nuclei=1, mitosis=1).               C. Tumor size: ~24 mm maximally (present focally in slices 7 and 11 and throughout slices 8-10, slices 6mm).               D. Tumor focality:  Single focus.               E. Ductal carcinoma in-situ (DCIS):  Not identified.               F. Lobular carcinoma in-situ (LCIS):  Present.               G. Tumor extent:  Overlying skin is uninvolved by carcinoma.               H. No lymphovascular invasion identified.               I.  Margins:  Uninvolved by in-situ and invasive carcinoma.                            1. Invasive carcinoma is present 0.3 mm from the original inferior margin, 1.2 mm from the lateral                                margin, 2 mm from the posterior margin, 13 mm from the medial margin, 20 mm from the anterior                                margin and 38 mm from the superior margin of excision in specimen 1.               J. Lymph nodes:  Not submitted.               K. Hormone receptor status:  ER - % Positive, MO - % Positive, Her2/jed - 1+ Negative, Ki67 - 7% (performed on prior biopsy [id number]).    L. Pathologic stage:  pT2, NX.     2. Right Breast, Additional Medial/Superior Margin, Oriented Excision:               A. Benign unremarkable breast tissue.               B. Final medial and superior margin are free of tumor by an additional 15 mm.     3. Right Breast, Additional Inferior/Lateral Margin,  Oriented Excision:               A. Benign breast tissue with foci of usual ductal hyperplasia, atypical lobular hyperplasia and lobular carcinoma in-situ (LCIS).               B. No evidence of invasive carcinoma identified.               C. Final inferior and lateral margin are free by an additional 15 mm.     4. Left Breast, Reduction Mammoplasty (163 grams):                A. Benign breast tissue with foci of pseudoangiomatous stromal hyperplasia (PASH).               B. Benign microcalcifications.               C. Unremarkable skin.               D. No evidence of in-situ nor invasive carcinoma identified.     5. Skin, Right Breast, Excision:  Benign unremarkable skin.     6. Right Breast, Capsulectomy:                A. Explanted intact breast prosthesis.               B. Benign fibrous capsule.      7. Left Breast, Capsulectomy:  Benign hyalinized breast capsule with fat necrosis and dystrophic calcification.     5/18/2022 -  Chemotherapy    OP SUPPORTIVE Denosumab (Prolia) Q6M         Review of Systems:  See interval history.       Medications:    Current Outpatient Medications:   •  acetaminophen (TYLENOL) 325 MG tablet, Take 2 tablets by mouth Every 4 (Four) Hours As Needed for Mild Pain ., Disp: 60 tablet, Rfl: 0  •  anastrozole (Arimidex) 1 MG tablet, Take 1 tablet by mouth Daily for 30 days., Disp: 90 tablet, Rfl: 3  •  calcium carbonate (TUMS) 500 MG chewable tablet, Chew 1 tablet As Needed for Indigestion or Heartburn., Disp: , Rfl:   •  cephalexin (KEFLEX) 500 MG capsule, 4 pills one hour prior to procedure (Patient taking differently: 4 pills one hour prior to procedure prior to dental procedures), Disp: 4 capsule, Rfl: 2  •  cetirizine (zyrTEC) 10 MG tablet, Take 10 mg by mouth Every Night., Disp: , Rfl:   •  cholecalciferol (VITAMIN D3) 25 MCG (1000 UT) tablet, Take 1,000 Units by mouth Every Morning., Disp: , Rfl:   •  hydrOXYzine (ATARAX) 10 MG tablet, , Disp: , Rfl:   •  rivaroxaban (Xarelto)  20 MG tablet, Take 1 tablet by mouth Daily With Dinner., Disp: 30 tablet, Rfl: 0  •  SYNTHROID 112 MCG tablet, Take 112 mcg by mouth Every Night., Disp: , Rfl:   No current facility-administered medications for this visit.    Facility-Administered Medications Ordered in Other Visits:   •  Chlorhexidine Gluconate 2 % pads 1 each, 1 each, Apply externally, Take As Directed, Clarence Suarez MD      Allergies   Allergen Reactions   • Penicillins Other (See Comments)     Passes out   • Sulfa Antibiotics Nausea And Vomiting       Family History   Problem Relation Age of Onset   • Hypertension Mother    • Osteoporosis Mother    • Scoliosis Mother    • Hypertension Father    • Stroke Paternal Aunt    • Cervical cancer Maternal Grandmother 68   • Prostate cancer Brother 74   • Malig Hyperthermia Neg Hx        PHYSICAL EXAMINATION:   Vitals:    01/20/22 0825   BP: 130/82   Pulse: 72   Resp: 17   SpO2: 97%     ECOG 0 - Asymptomatic  General: NAD, well appearing  Psych: a&o x3, normal mood and affect  Eyes: EOMI, no scleral icterus  ENMT: neck supple without masses or thyromegaly, mucous membranes moist  MSK: normal gait, normal ROM in bilateral shoulders  Lymph nodes: no cervical, supraclavicular or axillary lymphadenopathy  Breast:   Right: Sp mastopexy with well-healing incisions. Flaps and NAC healthy. There is some mild surrounding dermatitis, however this does not appear to be affecting the incisions  Left: Sp mastopexy with well-healing incisions. Flaps and NAC healthy. There is some mild surrounding dermatitis, however this does not appear to be affecting the incisions      Assessment:  76 y.o. F with a diagnosis of right breast cancer: Low grade, invasive lobular carcinoma, ER/ME+, Her2 negative. She underwent right partial mastectomy with oncoplastic closure and left reduction for symmetry on 1/3/22, pT2Nx.    Plan:  -She can try some topical hydrocortisone cream for the red areas.  -Continue follow-up with   Clint.  -Continue follow-up with Dr. Butcher.  -F/u in 3 months for clinical exam with NP. She will be due for mammogram in 9/2022.  -She was instructed to call sooner with any questions, concerns or changes on BSE.    Adela Cardoso MD      CC:  MD Jarrod Hernandez MD

## 2022-01-26 ENCOUNTER — APPOINTMENT (OUTPATIENT)
Dept: RADIATION ONCOLOGY | Facility: HOSPITAL | Age: 77
End: 2022-01-26

## 2022-01-26 ENCOUNTER — CONSULT (OUTPATIENT)
Dept: RADIATION ONCOLOGY | Facility: HOSPITAL | Age: 77
End: 2022-01-26

## 2022-01-26 VITALS — HEART RATE: 75 BPM | SYSTOLIC BLOOD PRESSURE: 136 MMHG | DIASTOLIC BLOOD PRESSURE: 87 MMHG | OXYGEN SATURATION: 96 %

## 2022-01-26 DIAGNOSIS — C50.411 MALIGNANT NEOPLASM OF UPPER-OUTER QUADRANT OF RIGHT BREAST IN FEMALE, ESTROGEN RECEPTOR POSITIVE: Primary | ICD-10-CM

## 2022-01-26 DIAGNOSIS — Z17.0 MALIGNANT NEOPLASM OF UPPER-OUTER QUADRANT OF RIGHT BREAST IN FEMALE, ESTROGEN RECEPTOR POSITIVE: Primary | ICD-10-CM

## 2022-01-26 PROCEDURE — 77333 RADIATION TREATMENT AID(S): CPT | Performed by: RADIOLOGY

## 2022-01-26 PROCEDURE — 77263 THER RADIOLOGY TX PLNG CPLX: CPT | Performed by: RADIOLOGY

## 2022-01-26 PROCEDURE — 77290 THER RAD SIMULAJ FIELD CPLX: CPT | Performed by: RADIOLOGY

## 2022-01-26 PROCEDURE — 99205 OFFICE O/P NEW HI 60 MIN: CPT | Performed by: RADIOLOGY

## 2022-01-26 NOTE — PROGRESS NOTES
Subjective     Adela Cardoso MD    Cancer Staging  No matching staging information was found for the patient.    Chief Complaint   Patient presents with   • Consult     Rt Breast Ca     CC: right breast cancer cT2Nx invasive lobular carcinoma, ER TX pos Her 2 neg                            Dear Adela Cardoso MD    I had the pleasure of seeing Iris Yee  [facility] in [facility].   The patient is a 76 y.o. female with recently diagnosed right breast cancer.   She had a screening mammogram on 9/21/21 which showed bilateral implants, architectural distortion upper central middle anterior right breast, birads 0.  She had a diagnostic right mammogram and ultrasound on 11/2/21 which confirmed architectural distortion middle third right breast lateral to plane of nipple.  Ultrasound showed at 11 oclock a 1.2cm hypoechoic region with posterior shadowing with a second 6mm ill defined hypoechoic lesion at 11 oclock 3cm from the nipple, birads 4.      She had an ultrasound guided biopsy on 11/18/21 with pathology revealing multifocal atypical lobular hyperplasia a with adh at 11 oclock position, 3cm from nipple, and biopsy from second area right breast 11 oclock 3cm from the nipple showed invasive lobular carcinoma grade 1,  14mm in size, ER 1-100% TX % Her 2 negative ki67 7%.      She had a breast mri on 12/3/21 which showed the biopsy proven site of malignancy right breast at 11:30 with bowtie clip with 1.3cm enhancing mass consistent with residual malignancy with irregularity of capsule of left implant focal rupture cannot be excluded.  She underwent a right breast needle bracketed partial mastectomy on 1/3/22 with pathology revealing invasive lobular carcinoma, grade 1, uoq, 2.4cm in size, with associated lcis, no lvsi.  The invasive carcinoma was .3mm from the original inferior margin, 1.2mm from the lateral margin, additional inferior and lateral margins were negative by 15mm.  The  final closest margin was the posterior margin at 2mm.      She met with Dr. Jaramillo with medical oncology on 22 and she has recommended adjuvant arimidex x 5 years.  She is recovering well from surgery and referred today for evaluation for adjuvant radiation.     She is  with menarche age 16, first childbirth age 21, she did not breast feed.  She went through menopause in her mid 50s and took hormones for a few months.  She has no family hx of breast or ovarian cancer. Her maternal grandmother had cervical cancer.     She had inv[employer] genetic testing which was negative.          Review of Systems   Constitutional: Negative.    HENT: Negative.    Respiratory: Negative.    Cardiovascular: Negative.    Gastrointestinal: Negative.    Genitourinary: Positive for frequency.   Musculoskeletal: Positive for arthralgias.   Skin: Negative.    Neurological: Negative.    Psychiatric/Behavioral: Negative.          Past Medical History:   Diagnosis Date   • Arthritis    • Arthritis of back    • Arthritis of neck    • Bulging lumbar disc     L 3-4   • Bursitis of hip    • Clotting disorder ([facility])    • Fracture, tibia and fibula     Stress fracture   • GERD (gastroesophageal reflux disease)    • Hip arthrosis    • History of kidney stones    • History of MRSA infection     MICHEL EARS   • History of transfusion     no reaction   • Hx of blood clots    • Hypothyroid    • Knee swelling    • Left knee pain    • Lumbosacral disc disease    • Malignant neoplasm of upper-outer quadrant of right breast in female, estrogen receptor positive ([facility]) 2021   • Neck strain    • Neuroma of foot     Right foot   • Ovarian cyst    • Periarthritis of shoulder    • PONV (postoperative nausea and vomiting)     states gets really sick lasted for 4 days aafter surgery the last time    • Pulmonary embolism, bilateral ([facility])    • Scoliosis    • Stress fracture    • Tear of meniscus of knee    • Thrombopenia ([facility])           Past Surgical History:   Procedure Laterality Date   • APPENDECTOMY     • AUGMENTATION MAMMAPLASTY     • BREAST AUGMENTATION      years ago has implants   • BREAST EXCISIONAL BIOPSY Right 10/2021   • BREAST EXCISIONAL BIOPSY Left    • BREAST LUMPECTOMY Right 1/3/2022    Procedure: Right needle-localized, bracketed, partial mastectomy;  Surgeon: Adela Cardoso MD;  Location: [facility] MAIN OR;  Service: General;  Laterality: Right;   • BREAST SURGERY Bilateral 1/3/2022    Procedure: RIGHT ONCOPLASTIC REDUCTION AND LEFT REDUCTION FOR SYMMETRY, BILATERAL IMPLANT REMOVAL AND  BILATERAL CAPSULECTOMIES;  Surgeon: Delbert Butcher MD;  Location: [facility] MAIN OR;  Service: Plastics;  Laterality: Bilateral;   • CHOLECYSTECTOMY     • COLONOSCOPY N/A 2018    Procedure: COLONOSCOPY into cecum/termial ileum with polypectomy;  Surgeon: Jose Daniel Dooley MD;  Location: [facility] ENDOSCOPY;  Service: Gastroenterology   • ENDOSCOPY N/A 2018    Procedure: ESOPHAGOGASTRODUODENOSCOPY with biopsy;  Surgeon: Jose Daniel Dooley MD;  Location: [facility] ENDOSCOPY;  Service: Gastroenterology   • EXPLORATORY LAPAROTOMY      bleeding from ruptured ovarian cyst   • HERNIA REPAIR      umbilical x 2   • OVARIAN CYST DRAINAGE/EXCISION      ruptured ovarian cyst with vblood accumulatine in abdomin   • TONSILLECTOMY     • TOTAL KNEE ARTHROPLASTY Left 2021    Procedure: TOTAL KNEE ARTHROPLASTY;  Surgeon: Clarence Suarez MD;  Location: [facility] MAIN OR;  Service: Orthopedics;  Laterality: Left;   • UMBILICAL HERNIA REPAIR      x2         Social History     Socioeconomic History   • Marital status:      Spouse name: JOSE DANIEL   • Number of children: 3   • Years of education: COLLEGE   Tobacco Use   • Smoking status: Former Smoker     Packs/day: 1.00     Years: 10.00     Pack years: 10.00     Types: Cigarettes     Start date: 1964     Quit date: 1976     Years since quittin.4   • Smokeless tobacco:  Never Used   Vaping Use   • Vaping Use: Never used   Substance and Sexual Activity   • Alcohol use: Yes     Alcohol/week: 3.0 standard drinks     Types: 3 Glasses of wine per week   • Drug use: Never         Family History   Problem Relation Age of Onset   • Hypertension Mother    • Osteoporosis Mother    • Scoliosis Mother    • Hypertension Father    • Stroke Paternal Aunt    • Cervical cancer Maternal Grandmother 68   • Prostate cancer Brother 74   • Malig Hyperthermia Neg Hx           Objective    Physical Exam  Constitutional:       Appearance: Normal appearance.   Chest:          Comments: Mastopexy incisions healing well, no palpable masses in either breast or axilla  Musculoskeletal:         General: Normal range of motion.      Cervical back: Normal range of motion.   Skin:     General: Skin is warm.   Neurological:      General: No focal deficit present.      Mental Status: She is alert.   Psychiatric:         Mood and Affect: Mood normal.         Behavior: Behavior normal.         Thought Content: Thought content normal.         Judgment: Judgment normal.           Current Outpatient Medications on File Prior to Visit   Medication Sig Dispense Refill   • acetaminophen (TYLENOL) 325 MG tablet Take 2 tablets by mouth Every 4 (Four) Hours As Needed for Mild Pain . 60 tablet 0   • anastrozole (Arimidex) 1 MG tablet Take 1 tablet by mouth Daily for 30 days. 90 tablet 3   • calcium carbonate (TUMS) 500 MG chewable tablet Chew 1 tablet As Needed for Indigestion or Heartburn.     • cephalexin (KEFLEX) 500 MG capsule 4 pills one hour prior to procedure (Patient taking differently: 4 pills one hour prior to procedure prior to dental procedures) 4 capsule 2   • cetirizine (zyrTEC) 10 MG tablet Take 10 mg by mouth Every Night.     • cholecalciferol (VITAMIN D3) 25 MCG (1000 UT) tablet Take 1,000 Units by mouth Every Morning.     • hydrOXYzine (ATARAX) 10 MG tablet      • rivaroxaban (Xarelto) 20 MG tablet Take 1  tablet by mouth Daily With Dinner. 30 tablet 0   • SYNTHROID 112 MCG tablet Take 112 mcg by mouth Every Night.       Current Facility-Administered Medications on File Prior to Visit   Medication Dose Route Frequency Provider Last Rate Last Admin   • Chlorhexidine Gluconate 2 % pads 1 each  1 each Apply externally Take As Directed Clarence Suarez MD           ALLERGIES:    Allergies   Allergen Reactions   • Penicillins Other (See Comments)     Passes out   • Sulfa Antibiotics Nausea And Vomiting       LMP  (LMP Unknown)      (0) Fully active, able to carry on all predisease performance without restriction  Current Status 1/18/2022   ECOG score 0         Assessment/Plan     76 year old female with pT2nx invasive lobular carcinoma of right breast, ER [place] Pos Her 2 neg.  I discussed with her my recommendation for post-operative radiation therapy to the right breast to decrease the risk of local recurrence. I discussed the findings of the CALGB study in women 70 and over which showed slight reduction in local recurrence at 10 years with radiation but no change in overall survival.  I explained that in her specific case, I am a little more concerned about the size of 2.4cm and the invasive lobular component.  In the study, the majority of patients had invasive ductal.  For this reason, I recommend adjuvant radiation because I think her risk of local recurrence is a little higher.     I discussed with her in detail the risks, benefits and rationale of radiation therapy to the right breast to include but not limited to the following:    Acute: skin erythema, breakdown/moist desquamation, swelling or discomfort of the breast, fatigue, pneumonitis resulting in shortness of breath, cough or pain    Late: Permanent skin changes including hyperpigmentation, telangiectasias, fibrosis of the breast resulting in smaller size or poor cosmetic outcome, late edema or cellulitis, late rib fracture, late pulmonary fibrosis and the  remote risk of second malignancies.      She voiced understanding and was given an opportunity to ask questions which I believe were answered to her satisfaction.  We will proceed with CT simulation for treatment planning today in anticipation of starting her treatments  In 2 weeks.  I plan to treat the right breast with tangential fields to a dose of approximately 4000cGy in 15 fractions with no boost.      I personally spent greater than 60 minutes today assessing, managing, discussing and documenting my visit with the patient. That time includes review of records, imaging and pathology reports, obtaining my own history, performing a medically appropriate evaluation, counseling and educating the patient, discussing goals, logistics, alternatives and risks of my recommendations, surveillance options and potential outcomes. It also includes the time documenting the clinical information in the EMR and communicating my recommendations to the other involved physicians.                 Thank you very much for allowing me to participate in the care of this very pleasant patient.    Sincerely,      Ciara Celaya MD

## 2022-01-27 PROCEDURE — 77300 RADIATION THERAPY DOSE PLAN: CPT | Performed by: RADIOLOGY

## 2022-01-27 PROCEDURE — 77334 RADIATION TREATMENT AID(S): CPT | Performed by: RADIOLOGY

## 2022-01-27 PROCEDURE — 77295 3-D RADIOTHERAPY PLAN: CPT | Performed by: RADIOLOGY

## 2022-02-01 ENCOUNTER — APPOINTMENT (OUTPATIENT)
Dept: RADIATION ONCOLOGY | Facility: HOSPITAL | Age: 77
End: 2022-02-01

## 2022-02-08 PROCEDURE — 77412 RADIATION TX DELIVERY LVL 3: CPT | Performed by: RADIOLOGY

## 2022-02-08 PROCEDURE — 77280 THER RAD SIMULAJ FIELD SMPL: CPT | Performed by: RADIOLOGY

## 2022-02-08 PROCEDURE — 77427 RADIATION TX MANAGEMENT X5: CPT | Performed by: RADIOLOGY

## 2022-02-09 ENCOUNTER — APPOINTMENT (OUTPATIENT)
Dept: RADIATION ONCOLOGY | Facility: HOSPITAL | Age: 77
End: 2022-02-09

## 2022-02-09 PROCEDURE — 77412 RADIATION TX DELIVERY LVL 3: CPT | Performed by: RADIOLOGY

## 2022-02-09 PROCEDURE — 77427 RADIATION TX MANAGEMENT X5: CPT | Performed by: RADIOLOGY

## 2022-02-10 ENCOUNTER — APPOINTMENT (OUTPATIENT)
Dept: RADIATION ONCOLOGY | Facility: HOSPITAL | Age: 77
End: 2022-02-10

## 2022-02-10 ENCOUNTER — RADIATION ONCOLOGY WEEKLY ASSESSMENT (OUTPATIENT)
Dept: RADIATION ONCOLOGY | Facility: HOSPITAL | Age: 77
End: 2022-02-10

## 2022-02-10 VITALS — HEART RATE: 76 BPM | OXYGEN SATURATION: 97 % | DIASTOLIC BLOOD PRESSURE: 83 MMHG | SYSTOLIC BLOOD PRESSURE: 120 MMHG

## 2022-02-10 DIAGNOSIS — C50.411 MALIGNANT NEOPLASM OF UPPER-OUTER QUADRANT OF RIGHT BREAST IN FEMALE, ESTROGEN RECEPTOR POSITIVE: Primary | ICD-10-CM

## 2022-02-10 DIAGNOSIS — Z17.0 MALIGNANT NEOPLASM OF UPPER-OUTER QUADRANT OF RIGHT BREAST IN FEMALE, ESTROGEN RECEPTOR POSITIVE: Primary | ICD-10-CM

## 2022-02-10 PROCEDURE — 77412 RADIATION TX DELIVERY LVL 3: CPT | Performed by: RADIOLOGY

## 2022-02-10 NOTE — PROGRESS NOTES
Physician Weekly Management Note    Diagnosis:     Diagnosis Plan   1. Malignant neoplasm of upper-outer quadrant of right breast in female, estrogen receptor positive (HCC)         RT Details:  fx3 right breast    Notes on Treatment course, Films, Medical progress:  Doing well so far, no erythema, cont on    Weekly Management:  Medication reviewed?   Yes  New medications given?   No  Problemlist reviewed?   Yes  Problem added?   No  Issues raised requiring referral to support services - task assigned to:  na    Technical aspects reviewed:  Weekly OBI approved?   Yes  Weekly port films approved?   Yes  Change requests noted on port film?   No  Patient setup and plan reviewed?   Yes    Chart Reviewed:  Continue current treatment plan?   Yes  Treatment plan change requested?   No  CBC reviewed?   Yes  Concurrent Chemo?   No    Objective     Toxicities:   none     Review of Systems   Constitutional: Negative.    HENT: Negative.    Respiratory: Negative.    Skin: Negative.           There were no vitals filed for this visit.    Current Status 1/18/2022   ECOG score 0       Physical Exam  Constitutional:       Appearance: Normal appearance.   Chest:          Comments: No erythema  Neurological:      Mental Status: She is alert.             Problem Summary List    Diagnosis:     Diagnosis Plan   1. Malignant neoplasm of upper-outer quadrant of right breast in female, estrogen receptor positive (HCC)       Pathology:   breast    Past Medical History:   Diagnosis Date   • Arthritis    • Arthritis of back 2015   • Arthritis of neck    • Bulging lumbar disc     L 3-4   • Bursitis of hip    • Clotting disorder (HCC)    • Fracture, tibia and fibula     Stress fracture   • GERD (gastroesophageal reflux disease)    • Hip arthrosis    • History of kidney stones    • History of MRSA infection 2020    MICHEL EARS   • History of transfusion 1971    no reaction   • Hx of blood clots 2011   • Hypothyroid    • Kidney stones    • Knee  swelling    • Left knee pain    • Lumbosacral disc disease    • Malignant neoplasm of upper-outer quadrant of right breast in female, estrogen receptor positive (HCC) 12/9/2021   • Neck strain    • Neuroma of foot 2000    Right foot   • Ovarian cyst    • Periarthritis of shoulder    • PONV (postoperative nausea and vomiting)     states gets really sick lasted for 4 days aafter surgery the last time    • Pulmonary embolism, bilateral (HCC)    • Scoliosis 2020   • Stress fracture    • Tear of meniscus of knee    • Thrombopenia (HCC)          Past Surgical History:   Procedure Laterality Date   • APPENDECTOMY     • AUGMENTATION MAMMAPLASTY  1976   • BREAST AUGMENTATION      years ago has implants   • BREAST EXCISIONAL BIOPSY Right 10/2021   • BREAST EXCISIONAL BIOPSY Left    • BREAST LUMPECTOMY Right 1/3/2022    Procedure: Right needle-localized, bracketed, partial mastectomy;  Surgeon: Adela Cardoso MD;  Location: [facility] OR;  Service: General;  Laterality: Right;   • BREAST SURGERY Bilateral 1/3/2022    Procedure: RIGHT ONCOPLASTIC REDUCTION AND LEFT REDUCTION FOR SYMMETRY, BILATERAL IMPLANT REMOVAL AND  BILATERAL CAPSULECTOMIES;  Surgeon: Delbert Butcher MD;  Location: [facility] OR;  Service: Plastics;  Laterality: Bilateral;   • CHOLECYSTECTOMY     • COLONOSCOPY N/A 12/14/2018    Procedure: COLONOSCOPY into cecum/termial ileum with polypectomy;  Surgeon: Jose Daniel Dooley MD;  Location: [facility] ENDOSCOPY;  Service: Gastroenterology   • ENDOSCOPY N/A 12/14/2018    Procedure: ESOPHAGOGASTRODUODENOSCOPY with biopsy;  Surgeon: Jose Daniel Dooley MD;  Location: [facility] ENDOSCOPY;  Service: Gastroenterology   • EXPLORATORY LAPAROTOMY      bleeding from ruptured ovarian cyst   • HERNIA REPAIR      umbilical x 2   • OVARIAN CYST DRAINAGE/EXCISION      ruptured ovarian cyst with vblood accumulatine in abdomin   • TONSILLECTOMY     • TOTAL KNEE ARTHROPLASTY Left 6/24/2021    Procedure: TOTAL KNEE  ARTHROPLASTY;  Surgeon: Clarence Suarez MD;  Location: [facility] MAIN OR;  Service: Orthopedics;  Laterality: Left;   • UMBILICAL HERNIA REPAIR      x2         Current Outpatient Medications on File Prior to Visit   Medication Sig Dispense Refill   • acetaminophen (TYLENOL) 325 MG tablet Take 2 tablets by mouth Every 4 (Four) Hours As Needed for Mild Pain . 60 tablet 0   • anastrozole (Arimidex) 1 MG tablet Take 1 tablet by mouth Daily for 30 days. 90 tablet 3   • calcium carbonate (TUMS) 500 MG chewable tablet Chew 1 tablet As Needed for Indigestion or Heartburn.     • cetirizine (zyrTEC) 10 MG tablet Take 10 mg by mouth Every Night.     • cholecalciferol (VITAMIN D3) 25 MCG (1000 UT) tablet Take 1,000 Units by mouth Every Morning.     • rivaroxaban (Xarelto) 20 MG tablet Take 1 tablet by mouth Daily With Dinner. 30 tablet 0   • SYNTHROID 112 MCG tablet Take 112 mcg by mouth Every Night.       Current Facility-Administered Medications on File Prior to Visit   Medication Dose Route Frequency Provider Last Rate Last Admin   • Chlorhexidine Gluconate 2 % pads 1 each  1 each Apply externally Take As Directed Clarence Suarez MD           Allergies   Allergen Reactions   • Penicillins Other (See Comments)     Passes out   • Sulfa Antibiotics Nausea And Vomiting         Referring Provider:    No referring provider defined for this encounter.    Oncologist:  No primary care provider on file.      Seen and approved by:  Ciara Celaya MD  02/10/2022

## 2022-02-11 ENCOUNTER — APPOINTMENT (OUTPATIENT)
Dept: RADIATION ONCOLOGY | Facility: HOSPITAL | Age: 77
End: 2022-02-11

## 2022-02-11 PROCEDURE — 77280 THER RAD SIMULAJ FIELD SMPL: CPT | Performed by: RADIOLOGY

## 2022-02-11 PROCEDURE — 77412 RADIATION TX DELIVERY LVL 3: CPT | Performed by: RADIOLOGY

## 2022-02-14 ENCOUNTER — APPOINTMENT (OUTPATIENT)
Dept: RADIATION ONCOLOGY | Facility: HOSPITAL | Age: 77
End: 2022-02-14

## 2022-02-14 PROCEDURE — 77412 RADIATION TX DELIVERY LVL 3: CPT | Performed by: RADIOLOGY

## 2022-02-15 ENCOUNTER — RADIATION ONCOLOGY WEEKLY ASSESSMENT (OUTPATIENT)
Dept: RADIATION ONCOLOGY | Facility: HOSPITAL | Age: 77
End: 2022-02-15

## 2022-02-15 ENCOUNTER — APPOINTMENT (OUTPATIENT)
Dept: RADIATION ONCOLOGY | Facility: HOSPITAL | Age: 77
End: 2022-02-15

## 2022-02-15 VITALS — SYSTOLIC BLOOD PRESSURE: 132 MMHG | DIASTOLIC BLOOD PRESSURE: 83 MMHG | OXYGEN SATURATION: 99 % | HEART RATE: 71 BPM

## 2022-02-15 DIAGNOSIS — C50.411 MALIGNANT NEOPLASM OF UPPER-OUTER QUADRANT OF RIGHT BREAST IN FEMALE, ESTROGEN RECEPTOR POSITIVE: Primary | ICD-10-CM

## 2022-02-15 DIAGNOSIS — Z17.0 MALIGNANT NEOPLASM OF UPPER-OUTER QUADRANT OF RIGHT BREAST IN FEMALE, ESTROGEN RECEPTOR POSITIVE: Primary | ICD-10-CM

## 2022-02-15 PROCEDURE — 77336 RADIATION PHYSICS CONSULT: CPT | Performed by: RADIOLOGY

## 2022-02-15 PROCEDURE — 77412 RADIATION TX DELIVERY LVL 3: CPT | Performed by: RADIOLOGY

## 2022-02-15 PROCEDURE — 77417 THER RADIOLOGY PORT IMAGE(S): CPT | Performed by: RADIOLOGY

## 2022-02-15 NOTE — PROGRESS NOTES
Physician Weekly Management Note    Diagnosis:     Diagnosis Plan   1. Malignant neoplasm of upper-outer quadrant of right breast in female, estrogen receptor positive (HCC)         RT Details:  fx 6/15 right breast 1596cGy    Notes on Treatment course, Films, Medical progress:  Doing well, no erythema    Weekly Management:  Medication reviewed?   Yes  New medications given?   No  Problemlist reviewed?   Yes  Problem added?   No  Issues raised requiring referral to support services - task assigned to:  na    Technical aspects reviewed:  Weekly OBI approved?   Yes  Weekly port films approved?   Yes  Change requests noted on port film?   No  Patient setup and plan reviewed?   Yes    Chart Reviewed:  Continue current treatment plan?   Yes  Treatment plan change requested?   No  CBC reviewed?   Yes  Concurrent Chemo?   No    Objective     Toxicities:   none     Review of Systems   Constitutional: Negative.    Skin: Negative.           There were no vitals filed for this visit.    Current Status 1/18/2022   ECOG score 0       Physical Exam  Constitutional:       Appearance: Normal appearance.   Chest:          Comments: No erythema  Neurological:      Mental Status: She is alert.             Problem Summary List    Diagnosis:     Diagnosis Plan   1. Malignant neoplasm of upper-outer quadrant of right breast in female, estrogen receptor positive (HCC)       Pathology:   breast    Past Medical History:   Diagnosis Date   • Arthritis    • Arthritis of back 2015   • Arthritis of neck    • Bulging lumbar disc     L 3-4   • Bursitis of hip    • Clotting disorder (HCC)    • Fracture, tibia and fibula     Stress fracture   • GERD (gastroesophageal reflux disease)    • Hip arthrosis    • History of kidney stones    • History of MRSA infection 2020    MICHEL EARS   • History of transfusion 1971    no reaction   • Hx of blood clots 2011   • Hypothyroid    • Kidney stones    • Knee swelling    • Left knee pain    • Lumbosacral disc  disease    • Malignant neoplasm of upper-outer quadrant of right breast in female, estrogen receptor positive (HCC) 12/9/2021   • Neck strain    • Neuroma of foot 2000    Right foot   • Ovarian cyst    • Periarthritis of shoulder    • PONV (postoperative nausea and vomiting)     states gets really sick lasted for 4 days aafter surgery the last time    • Pulmonary embolism, bilateral (HCC)    • Scoliosis 2020   • Stress fracture    • Tear of meniscus of knee    • Thrombopenia (HCC)          Past Surgical History:   Procedure Laterality Date   • APPENDECTOMY     • AUGMENTATION MAMMAPLASTY  1976   • BREAST AUGMENTATION      years ago has implants   • BREAST EXCISIONAL BIOPSY Right 10/2021   • BREAST EXCISIONAL BIOPSY Left    • BREAST LUMPECTOMY Right 1/3/2022    Procedure: Right needle-localized, bracketed, partial mastectomy;  Surgeon: Adela Cardoso MD;  Location: [facility] MAIN OR;  Service: General;  Laterality: Right;   • BREAST SURGERY Bilateral 1/3/2022    Procedure: RIGHT ONCOPLASTIC REDUCTION AND LEFT REDUCTION FOR SYMMETRY, BILATERAL IMPLANT REMOVAL AND  BILATERAL CAPSULECTOMIES;  Surgeon: Delbert Butcher MD;  Location: [facility] MAIN OR;  Service: Plastics;  Laterality: Bilateral;   • CHOLECYSTECTOMY     • COLONOSCOPY N/A 12/14/2018    Procedure: COLONOSCOPY into cecum/termial ileum with polypectomy;  Surgeon: Jose Daniel Dooley MD;  Location: [facility] ENDOSCOPY;  Service: Gastroenterology   • ENDOSCOPY N/A 12/14/2018    Procedure: ESOPHAGOGASTRODUODENOSCOPY with biopsy;  Surgeon: Jose Daniel Dooley MD;  Location: [facility] ENDOSCOPY;  Service: Gastroenterology   • EXPLORATORY LAPAROTOMY      bleeding from ruptured ovarian cyst   • HERNIA REPAIR      umbilical x 2   • OVARIAN CYST DRAINAGE/EXCISION      ruptured ovarian cyst with vblood accumulatine in abdomin   • TONSILLECTOMY     • TOTAL KNEE ARTHROPLASTY Left 6/24/2021    Procedure: TOTAL KNEE ARTHROPLASTY;  Surgeon: Clarence Suarez MD;   Location: [facility] MAIN OR;  Service: Orthopedics;  Laterality: Left;   • UMBILICAL HERNIA REPAIR      x2         Current Outpatient Medications on File Prior to Visit   Medication Sig Dispense Refill   • acetaminophen (TYLENOL) 325 MG tablet Take 2 tablets by mouth Every 4 (Four) Hours As Needed for Mild Pain . 60 tablet 0   • anastrozole (Arimidex) 1 MG tablet Take 1 tablet by mouth Daily for 30 days. 90 tablet 3   • calcium carbonate (TUMS) 500 MG chewable tablet Chew 1 tablet As Needed for Indigestion or Heartburn.     • cetirizine (zyrTEC) 10 MG tablet Take 10 mg by mouth Every Night.     • cholecalciferol (VITAMIN D3) 25 MCG (1000 UT) tablet Take 1,000 Units by mouth Every Morning.     • rivaroxaban (Xarelto) 20 MG tablet Take 1 tablet by mouth Daily With Dinner. 30 tablet 0   • SYNTHROID 112 MCG tablet Take 112 mcg by mouth Every Night.       Current Facility-Administered Medications on File Prior to Visit   Medication Dose Route Frequency Provider Last Rate Last Admin   • Chlorhexidine Gluconate 2 % pads 1 each  1 each Apply externally Take As Directed Clarence Suarez MD           Allergies   Allergen Reactions   • Penicillins Other (See Comments)     Passes out   • Sulfa Antibiotics Nausea And Vomiting         Referring Provider:    No referring provider defined for this encounter.    Oncologist:  No primary care provider on file.      Seen and approved by:  Ciara Celaya MD  02/15/2022

## 2022-02-16 ENCOUNTER — APPOINTMENT (OUTPATIENT)
Dept: RADIATION ONCOLOGY | Facility: HOSPITAL | Age: 77
End: 2022-02-16

## 2022-02-16 PROCEDURE — 77412 RADIATION TX DELIVERY LVL 3: CPT | Performed by: RADIOLOGY

## 2022-02-16 PROCEDURE — 77427 RADIATION TX MANAGEMENT X5: CPT | Performed by: RADIOLOGY

## 2022-02-17 ENCOUNTER — APPOINTMENT (OUTPATIENT)
Dept: RADIATION ONCOLOGY | Facility: HOSPITAL | Age: 77
End: 2022-02-17

## 2022-02-17 PROCEDURE — 77412 RADIATION TX DELIVERY LVL 3: CPT | Performed by: RADIOLOGY

## 2022-02-18 ENCOUNTER — APPOINTMENT (OUTPATIENT)
Dept: RADIATION ONCOLOGY | Facility: HOSPITAL | Age: 77
End: 2022-02-18

## 2022-02-18 PROCEDURE — 77412 RADIATION TX DELIVERY LVL 3: CPT | Performed by: RADIOLOGY

## 2022-02-21 ENCOUNTER — APPOINTMENT (OUTPATIENT)
Dept: RADIATION ONCOLOGY | Facility: HOSPITAL | Age: 77
End: 2022-02-21

## 2022-02-21 PROCEDURE — 77412 RADIATION TX DELIVERY LVL 3: CPT | Performed by: RADIOLOGY

## 2022-02-22 ENCOUNTER — RADIATION ONCOLOGY WEEKLY ASSESSMENT (OUTPATIENT)
Dept: RADIATION ONCOLOGY | Facility: HOSPITAL | Age: 77
End: 2022-02-22

## 2022-02-22 ENCOUNTER — APPOINTMENT (OUTPATIENT)
Dept: RADIATION ONCOLOGY | Facility: HOSPITAL | Age: 77
End: 2022-02-22

## 2022-02-22 VITALS — HEART RATE: 88 BPM | OXYGEN SATURATION: 96 % | SYSTOLIC BLOOD PRESSURE: 107 MMHG | DIASTOLIC BLOOD PRESSURE: 73 MMHG

## 2022-02-22 DIAGNOSIS — Z17.0 MALIGNANT NEOPLASM OF UPPER-OUTER QUADRANT OF RIGHT BREAST IN FEMALE, ESTROGEN RECEPTOR POSITIVE: Primary | ICD-10-CM

## 2022-02-22 DIAGNOSIS — C50.411 MALIGNANT NEOPLASM OF UPPER-OUTER QUADRANT OF RIGHT BREAST IN FEMALE, ESTROGEN RECEPTOR POSITIVE: Primary | ICD-10-CM

## 2022-02-22 PROCEDURE — 77412 RADIATION TX DELIVERY LVL 3: CPT | Performed by: RADIOLOGY

## 2022-02-22 PROCEDURE — 77336 RADIATION PHYSICS CONSULT: CPT | Performed by: RADIOLOGY

## 2022-02-22 PROCEDURE — 77417 THER RADIOLOGY PORT IMAGE(S): CPT | Performed by: RADIOLOGY

## 2022-02-22 NOTE — PROGRESS NOTES
Physician Weekly Management Note    Diagnosis:     Diagnosis Plan   1. Malignant neoplasm of upper-outer quadrant of right breast in female, estrogen receptor positive (HCC)         RT Details:  fx 11/15 right breast 2926cGy  Notes on Treatment course, Films, Medical progress:  Doing well, no erythema, cotn on     Weekly Management:  Medication reviewed?   Yes  New medications given?   No  Problemlist reviewed?   Yes  Problem added?   No  Issues raised requiring referral to support services - task assigned to:  na    Technical aspects reviewed:  Weekly OBI approved?   Yes  Weekly port films approved?   Yes  Change requests noted on port film?   No  Patient setup and plan reviewed?   Yes    Chart Reviewed:  Continue current treatment plan?   Yes  Treatment plan change requested?   No  CBC reviewed?   No  Concurrent Chemo?   No    Objective     Toxicities:   none     Review of Systems   Constitutional: Negative.    Skin: Negative for color change.          Vitals:    02/22/22 0953   BP: 107/73   Pulse: 88   SpO2: 96%       Current Status 1/18/2022   ECOG score 0       Physical Exam  Constitutional:       Appearance: Normal appearance.   Chest:          Comments: No erythema  Neurological:      Mental Status: She is alert.             Problem Summary List    Diagnosis:     Diagnosis Plan   1. Malignant neoplasm of upper-outer quadrant of right breast in female, estrogen receptor positive (HCC)       Pathology:   breast    Past Medical History:   Diagnosis Date   • Arthritis    • Arthritis of back 2015   • Arthritis of neck    • Bulging lumbar disc     L 3-4   • Bursitis of hip    • Clotting disorder (HCC)    • Fracture, tibia and fibula     Stress fracture   • GERD (gastroesophageal reflux disease)    • Hip arthrosis    • History of kidney stones    • History of MRSA infection 2020    MICHEL EARS   • History of transfusion 1971    no reaction   • Hx of blood clots 2011   • Hypothyroid    • Kidney stones    • Knee swelling     • Left knee pain    • Lumbosacral disc disease    • Malignant neoplasm of upper-outer quadrant of right breast in female, estrogen receptor positive (HCC) 12/9/2021   • Neck strain    • Neuroma of foot 2000    Right foot   • Ovarian cyst    • Periarthritis of shoulder    • PONV (postoperative nausea and vomiting)     states gets really sick lasted for 4 days aafter surgery the last time    • Pulmonary embolism, bilateral (HCC)    • Scoliosis 2020   • Stress fracture    • Tear of meniscus of knee    • Thrombopenia (HCC)          Past Surgical History:   Procedure Laterality Date   • APPENDECTOMY     • AUGMENTATION MAMMAPLASTY  1976   • BREAST AUGMENTATION      years ago has implants   • BREAST EXCISIONAL BIOPSY Right 10/2021   • BREAST EXCISIONAL BIOPSY Left    • BREAST LUMPECTOMY Right 1/3/2022    Procedure: Right needle-localized, bracketed, partial mastectomy;  Surgeon: Adela Cardoso MD;  Location: [facility] OR;  Service: General;  Laterality: Right;   • BREAST SURGERY Bilateral 1/3/2022    Procedure: RIGHT ONCOPLASTIC REDUCTION AND LEFT REDUCTION FOR SYMMETRY, BILATERAL IMPLANT REMOVAL AND  BILATERAL CAPSULECTOMIES;  Surgeon: Delbert Butcher MD;  Location: [facility] MAIN OR;  Service: Plastics;  Laterality: Bilateral;   • CHOLECYSTECTOMY     • COLONOSCOPY N/A 12/14/2018    Procedure: COLONOSCOPY into cecum/termial ileum with polypectomy;  Surgeon: Jose Daniel Dooley MD;  Location: [facility] ENDOSCOPY;  Service: Gastroenterology   • ENDOSCOPY N/A 12/14/2018    Procedure: ESOPHAGOGASTRODUODENOSCOPY with biopsy;  Surgeon: Jose Daniel Dooley MD;  Location: [facility] ENDOSCOPY;  Service: Gastroenterology   • EXPLORATORY LAPAROTOMY      bleeding from ruptured ovarian cyst   • HERNIA REPAIR      umbilical x 2   • OVARIAN CYST DRAINAGE/EXCISION      ruptured ovarian cyst with vblood accumulatine in abdomin   • TONSILLECTOMY     • TOTAL KNEE ARTHROPLASTY Left 6/24/2021    Procedure: TOTAL KNEE  ARTHROPLASTY;  Surgeon: Clarence Suarez MD;  Location: [facility] MAIN OR;  Service: Orthopedics;  Laterality: Left;   • UMBILICAL HERNIA REPAIR      x2         Current Outpatient Medications on File Prior to Visit   Medication Sig Dispense Refill   • acetaminophen (TYLENOL) 325 MG tablet Take 2 tablets by mouth Every 4 (Four) Hours As Needed for Mild Pain . 60 tablet 0   • calcium carbonate (TUMS) 500 MG chewable tablet Chew 1 tablet As Needed for Indigestion or Heartburn.     • cetirizine (zyrTEC) 10 MG tablet Take 10 mg by mouth Every Night.     • cholecalciferol (VITAMIN D3) 25 MCG (1000 UT) tablet Take 1,000 Units by mouth Every Morning.     • rivaroxaban (Xarelto) 20 MG tablet Take 1 tablet by mouth Daily With Dinner. 30 tablet 0   • SYNTHROID 112 MCG tablet Take 112 mcg by mouth Every Night.       Current Facility-Administered Medications on File Prior to Visit   Medication Dose Route Frequency Provider Last Rate Last Admin   • Chlorhexidine Gluconate 2 % pads 1 each  1 each Apply externally Take As Directed Clarence Suarez MD           Allergies   Allergen Reactions   • Penicillins Other (See Comments)     Passes out   • Sulfa Antibiotics Nausea And Vomiting         Referring Provider:    No referring provider defined for this encounter.    Oncologist:  No primary care provider on file.      Seen and approved by:  Ciara Celaya MD  02/22/2022

## 2022-02-23 ENCOUNTER — APPOINTMENT (OUTPATIENT)
Dept: RADIATION ONCOLOGY | Facility: HOSPITAL | Age: 77
End: 2022-02-23

## 2022-02-23 PROCEDURE — 77412 RADIATION TX DELIVERY LVL 3: CPT | Performed by: RADIOLOGY

## 2022-02-24 ENCOUNTER — APPOINTMENT (OUTPATIENT)
Dept: RADIATION ONCOLOGY | Facility: HOSPITAL | Age: 77
End: 2022-02-24

## 2022-02-24 PROCEDURE — 77412 RADIATION TX DELIVERY LVL 3: CPT | Performed by: RADIOLOGY

## 2022-02-25 ENCOUNTER — APPOINTMENT (OUTPATIENT)
Dept: RADIATION ONCOLOGY | Facility: HOSPITAL | Age: 77
End: 2022-02-25

## 2022-02-25 PROCEDURE — 77412 RADIATION TX DELIVERY LVL 3: CPT | Performed by: RADIOLOGY

## 2022-02-28 ENCOUNTER — TELEPHONE (OUTPATIENT)
Dept: ONCOLOGY | Facility: CLINIC | Age: 77
End: 2022-02-28

## 2022-02-28 ENCOUNTER — DOCUMENTATION (OUTPATIENT)
Dept: RADIATION ONCOLOGY | Facility: HOSPITAL | Age: 77
End: 2022-02-28

## 2022-02-28 DIAGNOSIS — C50.411 MALIGNANT NEOPLASM OF UPPER-OUTER QUADRANT OF RIGHT BREAST IN FEMALE, ESTROGEN RECEPTOR POSITIVE: Primary | ICD-10-CM

## 2022-02-28 DIAGNOSIS — Z17.0 MALIGNANT NEOPLASM OF UPPER-OUTER QUADRANT OF RIGHT BREAST IN FEMALE, ESTROGEN RECEPTOR POSITIVE: Primary | ICD-10-CM

## 2022-02-28 PROCEDURE — 77412 RADIATION TX DELIVERY LVL 3: CPT | Performed by: RADIOLOGY

## 2022-02-28 NOTE — TELEPHONE ENCOUNTER
Pt has broken a piece of her tooth off and was told by dentist to call to make sure it is okay to have dental work done

## 2022-02-28 NOTE — TELEPHONE ENCOUNTER
Reviewed pt's medications; no contraindications for dental work. Advised pt she may proceed with her tooth repair. She v/u.

## 2022-03-01 NOTE — PROGRESS NOTES
RADIATION THERAPY TREATMENT SUMMARY  Patient: Iris Yee  YOB: 1945  Diagnosis:   Malignant neoplasm of upper-outer quadrant of right breast in female, estrogen receptor positive (HCC)   No matching staging information was found for the patient.      Iris Yee has recently completed the course of radiation therapy prescribed for the above-mentioned diagnosis.  Below, please find the specifics of the course of treatment delivered:    Radiation Details:    Tx Start Date  2/8/2022   Tx End date  2/28/2022  Intent   Curative   Total Treatments 15    Prescription Name R BREAST  Site   Breast, Right  Primary/Boost  Primary  Dose Per Fraction 270 cGy/Frac  Number of Fractions 15  Prescribe To  IsodoseLine 100 % 4050 cGy  270 cGy/Frac  Mode    Photon  Technique  TANGENTS  Frequency  5 Times a week  Energy   6X/18X  Prescription Note PRESCRIBED 100% TO CALC PT            Tolerance and Toxicities: she tolerated the treatments well, requiring no treatment breaks. she completed the treatments in 20 elapsed days.    Follow-up Plans:  I have asked the patient to return to see me in approximately 4 weeks .  I have also made a referral to our [facility].

## 2022-03-18 ENCOUNTER — OFFICE VISIT (OUTPATIENT)
Dept: ORTHOPEDIC SURGERY | Facility: CLINIC | Age: 77
End: 2022-03-18

## 2022-03-18 VITALS — HEIGHT: 65 IN | BODY MASS INDEX: 37.15 KG/M2 | TEMPERATURE: 96.9 F | WEIGHT: 223 LBS

## 2022-03-18 DIAGNOSIS — R52 PAIN: Primary | ICD-10-CM

## 2022-03-18 DIAGNOSIS — Z09 SURGERY FOLLOW-UP: ICD-10-CM

## 2022-03-18 PROCEDURE — 99212 OFFICE O/P EST SF 10 MIN: CPT | Performed by: ORTHOPAEDIC SURGERY

## 2022-03-18 PROCEDURE — 73562 X-RAY EXAM OF KNEE 3: CPT | Performed by: ORTHOPAEDIC SURGERY

## 2022-03-18 RX ORDER — CEPHALEXIN 500 MG/1
CAPSULE ORAL
Qty: 4 CAPSULE | Refills: 2 | Status: SHIPPED | OUTPATIENT
Start: 2022-03-18 | End: 2022-03-21 | Stop reason: SDUPTHER

## 2022-03-18 NOTE — PROGRESS NOTES
"Iris Yee     : 1945     MRN: [id number]    DATE: 3/18/2022    DIAGNOSIS:  Annual follow up left total knee arthroplasty    SUBJECTIVE:  Patient returns today for annual follow up of a left total knee replacement. Patient reports doing well with no unusual complaints. Denies any limitations due to the knee.    OBJECTIVE:  Temp 96.9 °F (36.1 °C)   Ht 165.1 cm (65\")   Wt 101 kg (223 lb)   LMP  (LMP Unknown)   BMI 37.11 kg/m²   Family History   Problem Relation Age of Onset   • Hypertension Mother    • Osteoporosis Mother    • Scoliosis Mother    • Arthritis Mother    • Hypertension Father    • Arthritis Father    • Stroke Paternal Aunt    • Cervical cancer Maternal Grandmother 68   • Prostate cancer Brother 74   • Hypertension Brother    • Hyperlipidemia Brother    • Alzheimer's disease Brother    • Malig Hyperthermia Neg Hx      Past Medical History:   Diagnosis Date   • Arthritis    • Arthritis of back    • Arthritis of neck    • Bulging lumbar disc     L 3-4   • Bursitis of hip    • Clotting disorder (HCC)    • Fracture, tibia and fibula     Stress fracture   • GERD (gastroesophageal reflux disease)    • Hip arthrosis    • History of kidney stones    • History of MRSA infection     MICHEL EARS   • History of transfusion     no reaction   • Hx of blood clots    • Hypothyroid    • Kidney stones    • Knee swelling    • Left knee pain    • Lumbosacral disc disease    • Malignant neoplasm of upper-outer quadrant of right breast in female, estrogen receptor positive ([facility]) 2021   • Neck strain    • Neuroma of foot     Right foot   • Ovarian cyst    • Periarthritis of shoulder    • PONV (postoperative nausea and vomiting)     states gets really sick lasted for 4 days aafter surgery the last time    • Pulmonary embolism, bilateral ([facility])    • Scoliosis    • Stress fracture    • Tear of meniscus of knee    • Thrombopenia (HCC)      Past Surgical History:   Procedure Laterality " Date   • APPENDECTOMY     • AUGMENTATION MAMMAPLASTY     • BREAST AUGMENTATION      years ago has implants   • BREAST EXCISIONAL BIOPSY Right 10/2021   • BREAST EXCISIONAL BIOPSY Left    • BREAST LUMPECTOMY Right 1/3/2022    Procedure: Right needle-localized, bracketed, partial mastectomy;  Surgeon: Adela Cardoso MD;  Location: [facility] MAIN OR;  Service: General;  Laterality: Right;   • BREAST SURGERY Bilateral 1/3/2022    Procedure: RIGHT ONCOPLASTIC REDUCTION AND LEFT REDUCTION FOR SYMMETRY, BILATERAL IMPLANT REMOVAL AND  BILATERAL CAPSULECTOMIES;  Surgeon: Delbert Butcher MD;  Location: [facility] MAIN OR;  Service: Plastics;  Laterality: Bilateral;   • CHOLECYSTECTOMY     • COLONOSCOPY N/A 2018    Procedure: COLONOSCOPY into cecum/termial ileum with polypectomy;  Surgeon: Jose Daniel Dooley MD;  Location: [facility] ENDOSCOPY;  Service: Gastroenterology   • ENDOSCOPY N/A 2018    Procedure: ESOPHAGOGASTRODUODENOSCOPY with biopsy;  Surgeon: Jose Daniel Dooley MD;  Location: [facility] ENDOSCOPY;  Service: Gastroenterology   • EXPLORATORY LAPAROTOMY      bleeding from ruptured ovarian cyst   • HERNIA REPAIR      umbilical x 2   • OVARIAN CYST DRAINAGE/EXCISION      ruptured ovarian cyst with vblood accumulatine in abdomin   • TONSILLECTOMY     • TOTAL KNEE ARTHROPLASTY Left 2021    Procedure: TOTAL KNEE ARTHROPLASTY;  Surgeon: Clarence Suarez MD;  Location: [facility] MAIN OR;  Service: Orthopedics;  Laterality: Left;   • UMBILICAL HERNIA REPAIR      x2     Social History     Socioeconomic History   • Marital status:      Spouse name: JOSE DANIEL   • Number of children: 3   • Years of education: COLLEGE   Tobacco Use   • Smoking status: Former Smoker     Packs/day: 1.00     Years: 10.00     Pack years: 10.00     Types: Cigarettes     Start date: 1964     Quit date: 1976     Years since quittin.5   • Smokeless tobacco: Never Used   Vaping Use   • Vaping Use: Never used    Substance and Sexual Activity   • Alcohol use: Yes     Alcohol/week: 3.0 standard drinks     Types: 3 Glasses of wine per week   • Drug use: Never       Review of Systems:   A 14 point review of systems is reviewed with the patient.  Pertinent positives are listed above.  All others negative.    Exam:  The incision is well healed.  Range of motion is measured at 0 to 120.  The calf is soft and nontender with a negative Homans sign.  Alignment is neutral.  Good quad strength. There is no evidence of varus/valgus or flexion instability. No effusion.  Intact sensation to light touch.  Palpable pedal pulses    DIAGNOSTIC STUDIES    Xrays: AP, merchant and lateral views of the left knee were ordered and reviewed for evaluation of recent knee replacement.  The x-rays demonstrate a well positioned, well aligned knee replacement without complicating factors noted.  In comparison with previous films there has been no change.    ASSESSMENT:  Annual follow up left knee replacement.    PLAN: Appropriate activity modifications and restrictions discussed.  Antibiotic prophylaxis recommendations discussed.  Follow-up annually.    Clarence Suarez MD

## 2022-03-21 RX ORDER — CEPHALEXIN 500 MG/1
CAPSULE ORAL
Qty: 4 CAPSULE | Refills: 2 | Status: SHIPPED | OUTPATIENT
Start: 2022-03-21 | End: 2022-10-03 | Stop reason: SDUPTHER

## 2022-03-21 NOTE — TELEPHONE ENCOUNTER
----- Message from Iris Yee sent at 3/21/2022  4:42 PM EDT -----  Regarding: Keflex refill  Please redirect my Keflex Rx to the [facility] on my list. The doctor sent it to the [facility] in error

## 2022-03-28 ENCOUNTER — OFFICE VISIT (OUTPATIENT)
Dept: RADIATION ONCOLOGY | Facility: HOSPITAL | Age: 77
End: 2022-03-28

## 2022-03-28 ENCOUNTER — OFFICE VISIT (OUTPATIENT)
Dept: OTHER | Facility: HOSPITAL | Age: 77
End: 2022-03-28

## 2022-03-28 VITALS
HEART RATE: 83 BPM | WEIGHT: 226.6 LBS | DIASTOLIC BLOOD PRESSURE: 95 MMHG | BODY MASS INDEX: 37.71 KG/M2 | SYSTOLIC BLOOD PRESSURE: 126 MMHG | OXYGEN SATURATION: 96 % | RESPIRATION RATE: 18 BRPM

## 2022-03-28 VITALS — HEART RATE: 83 BPM | OXYGEN SATURATION: 96 % | DIASTOLIC BLOOD PRESSURE: 95 MMHG | SYSTOLIC BLOOD PRESSURE: 126 MMHG

## 2022-03-28 DIAGNOSIS — C50.411 MALIGNANT NEOPLASM OF UPPER-OUTER QUADRANT OF RIGHT BREAST IN FEMALE, ESTROGEN RECEPTOR POSITIVE: Primary | ICD-10-CM

## 2022-03-28 DIAGNOSIS — Z17.0 MALIGNANT NEOPLASM OF UPPER-OUTER QUADRANT OF RIGHT BREAST IN FEMALE, ESTROGEN RECEPTOR POSITIVE: Primary | ICD-10-CM

## 2022-03-28 PROCEDURE — 99215 OFFICE O/P EST HI 40 MIN: CPT | Performed by: NURSE PRACTITIONER

## 2022-03-28 PROCEDURE — 99212-NC PR NO CHARGE CBC OFFICE OUTPATIENT VISIT 10 MINUTES: Performed by: RADIOLOGY

## 2022-03-28 RX ORDER — ANASTROZOLE 1 MG/1
1 TABLET ORAL DAILY
COMMUNITY
End: 2022-09-01

## 2022-03-28 NOTE — PROGRESS NOTES
[facility] MULTIDISCIPLINARY CLINIC  SURVIVORSHIP VISIT: IN CLINIC  Survivorship Treatment Summary Initial Visit        Iris Yee is a pleasant 76 y.o. female being followed by Myra Jaramillo MD for pT2Nx grade 1 lobular carcinoma right breast strongly ER/AR positive HER2 negative postlumpectomy1/3/2022. Reviewed today in Multidisciplinary Clinic, for initial survivorship treatment summary visit.     HPI  Extremely pleasant 76 year old who is now about four weeks out from completion of radiation to her right breast. She reports doing very well. Her energy is mostly recovered. She has troubles initiating sleep at times but otherwise sleeps well. She denies any pain, discomfort, swelling, or loss of function of her upper extremities. She is tolerating anastrozole well without any arthralgias or myalgias above baseline. She has had two episodes of feeling warm but no severe hot flashes.      TREATMENT HISTORY:     Oncology/Hematology History Overview Note   12/10/2019, Screening MMG with Jorge ( [facility]):  Negative mammogram with breast implants in place and showing no change from 07/24/2017.  BI-RADS 1: Negative.     Malignant neoplasm of upper-outer quadrant of right breast in female, estrogen receptor positive (HCC)   9/20/2021 Initial Diagnosis    Malignant neoplasm of upper-outer quadrant of right breast in female, estrogen receptor positive (HCC)     9/21/2021 Imaging    Screening MMG with Jorge ( [facility]):  Scattered areas of fibroglandular density. There are bilateral retroglandular silicone breast implants with capsular calcifications. There is an area of architectural distortion in the slightly upper central middle to anterior right breast, best appreciated on Tomosynthesis. There are no suspicious masses, calcifications, or areas of architectural distortion in the left breast.  BI-RADS 0: Incomplete.     11/2/2021 Imaging    Right Diagnostic MMG with Jorge & Right Breast US (  [facility]):  MMG:  With additional imaging there is persistence of the area of architectural distortion in the middle third of the right breast lateral to the plane of the nipple.   US:  At the 11 o'clock position on the order 3 cm from the nipple there is a 1.2 cm ill-defined hypoechoic region with posterior acoustic shadowing and mild detectable internal vascularity.   There is an adjacent 0.6 cm ill-defined hypoechoic lesion that is also seen at the 11 o'clock position on the order of 3 cm from the nipple that demonstrates posterior acoustic shadowing.  BI-RADS 4: Suspicious.     11/18/2021 Biopsy    Right Breast, US-Guided Biopsy x 2 ( [facility]lette):    1. Right Breast at 11:00 o'clock, 3 cm from Nipple (not for calcifications), Core Biopsy:                 A. Multifocal atypical lobular hyperplasia (ALH) with foci of usual ductal hyperplasia (UDH).               B. No ductal carcinoma in situ nor invasive carcinoma identified  -Tribell clip.     2. Right Breast mass at 11:00 o'clock, 3 cm from Nipple (not for calcifications), Core Biopsy:                 A. Invasive lobular carcinoma:                            1. Overall Au Train grade I (tubular score=3, nuclear score=1, mitotic score=1).                            2. Invasive carcinoma measures at least 14 mm.                3. No lymphovascular space invasion identified.  B. Associated atypical lobular hyperplasia (ALH) noted.  C. No definitive in situ carcinoma component identified.  D. Focal columnar cell change, usual duct hyperplasia (UDH) and micropapilloma noted.  -Bowtie clip.     ER+ (%, strong)  AR+ (%, strong)  HER2 negative (IHC 1+)  Ki-67 7%     12/3/2021 Imaging    Bilateral Breast MRI ( [facility]):  In the right breast centered at the 11:30 position centered on the order of 3 cm from the nipple there are 2 focal signal voids that are  by 1.1 cm. This represents the tri-bell-shaped and bowtie shaped metallic clips. The bowtie-shaped  metallic clip is the more medial and slightly posterior metallic clip and represents the site of  invasive lobular carcinoma. There is evidence of an irregularly-shaped T1 hyperintense peripherally enhancing mass that surrounds the biopsy clips that measures on the order of 3.1 cm in anterior to posterior dimension, 1.5 cm in the superior to inferior dimension, and 2.6 cm in the medial to lateral dimension, and is consistent with a hematoma cavity. The bowtie-shaped metallic clip which represents the biopsy-proven site of malignancy is on the order of 0.6 cm anterior to an area of architectural distortion with mild enhancement measures on the order of 1.3 cm in superior to inferior dimension, 1.1 cm in the medial to lateral dimension and 1.0 cm in anterior to posterior dimension. This is most consistent with residual malignancy at the biopsy-proven site of malignancy. The tri-bell-shaped metallic clip is not surrounded by any abnormal enhancement but is within a hematoma  cavity.   No other areas of abnormal enhancement or morphology are seen in the right breast. I see no evidence for abnormal right breast skin, nipple or chest wall enhancement and there is no evidence for right axillary or  internal mammary chain adenopathy.   There are no areas of abnormal enhancement or morphology in the left breast. I see no evidence for abnormal left breast skin, nipple or chest wall enhancement and there is no evidence for left axillary or internal mammary chain adenopathy.  Bilateral retroglandular silicone implants are noted. The left silicone implant shows irregularity at the posterior margin of the implant, but no evidence for free silicone is appreciated.  BI-RADS 6: Known malignancy.     12/9/2021 Genetic Testing    [facility] Cancers Panel (37 genes):    Negative     1/3/2022 Surgery    Right needle-localized, bracketed, partial mastectomy with oncoplastic closure and left reduction for symmetry    1. Right  Breast, Oriented Needle Localization Lumpectomy (69 grams):  INVASIVE LOBULAR CARCINOMA.               A. Tumor site:  Upper outer quadrant (11:00 o'clock, 11:30).               B. Histologic grade:  Maryann score I (tubules=3, nuclei=1, mitosis=1).               C. Tumor size: ~24 mm maximally (present focally in slices 7 and 11 and throughout slices 8-10, slices 6mm).               D. Tumor focality:  Single focus.               E. Ductal carcinoma in-situ (DCIS):  Not identified.               F. Lobular carcinoma in-situ (LCIS):  Present.               G. Tumor extent:  Overlying skin is uninvolved by carcinoma.               H. No lymphovascular invasion identified.               I.  Margins:  Uninvolved by in-situ and invasive carcinoma.                            1. Invasive carcinoma is present 0.3 mm from the original inferior margin, 1.2 mm from the lateral                                margin, 2 mm from the posterior margin, 13 mm from the medial margin, 20 mm from the anterior                                margin and 38 mm from the superior margin of excision in specimen 1.               J. Lymph nodes:  Not submitted.               K. Hormone receptor status:  ER - % Positive, [place] - % Positive, Her2/jed - 1+ Negative, Ki67 - 7% (performed on prior biopsy [id number]).    L. Pathologic stage:  pT2, NX.     2. Right Breast, Additional Medial/Superior Margin, Oriented Excision:               A. Benign unremarkable breast tissue.               B. Final medial and superior margin are free of tumor by an additional 15 mm.     3. Right Breast, Additional Inferior/Lateral Margin, Oriented Excision:               A. Benign breast tissue with foci of usual ductal hyperplasia, atypical lobular hyperplasia and lobular carcinoma in-situ (LCIS).               B. No evidence of invasive carcinoma identified.               C. Final inferior and lateral margin are free by an additional 15 mm.     4. Left  Breast, Reduction Mammoplasty (163 grams):                A. Benign breast tissue with foci of pseudoangiomatous stromal hyperplasia (PASH).               B. Benign microcalcifications.               C. Unremarkable skin.               D. No evidence of in-situ nor invasive carcinoma identified.     5. Skin, Right Breast, Excision:  Benign unremarkable skin.     6. Right Breast, Capsulectomy:                A. Explanted intact breast prosthesis.               B. Benign fibrous capsule.      7. Left Breast, Capsulectomy:  Benign hyalinized breast capsule with fat necrosis and dystrophic calcification.     2/8/2022 - 2/28/2022 Radiation    Radiation OncologyTreatment Course:  Iris Yee received 4050 cGy in 15 fractions to Right breast     2/9/2022 -  Hormonal Therapy    Anastrozole (Arimidex)     5/18/2022 -  Chemotherapy    OP SUPPORTIVE Denosumab (Prolia) Q6M         Past Medical History:   Diagnosis Date   • Arthritis    • Arthritis of back 2015   • Arthritis of neck    • Bulging lumbar disc     L 3-4   • Bursitis of hip    • Clotting disorder (HCC)    • Fracture, tibia and fibula     Stress fracture   • GERD (gastroesophageal reflux disease)    • Hip arthrosis    • History of kidney stones    • History of MRSA infection 2020    MICHEL EARS   • History of transfusion 1971    no reaction   • Hx of blood clots 2011   • Hypothyroid    • Kidney stones    • Knee swelling    • Left knee pain    • Lumbosacral disc disease    • Malignant neoplasm of upper-outer quadrant of right breast in female, estrogen receptor positive ([facility]) 12/9/2021   • Neck strain    • Neuroma of foot 2000    Right foot   • Ovarian cyst    • Periarthritis of shoulder    • PONV (postoperative nausea and vomiting)     states gets really sick lasted for 4 days aafter surgery the last time    • Pulmonary embolism, bilateral ([facility])    • Scoliosis 2020   • Stress fracture    • Tear of meniscus of knee    • Thrombopenia (HCC)        Past Surgical  History:   Procedure Laterality Date   • APPENDECTOMY     • AUGMENTATION MAMMAPLASTY     • BREAST AUGMENTATION      years ago has implants   • BREAST EXCISIONAL BIOPSY Right 10/2021   • BREAST EXCISIONAL BIOPSY Left    • BREAST LUMPECTOMY Right 1/3/2022    Procedure: Right needle-localized, bracketed, partial mastectomy;  Surgeon: Adela Cardoso MD;  Location: [facility] MAIN OR;  Service: General;  Laterality: Right;   • BREAST SURGERY Bilateral 1/3/2022    Procedure: RIGHT ONCOPLASTIC REDUCTION AND LEFT REDUCTION FOR SYMMETRY, BILATERAL IMPLANT REMOVAL AND  BILATERAL CAPSULECTOMIES;  Surgeon: Delbert Butcher MD;  Location: [facility] MAIN OR;  Service: Plastics;  Laterality: Bilateral;   • CHOLECYSTECTOMY     • COLONOSCOPY N/A 2018    Procedure: COLONOSCOPY into cecum/termial ileum with polypectomy;  Surgeon: Jose Daniel Dooley MD;  [facility];  Service: Gastroenterology   • ENDOSCOPY N/A 2018    Procedure: ESOPHAGOGASTRODUODENOSCOPY with biopsy;  Surgeon: Jose Daniel Dooley MD;  Location: [facility] ENDOSCOPY;  Service: Gastroenterology   • EXPLORATORY LAPAROTOMY      bleeding from ruptured ovarian cyst   • HERNIA REPAIR      umbilical x 2   • OVARIAN CYST DRAINAGE/EXCISION      ruptured ovarian cyst with vblood accumulatine in abdomin   • TONSILLECTOMY     • TOTAL KNEE ARTHROPLASTY Left 2021    Procedure: TOTAL KNEE ARTHROPLASTY;  Surgeon: Clarence Suarez MD;  Location: [facility] MAIN OR;  Service: Orthopedics;  Laterality: Left;   • UMBILICAL HERNIA REPAIR      x2       Social History     Socioeconomic History   • Marital status:      Spouse name: JOSE DANIEL   • Number of children: 3   • Years of education: COLLEGE   Tobacco Use   • Smoking status: Former Smoker     Packs/day: 1.00     Years: 10.00     Pack years: 10.00     Types: Cigarettes     Start date: 1964     Quit date: 1976     Years since quittin.6   • Smokeless tobacco: Never Used   Vaping  Use   • Vaping Use: Never used   Substance and Sexual Activity   • Alcohol use: Yes     Alcohol/week: 3.0 standard drinks     Types: 3 Glasses of wine per week   • Drug use: Never       No results found for: LDH, URICACID    Lab Results   Component Value Date    GLUCOSE 94 12/28/2021    BUN 21 12/28/2021    CREATININE 1.13 (H) 12/28/2021    EGFRIFNONA 47 (L) 12/28/2021    BCR 18.6 12/28/2021    K 4.0 12/28/2021    CO2 24.5 12/28/2021    CALCIUM 9.5 12/28/2021    ALBUMIN 3.90 12/14/2020    AST 27 12/14/2020    ALT 21 12/14/2020       CBC w/diff    CBC w/Diff 6/14/21 12/28/21 1/18/22   WBC 4.37 6.44 6.82   RBC 4.36 4.34 4.30   Hemoglobin 14.4 14.3 14.1   Hematocrit 43.3 42.0 41.8   MCV 99.3 (A) 96.8 97.2 (A)   MCH 33.0 32.9 32.8   MCHC 33.3 34.0 33.7   RDW 13.0 13.1 13.9   Platelets 184 195 185   Neutrophil Rel %  73.7 65.0   Immature Granulocyte Rel %  0.5 0.4   Lymphocyte Rel %  14.3 (A) 17.9 (A)   Monocyte Rel %  9.5 10.9   Eosinophil Rel %  1.4 5.1   Basophil Rel %  0.6 0.7   (A) Abnormal value              Allergies as of 03/28/2022 - Reviewed 03/28/2022   Allergen Reaction Noted   • Penicillins Other (See Comments) 05/24/2016   • Sulfa antibiotics Nausea And Vomiting 06/14/2021       MEDICATIONS:  Medication list reviewed today    Review of Systems   Constitutional: Negative for activity change, appetite change, fatigue and fever.   Respiratory: Negative for chest tightness and shortness of breath.    Cardiovascular: Negative for chest pain and leg swelling.   Gastrointestinal: Negative for blood in stool, constipation and diarrhea.   Genitourinary: Negative for hematuria.   Musculoskeletal: Positive for arthralgias (generalized). Negative for myalgias.   Skin: Negative for rash and wound.   Neurological: Negative for dizziness and light-headedness.   Psychiatric/Behavioral: Positive for sleep disturbance (difficulty initiating sleep at times). Negative for decreased concentration and dysphoric mood. The patient  is not nervous/anxious.        /95   Pulse 83   Resp 18   Wt 103 kg (226 lb 9.6 oz)   LMP  (LMP Unknown)   SpO2 96% Comment: room air  BMI 37.71 kg/m²     Wt Readings from Last 3 Encounters:   03/28/22 103 kg (226 lb 9.6 oz)   03/18/22 101 kg (223 lb)   01/20/22 101 kg (223 lb)       Pain Score    03/28/22 1030   PainSc: 0-No pain       PHQ-9 Total Score: 0   GAD7 Total Score: 0  Distress Score: 0      Physical Exam  Constitutional:       Appearance: Normal appearance. She is well-groomed.   HENT:      Head: Normocephalic and atraumatic.   Cardiovascular:      Rate and Rhythm: Normal rate and regular rhythm.   Pulmonary:      Effort: Pulmonary effort is normal.   Abdominal:      General: Abdomen is flat.   Skin:     General: Skin is warm and dry.   Neurological:      Mental Status: She is alert and oriented to person, place, and time.   Psychiatric:         Attention and Perception: Attention and perception normal.         Mood and Affect: Mood and affect normal.         Speech: Speech normal.         Behavior: Behavior normal. Behavior is cooperative.         Thought Content: Thought content normal.         Cognition and Memory: Cognition and memory normal.         Judgment: Judgment normal.           Advance Care Planning     Patient does have advance care planning complete, scanned into the medical record and dated January 27, 2022 and we reviewed that today    Patient has designated a healthcare surrogate: Daughters Nel Hernandez (primary) and Makenna Velazquez (secondary).    Written information provided regarding advance care planning and appropriateness for all healthy adults, choosing a healthcare surrogate, prior experiences with loved ones who have been seriously ill, and exploration of goals of care in the event of a sudden injury or illness.     Information provided on upcoming Advance Care Planning classes offered virtually each month through the [facility].        DISCUSSION HELD  TODAY:   Discussed NCCN recommendations for all cancer survivors of 150 minutes/week moderate intensity exercise, achieve and maintain a healthy weight, plants-based whole-foods diet, avoid tobacco and second hand smoke, avoid alcohol or minimize alcohol intake - no more than 1 drink in a day for adults.    After review of the Survivorship Treatment Summary & Care Plan, the patient verbalized understanding of recommendations for follow-up. As outlined in the care plan, they were advised to continue with follow-up care in accordance with the NCCN surveillance guidelines while transitioning back to their primary care physician for continued general preventive and healthcare needs. We discussed the importance of healthy eating, exercise and weight management. We reviewed current guidelines for routine screening of other cancers.     A copy of the Survivorship Treatment Summary & Care Plan for Ms. Yee was provided to and forwarded to the providers identified on the care team.    Problems identified:  1. Lymphedema: Reviewed lymphedema causes, early signs and symptoms, prevention and management. Discussed that while most people who will develop lymphedema will do so during their first year after surgery but risk is lifelong. She did not have a node procedure performed and so we did discuss her risk is extraordinarily small.  2. Arimidex: Tolerating without new arthralgias or myalgias. Had a couple of warm spells but not hot flashes. Discussed strategies for preservation of bone mass while on AI therapy including calcium and vitamin D supplementation and importance of weight bearing exercise to include moderate intensity walking most days of the week. She does have a baseline DEXA scan scheduled and after that will discuss need for prolia with Dr Rosario  3. Fatigue: Minimal. Discussed importance of physical activity for all cancer survivors. She did have a left knee replacement in June of last year and reports doing  very well through PT and rehab. Discussed gradually increasing exercise duration and intensity over time to limit risk of injury.  4. Psychosocial: Scores negative for distress, depression or anxiety on screening today. Reports an excellent support system and feeling extremely grateful for her positive outcome.      Plan and recommendations:  1. DEXA scheduled 4/12/22  2. Follow up Vanessa JACKSON with surgery 4/27/22  3. Dr Rosario follow up and Prolia scheduled 5/12/22  4. [employer], [facility] for physical activity offerings  5. Call my office as needed at [phone] for additional information, resources or support.        ICD-10-CM ICD-9-CM   1. Malignant neoplasm of upper-outer quadrant of right breast in female, estrogen receptor positive (HCC)  C50.411 174.4    Z17.0 V86.0       No orders of the defined types were placed in this encounter.      I spent 40 minutes caring for this patient on this date of service by face-to-face counseling. This time includes time spent by me in the following activities: preparing for the visit, reviewing tests, obtaining and/or reviewing a separately obtained history, performing a medically appropriate examination and/or evaluation, counseling and educating the patient/family/caregiver and documenting information in the medical record

## 2022-03-30 ENCOUNTER — TELEPHONE (OUTPATIENT)
Dept: ORTHOPEDIC SURGERY | Facility: CLINIC | Age: 77
End: 2022-03-30

## 2022-03-30 NOTE — TELEPHONE ENCOUNTER
I spoke to Ms. Yee.  I told her that I am happy to see her on Friday.  She agreed.  I will have our office staff add her to my schedule for 12:50 at the [facility] location.  She verbalized understanding and appreciated the assistance.

## 2022-03-30 NOTE — TELEPHONE ENCOUNTER
----- Message from Clarence Suarez MD sent at 3/30/2022  4:09 PM EDT -----  Regarding: FW: Fall  Please see if you can get her in to see you to evaluate.  ----- Message -----  From: Tammi Black MA  Sent: 3/30/2022   2:49 PM EDT  To: Clarence Suarez MD  Subject: FW: Fall                                           ----- Message -----  From: Iris Yee  Sent: 3/30/2022   2:13 PM EDT  To: [facility] Clinical Pool  Subject: Fall                                             I fell this morning out shopping. Although I didn’t land directly on my knees I sort of wrenched them. They are a little stiff it so not hurt. Should I request to be seen?

## 2022-04-01 ENCOUNTER — OFFICE VISIT (OUTPATIENT)
Dept: ORTHOPEDIC SURGERY | Facility: CLINIC | Age: 77
End: 2022-04-01

## 2022-04-01 VITALS — WEIGHT: 229 LBS | TEMPERATURE: 98 F | BODY MASS INDEX: 38.15 KG/M2 | HEIGHT: 65 IN

## 2022-04-01 DIAGNOSIS — M17.11 PRIMARY OSTEOARTHRITIS OF RIGHT KNEE: ICD-10-CM

## 2022-04-01 DIAGNOSIS — Z96.652 HISTORY OF ARTHROPLASTY OF LEFT KNEE: ICD-10-CM

## 2022-04-01 DIAGNOSIS — S89.92XA INJURY OF LEFT KNEE, INITIAL ENCOUNTER: Primary | ICD-10-CM

## 2022-04-01 PROCEDURE — 73562 X-RAY EXAM OF KNEE 3: CPT | Performed by: NURSE PRACTITIONER

## 2022-04-01 PROCEDURE — 99213 OFFICE O/P EST LOW 20 MIN: CPT | Performed by: NURSE PRACTITIONER

## 2022-04-05 ENCOUNTER — APPOINTMENT (OUTPATIENT)
Dept: BONE DENSITY | Facility: HOSPITAL | Age: 77
End: 2022-04-05

## 2022-04-05 NOTE — PROGRESS NOTES
"Chief Complaint:  Bilateral knee pain    HPI:  Ms. Yee comes in today for evaluation of both knees.  Reports she sustained a fall injury at [employer] on [address] 2 days ago.  Reports she slipped and fell landing awkwardly on her right side twisting both knees as she fell.  Reports her right knee pain is resolved.  She is primarily concerned about her left knee specifically her implants.  She localizes her pain to the lateral aspect.  Pain is described as mild, constant, aching.  Pain is worse with activity.  Rest helps.  Denies knee instability.      Vitals:    04/01/22 1251   Temp: 98 °F (36.7 °C)   Weight: 104 kg (229 lb)   Height: 165.1 cm (65\")        Exam:  The left knee is examined: The incision is well-healed and benign appearing.  No abrasion or lacerations.  No erythema.  No effusion.  Tenderness to palpation over lateral aspect.  Knee motion is full extension to 120° of flexion.  Calf is soft and nontender.  Alignment is neutral.  No obvious instability.  Good quad strength.  No evidence of varus/valgus or flexion instability.  Good strength with knee extension, ankle and toe plantarflexion-dorsiflexion.  Intact sensation to light touch.  Palpable pedal pulses.    The right knee is examined.  Skin is benign.  No gross abnormalities on inspection.  No palpable masses or adenopathy.  No effusion.  No significant joint line tenderness.  Full motion.  No instability.  Good strength with hip flexion, knee extension, ankle and toe plantarflexion and dorsiflexion.  Sensation is intact distally.  Brisk capillary refill in the toes with good skin turgor.    Imaging: AP, merchant, and lateral views of both knees were ordered and reviewed for evaluation of her recent injury.  The left knee x-rays demonstrate a well-positioned, well aligned knee replacement without complicating factors noted.  In comparison with previous films there has been no change.  No acute findings.  The right knee x-rays show " advanced medial compartment and mild patellofemoral compartment osteoarthritis, with joint space narrowing, osteophyte formation, and subchondral sclerosis.  No acute findings.    Assessment:  1.  Left knee injury, suspect lateral collateral ligament strain  2.  History of left knee arthroplasty  3.  Right knee osteoarthritis    Plan: We reviewed the x-rays together.  I explained there are no findings to suggest complications with her implants at this time.  I suspect she has strained the lateral collateral ligament.  This should resolve with conservative management.  I recommended she continue conservative treatments of ice, rest, and Tylenol.  For the right knee, she may use conservative treatments as well for her arthritis.  I recommended she follow-up as needed if her symptoms persist or worsen.  She verbalized understanding and agreed with this plan.    MANUEL Julien     04/01/2022    Answers for HPI/ROS submitted by the patient on 3/30/2022  Please describe your symptoms.: Fell down and need my knees checked  Have you had these symptoms before?: No  How long have you been having these symptoms?: 1-4 days  Please list any medications you are currently taking for this condition.: Tylenol  Please describe any probable cause for these symptoms. : Fell  What is the primary reason for your visit?: Other

## 2022-04-12 ENCOUNTER — HOSPITAL ENCOUNTER (OUTPATIENT)
Dept: BONE DENSITY | Facility: HOSPITAL | Age: 77
End: 2022-04-12

## 2022-04-14 ENCOUNTER — HOSPITAL ENCOUNTER (OUTPATIENT)
Dept: BONE DENSITY | Facility: HOSPITAL | Age: 77
Discharge: HOME | End: 2022-04-14
Admitting: INTERNAL MEDICINE

## 2022-04-14 DIAGNOSIS — Z17.0 MALIGNANT NEOPLASM OF UPPER-OUTER QUADRANT OF RIGHT BREAST IN FEMALE, ESTROGEN RECEPTOR POSITIVE: ICD-10-CM

## 2022-04-14 DIAGNOSIS — Z09 ENCOUNTER FOR FOLLOW-UP EXAMINATION AFTER COMPLETED TREATMENT FOR CONDITIONS OTHER THAN MALIGNANT NEOPLASM: ICD-10-CM

## 2022-04-14 DIAGNOSIS — C50.411 MALIGNANT NEOPLASM OF UPPER-OUTER QUADRANT OF RIGHT BREAST IN FEMALE, ESTROGEN RECEPTOR POSITIVE: ICD-10-CM

## 2022-04-14 PROCEDURE — 77080 DXA BONE DENSITY AXIAL: CPT

## 2022-04-27 ENCOUNTER — DOCUMENTATION (OUTPATIENT)
Dept: SURGERY | Facility: CLINIC | Age: 77
End: 2022-04-27

## 2022-04-27 ENCOUNTER — OFFICE VISIT (OUTPATIENT)
Dept: SURGERY | Facility: CLINIC | Age: 77
End: 2022-04-27

## 2022-04-27 VITALS
HEIGHT: 65 IN | WEIGHT: 229 LBS | BODY MASS INDEX: 38.15 KG/M2 | DIASTOLIC BLOOD PRESSURE: 84 MMHG | SYSTOLIC BLOOD PRESSURE: 130 MMHG

## 2022-04-27 DIAGNOSIS — C50.411 MALIGNANT NEOPLASM OF UPPER-OUTER QUADRANT OF RIGHT BREAST IN FEMALE, ESTROGEN RECEPTOR POSITIVE: Primary | ICD-10-CM

## 2022-04-27 DIAGNOSIS — Z17.0 MALIGNANT NEOPLASM OF UPPER-OUTER QUADRANT OF RIGHT BREAST IN FEMALE, ESTROGEN RECEPTOR POSITIVE: Primary | ICD-10-CM

## 2022-04-27 DIAGNOSIS — Z12.31 ENCOUNTER FOR SCREENING MAMMOGRAM FOR MALIGNANT NEOPLASM OF BREAST: ICD-10-CM

## 2022-04-27 PROCEDURE — 99213 OFFICE O/P EST LOW 20 MIN: CPT | Performed by: NURSE PRACTITIONER

## 2022-04-27 NOTE — PROGRESS NOTES
[facility]   Patient said she would check her MyChart for appt time.

## 2022-04-27 NOTE — PROGRESS NOTES
[facility]     Referring Provider:  Dr. Sri Conner     Chief complaint: breast cancer follow up     HPI:   12/9/21: seen by Dr Walker Street. Iris Yee is a 75 yo woman, seen at the request of Dr. Sri Conner, for a new diagnosis of right breast cancer. This was initially detected as an imaging abnormality on routine screening. Her work-up is detailed in the oncologic history below. Prior to the biopsies, she denies any breast lumps, pain, skin changes, or nipple discharge. She has a past history of a benign left breast surgical biopsy in 2000. She also has a past history of prepectoral saline implants placed in 1976. She has a past history of an unprovoked pulmonary embolus in 2011, for which she is on Xarelto. She held this over the summer for her knee replacement surgery without any issues. She denies any family history of breast or ovarian cancer, however her brother did have prostate cancer.      1/20/22:  She underwent right partial mastectomy with oncoplastic closure and left reduction for symmetry on 1/3/22. See surgery & pathology details below in oncologic history. She has been recovering well, aside from itching and a generalized rash. She has already seen Dr. Jaramillo postoperatively and discussed eventually starting Arimidex.    4/27/22, Interval History:  She returns today for follow up with no breast concerns, she started arimidex 3/1/22 with no noted side effects.  In late 3/22 she fell with injury to her left knee, no injury to knee implant thankfully.    Her last clinic visit with Dr Celaya was in 2/22:  Iris Yee has recently completed the course of radiation therapy prescribed for the above-mentioned diagnosis    She met with Dr Jaramillo in 1/22:  Based on her thrombotic risk and anticoagulation I have recommended avoiding tamoxifen and using aromatase inhibitor and we will start with Arimidex which we would recommend for 5 years at least.       Oncology/Hematology  History Overview Note   12/10/2019, Scre[facility] with [facility] ([facility]):  Negative mammogram with breast implants in place and showing no change from 07/24/2017.  BI-RADS 1: Negative.     Malignant neoplasm of upper-outer quadrant of right breast in female, estrogen receptor positive (HCC)   9/20/2021 Initial Diagnosis    Malignant neoplasm of upper-outer quadrant of right breast in female, estrogen receptor positive (HCC)     9/21/2021 Imaging    Screening MMG with Jorge ( [facility]):  Scattered areas of fibroglandular density. There are bilateral retroglandular silicone breast implants with capsular calcifications. There is an area of architectural distortion in the slightly upper central middle to anterior right breast, best appreciated on Tomosynthesis. There are no suspicious masses, calcifications, or areas of architectural distortion in the left breast.  BI-RADS 0: Incomplete.     11/2/2021 Imaging    Right Diagnostic MMG with Jorge & Right Breast US ([facility]):  MMG:  With additional imaging there is persistence of the area of architectural distortion in the middle third of the right breast lateral to the plane of the nipple.   US:  At the 11 o'clock position on the order 3 cm from the nipple there is a 1.2 cm ill-defined hypoechoic region with posterior acoustic shadowing and mild detectable internal vascularity.   There is an adjacent 0.6 cm ill-defined hypoechoic lesion that is also seen at the 11 o'clock position on the order of 3 cm from the nipple that demonstrates posterior acoustic shadowing.  BI-RADS 4: Suspicious.     11/18/2021 Biopsy    Right Breast, US-Guided Biopsy x 2 ([facility]):    1. Right Breast at 11:00 o'clock, 3 cm from Nipple (not for calcifications), Core Biopsy:                 A. Multifocal atypical lobular hyperplasia (ALH) with foci of usual ductal hyperplasia (UDH).               B. No ductal carcinoma in situ nor invasive carcinoma identified  -Tribell clip.     2. Right Breast mass at 11:00  o'clock, 3 cm from Nipple (not for calcifications), Core Biopsy:                 A. Invasive lobular carcinoma:                            1. Overall Winslow grade I (tubular score=3, nuclear score=1, mitotic score=1).                            2. Invasive carcinoma measures at least 14 mm.                3. No lymphovascular space invasion identified.  B. Associated atypical lobular hyperplasia (ALH) noted.  C. No definitive in situ carcinoma component identified.  D. Focal columnar cell change, usual duct hyperplasia (UDH) and micropapilloma noted.  -Bowtie clip.     ER+ (%, strong)  GA+ (%, strong)  HER2 negative (IHC 1+)  Ki-67 7%     12/3/2021 Imaging    [facility]):  In the right breast centered at the 11:30 position centered on the order of 3 cm from the nipple there are 2 focal signal voids that are  by 1.1 cm. This represents the tri-bell-shaped and bowtie shaped metallic clips. The bowtie-shaped metallic clip is the more medial and slightly posterior metallic clip and represents the site of  invasive lobular carcinoma. There is evidence of an irregularly-shaped T1 hyperintense peripherally enhancing mass that surrounds the biopsy clips that measures on the order of 3.1 cm in anterior to posterior dimension, 1.5 cm in the superior to inferior dimension, and 2.6 cm in the medial to lateral dimension, and is consistent with a hematoma cavity. The bowtie-shaped metallic clip which represents the biopsy-proven site of malignancy is on the order of 0.6 cm anterior to an area of architectural distortion with mild enhancement measures on the order of 1.3 cm in superior to inferior dimension, 1.1 cm in the medial to lateral dimension and 1.0 cm in anterior to posterior dimension. This is most consistent with residual malignancy at the biopsy-proven site of malignancy. The tri-bell-shaped metallic clip is not surrounded by any abnormal enhancement but is within a  hematoma  cavity.   No other areas of abnormal enhancement or morphology are seen in the right breast. I see no evidence for abnormal right breast skin, nipple or chest wall enhancement and there is no evidence for right axillary or  internal mammary chain adenopathy.   There are no areas of abnormal enhancement or morphology in the left breast. I see no evidence for abnormal left breast skin, nipple or chest wall enhancement and there is no evidence for left axillary or internal mammary chain adenopathy.  Bilateral retroglandular silicone implants are noted. The left silicone implant shows irregularity at the posterior margin of the implant, but no evidence for free silicone is appreciated.  BI-RADS 6: Known malignancy.     12/9/2021 Genetic Testing    [employer] [facility] Cancers Panel (37 genes):    Negative     1/3/2022 Surgery    Right needle-localized, bracketed, partial mastectomy with oncoplastic closure and left reduction for symmetry    1. Right Breast, Oriented Needle Localization Lumpectomy (69 grams):  INVASIVE LOBULAR CARCINOMA.               A. Tumor site:  Upper outer quadrant (11:00 o'clock, 11:30).               B. Histologic grade:  Maryann score I (tubules=3, nuclei=1, mitosis=1).               C. Tumor size: ~24 mm maximally (present focally in slices 7 and 11 and throughout slices 8-10, slices 6mm).               D. Tumor focality:  Single focus.               E. Ductal carcinoma in-situ (DCIS):  Not identified.               F. Lobular carcinoma in-situ (LCIS):  Present.               G. Tumor extent:  Overlying skin is uninvolved by carcinoma.               H. No lymphovascular invasion identified.               I.  Margins:  Uninvolved by in-situ and invasive carcinoma.                            1. Invasive carcinoma is present 0.3 mm from the original inferior margin, 1.2 mm from the lateral                                margin, 2 mm from the posterior margin, 13 mm from the medial  margin, 20 mm from the anterior                                margin and 38 mm from the superior margin of excision in specimen 1.               J. Lymph nodes:  Not submitted.               K. Hormone receptor status:  ER - % Positive, NC - % Positive, Her2/jed - 1+ Negative, Ki67 - 7% (performed on prior biopsy [id number]).    L. Pathologic stage:  pT2, NX.     2. Right Breast, Additional Medial/Superior Margin, Oriented Excision:               A. Benign unremarkable breast tissue.               B. Final medial and superior margin are free of tumor by an additional 15 mm.     3. Right Breast, Additional Inferior/Lateral Margin, Oriented Excision:               A. Benign breast tissue with foci of usual ductal hyperplasia, atypical lobular hyperplasia and lobular carcinoma in-situ (LCIS).               B. No evidence of invasive carcinoma identified.               C. Final inferior and lateral margin are free by an additional 15 mm.     4. Left Breast, Reduction Mammoplasty (163 grams):                A. Benign breast tissue with foci of pseudoangiomatous stromal hyperplasia (PASH).               B. Benign microcalcifications.               C. Unremarkable skin.               D. No evidence of in-situ nor invasive carcinoma identified.     5. Skin, Right Breast, Excision:  Benign unremarkable skin.     6. Right Breast, Capsulectomy:                A. Explanted intact breast prosthesis.               B. Benign fibrous capsule.      7. Left Breast, Capsulectomy:  Benign hyalinized breast capsule with fat necrosis and dystrophic calcification.     2/8/2022 - 2/28/2022 Radiation    Radiation OncologyTreatment Course:  Iris Yee received 4050 cGy in 15 fractions to Right breast     2/9/2022 -  Hormonal Therapy    Anastrozole (Arimidex)     5/18/2022 -  Chemotherapy    OP SUPPORTIVE Denosumab (Prolia) Q6M         Review of Systems:  See interval history.       Medications:    Current Outpatient  "Medications:   •  acetaminophen (TYLENOL) 325 MG tablet, Take 2 tablets by mouth Every 4 (Four) Hours As Needed for Mild Pain ., Disp: 60 tablet, Rfl: 0  •  anastrozole (ARIMIDEX) 1 MG tablet, Take 1 mg by mouth Daily., Disp: , Rfl:   •  calcium carbonate (TUMS) 500 MG chewable tablet, Chew 1 tablet As Needed for Indigestion or Heartburn., Disp: , Rfl:   •  cephalexin (KEFLEX) 500 MG capsule, Take 4 capsules by mouth one hour prior to procedure., Disp: 4 capsule, Rfl: 2  •  cetirizine (zyrTEC) 10 MG tablet, Take 10 mg by mouth Every Night., Disp: , Rfl:   •  cholecalciferol (VITAMIN D3) 25 MCG (1000 UT) tablet, Take 1,000 Units by mouth Every Morning., Disp: , Rfl:   •  rivaroxaban (Xarelto) 20 MG tablet, Take 1 tablet by mouth Daily With Dinner., Disp: 30 tablet, Rfl: 0  •  SYNTHROID 112 MCG tablet, Take 112 mcg by mouth Every Night., Disp: , Rfl:   No current facility-administered medications for this visit.    Facility-Administered Medications Ordered in Other Visits:   •  Chlorhexidine Gluconate 2 % pads 1 each, 1 each, Apply externally, Take As Directed, Clarence Suarez MD      Allergies   Allergen Reactions   • Penicillins Other (See Comments)     Passes out   • Sulfa Antibiotics Nausea And Vomiting       Family History   Problem Relation Age of Onset   • Hypertension Mother    • Osteoporosis Mother    • Scoliosis Mother    • Arthritis Mother    • Hypertension Father    • Arthritis Father    • Stroke Paternal Aunt    • Cervical cancer Maternal Grandmother 68   • Prostate cancer Brother 74   • Hypertension Brother    • Hyperlipidemia Brother    • Alzheimer's disease Brother    • Malig Hyperthermia Neg Hx        PHYSICAL EXAMINATION:   Vitals:    04/27/22 1245   BP: 130/84      /84 (BP Location: Left arm)   Ht 165.1 cm (65\")   Wt 104 kg (229 lb)   LMP  (LMP Unknown)   BMI 38.11 kg/m²     I reviewed physical exam, no changes noted    ECOG 0 - Asymptomatic  General: NAD, well appearing  Psych: a&o x3, " normal mood and affect  Eyes: EOMI, no scleral icterus  ENMT: neck supple without masses or thyromegaly, mucous membranes moist  MSK: normal gait, normal ROM in bilateral shoulders  Lymph nodes: no cervical, supraclavicular or axillary lymphadenopathy  Breast:   Right: Sp mastopexy with well-healed incisions. Flaps and NAC healthy. There is some mild surrounding dermatitis, however this does not appear to be affecting the incisions  Left: Sp mastopexy with well-healed incisions. Flaps and NAC healthy. There is some mild surrounding dermatitis, however this does not appear to be affecting the incisions      Assessment:   1)   76 y.o. F with a diagnosis of right breast cancer: Low grade, invasive lobular carcinoma, ER/MO+, Her2 negative. She underwent right partial mastectomy with oncoplastic closure and left reduction for symmetry on 1/3/22, pT2Nx. arimidex currently    Discussion:  We discussed her follow up which includes clinical breast exam every 6 months for 2 years (11/2023), with mammogram annually then  mammogram and exam annually until 5 years from diagnosis ( 11/26).      Plan:  -Continue follow-up with Dr. Jaramillo.  -Continue follow-up with Dr. Butcher.  - continue follow up with Dr Celaya  -screening with tomosynthesis in 6 months at [facility] followed by exam  -She was instructed to call sooner with any questions, concerns or changes on BSE.    MANUEL Mancini      CC:   Dr. Sri Vasquez MD

## 2022-05-12 ENCOUNTER — OFFICE VISIT (OUTPATIENT)
Dept: ONCOLOGY | Facility: CLINIC | Age: 77
End: 2022-05-12

## 2022-05-12 ENCOUNTER — LAB (OUTPATIENT)
Dept: LAB | Facility: HOSPITAL | Age: 77
End: 2022-05-12

## 2022-05-12 ENCOUNTER — APPOINTMENT (OUTPATIENT)
Dept: ONCOLOGY | Facility: HOSPITAL | Age: 77
End: 2022-05-12

## 2022-05-12 VITALS
WEIGHT: 225.7 LBS | DIASTOLIC BLOOD PRESSURE: 85 MMHG | RESPIRATION RATE: 14 BRPM | OXYGEN SATURATION: 96 % | HEART RATE: 81 BPM | BODY MASS INDEX: 37.61 KG/M2 | HEIGHT: 65 IN | TEMPERATURE: 98.6 F | SYSTOLIC BLOOD PRESSURE: 138 MMHG

## 2022-05-12 DIAGNOSIS — M85.851 OSTEOPENIA OF BOTH HIPS: ICD-10-CM

## 2022-05-12 DIAGNOSIS — M85.852 OSTEOPENIA OF BOTH HIPS: ICD-10-CM

## 2022-05-12 DIAGNOSIS — C50.411 MALIGNANT NEOPLASM OF UPPER-OUTER QUADRANT OF RIGHT BREAST IN FEMALE, ESTROGEN RECEPTOR POSITIVE: Primary | ICD-10-CM

## 2022-05-12 DIAGNOSIS — Z79.811 AROMATASE INHIBITOR USE: ICD-10-CM

## 2022-05-12 DIAGNOSIS — Z17.0 MALIGNANT NEOPLASM OF UPPER-OUTER QUADRANT OF RIGHT BREAST IN FEMALE, ESTROGEN RECEPTOR POSITIVE: Primary | ICD-10-CM

## 2022-05-12 DIAGNOSIS — C50.411 MALIGNANT NEOPLASM OF UPPER-OUTER QUADRANT OF RIGHT BREAST IN FEMALE, ESTROGEN RECEPTOR POSITIVE: ICD-10-CM

## 2022-05-12 DIAGNOSIS — Z17.0 MALIGNANT NEOPLASM OF UPPER-OUTER QUADRANT OF RIGHT BREAST IN FEMALE, ESTROGEN RECEPTOR POSITIVE: ICD-10-CM

## 2022-05-12 LAB
ALBUMIN SERPL-MCNC: 4.1 G/DL (ref 3.5–5.2)
ALBUMIN/GLOB SERPL: 1.5 G/DL (ref 1.1–2.4)
ALP SERPL-CCNC: 83 U/L (ref 38–116)
ALT SERPL W P-5'-P-CCNC: 18 U/L (ref 0–33)
ANION GAP SERPL CALCULATED.3IONS-SCNC: 10.2 MMOL/L (ref 5–15)
AST SERPL-CCNC: 19 U/L (ref 0–32)
BASOPHILS # BLD AUTO: 0.02 10*3/MM3 (ref 0–0.2)
BASOPHILS NFR BLD AUTO: 0.4 % (ref 0–1.5)
BILIRUB SERPL-MCNC: 0.4 MG/DL (ref 0.2–1.2)
BUN SERPL-MCNC: 18 MG/DL (ref 6–20)
BUN/CREAT SERPL: 21.2 (ref 7.3–30)
CALCIUM SPEC-SCNC: 9.6 MG/DL (ref 8.5–10.2)
CHLORIDE SERPL-SCNC: 106 MMOL/L (ref 98–107)
CO2 SERPL-SCNC: 23.8 MMOL/L (ref 22–29)
CREAT SERPL-MCNC: 0.85 MG/DL (ref 0.6–1.1)
DEPRECATED RDW RBC AUTO: 47.8 FL (ref 37–54)
EGFRCR SERPLBLD CKD-EPI 2021: 72 ML/MIN/1.73
EOSINOPHIL # BLD AUTO: 0.07 10*3/MM3 (ref 0–0.4)
EOSINOPHIL NFR BLD AUTO: 1.3 % (ref 0.3–6.2)
ERYTHROCYTE [DISTWIDTH] IN BLOOD BY AUTOMATED COUNT: 13.2 % (ref 12.3–15.4)
GLOBULIN UR ELPH-MCNC: 2.7 GM/DL (ref 1.8–3.5)
GLUCOSE SERPL-MCNC: 106 MG/DL (ref 74–124)
HCT VFR BLD AUTO: 42.4 % (ref 34–46.6)
HGB BLD-MCNC: 14.2 G/DL (ref 12–15.9)
IMM GRANULOCYTES # BLD AUTO: 0.01 10*3/MM3 (ref 0–0.05)
IMM GRANULOCYTES NFR BLD AUTO: 0.2 % (ref 0–0.5)
LYMPHOCYTES # BLD AUTO: 0.86 10*3/MM3 (ref 0.7–3.1)
LYMPHOCYTES NFR BLD AUTO: 16.5 % (ref 19.6–45.3)
MAGNESIUM SERPL-MCNC: 2.1 MG/DL (ref 1.8–2.5)
MCH RBC QN AUTO: 33.1 PG (ref 26.6–33)
MCHC RBC AUTO-ENTMCNC: 33.5 G/DL (ref 31.5–35.7)
MCV RBC AUTO: 98.8 FL (ref 79–97)
MONOCYTES # BLD AUTO: 0.79 10*3/MM3 (ref 0.1–0.9)
MONOCYTES NFR BLD AUTO: 15.2 % (ref 5–12)
NEUTROPHILS NFR BLD AUTO: 3.46 10*3/MM3 (ref 1.7–7)
NEUTROPHILS NFR BLD AUTO: 66.4 % (ref 42.7–76)
NRBC BLD AUTO-RTO: 0 /100 WBC (ref 0–0.2)
PHOSPHATE SERPL-MCNC: 3.5 MG/DL (ref 2.5–4.5)
PLATELET # BLD AUTO: 192 10*3/MM3 (ref 140–450)
PMV BLD AUTO: 11.3 FL (ref 6–12)
POTASSIUM SERPL-SCNC: 4 MMOL/L (ref 3.5–4.7)
PROT SERPL-MCNC: 6.8 G/DL (ref 6.3–8)
RBC # BLD AUTO: 4.29 10*6/MM3 (ref 3.77–5.28)
SODIUM SERPL-SCNC: 140 MMOL/L (ref 134–145)
WBC NRBC COR # BLD: 5.21 10*3/MM3 (ref 3.4–10.8)

## 2022-05-12 PROCEDURE — 36415 COLL VENOUS BLD VENIPUNCTURE: CPT

## 2022-05-12 PROCEDURE — 84100 ASSAY OF PHOSPHORUS: CPT

## 2022-05-12 PROCEDURE — 99214 OFFICE O/P EST MOD 30 MIN: CPT | Performed by: INTERNAL MEDICINE

## 2022-05-12 PROCEDURE — 80053 COMPREHEN METABOLIC PANEL: CPT

## 2022-05-12 PROCEDURE — 85025 COMPLETE CBC W/AUTO DIFF WBC: CPT

## 2022-05-12 PROCEDURE — 83735 ASSAY OF MAGNESIUM: CPT

## 2022-05-12 NOTE — PROGRESS NOTES
Subjective     REASON FOR CONSULTATION:  pT2Nx grade 1 lobular carcinoma right breast strongly ER/MD positive HER2 negative postlumpectomy1/3/2022  Provide an opinion on any further workup or treatment                             REQUESTING PHYSICIAN: MD Sri Willis MD    History of Present Illness patient is a 76-year-old lady with a lobular breast cancer low-grade treated with lumpectomy and and radiation and currently on Arimidex which was started In mid March    So far she is tolerating okay except for mild aches and pains which are very tolerable    Her bone density is actually stable even though there is significant osteopenia in her hips and therefore we will hold off on the Prolia for now.  She is taking Tums for calcium but no additional vitamin D except for multivitamin and have asked her to take 1000 units of vitamin D and see how  we do with that    I think we can do a bone density again in a year to make sure were not losing further ground and at that point add Prolia      Past Medical History:   Diagnosis Date   • Arthritis    • Arthritis of back 2015   • Arthritis of neck    • Bulging lumbar disc     L 3-4   • Bursitis of hip    • Clotting disorder (HCC)    • Fracture, fibula    • Fracture, tibia and fibula     Stress fracture   • Frozen shoulder    • GERD (gastroesophageal reflux disease)    • Hip arthrosis    • History of kidney stones    • History of MRSA infection 2020    MICHEL EARS   • History of transfusion 1971    no reaction   • Hx of blood clots 2011   • Hypothyroid    • Kidney stones    • Knee swelling    • Left knee pain    • Lumbosacral disc disease    • Malignant neoplasm of upper-outer quadrant of right breast in female, estrogen receptor positive (HCC) 12/09/2021   • Neck strain    • Neuroma of foot 2000    Right foot   • Ovarian cyst    • Periarthritis of shoulder    • PONV (postoperative nausea and vomiting)      states gets really sick lasted for 4 days aafter surgery the last time    • Pulmonary embolism, bilateral (HCC)    • Scoliosis 2020   • Stress fracture    • Tear of meniscus of knee    • Thrombopenia (HCC)         Past Surgical History:   Procedure Laterality Date   • APPENDECTOMY     • AUGMENTATION MAMMAPLASTY  1976   • BREAST AUGMENTATION      years ago has implants   • BREAST EXCISIONAL BIOPSY Right 10/2021   • BREAST EXCISIONAL BIOPSY Left    • BREAST LUMPECTOMY Right 01/03/2022    Procedure: Right needle-localized, bracketed, partial mastectomy;  Surgeon: Adela Cardoso MD;  Location: [facility] OR;  Service: General;  Laterality: Right;   • BREAST SURGERY Bilateral 01/03/2022    Procedure: RIGHT ONCOPLASTIC REDUCTION AND LEFT REDUCTION FOR SYMMETRY, BILATERAL IMPLANT REMOVAL AND  BILATERAL CAPSULECTOMIES;  Surgeon: Delbert Butcher MD;  Location: [facility] OR;  Service: Plastics;  Laterality: Bilateral;   • CHOLECYSTECTOMY     • COLONOSCOPY N/A 12/14/2018    Procedure: COLONOSCOPY into cecum/termial ileum with polypectomy;  Surgeon: Jose Daniel Dooley MD;  Location: [facility] ENDOSCOPY;  Service: Gastroenterology   • ENDOSCOPY N/A 12/14/2018    Procedure: ESOPHAGOGASTRODUODENOSCOPY with biopsy;  Surgeon: Jose Daniel Dooley MD;  Location: [facility] ENDOSCOPY;  Service: Gastroenterology   • EXPLORATORY LAPAROTOMY      bleeding from ruptured ovarian cyst   • HERNIA REPAIR      umbilical x 2   • OVARIAN CYST DRAINAGE/EXCISION      ruptured ovarian cyst with vblood accumulatine in abdomin   • TONSILLECTOMY     • TOTAL KNEE ARTHROPLASTY Left 06/24/2021    Procedure: TOTAL KNEE ARTHROPLASTY;  Surgeon: Clarence Suarez MD;  Location: [facility] OR;  Service: Orthopedics;  Laterality: Left;   • UMBILICAL HERNIA REPAIR      x2      patient is a 76-year-old white female with a 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    Patient reports she was doing her mammograms every 2 years for the last couple of years and had a benign breast biopsy on the left about 10 years ago but otherwise had not had any callbacks or other abnormalities on her imaging.    Patient had diagnostic imaging and a biopsyOn 2021 which revealed atypical lobular hyperplasia at 11:00 and invasive lobular carcinoma grade 1 measuring 14 mm at 11:00 ER % RI % HER2 negative Ki-67 of 7%  Patient was referred to Dr. Adela Cardoso who ordered an MRI which confirmed unifocal disease on the right and a biopsy cavity from the [facility] biopsy with no suspicious enhancement and no obvious adenopathy and a normal left breast.  She then proceeded with a lumpectomy on 1/3/2022 which showed residual 24 mm  low-grade lobular carcinoma with associated lobular carcinoma in situ with no lymphovascular invasion and clear margins no sentinel nodes were removed.  Implants which had been in for over 40 years were both removed    She has had a little problem with some infection in the inframammary area bilaterally but is on antibiotic and this is improving.  She is here to discuss adjuvant treatment options    She is  5 para 3 with 2 miscarriages-first childbirth was at age 21 she did not breast-feed.  Menarche was age 16 menopause at 57 and she took hormones for a few months and stopped.    Family history is completely negative for malignancy except in a maternal grandmother who may have had cervical cancer her 47 gene Invitae panel was negative    She is not a smoker or drinker  She has been on anticoagulation for 7 to 8 years for unprovoked massive PE with a negative work-up  She has had breast implants since  and they were removed at the time of surgery  She has not had a heart attack or stroke    Explained in detail the rationale for surgery and adjuvant  treatment with micrometastasis that can give rise to recurrence of her cancer if not treated adequately.  We discussed the fact that the use of hormonal blockade would decrease the risk of recurrence by 30 to 40% depending on grade and histology .    Based on her thrombotic risk and anticoagulation I have recommended avoiding tamoxifen and using aromatase inhibitor and we will start with Arimidex which we would recommend for 5 years at least.  The side effects and toxicities of the Aromatase inhibitors was discussed with the patient including, hot flashes, mood swings and hair thinning.Significant arthralgias and worsening bone density were also discussed. Baseline bone density evaluation was ordered.    She has been referred to radiation and they will decide whether radiation is indicated and if so she will start her Arimidex towards the end of radiation but if no radiation is planned she can start in about 2 weeks    She is agreeable and I told her to call if she has problems with the hormonal blockade  The patient and her daughter are comfortable with the decision to proceed with hormonal blockade    She will receive return in 3 to 4 months to assess her tolerance with a DEXA scan and we will consider the use of Prolia if her bone density is worse      Current Outpatient Medications on File Prior to Visit   Medication Sig Dispense Refill   • acetaminophen (TYLENOL) 325 MG tablet Take 2 tablets by mouth Every 4 (Four) Hours As Needed for Mild Pain . 60 tablet 0   • anastrozole (ARIMIDEX) 1 MG tablet Take 1 mg by mouth Daily.     • calcium carbonate (TUMS) 500 MG chewable tablet Chew 1 tablet As Needed for Indigestion or Heartburn.     • cephalexin (KEFLEX) 500 MG capsule Take 4 capsules by mouth one hour prior to procedure. 4 capsule 2   • cetirizine (zyrTEC) 10 MG tablet Take 10 mg by mouth Every Night.     • cholecalciferol (VITAMIN D3) 25 MCG (1000 UT) tablet Take 1,000 Units by mouth Every Morning.     •  rivaroxaban (Xarelto) 20 MG tablet Take 1 tablet by mouth Daily With Dinner. 30 tablet 0   • SYNTHROID 112 MCG tablet Take 112 mcg by mouth Every Night.       Current Facility-Administered Medications on File Prior to Visit   Medication Dose Route Frequency Provider Last Rate Last Admin   • Chlorhexidine Gluconate 2 % pads 1 each  1 each Apply externally Take As Directed Clarence Suarez MD            ALLERGIES:    Allergies   Allergen Reactions   • Penicillins Other (See Comments)     Passes out   • Sulfa Antibiotics Nausea And Vomiting        Social History     Socioeconomic History   • Marital status:      Spouse name: SANDRA   • Number of children: 3   • Years of education: COLLEGE   Tobacco Use   • Smoking status: Former Smoker     Packs/day: 1.00     Years: 10.00     Pack years: 10.00     Types: Cigarettes     Start date: 1964     Quit date: 1976     Years since quittin.7   • Smokeless tobacco: Never Used   Vaping Use   • Vaping Use: Never used   Substance and Sexual Activity   • Alcohol use: Yes     Alcohol/week: 3.0 standard drinks     Types: 3 Glasses of wine per week   • Drug use: No   • Sexual activity: Not Currently     Partners: Male     Birth control/protection: Inserts, Post-menopausal, None        Family History   Problem Relation Age of Onset   • Hypertension Mother    • Osteoporosis Mother    • Scoliosis Mother    • Arthritis Mother    • Hypertension Father    • Arthritis Father    • Stroke Paternal Aunt    • Cervical cancer Maternal Grandmother 68   • Prostate cancer Brother 74   • Hypertension Brother    • Hyperlipidemia Brother    • Alzheimer's disease Brother    • Malig Hyperthermia Neg Hx         Review of Systems   Gastrointestinal:        Reflux symptoms   Skin: Positive for color change (Itching and redness from her surgery).   Neurological: Positive for headaches (Migraines).        Objective     There were no vitals filed for this visit.  Current Status 2022    ECOG score 0       Physical Exam       CONSTITUTIONAL:  Vital signs reviewed.  No distress, looks comfortable.  EYES:  Conjunctivae and lids unremarkable.  PERRLA  EARS,NOSE,MOUTH,THROAT:  Ears and nose appear unremarkable.  Lips, teeth, gums appear unremarkable.  RESPIRATORY:  Normal respiratory effort.  Lungs clear to auscultation bilaterally.  BREASTS: Right breast shows a lift and no definite lumpectomy scar left breast also shows left with some redness and a small furuncle below the left breast  CARDIOVASCULAR:  Normal S1, S2.  No murmurs rubs or gallops.  No significant lower extremity edema.  GASTROINTESTINAL: Abdomen appears unremarkable.  Nontender.  No hepatomegaly.  No splenomegaly.  LYMPHATIC:  No cervical, supraclavicular, axillary lymphadenopathy.  SKIN:  Warm.  No rashes.  PSYCHIATRIC:  Normal judgment and insight.  Normal mood and affect.  I have reexamined the patient and the results are consistent with the previously documented exam. Len Jaramillo MD       RECENT LABS:  Hematology WBC   Date Value Ref Range Status   01/18/2022 6.82 3.40 - 10.80 10*3/mm3 Final     RBC   Date Value Ref Range Status   01/18/2022 4.30 3.77 - 5.28 10*6/mm3 Final     Hemoglobin   Date Value Ref Range Status   01/18/2022 14.1 12.0 - 15.9 g/dL Final     Hematocrit   Date Value Ref Range Status   01/18/2022 41.8 34.0 - 46.6 % Final     Platelets   Date Value Ref Range Status   01/18/2022 185 140 - 450 10*3/mm3 Final        Synoptic Checklist     INVASIVE CARCINOMA OF THE BREAST: Resection  8th Edition - Protocol posted: 6/30/2021  INVASIVE CARCINOMA OF THE BREAST: COMPLETE EXCISION - All Specimens  SPECIMEN   Procedure  Excision (less than total mastectomy)    Specimen Laterality  Right    TUMOR   Tumor Site  Upper outer quadrant    Histologic Type  Invasive lobular carcinoma    Histologic Grade ([place] Histologic Score)     Glandular (Acinar) / Tubular Differentiation  Score 3    Nuclear Pleomorphism  Score 1     Mitotic Rate  Score 1    Overall Grade  Grade 1 (scores of 3, 4 or 5)    Tumor Size  Greatest dimension of largest invasive focus (Millimeters): 24 mm   Tumor Focality  Single focus of invasive carcinoma    Ductal Carcinoma In Situ (DCIS)  Not identified    Lobular Carcinoma In Situ (LCIS)  Present    Tumor Extent     Lymphovascular Invasion  Not identified    Dermal Lymphovascular Invasion  Not identified    Microcalcifications  Present in non-neoplastic tissue    Treatment Effect in the Breast  No known presurgical therapy    MARGINS   Margin Status for Invasive Carcinoma  All margins negative for invasive carcinoma    Distance from Invasive Carcinoma to Closest Margin  2 mm   Closest Margin(s) to Invasive Carcinoma  Posterior    Distance from Invasive Carcinoma to Anterior Margin  20 mm   Distance from Invasive Carcinoma to Posterior Margin  2 mm   Distance from Invasive Carcinoma to Superior Margin  53 mm   Distance from Invasive Carcinoma to Inferior Margin  15.3 mm   Distance from Invasive Carcinoma to Medial Margin  28 mm   Distance from Invasive Carcinoma to Lateral Margin  16.2 mm   REGIONAL LYMPH NODES   Regional Lymph Node Status  Not applicable (no regional lymph nodes submitted or found)    PATHOLOGIC STAGE CLASSIFICATION (pTNM, AJCC 8th Edition)      pT Category  pT2    pN Category  pN not assigned (no nodes submitted or found)    Breast Biomarker Testing Performed on Previous Biopsy     Estrogen Receptor (ER) Status  Positive (greater than 10% of cells demonstrate nuclear positivity)    Percentage of Cells with Nuclear Positivity  %    Breast Biomarker Testing Performed on Previous Biopsy     Progesterone Receptor (PgR) Status  Positive    Percentage of Cells with Nuclear Positivity  %    Breast Biomarker Testing Performed on Previous Biopsy     HER2 (by immunohistochemistry)  Negative (Score 1+)    Breast Biomarker Testing Performed on Previous Biopsy     Ki-67 Percentage of Positive  Nuclei  7 %            Assessment & Plan   1.pT2Nx grade 1 invasive lobular carcinoma left breast strongly ER/DC positive HER2 negative postlumpectomy with associated LCIS and ALH  Arimidex started in 3/22    2. DVT and PE on lifelong anticoagulation with Xarelto    3, hypothyroidism on treatment    4, 37 gene Invitae panel negative    5. osteopenia in the hips on calcium and vitamin D-  · DEXA scan stable in 4 /22  · hold off on Prolia for now and recheck bone density in 1 year    Plan  1.  Continue Arimidex 1 mg daily  2.  Supplement vitamin D and calcium  3.  See the NP in 4 months and see me in 8 months and we will repeat a bone density in 1 year.   4. Her mammogram is due again in September of this year

## 2022-07-05 ENCOUNTER — HOSPITAL ENCOUNTER (OUTPATIENT)
Dept: INFUSION THERAPY | Facility: HOSPITAL | Age: 77
Discharge: HOME | End: 2022-07-05
Admitting: INTERNAL MEDICINE

## 2022-07-05 ENCOUNTER — TRANSCRIBE ORDERS (OUTPATIENT)
Dept: ADMINISTRATIVE | Facility: HOSPITAL | Age: 77
End: 2022-07-05

## 2022-07-05 VITALS
RESPIRATION RATE: 20 BRPM | TEMPERATURE: 97.7 F | HEIGHT: 65 IN | BODY MASS INDEX: 36.65 KG/M2 | WEIGHT: 220 LBS | SYSTOLIC BLOOD PRESSURE: 111 MMHG | DIASTOLIC BLOOD PRESSURE: 66 MMHG | OXYGEN SATURATION: 95 % | HEART RATE: 77 BPM

## 2022-07-05 DIAGNOSIS — U07.1 CLINICAL DIAGNOSIS OF SEVERE ACUTE RESPIRATORY SYNDROME CORONAVIRUS 2 (SARS-COV-2) DISEASE: Primary | ICD-10-CM

## 2022-07-05 DIAGNOSIS — U07.1 CLINICAL DIAGNOSIS OF COVID-19: Primary | ICD-10-CM

## 2022-07-05 PROCEDURE — M0222 HC INJECTION BEBTELOVIMAB: HCPCS | Performed by: INTERNAL MEDICINE

## 2022-07-05 PROCEDURE — 96374 THER/PROPH/DIAG INJ IV PUSH: CPT

## 2022-07-05 PROCEDURE — 25010000002 INJECTION, BEBTELOVIMAB, 175 MG: Performed by: INTERNAL MEDICINE

## 2022-07-05 RX ORDER — METHYLPREDNISOLONE SODIUM SUCCINATE 125 MG/2ML
125 INJECTION, POWDER, LYOPHILIZED, FOR SOLUTION INTRAMUSCULAR; INTRAVENOUS AS NEEDED
Status: DISCONTINUED | OUTPATIENT
Start: 2022-07-05 | End: 2022-07-07 | Stop reason: HOSPADM

## 2022-07-05 RX ORDER — SODIUM CHLORIDE 9 MG/ML
30 INJECTION, SOLUTION INTRAVENOUS ONCE
Status: CANCELLED | OUTPATIENT
Start: 2022-07-05 | End: 2022-07-05

## 2022-07-05 RX ORDER — DIPHENHYDRAMINE HYDROCHLORIDE 50 MG/ML
50 INJECTION INTRAMUSCULAR; INTRAVENOUS ONCE AS NEEDED
OUTPATIENT
Start: 2022-07-05

## 2022-07-05 RX ORDER — BEBTELOVIMAB 87.5 MG/ML
175 INJECTION, SOLUTION INTRAVENOUS ONCE
Status: COMPLETED | OUTPATIENT
Start: 2022-07-05 | End: 2022-07-05

## 2022-07-05 RX ORDER — DIPHENHYDRAMINE HYDROCHLORIDE 50 MG/ML
50 INJECTION INTRAMUSCULAR; INTRAVENOUS ONCE AS NEEDED
Status: DISCONTINUED | OUTPATIENT
Start: 2022-07-05 | End: 2022-07-07 | Stop reason: HOSPADM

## 2022-07-05 RX ORDER — DIPHENHYDRAMINE HYDROCHLORIDE 50 MG/ML
50 INJECTION INTRAMUSCULAR; INTRAVENOUS ONCE AS NEEDED
Status: CANCELLED | OUTPATIENT
Start: 2022-07-05

## 2022-07-05 RX ORDER — DIPHENHYDRAMINE HCL 25 MG
50 CAPSULE ORAL ONCE AS NEEDED
OUTPATIENT
Start: 2022-07-05

## 2022-07-05 RX ORDER — METHYLPREDNISOLONE SODIUM SUCCINATE 125 MG/2ML
125 INJECTION, POWDER, LYOPHILIZED, FOR SOLUTION INTRAMUSCULAR; INTRAVENOUS AS NEEDED
Status: CANCELLED | OUTPATIENT
Start: 2022-07-05

## 2022-07-05 RX ORDER — EPINEPHRINE 0.1 MG/ML
0.3 SYRINGE (ML) INJECTION AS NEEDED
Status: CANCELLED | OUTPATIENT
Start: 2022-07-05

## 2022-07-05 RX ORDER — DIPHENHYDRAMINE HCL 50 MG
50 CAPSULE ORAL ONCE AS NEEDED
Status: CANCELLED | OUTPATIENT
Start: 2022-07-05

## 2022-07-05 RX ORDER — METHYLPREDNISOLONE SODIUM SUCCINATE 125 MG/2ML
125 INJECTION, POWDER, LYOPHILIZED, FOR SOLUTION INTRAMUSCULAR; INTRAVENOUS AS NEEDED
OUTPATIENT
Start: 2022-07-05

## 2022-07-05 RX ORDER — BEBTELOVIMAB 87.5 MG/ML
175 INJECTION, SOLUTION INTRAVENOUS ONCE
Status: CANCELLED | OUTPATIENT
Start: 2022-07-05

## 2022-07-05 RX ORDER — SODIUM CHLORIDE 9 MG/ML
30 INJECTION, SOLUTION INTRAVENOUS ONCE
Status: DISCONTINUED | OUTPATIENT
Start: 2022-07-05 | End: 2022-07-07 | Stop reason: HOSPADM

## 2022-07-05 RX ORDER — DIPHENHYDRAMINE HCL 25 MG
50 CAPSULE ORAL ONCE AS NEEDED
Status: DISCONTINUED | OUTPATIENT
Start: 2022-07-05 | End: 2022-07-07 | Stop reason: HOSPADM

## 2022-07-05 RX ADMIN — BEBTELOVIMAB 175 MG: 87.5 INJECTION, SOLUTION INTRAVENOUS at 13:18

## 2022-07-05 NOTE — PROGRESS NOTES
Patient provided with Fact Sheet for Patients, Parents and Caregivers Emergency Use Authorization (EUA) of Bebtelovimab for Coronavirus Disease 2019 (COVID-19) form.    Reviewed and patient verbalized understanding.  Appropriate PPE worn during the care of the patient.  Advised patient not to receive Covid vaccine for 90 days.  1310-Patient nauseated before medication given.  1415-Tolerated treatment well.  Drank Ginger Ale.

## 2022-07-25 ENCOUNTER — TELEPHONE (OUTPATIENT)
Dept: ONCOLOGY | Facility: CLINIC | Age: 77
End: 2022-07-25

## 2022-07-25 NOTE — TELEPHONE ENCOUNTER
Returned call to patient who is reporting increasing and worsening pain in her lower back since starting on the Arimidex.   She has had a history of back pain, but this is now affecting her ADL's.  The pain is affecting her daily life, with walking and even standing.  She needs to know what she should do.  Should she contact PCP or is the Arimidex the possible cause for this pain.  Please advise.

## 2022-07-25 NOTE — TELEPHONE ENCOUNTER
Spoke with patient and information from Dr. Jaramillo provided, she will hold her Arimidex one month.  She has an appointment early September and she will either call in one month or wait and speak with Dr. Jaramillo at her next appointment.  She will also reach out to her PCP regarding this pain as well.

## 2022-09-01 ENCOUNTER — OFFICE VISIT (OUTPATIENT)
Dept: ONCOLOGY | Facility: CLINIC | Age: 77
End: 2022-09-01

## 2022-09-01 ENCOUNTER — LAB (OUTPATIENT)
Dept: LAB | Facility: HOSPITAL | Age: 77
End: 2022-09-01

## 2022-09-01 VITALS
HEIGHT: 65 IN | TEMPERATURE: 97.1 F | BODY MASS INDEX: 38.85 KG/M2 | RESPIRATION RATE: 16 BRPM | HEART RATE: 74 BPM | WEIGHT: 233.2 LBS | OXYGEN SATURATION: 95 % | DIASTOLIC BLOOD PRESSURE: 84 MMHG | SYSTOLIC BLOOD PRESSURE: 126 MMHG

## 2022-09-01 DIAGNOSIS — C50.411 MALIGNANT NEOPLASM OF UPPER-OUTER QUADRANT OF RIGHT BREAST IN FEMALE, ESTROGEN RECEPTOR POSITIVE: Primary | ICD-10-CM

## 2022-09-01 DIAGNOSIS — C50.411 MALIGNANT NEOPLASM OF UPPER-OUTER QUADRANT OF RIGHT BREAST IN FEMALE, ESTROGEN RECEPTOR POSITIVE: ICD-10-CM

## 2022-09-01 DIAGNOSIS — Z17.0 MALIGNANT NEOPLASM OF UPPER-OUTER QUADRANT OF RIGHT BREAST IN FEMALE, ESTROGEN RECEPTOR POSITIVE: ICD-10-CM

## 2022-09-01 DIAGNOSIS — M85.852 OSTEOPENIA OF BOTH HIPS: ICD-10-CM

## 2022-09-01 DIAGNOSIS — M85.851 OSTEOPENIA OF BOTH HIPS: ICD-10-CM

## 2022-09-01 DIAGNOSIS — Z17.0 MALIGNANT NEOPLASM OF UPPER-OUTER QUADRANT OF RIGHT BREAST IN FEMALE, ESTROGEN RECEPTOR POSITIVE: Primary | ICD-10-CM

## 2022-09-01 LAB
BASOPHILS # BLD AUTO: 0.05 10*3/MM3 (ref 0–0.2)
BASOPHILS NFR BLD AUTO: 1 % (ref 0–1.5)
DEPRECATED RDW RBC AUTO: 48.4 FL (ref 37–54)
EOSINOPHIL # BLD AUTO: 0.1 10*3/MM3 (ref 0–0.4)
EOSINOPHIL NFR BLD AUTO: 2 % (ref 0.3–6.2)
ERYTHROCYTE [DISTWIDTH] IN BLOOD BY AUTOMATED COUNT: 13.5 % (ref 12.3–15.4)
HCT VFR BLD AUTO: 41.1 % (ref 34–46.6)
HGB BLD-MCNC: 14 G/DL (ref 12–15.9)
IMM GRANULOCYTES # BLD AUTO: 0.08 10*3/MM3 (ref 0–0.05)
IMM GRANULOCYTES NFR BLD AUTO: 1.6 % (ref 0–0.5)
LYMPHOCYTES # BLD AUTO: 1.11 10*3/MM3 (ref 0.7–3.1)
LYMPHOCYTES NFR BLD AUTO: 21.9 % (ref 19.6–45.3)
MCH RBC QN AUTO: 32.9 PG (ref 26.6–33)
MCHC RBC AUTO-ENTMCNC: 34.1 G/DL (ref 31.5–35.7)
MCV RBC AUTO: 96.5 FL (ref 79–97)
MONOCYTES # BLD AUTO: 0.64 10*3/MM3 (ref 0.1–0.9)
MONOCYTES NFR BLD AUTO: 12.6 % (ref 5–12)
NEUTROPHILS NFR BLD AUTO: 3.08 10*3/MM3 (ref 1.7–7)
NEUTROPHILS NFR BLD AUTO: 60.9 % (ref 42.7–76)
NRBC BLD AUTO-RTO: 0 /100 WBC (ref 0–0.2)
PLATELET # BLD AUTO: 156 10*3/MM3 (ref 140–450)
PMV BLD AUTO: 11.4 FL (ref 6–12)
RBC # BLD AUTO: 4.26 10*6/MM3 (ref 3.77–5.28)
WBC NRBC COR # BLD: 5.06 10*3/MM3 (ref 3.4–10.8)

## 2022-09-01 PROCEDURE — 36415 COLL VENOUS BLD VENIPUNCTURE: CPT

## 2022-09-01 PROCEDURE — 85025 COMPLETE CBC W/AUTO DIFF WBC: CPT

## 2022-09-01 PROCEDURE — 99213 OFFICE O/P EST LOW 20 MIN: CPT | Performed by: NURSE PRACTITIONER

## 2022-09-01 NOTE — PROGRESS NOTES
Subjective     REASON FOR FOLLOW-UP:    1. pT2Nx grade 1 lobular carcinoma right breast strongly ER/DE positive HER2 negative. Post lumpectomy1/3/2022                             REQUESTING PHYSICIAN: MD Sri Willis MD    History of Present Illness patient is a 76 y.o. lady with a lobular breast cancer low-grade treated with lumpectomy and and radiation and started on Arimidex currently on Arimidex which was started In mid March 2022.    We received a call from the patient in late July noting significant change in performance status with worsening body pain affecting ADLs.  Poor quality of life.  She was instructed to hold Arimidex.    She is now seen back in follow-up accompanied by her daughter.  They both state that within 2 days of stopping the Arimidex patient was her old self again.  They note that not only was she having diffuse aches but her personality was also different with worsening depression and often not wanting to get out of bed.  Again this all turned around quickly off of Arimidex.    At this point patient is very reluctant to take anything further in terms of oral therapy.  She is not a good candidate for tamoxifen because of previous history of DVT and PE.    At this point she is feeling well and denies other concerns.    Past Medical History:   Diagnosis Date   • Arthritis    • Arthritis of back 2015   • Arthritis of neck    • Bulging lumbar disc     L 3-4   • Bursitis of hip    • Clotting disorder (HCC)    • COVID-19    • Fracture, fibula    • Fracture, tibia and fibula     Stress fracture   • Frozen shoulder    • GERD (gastroesophageal reflux disease)    • Hip arthrosis    • History of kidney stones    • History of MRSA infection 2020    MICHEL EARS   • History of transfusion 1971    no reaction   • Hx of blood clots 2011   • Hypothyroid    • Kidney stones    • Knee swelling    • Left knee pain    • Lumbosacral disc disease     • Malignant neoplasm of upper-outer quadrant of right breast in female, estrogen receptor positive (HCC) 12/09/2021   • Neck strain    • Neuroma of foot 2000    Right foot   • Ovarian cyst    • Periarthritis of shoulder    • PONV (postoperative nausea and vomiting)     states gets really sick lasted for 4 days aafter surgery the last time    • Pulmonary embolism, bilateral (HCC)    • Scoliosis 2020   • Stress fracture    • Tear of meniscus of knee    • Thrombopenia (HCC)    • Wears glasses         Past Surgical History:   Procedure Laterality Date   • APPENDECTOMY     • AUGMENTATION MAMMAPLASTY  1976   • BREAST AUGMENTATION      years ago has implants   • BREAST EXCISIONAL BIOPSY Right 10/2021   • BREAST EXCISIONAL BIOPSY Left    • BREAST LUMPECTOMY Right 01/03/2022    Procedure: Right needle-localized, bracketed, partial mastectomy;  Surgeon: Adela Cardoso MD;  Location: [facility] MAIN OR;  Service: General;  Laterality: Right;   • BREAST SURGERY Bilateral 01/03/2022    Procedure: RIGHT ONCOPLASTIC REDUCTION AND LEFT REDUCTION FOR SYMMETRY, BILATERAL IMPLANT REMOVAL AND  BILATERAL CAPSULECTOMIES;  Surgeon: Delbert Butcher MD;  Location: [facility] MAIN OR;  Service: Plastics;  Laterality: Bilateral;   • CHOLECYSTECTOMY     • COLONOSCOPY N/A 12/14/2018    Procedure: COLONOSCOPY into cecum/termial ileum with polypectomy;  Surgeon: Jose Daniel Dooley MD;  Location: [facility] ENDOSCOPY;  Service: Gastroenterology   • ENDOSCOPY N/A 12/14/2018    Procedure: ESOPHAGOGASTRODUODENOSCOPY with biopsy;  Surgeon: Jose Daniel Dooley MD;  Location: [facility] ENDOSCOPY;  Service: Gastroenterology   • EXPLORATORY LAPAROTOMY      bleeding from ruptured ovarian cyst   • HERNIA REPAIR      umbilical x 2   • OVARIAN CYST DRAINAGE/EXCISION      ruptured ovarian cyst with vblood accumulatine in abdomin   • TONSILLECTOMY     • TOTAL KNEE ARTHROPLASTY Left 06/24/2021    Procedure: TOTAL KNEE ARTHROPLASTY;  Surgeon: Clarence Suarez  MD TAMMIE;  Location: [facility] OR;  Service: Orthopedics;  Laterality: Left;   • UMBILICAL HERNIA REPAIR      x2      patient is a 76-year-old white female with a 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    Patient reports she was doing her mammograms every 2 years for the last couple of years and had a benign breast biopsy on the left about 10 years ago but otherwise had not had any callbacks or other abnormalities on her imaging.    Patient had diagnostic imaging and a biopsyOn 2021 which revealed atypical lobular hyperplasia at 11:00 and invasive lobular carcinoma grade 1 measuring 14 mm at 11:00 ER % OK % HER2 negative Ki-67 of 7%  Patient was referred to Dr. Adela Cardoso who ordered an MRI which confirmed unifocal disease on the right and a biopsy cavity from the [facility] biopsy with no suspicious enhancement and no obvious adenopathy and a normal left breast.  She then proceeded with a lumpectomy on 1/3/2022 which showed residual 24 mm  low-grade lobular carcinoma with associated lobular carcinoma in situ with no lymphovascular invasion and clear margins no sentinel nodes were removed.  Implants which had been in for over 40 years were both removed    She has had a little problem with some infection in the inframammary area bilaterally but is on antibiotic and this is improving.  She is here to discuss adjuvant treatment options    She is  5 para 3 with 2 miscarriages-first childbirth was at age 21 she did not breast-feed.  Menarche was age 16 menopause at 57 and she took hormones for a few months and stopped.    Family history is completely negative for malignancy except in a maternal grandmother who may have had cervical cancer her 47 gene Invitae panel was negative    She is not a smoker or  drinker  She has been on anticoagulation for 7 to 8 years for unprovoked massive PE with a negative work-up  She has had breast implants since 1977 and they were removed at the time of surgery  She has not had a heart attack or stroke    Explained in detail the rationale for surgery and adjuvant treatment with micrometastasis that can give rise to recurrence of her cancer if not treated adequately.  We discussed the fact that the use of hormonal blockade would decrease the risk of recurrence by 30 to 40% depending on grade and histology .    Based on her thrombotic risk and anticoagulation I have recommended avoiding tamoxifen and using aromatase inhibitor and we will start with Arimidex which we would recommend for 5 years at least.  The side effects and toxicities of the Aromatase inhibitors was discussed with the patient including, hot flashes, mood swings and hair thinning.Significant arthralgias and worsening bone density were also discussed. Baseline bone density evaluation was ordered.    She has been referred to radiation and they will decide whether radiation is indicated and if so she will start her Arimidex towards the end of radiation but if no radiation is planned she can start in about 2 weeks    She is agreeable and I told her to call if she has problems with the hormonal blockade  The patient and her daughter are comfortable with the decision to proceed with hormonal blockade    She will receive return in 3 to 4 months to assess her tolerance with a DEXA scan and we will consider the use of Prolia if her bone density is worse      Current Outpatient Medications on File Prior to Visit   Medication Sig Dispense Refill   • acetaminophen (TYLENOL) 325 MG tablet Take 2 tablets by mouth Every 4 (Four) Hours As Needed for Mild Pain . 60 tablet 0   • calcium carbonate (TUMS) 500 MG chewable tablet Chew 1 tablet As Needed for Indigestion or Heartburn.     • cephalexin (KEFLEX) 500 MG capsule Take 4 capsules  by mouth one hour prior to procedure. 4 capsule 2   • cetirizine (zyrTEC) 10 MG tablet Take 10 mg by mouth Every Night.     • cholecalciferol (VITAMIN D3) 25 MCG (1000 UT) tablet Take 1,000 Units by mouth Every Morning.     • rivaroxaban (Xarelto) 20 MG tablet Take 1 tablet by mouth Daily With Dinner. 30 tablet 0   • SYNTHROID 112 MCG tablet Take 112 mcg by mouth Every Night.     • [DISCONTINUED] anastrozole (ARIMIDEX) 1 MG tablet Take 1 mg by mouth Daily.       Current Facility-Administered Medications on File Prior to Visit   Medication Dose Route Frequency Provider Last Rate Last Admin   • Chlorhexidine Gluconate 2 % pads 1 each  1 each Apply externally Take As Directed Clarence Suarez MD            ALLERGIES:    Allergies   Allergen Reactions   • Penicillins Other (See Comments)     Passes out   • Latex Rash   • Sulfa Antibiotics Nausea And Vomiting        Social History     Socioeconomic History   • Marital status:      Spouse name: SANDRA   • Number of children: 3   • Years of education: COLLEGE   Tobacco Use   • Smoking status: Former Smoker     Packs/day: 1.00     Years: 10.00     Pack years: 10.00     Types: Cigarettes     Start date: 1964     Quit date: 1976     Years since quittin.0   • Smokeless tobacco: Never Used   Vaping Use   • Vaping Use: Never used   Substance and Sexual Activity   • Alcohol use: Yes     Alcohol/week: 3.0 standard drinks     Types: 3 Glasses of wine per week   • Drug use: No   • Sexual activity: Not Currently     Partners: Male     Birth control/protection: Inserts, Post-menopausal, None        Family History   Problem Relation Age of Onset   • Hypertension Mother    • Osteoporosis Mother    • Scoliosis Mother    • Arthritis Mother    • Hypertension Father    • Arthritis Father    • Stroke Paternal Aunt    • Cervical cancer Maternal Grandmother 68   • Prostate cancer Brother 74   • Hypertension Brother    • Hyperlipidemia Brother    • Alzheimer's disease  "Brother    • Malsylvia Hyperthermia Neg Hx         Review of Systems   All other systems reviewed and are negative.       Objective     Vitals:    09/01/22 1337   Temp: 97.1 °F (36.2 °C)   TempSrc: Temporal   Weight: 106 kg (233 lb 3.2 oz)   Height: 165 cm (64.96\")   PainSc: 0-No pain     Current Status 9/1/2022   ECOG score 0       Physical Exam     CONSTITUTIONAL:  Vital signs reviewed.  No distress, looks comfortable.  EYES:  Conjunctivae and lids unremarkable.  PERRLA  EARS,NOSE,MOUTH,THROAT:  Ears and nose appear unremarkable.  Lips, teeth, gums appear unremarkable.  RESPIRATORY:  Normal respiratory effort.  Lungs clear to auscultation bilaterally.  BREASTS: Patient declined today. Previously - Right breast shows a lift and no definite lumpectomy scar left breast also shows left with some redness and a small furuncle below the left breast  CARDIOVASCULAR:  Normal S1, S2.  No murmurs rubs or gallops.  No significant lower extremity edema.  GASTROINTESTINAL: Abdomen appears unremarkable.  Nontender.  No hepatomegaly.  No splenomegaly.  LYMPHATIC:  No cervical, supraclavicular, axillary lymphadenopathy.  SKIN:  Warm.  No rashes.  PSYCHIATRIC:  Normal judgment and insight.  Normal mood and affect.    I have reexamined the patient and the results are consistent with the previously documented exam except as updated. Nirmala Mart, MANUEL       RECENT LABS:  Results from last 7 days   Lab Units 09/01/22  1327   WBC 10*3/mm3 5.06   NEUTROS ABS 10*3/mm3 3.08   HEMOGLOBIN g/dL 14.0   HEMATOCRIT % 41.1   PLATELETS 10*3/mm3 156                 Synoptic Checklist     INVASIVE CARCINOMA OF THE BREAST: Resection  8th Edition - Protocol posted: 6/30/2021  INVASIVE CARCINOMA OF THE BREAST: COMPLETE EXCISION - All Specimens  SPECIMEN   Procedure  Excision (less than total mastectomy)    Specimen Laterality  Right    TUMOR   Tumor Site  Upper outer quadrant    Histologic Type  Invasive lobular carcinoma    Histologic Grade " (Maryann Histologic Score)     Glandular (Acinar) / Tubular Differentiation  Score 3    Nuclear Pleomorphism  Score 1    Mitotic Rate  Score 1    Overall Grade  Grade 1 (scores of 3, 4 or 5)    Tumor Size  Greatest dimension of largest invasive focus (Millimeters): 24 mm   Tumor Focality  Single focus of invasive carcinoma    Ductal Carcinoma In Situ (DCIS)  Not identified    Lobular Carcinoma In Situ (LCIS)  Present    Tumor Extent     Lymphovascular Invasion  Not identified    Dermal Lymphovascular Invasion  Not identified    Microcalcifications  Present in non-neoplastic tissue    Treatment Effect in the Breast  No known presurgical therapy    MARGINS   Margin Status for Invasive Carcinoma  All margins negative for invasive carcinoma    Distance from Invasive Carcinoma to Closest Margin  2 mm   Closest Margin(s) to Invasive Carcinoma  Posterior    Distance from Invasive Carcinoma to Anterior Margin  20 mm   Distance from Invasive Carcinoma to Posterior Margin  2 mm   Distance from Invasive Carcinoma to Superior Margin  53 mm   Distance from Invasive Carcinoma to Inferior Margin  15.3 mm   Distance from Invasive Carcinoma to Medial Margin  28 mm   Distance from Invasive Carcinoma to Lateral Margin  16.2 mm   REGIONAL LYMPH NODES   Regional Lymph Node Status  Not applicable (no regional lymph nodes submitted or found)    PATHOLOGIC STAGE CLASSIFICATION (pTNM, AJCC 8th Edition)      pT Category  pT2    pN Category  pN not assigned (no nodes submitted or found)    Breast Biomarker Testing Performed on Previous Biopsy     Estrogen Receptor (ER) Status  Positive (greater than 10% of cells demonstrate nuclear positivity)    Percentage of Cells with Nuclear Positivity  %    Breast Biomarker Testing Performed on Previous Biopsy     Progesterone Receptor (PgR) Status  Positive    Percentage of Cells with Nuclear Positivity  %    Breast Biomarker Testing Performed on Previous Biopsy     HER2 (by  immunohistochemistry)  Negative (Score 1+)    Breast Biomarker Testing Performed on Previous Biopsy     Ki-67 Percentage of Positive Nuclei  7 %            Assessment & Plan   1.pT2Nx grade 1 invasive lobular carcinoma left breast strongly ER/NV positive HER2 negative postlumpectomy with associated LCIS and ALH  · Arimidex started in 3/22  · Patient stopping Arimidex in July 2022 after progressive worsening body aches, affecting ADLs and worsening depression with often desire not to get out of bed.  Patient and daughter note within 2 days of stopping Arimidex her symptoms improved.  · 9/1/2022 per review today patient is feeling much better and back to her old self.  She is very reluctant to take anything further in terms of AI therapy.  Per discussion today I will review all this with Dr. Jaramillo and call the patient back.    2. DVT and PE on lifelong anticoagulation with Xarelto    3, hypothyroidism on treatment    4, 37 gene Invitae panel negative    5. Osteopenia in the hips on calcium and vitamin D-  · DEXA scan stable in 4 /22  · hold off on Prolia for now and recheck bone density in 1 year    Plan  1.  Continue supplements of vitamin D and calcium  2.  Patient currently scheduled back in 4 months with Dr. Jaramillo.  3.  I will call the patient back once I spoke with Dr. Jaramillo regarding further plan of care.  Again patient is reluctant to take any further AI therapy and would prefer just to stay off.  However she does want to know percentages in terms of risk recurrence off AI therapy.  4.  Patient is scheduled for repeat mammogram 9/23/2022

## 2022-09-06 ENCOUNTER — TELEPHONE (OUTPATIENT)
Dept: ONCOLOGY | Facility: CLINIC | Age: 77
End: 2022-09-06

## 2022-09-06 NOTE — TELEPHONE ENCOUNTER
----- Message from MANUEL Velasquez sent at 9/5/2022  8:11 PM EDT -----  Regarding: FW: intolerance to Arimidex  Please call this patient and tell her that Dr. Jaramillo would like to see her to discuss further treatment decisions. Make sure it's marked as DO NOT MOVE and put on Tracy[facility] first available (not urgent but sooner than later). Thank you!    ----- Message -----  From: Len Jaramillo MD  Sent: 9/3/2022   7:29 AM EDT  To: MANUEL Velasquez  Subject: RE: intolerance to Arimidex                      Make appt with me  ----- Message -----  From: Nirmala Mart APRN  Sent: 9/1/2022   3:33 PM EDT  To: Len Jaramillo MD  Subject: intolerance to Arimidex                          This patient has history of pT2Nx grade 1 lobular carcinoma right breast strongly ER/OK positive HER2 negative. Post lumpectomy 1/3/2022 and radiation.  You started her on Arimidex in May 2022.  Shortly after that she developed significant body aches and felt like her personality changed.  States it was difficult just to get out of bed most days.  Her daughter confirms this in follow-up today.  The patient took Arimidex up until the end of July at which time we told her via telephone to hold it.  Patient and daughter state that within 2 days she was back to her old self.    Patient is leaning strongly towards not taking anything.  She has a history of DVT and PE so tamoxifen is not an option.  She wants to know percentagewise, how much her risk goes up not taking any type of oral therapy.     If you can let me know how much being off AI therapy increases her risk of recurrence, knowing that she has had lumpectomy and radiation and had a small tumor to begin with, that would be great.  I told her I would call her back next week after I hear from you.  Thanks.

## 2022-09-15 ENCOUNTER — OFFICE VISIT (OUTPATIENT)
Dept: ONCOLOGY | Facility: CLINIC | Age: 77
End: 2022-09-15

## 2022-09-15 ENCOUNTER — LAB (OUTPATIENT)
Dept: LAB | Facility: HOSPITAL | Age: 77
End: 2022-09-15

## 2022-09-15 VITALS
TEMPERATURE: 97.2 F | RESPIRATION RATE: 18 BRPM | DIASTOLIC BLOOD PRESSURE: 86 MMHG | OXYGEN SATURATION: 97 % | SYSTOLIC BLOOD PRESSURE: 151 MMHG | HEIGHT: 65 IN | BODY MASS INDEX: 38.8 KG/M2 | WEIGHT: 232.9 LBS | HEART RATE: 72 BPM

## 2022-09-15 DIAGNOSIS — Z17.0 MALIGNANT NEOPLASM OF UPPER-OUTER QUADRANT OF RIGHT BREAST IN FEMALE, ESTROGEN RECEPTOR POSITIVE: Primary | ICD-10-CM

## 2022-09-15 DIAGNOSIS — C50.411 MALIGNANT NEOPLASM OF UPPER-OUTER QUADRANT OF RIGHT BREAST IN FEMALE, ESTROGEN RECEPTOR POSITIVE: ICD-10-CM

## 2022-09-15 DIAGNOSIS — Z79.811 AROMATASE INHIBITOR USE: ICD-10-CM

## 2022-09-15 DIAGNOSIS — C50.411 MALIGNANT NEOPLASM OF UPPER-OUTER QUADRANT OF RIGHT BREAST IN FEMALE, ESTROGEN RECEPTOR POSITIVE: Primary | ICD-10-CM

## 2022-09-15 DIAGNOSIS — Z17.0 MALIGNANT NEOPLASM OF UPPER-OUTER QUADRANT OF RIGHT BREAST IN FEMALE, ESTROGEN RECEPTOR POSITIVE: ICD-10-CM

## 2022-09-15 DIAGNOSIS — Z09 ENCOUNTER FOR FOLLOW-UP EXAMINATION AFTER COMPLETED TREATMENT FOR CONDITIONS OTHER THAN MALIGNANT NEOPLASM: ICD-10-CM

## 2022-09-15 LAB
ALBUMIN SERPL-MCNC: 4.2 G/DL (ref 3.5–5.2)
ALBUMIN/GLOB SERPL: 1.6 G/DL (ref 1.1–2.4)
ALP SERPL-CCNC: 88 U/L (ref 38–116)
ALT SERPL W P-5'-P-CCNC: 19 U/L (ref 0–33)
ANION GAP SERPL CALCULATED.3IONS-SCNC: 13.3 MMOL/L (ref 5–15)
AST SERPL-CCNC: 22 U/L (ref 0–32)
BASOPHILS # BLD AUTO: 0.05 10*3/MM3 (ref 0–0.2)
BASOPHILS NFR BLD AUTO: 1.1 % (ref 0–1.5)
BILIRUB SERPL-MCNC: 0.6 MG/DL (ref 0.2–1.2)
BUN SERPL-MCNC: 20 MG/DL (ref 6–20)
BUN/CREAT SERPL: 19.6 (ref 7.3–30)
CALCIUM SPEC-SCNC: 9.8 MG/DL (ref 8.5–10.2)
CHLORIDE SERPL-SCNC: 103 MMOL/L (ref 98–107)
CO2 SERPL-SCNC: 23.7 MMOL/L (ref 22–29)
CREAT SERPL-MCNC: 1.02 MG/DL (ref 0.6–1.1)
DEPRECATED RDW RBC AUTO: 49.8 FL (ref 37–54)
EGFRCR SERPLBLD CKD-EPI 2021: 57.5 ML/MIN/1.73
EOSINOPHIL # BLD AUTO: 0.15 10*3/MM3 (ref 0–0.4)
EOSINOPHIL NFR BLD AUTO: 3.2 % (ref 0.3–6.2)
ERYTHROCYTE [DISTWIDTH] IN BLOOD BY AUTOMATED COUNT: 13.4 % (ref 12.3–15.4)
GLOBULIN UR ELPH-MCNC: 2.7 GM/DL (ref 1.8–3.5)
GLUCOSE SERPL-MCNC: 81 MG/DL (ref 74–124)
HCT VFR BLD AUTO: 43 % (ref 34–46.6)
HGB BLD-MCNC: 14.2 G/DL (ref 12–15.9)
IMM GRANULOCYTES # BLD AUTO: 0.01 10*3/MM3 (ref 0–0.05)
IMM GRANULOCYTES NFR BLD AUTO: 0.2 % (ref 0–0.5)
LYMPHOCYTES # BLD AUTO: 1 10*3/MM3 (ref 0.7–3.1)
LYMPHOCYTES NFR BLD AUTO: 21.1 % (ref 19.6–45.3)
MCH RBC QN AUTO: 32.9 PG (ref 26.6–33)
MCHC RBC AUTO-ENTMCNC: 33 G/DL (ref 31.5–35.7)
MCV RBC AUTO: 99.8 FL (ref 79–97)
MONOCYTES # BLD AUTO: 0.61 10*3/MM3 (ref 0.1–0.9)
MONOCYTES NFR BLD AUTO: 12.9 % (ref 5–12)
NEUTROPHILS NFR BLD AUTO: 2.91 10*3/MM3 (ref 1.7–7)
NEUTROPHILS NFR BLD AUTO: 61.5 % (ref 42.7–76)
NRBC BLD AUTO-RTO: 0 /100 WBC (ref 0–0.2)
PLATELET # BLD AUTO: 190 10*3/MM3 (ref 140–450)
PMV BLD AUTO: 11 FL (ref 6–12)
POTASSIUM SERPL-SCNC: 4.3 MMOL/L (ref 3.5–4.7)
PROT SERPL-MCNC: 6.9 G/DL (ref 6.3–8)
RBC # BLD AUTO: 4.31 10*6/MM3 (ref 3.77–5.28)
SODIUM SERPL-SCNC: 140 MMOL/L (ref 134–145)
WBC NRBC COR # BLD: 4.73 10*3/MM3 (ref 3.4–10.8)

## 2022-09-15 PROCEDURE — 36415 COLL VENOUS BLD VENIPUNCTURE: CPT

## 2022-09-15 PROCEDURE — 85025 COMPLETE CBC W/AUTO DIFF WBC: CPT

## 2022-09-15 PROCEDURE — 80053 COMPREHEN METABOLIC PANEL: CPT

## 2022-09-15 PROCEDURE — 99214 OFFICE O/P EST MOD 30 MIN: CPT | Performed by: INTERNAL MEDICINE

## 2022-09-15 RX ORDER — EXEMESTANE 25 MG/1
25 TABLET ORAL DAILY
Qty: 90 TABLET | Refills: 3 | Status: SHIPPED | OUTPATIENT
Start: 2022-09-15

## 2022-09-15 NOTE — PROGRESS NOTES
Subjective     REASON FOR CONSULTATION:  pT2Nx grade 1 lobular carcinoma right breast strongly ER/CT positive HER2 negative postlumpectomy1/3/2022  Provide an opinion on any further workup or treatment                             REQUESTING PHYSICIAN: MD Sri Willis MD    History of Present Illness patient is a 76-year-old lady with a lobular breast cancer low-grade treated with lumpectomy and and radiation and currently on off Arimidex for a month.  This was  started In mid March    She is here with her daughter today who states that her personality change she was achy and very uncomfortable and felt terrible overall and stopped it about a month ago and she is now back to baseline    We discussed why we gave adjuvant therapy and they misunderstood and thought it was to prevent a second cancer in her other breast we talked about micrometastasis and the risk of recurrence the 25 to 30% range with the size of her breast cancer with no additional therapy and we have decided to try exemestane and if that causes problems consider tamoxifen because she is on lifelong anticoagulation and should not have clots and it would also help with her bone density    Her bone density is actually stable even though there is significant osteopenia in her hips and therefore we will hold off on the Prolia for now.  She is taking Tums for calcium but no additional vitamin D except for multivitamin and have asked her to take 1000 units of vitamin D and see how  we do with that    I think we can do a bone density again in April 2023 to make sure were not losing further ground and at that point add Prolia      Past Medical History:   Diagnosis Date   • Arthritis    • Arthritis of back 2015   • Arthritis of neck    • Bulging lumbar disc     L 3-4   • Bursitis of hip    • Clotting disorder (HCC)    • COVID-19    • Fracture, fibula    • Fracture, tibia and fibula     Stress  fracture   • Frozen shoulder    • GERD (gastroesophageal reflux disease)    • Hip arthrosis    • History of kidney stones    • History of MRSA infection 2020    MICHEL EARS   • History of transfusion 1971    no reaction   • Hx of blood clots 2011   • Hypothyroid    • Kidney stones    • Knee swelling    • Left knee pain    • Lumbosacral disc disease    • Malignant neoplasm of upper-outer quadrant of right breast in female, estrogen receptor positive (HCC) 12/09/2021   • Neck strain    • Neuroma of foot 2000    Right foot   • Ovarian cyst    • Periarthritis of shoulder    • PONV (postoperative nausea and vomiting)     states gets really sick lasted for 4 days aafter surgery the last time    • Pulmonary embolism, bilateral (HCC)    • Scoliosis 2020   • Stress fracture    • Tear of meniscus of knee    • Thrombopenia (HCC)    • Wears glasses         Past Surgical History:   Procedure Laterality Date   • APPENDECTOMY     • AUGMENTATION MAMMAPLASTY  1976   • BREAST AUGMENTATION      years ago has implants   • BREAST EXCISIONAL BIOPSY Right 10/2021   • BREAST EXCISIONAL BIOPSY Left    • BREAST LUMPECTOMY Right 01/03/2022    Procedure: Right needle-localized, bracketed, partial mastectomy;  Surgeon: Adela Cardoso MD;  Location: [facility] OR;  Service: General;  Laterality: Right;   • BREAST SURGERY Bilateral 01/03/2022    Procedure: RIGHT ONCOPLASTIC REDUCTION AND LEFT REDUCTION FOR SYMMETRY, BILATERAL IMPLANT REMOVAL AND  BILATERAL CAPSULECTOMIES;  Surgeon: Delbert Butcher MD;  Location: [facility] MAIN OR;  Service: Plastics;  Laterality: Bilateral;   • CHOLECYSTECTOMY     • COLONOSCOPY N/A 12/14/2018    Procedure: COLONOSCOPY into cecum/termial ileum with polypectomy;  Surgeon: Jose Daniel Dooley MD;  Location: [facility] ENDOSCOPY;  Service: Gastroenterology   • ENDOSCOPY N/A 12/14/2018    Procedure: ESOPHAGOGASTRODUODENOSCOPY with biopsy;  Surgeon: Jose Daniel Dooley MD;  Location: [facility] ENDOSCOPY;  Service:  Gastroenterology   • EXPLORATORY LAPAROTOMY      bleeding from ruptured ovarian cyst   • HERNIA REPAIR      umbilical x 2   • OVARIAN CYST DRAINAGE/EXCISION      ruptured ovarian cyst with vblood accumulatine in abdomin   • TONSILLECTOMY     • TOTAL KNEE ARTHROPLASTY Left 2021    Procedure: TOTAL KNEE ARTHROPLASTY;  Surgeon: Clarence Suarez MD;  Location: [facility] OR;  Service: Orthopedics;  Laterality: Left;   • UMBILICAL HERNIA REPAIR      x2      patient is a 76-year-old white female with a 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    Patient reports she was doing her mammograms every 2 years for the last couple of years and had a benign breast biopsy on the left about 10 years ago but otherwise had not had any callbacks or other abnormalities on her imaging.    Patient had diagnostic imaging and a biopsyOn 2021 which revealed atypical lobular hyperplasia at 11:00 and invasive lobular carcinoma grade 1 measuring 14 mm at 11:00 ER % MN % HER2 negative Ki-67 of 7%  Patient was referred to Dr. Adela Cardoso who ordered an MRI which confirmed unifocal disease on the right and a biopsy cavity from the [facility] biopsy with no suspicious enhancement and no obvious adenopathy and a normal left breast.  She then proceeded with a lumpectomy on 1/3/2022 which showed residual 24 mm  low-grade lobular carcinoma with associated lobular carcinoma in situ with no lymphovascular invasion and clear margins no sentinel nodes were removed.  Implants which had been in for over 40 years were both removed    She has had a little problem with some infection in the inframammary area bilaterally but is on antibiotic and this is improving.  She is here to discuss adjuvant treatment options    She is  5 para 3 with 2  miscarriages-first childbirth was at age 21 she did not breast-feed.  Menarche was age 16 menopause at 57 and she took hormones for a few months and stopped.    Family history is completely negative for malignancy except in a maternal grandmother who may have had cervical cancer her 47 gene Inv[employer] panel was negative    She is not a smoker or drinker  She has been on anticoagulation for 7 to 8 years for unprovoked massive PE with a negative work-up  She has had breast implants since 1977 and they were removed at the time of surgery  She has not had a heart attack or stroke    Explained in detail the rationale for surgery and adjuvant treatment with micrometastasis that can give rise to recurrence of her cancer if not treated adequately.  We discussed the fact that the use of hormonal blockade would decrease the risk of recurrence by 30 to 40% depending on grade and histology .    Based on her thrombotic risk and anticoagulation I have recommended avoiding tamoxifen and using aromatase inhibitor and we will start with Arimidex which we would recommend for 5 years at least.  The side effects and toxicities of the Aromatase inhibitors was discussed with the patient including, hot flashes, mood swings and hair thinning.Significant arthralgias and worsening bone density were also discussed. Baseline bone density evaluation was ordered.    She has been referred to radiation and they will decide whether radiation is indicated and if so she will start her Arimidex towards the end of radiation but if no radiation is planned she can start in about 2 weeks    She is agreeable and I told her to call if she has problems with the hormonal blockade  The patient and her daughter are comfortable with the decision to proceed with hormonal blockade    She will receive return in 3 to 4 months to assess her tolerance with a DEXA scan and we will consider the use of Prolia if her bone density is worse      Current Outpatient Medications  on File Prior to Visit   Medication Sig Dispense Refill   • acetaminophen (TYLENOL) 325 MG tablet Take 2 tablets by mouth Every 4 (Four) Hours As Needed for Mild Pain . 60 tablet 0   • calcium carbonate (TUMS) 500 MG chewable tablet Chew 1 tablet As Needed for Indigestion or Heartburn.     • cephalexin (KEFLEX) 500 MG capsule Take 4 capsules by mouth one hour prior to procedure. 4 capsule 2   • cetirizine (zyrTEC) 10 MG tablet Take 10 mg by mouth Every Night.     • cholecalciferol (VITAMIN D3) 25 MCG (1000 UT) tablet Take 1,000 Units by mouth Every Morning.     • rivaroxaban (Xarelto) 20 MG tablet Take 1 tablet by mouth Daily With Dinner. 30 tablet 0   • SYNTHROID 112 MCG tablet Take 112 mcg by mouth Every Night.       Current Facility-Administered Medications on File Prior to Visit   Medication Dose Route Frequency Provider Last Rate Last Admin   • Chlorhexidine Gluconate 2 % pads 1 each  1 each Apply externally Take As Directed Clarence Suarez MD            ALLERGIES:    Allergies   Allergen Reactions   • Penicillins Other (See Comments)     Passes out   • Latex Rash   • Sulfa Antibiotics Nausea And Vomiting        Social History     Socioeconomic History   • Marital status:      Spouse name: SANDRA   • Number of children: 3   • Years of education: COLLEGE   Tobacco Use   • Smoking status: Former Smoker     Packs/day: 1.00     Years: 10.00     Pack years: 10.00     Types: Cigarettes     Start date: 1964     Quit date: 1976     Years since quittin.0   • Smokeless tobacco: Never Used   Vaping Use   • Vaping Use: Never used   Substance and Sexual Activity   • Alcohol use: Yes     Alcohol/week: 3.0 standard drinks     Types: 3 Glasses of wine per week   • Drug use: No   • Sexual activity: Not Currently     Partners: Male     Birth control/protection: Inserts, Post-menopausal, None        Family History   Problem Relation Age of Onset   • Hypertension Mother    • Osteoporosis Mother    •  "Scoliosis Mother    • Arthritis Mother    • Hypertension Father    • Arthritis Father    • Stroke Paternal Aunt    • Cervical cancer Maternal Grandmother 68   • Prostate cancer Brother 74   • Hypertension Brother    • Hyperlipidemia Brother    • Alzheimer's disease Brother    • Malig Hyperthermia Neg Hx         Review of Systems   Gastrointestinal:        Reflux symptoms   Skin: Negative for color change.   Neurological: Positive for headaches (Migraines).        Objective     Vitals:    09/15/22 1123   BP: 151/86   Pulse: 72   Resp: 18   Temp: 97.2 °F (36.2 °C)   TempSrc: Temporal   SpO2: 97%   Weight: 106 kg (232 lb 14.4 oz)   Height: 165 cm (64.96\")   PainSc: 0-No pain     Current Status 9/15/2022   ECOG score 0       Physical Exam       CONSTITUTIONAL:  Vital signs reviewed.  No distress, looks comfortable.  EYES:  Conjunctivae and lids unremarkable.  PERRLA  EARS,NOSE,MOUTH,THROAT:  Ears and nose appear unremarkable.  Lips, teeth, gums appear unremarkable.  RESPIRATORY:  Normal respiratory effort.  Lungs clear to auscultation bilaterally.  BREASTS: Right breast shows a lift and no definite lumpectomy scar left breast also shows left with some redness and a small furuncle below the left breast  CARDIOVASCULAR:  Normal S1, S2.  No murmurs rubs or gallops.  No significant lower extremity edema.  GASTROINTESTINAL: Abdomen appears unremarkable.  Nontender.  No hepatomegaly.  No splenomegaly.  LYMPHATIC:  No cervical, supraclavicular, axillary lymphadenopathy.  SKIN:  Warm.  No rashes.  PSYCHIATRIC:  Normal judgment and insight.  Normal mood and affect.  I have reexamined the patient and the results are consistent with the previously documented exam. Len Jaramillo MD       RECENT LABS:  Hematology WBC   Date Value Ref Range Status   09/15/2022 4.73 3.40 - 10.80 10*3/mm3 Final     RBC   Date Value Ref Range Status   09/15/2022 4.31 3.77 - 5.28 10*6/mm3 Final     Hemoglobin   Date Value Ref Range Status "   09/15/2022 14.2 12.0 - 15.9 g/dL Final     Hematocrit   Date Value Ref Range Status   09/15/2022 43.0 34.0 - 46.6 % Final     Platelets   Date Value Ref Range Status   09/15/2022 190 140 - 450 10*3/mm3 Final        Synoptic Checklist     INVASIVE CARCINOMA OF THE BREAST: Resection  8th Edition - Protocol posted: 6/30/2021  INVASIVE CARCINOMA OF THE BREAST: COMPLETE EXCISION - All Specimens  SPECIMEN   Procedure  Excision (less than total mastectomy)    Specimen Laterality  Right    TUMOR   Tumor Site  Upper outer quadrant    Histologic Type  Invasive lobular carcinoma    Histologic Grade (Maryann Histologic Score)     Glandular (Acinar) / Tubular Differentiation  Score 3    Nuclear Pleomorphism  Score 1    Mitotic Rate  Score 1    Overall Grade  Grade 1 (scores of 3, 4 or 5)    Tumor Size  Greatest dimension of largest invasive focus (Millimeters): 24 mm   Tumor Focality  Single focus of invasive carcinoma    Ductal Carcinoma In Situ (DCIS)  Not identified    Lobular Carcinoma In Situ (LCIS)  Present    Tumor Extent     Lymphovascular Invasion  Not identified    Dermal Lymphovascular Invasion  Not identified    Microcalcifications  Present in non-neoplastic tissue    Treatment Effect in the Breast  No known presurgical therapy    MARGINS   Margin Status for Invasive Carcinoma  All margins negative for invasive carcinoma    Distance from Invasive Carcinoma to Closest Margin  2 mm   Closest Margin(s) to Invasive Carcinoma  Posterior    Distance from Invasive Carcinoma to Anterior Margin  20 mm   Distance from Invasive Carcinoma to Posterior Margin  2 mm   Distance from Invasive Carcinoma to Superior Margin  53 mm   Distance from Invasive Carcinoma to Inferior Margin  15.3 mm   Distance from Invasive Carcinoma to Medial Margin  28 mm   Distance from Invasive Carcinoma to Lateral Margin  16.2 mm   REGIONAL LYMPH NODES   Regional Lymph Node Status  Not applicable (no regional lymph nodes submitted or found)     PATHOLOGIC STAGE CLASSIFICATION (pTNM, AJCC 8th Edition)      pT Category  pT2    pN Category  pN not assigned (no nodes submitted or found)    Breast Biomarker Testing Performed on Previous Biopsy     Estrogen Receptor (ER) Status  Positive (greater than 10% of cells demonstrate nuclear positivity)    Percentage of Cells with Nuclear Positivity  %    Breast Biomarker Testing Performed on Previous Biopsy     Progesterone Receptor (PgR) Status  Positive    Percentage of Cells with Nuclear Positivity  %    Breast Biomarker Testing Performed on Previous Biopsy     HER2 (by immunohistochemistry)  Negative (Score 1+)    Breast Biomarker Testing Performed on Previous Biopsy     Ki-67 Percentage of Positive Nuclei  7 %            Assessment & Plan   1.pT2Nx grade 1 invasive lobular carcinoma left breast strongly ER/NC positive HER2 negative postlumpectomy with associated LCIS and ALH  Arimidex started in 3/22  Stopped in 8/22-changed to Aromasin in 9/22    2. DVT and PE on lifelong anticoagulation with Xarelto    3, hypothyroidism on treatment    4, 37 gene Invitae panel negative    5. osteopenia in the hips on calcium and vitamin D-  · DEXA scan stable in 4 /22  · hold off on Prolia for now and recheck bone density in 1 year    Plan  1.  Stop Arimidex 1 mg daily changed to Aromasin 25 mg daily  2.  Supplement vitamin D and calcium  3.  See  me in 4 months and we will repeat a bone density in April 2023.   4. Her mammogram is due again this month

## 2022-09-23 ENCOUNTER — HOSPITAL ENCOUNTER (OUTPATIENT)
Dept: MAMMOGRAPHY | Facility: HOSPITAL | Age: 77
Discharge: HOME | End: 2022-09-23
Admitting: NURSE PRACTITIONER

## 2022-09-23 DIAGNOSIS — Z17.0 MALIGNANT NEOPLASM OF UPPER-OUTER QUADRANT OF RIGHT BREAST IN FEMALE, ESTROGEN RECEPTOR POSITIVE: ICD-10-CM

## 2022-09-23 DIAGNOSIS — C50.411 MALIGNANT NEOPLASM OF UPPER-OUTER QUADRANT OF RIGHT BREAST IN FEMALE, ESTROGEN RECEPTOR POSITIVE: ICD-10-CM

## 2022-09-23 DIAGNOSIS — Z12.31 ENCOUNTER FOR SCREENING MAMMOGRAM FOR MALIGNANT NEOPLASM OF BREAST: ICD-10-CM

## 2022-09-23 PROCEDURE — 77067 SCR MAMMO BI INCL CAD: CPT

## 2022-09-23 PROCEDURE — 77063 BREAST TOMOSYNTHESIS BI: CPT

## 2022-10-03 RX ORDER — CEPHALEXIN 500 MG/1
CAPSULE ORAL
Qty: 4 CAPSULE | Refills: 2 | Status: SHIPPED | OUTPATIENT
Start: 2022-10-03

## 2022-10-18 ENCOUNTER — OFFICE VISIT (OUTPATIENT)
Dept: SURGERY | Facility: CLINIC | Age: 77
End: 2022-10-18

## 2022-10-18 VITALS
BODY MASS INDEX: 38.65 KG/M2 | HEIGHT: 65 IN | SYSTOLIC BLOOD PRESSURE: 110 MMHG | DIASTOLIC BLOOD PRESSURE: 74 MMHG | WEIGHT: 232 LBS

## 2022-10-18 DIAGNOSIS — C50.411 MALIGNANT NEOPLASM OF UPPER-OUTER QUADRANT OF RIGHT BREAST IN FEMALE, ESTROGEN RECEPTOR POSITIVE: Primary | ICD-10-CM

## 2022-10-18 DIAGNOSIS — Z17.0 MALIGNANT NEOPLASM OF UPPER-OUTER QUADRANT OF RIGHT BREAST IN FEMALE, ESTROGEN RECEPTOR POSITIVE: Primary | ICD-10-CM

## 2022-10-18 PROCEDURE — 99213 OFFICE O/P EST LOW 20 MIN: CPT | Performed by: NURSE PRACTITIONER

## 2022-10-18 NOTE — PROGRESS NOTES
[facility]     Referring Provider:  Dr. Sri Conner     Chief complaint: breast cancer follow up     HPI:   12/9/21: seen by Dr Cardoso  MsHever Yee is a 77 yo woman, seen at the request of Dr. Sri Conner, for a new diagnosis of right breast cancer. This was initially detected as an imaging abnormality on routine screening. Her work-up is detailed in the oncologic history below. Prior to the biopsies, she denies any breast lumps, pain, skin changes, or nipple discharge. She has a past history of a benign left breast surgical biopsy in 2000. She also has a past history of prepectoral saline implants placed in 1976. She has a past history of an unprovoked pulmonary embolus in 2011, for which she is on Xarelto. She held this over the summer for her knee replacement surgery without any issues. She denies any family history of breast or ovarian cancer, however her brother did have prostate cancer.      1/20/22: seen by Dr Cardoso  She underwent right partial mastectomy with oncoplastic closure and left reduction for symmetry on 1/3/22. See surgery & pathology details below in oncologic history. She has been recovering well, aside from itching and a generalized rash. She has already seen Dr. Jaramillo postoperatively and discussed eventually starting Arimidex.    4/27/22:  She returns today for follow up with no breast concerns, she started arimidex 3/1/22 with no noted side effects.  In late 3/22 she fell with injury to her left knee, no injury to knee implant thankfully.    Her last clinic visit with Dr Celaya was in 2/22:  Iris Yee has recently completed the course of radiation therapy prescribed for the above-mentioned diagnosis    She met with Dr Jaramillo in 1/22:  Based on her thrombotic risk and anticoagulation I have recommended avoiding tamoxifen and using aromatase inhibitor and we will start with Arimidex which we would recommend for 5 years at least.     10/18/22, Interval  History:  She returns today for follow up with no breast complaints, she is now taking aromasin with no adverse effects.    Her last clinic visit with Dr Jaramillo was in 9/22: She is here with her daughter today who states that her personality change she was achy and very uncomfortable and felt terrible overall and stopped it about a month ago and she is now back to baseline.  Stop Arimidex 1 mg daily changed to Aromasin 25 mg daily    Screening with tomosynthesis on 9/23/22 was stable, BiRads 2.  (see full report below)       Oncology/Hematology History Overview Note   12/10/2019, Screening MMG with [facility] ( [facility]):  Negative mammogram with breast implants in place and showing no change from 07/24/2017.  BI-RADS 1: Negative.     Malignant neoplasm of upper-outer quadrant of right breast in female, estrogen receptor positive (HCC)   9/20/2021 Initial Diagnosis    Malignant neoplasm of upper-outer quadrant of right breast in female, estrogen receptor positive (HCC)     9/21/2021 Imaging    Screening MMG with Jorge ( [facility]):  Scattered areas of fibroglandular density. There are bilateral retroglandular silicone breast implants with capsular calcifications. There is an area of architectural distortion in the slightly upper central middle to anterior right breast, best appreciated on Tomosynthesis. There are no suspicious masses, calcifications, or areas of architectural distortion in the left breast.  BI-RADS 0: Incomplete.     11/2/2021 Imaging    Right Diagnostic MMG with Jorge & Right Breast US ( [facility]):  MMG:  With additional imaging there is persistence of the area of architectural distortion in the middle third of the right breast lateral to the plane of the nipple.   US:  At the 11 o'clock position on the order 3 cm from the nipple there is a 1.2 cm ill-defined hypoechoic region with posterior acoustic shadowing and mild detectable internal vascularity.   There is an adjacent 0.6 cm ill-defined hypoechoic lesion that  is also seen at the 11 o'clock position on the order of 3 cm from the nipple that demonstrates posterior acoustic shadowing.  BI-RADS 4: Suspicious.     11/18/2021 Biopsy    Right Breast, US-Guided Biopsy x 2 ( [facility]):    1. Right Breast at 11:00 o'clock, 3 cm from Nipple (not for calcifications), Core Biopsy:                 A. Multifocal atypical lobular hyperplasia (ALH) with foci of usual ductal hyperplasia (UDH).               B. No ductal carcinoma in situ nor invasive carcinoma identified  -Tribell clip.     2. Right Breast mass at 11:00 o'clock, 3 cm from Nipple (not for calcifications), Core Biopsy:                 A. Invasive lobular carcinoma:                            1. Overall Philadelphia grade I (tubular score=3, nuclear score=1, mitotic score=1).                            2. Invasive carcinoma measures at least 14 mm.                3. No lymphovascular space invasion identified.  B. Associated atypical lobular hyperplasia (ALH) noted.  C. No definitive in situ carcinoma component identified.  D. Focal columnar cell change, usual duct hyperplasia (UDH) and micropapilloma noted.  -Bowtie clip.     ER+ (%, strong)  MI+ (%, strong)  HER2 negative (IHC 1+)  Ki-67 7%     12/3/2021 Imaging    [facility]  In the right breast centered at the 11:30 position centered on the order of 3 cm from the nipple there are 2 focal signal voids that are  by 1.1 cm. This represents the tri-bell-shaped and bowtie shaped metallic clips. The bowtie-shaped metallic clip is the more medial and slightly posterior metallic clip and represents the site of  invasive lobular carcinoma. There is evidence of an irregularly-shaped T1 hyperintense peripherally enhancing mass that surrounds the biopsy clips that measures on the order of 3.1 cm in anterior to posterior dimension, 1.5 cm in the superior to inferior dimension, and 2.6 cm in the medial to lateral dimension, and is consistent with a  hematoma cavity. The bowtie-shaped metallic clip which represents the biopsy-proven site of malignancy is on the order of 0.6 cm anterior to an area of architectural distortion with mild enhancement measures on the order of 1.3 cm in superior to inferior dimension, 1.1 cm in the medial to lateral dimension and 1.0 cm in anterior to posterior dimension. This is most consistent with residual malignancy at the biopsy-proven site of malignancy. The tri-bell-shaped metallic clip is not surrounded by any abnormal enhancement but is within a hematoma  cavity.   No other areas of abnormal enhancement or morphology are seen in the right breast. I see no evidence for abnormal right breast skin, nipple or chest wall enhancement and there is no evidence for right axillary or  internal mammary chain adenopathy.   There are no areas of abnormal enhancement or morphology in the left breast. I see no evidence for abnormal left breast skin, nipple or chest wall enhancement and there is no evidence for left axillary or internal mammary chain adenopathy.  Bilateral retroglandular silicone implants are noted. The left silicone implant shows irregularity at the posterior margin of the implant, but no evidence for free silicone is appreciated.  BI-RADS 6: Known malignancy.     12/9/2021 Genetic Testing    [facility] Cancers Panel (37 genes):    Negative     1/3/2022 Surgery    Right needle-localized, bracketed, partial mastectomy with oncoplastic closure and left reduction for symmetry    1. Right Breast, Oriented Needle Localization Lumpectomy (69 grams):  INVASIVE LOBULAR CARCINOMA.               A. Tumor site:  Upper outer quadrant (11:00 o'clock, 11:30).               B. Histologic grade:  Maryann score I (tubules=3, nuclei=1, mitosis=1).               C. Tumor size: ~24 mm maximally (present focally in slices 7 and 11 and throughout slices 8-10, slices 6mm).               D. Tumor focality:  Single focus.                E. Ductal carcinoma in-situ (DCIS):  Not identified.               F. Lobular carcinoma in-situ (LCIS):  Present.               G. Tumor extent:  Overlying skin is uninvolved by carcinoma.               H. No lymphovascular invasion identified.               I.  Margins:  Uninvolved by in-situ and invasive carcinoma.                            1. Invasive carcinoma is present 0.3 mm from the original inferior margin, 1.2 mm from the lateral                                margin, 2 mm from the posterior margin, 13 mm from the medial margin, 20 mm from the anterior                                margin and 38 mm from the superior margin of excision in specimen 1.               J. Lymph nodes:  Not submitted.               K. Hormone receptor status:  ER - % Positive, ME - % Positive, Her2/jed - 1+ Negative, Ki67 - 7% (performed on prior biopsy [id number]).    L. Pathologic stage:  pT2, NX.     2. Right Breast, Additional Medial/Superior Margin, Oriented Excision:               A. Benign unremarkable breast tissue.               B. Final medial and superior margin are free of tumor by an additional 15 mm.     3. Right Breast, Additional Inferior/Lateral Margin, Oriented Excision:               A. Benign breast tissue with foci of usual ductal hyperplasia, atypical lobular hyperplasia and lobular carcinoma in-situ (LCIS).               B. No evidence of invasive carcinoma identified.               C. Final inferior and lateral margin are free by an additional 15 mm.     4. Left Breast, Reduction Mammoplasty (163 grams):                A. Benign breast tissue with foci of pseudoangiomatous stromal hyperplasia (PASH).               B. Benign microcalcifications.               C. Unremarkable skin.               D. No evidence of in-situ nor invasive carcinoma identified.     5. Skin, Right Breast, Excision:  Benign unremarkable skin.     6. Right Breast, Capsulectomy:                A. Explanted intact breast  prosthesis.               B. Benign fibrous capsule.      7. Left Breast, Capsulectomy:  Benign hyalinized breast capsule with fat necrosis and dystrophic calcification.     2/8/2022 - 2/28/2022 Radiation    Radiation OncologyTreatment Course:  Iris Yee received 4050 cGy in 15 fractions to Right breast     2/9/2022 -  Hormonal Therapy    Anastrozole (Arimidex)     5/12/2022 Cancer Staged    Staging form: Breast, AJCC 8th Edition  - Pathologic: Stage Unknown (pT2, pNX, G1, ER+, WV+, HER2-) - Signed by Len Jaramillo MD on 5/12/2022 9/23/2022 Imaging    Bilateral screening mammogram with tomosynthesis at [facility]  FINDINGS: Bilateral digital CC and MLO mammographic and digitalTomosynthesis images were obtained. Comparison is made to prior studiesdated 9/21/2021, 11/2/2021 and 11/18/2021 .   The parenchyma of both breasts is largely fatty-replaced. Postsurgical change of the right breast is noted. There is an area of increased density in the posterior one third of the right breast associated with prior breast implant removal. There is no evidence for axillary lymphadenopathy or nipple retraction.   IMPRESSION:  1. There is no evidence for malignancy in either breast. Routine followup mammography is recommended.   BI-RADS category 2: Benign     5/12/2023 -  Chemotherapy    OP SUPPORTIVE Denosumab (Prolia) Q6M         Review of Systems:  See interval history.       Medications:    Current Outpatient Medications:   •  acetaminophen (TYLENOL) 325 MG tablet, Take 2 tablets by mouth Every 4 (Four) Hours As Needed for Mild Pain ., Disp: 60 tablet, Rfl: 0  •  calcium carbonate (TUMS) 500 MG chewable tablet, Chew 1 tablet As Needed for Indigestion or Heartburn., Disp: , Rfl:   •  cephalexin (KEFLEX) 500 MG capsule, Take 4 capsules by mouth one hour prior to procedure., Disp: 4 capsule, Rfl: 2  •  cetirizine (zyrTEC) 10 MG tablet, Take 10 mg by mouth Every Night., Disp: , Rfl:   •  cholecalciferol (VITAMIN D3)  "25 MCG (1000 UT) tablet, Take 1,000 Units by mouth Every Morning., Disp: , Rfl:   •  exemestane (AROMASIN) 25 MG chemo tablet, Take 1 tablet by mouth Daily., Disp: 90 tablet, Rfl: 3  •  rivaroxaban (Xarelto) 20 MG tablet, Take 1 tablet by mouth Daily With Dinner., Disp: 30 tablet, Rfl: 0  •  SYNTHROID 112 MCG tablet, Take 112 mcg by mouth Every Night., Disp: , Rfl:   No current facility-administered medications for this visit.    Facility-Administered Medications Ordered in Other Visits:   •  Chlorhexidine Gluconate 2 % pads 1 each, 1 each, Apply externally, Take As Directed, Clarence Suarez MD      Allergies   Allergen Reactions   • Penicillins Other (See Comments)     Passes out   • Latex Rash   • Sulfa Antibiotics Nausea And Vomiting       Family History   Problem Relation Age of Onset   • Hypertension Mother    • Osteoporosis Mother    • Scoliosis Mother    • Arthritis Mother    • Hypertension Father    • Arthritis Father    • Stroke Paternal Aunt    • Cervical cancer Maternal Grandmother 68   • Prostate cancer Brother 74   • Hypertension Brother    • Hyperlipidemia Brother    • Alzheimer's disease Brother    • Malig Hyperthermia Neg Hx        PHYSICAL EXAMINATION:   Vitals:    10/18/22 0850   BP: 110/74      /74 (BP Location: Right arm)   Ht 165.1 cm (65\")   Wt 105 kg (232 lb)   LMP  (LMP Unknown)   BMI 38.61 kg/m²     I reviewed physical exam, no changes noted    ECOG 0 - Asymptomatic  General: NAD, well appearing  Psych: a&o x3, normal mood and affect  Eyes: EOMI, no scleral icterus  ENMT: neck supple without masses or thyromegaly, mucous membranes moist  MSK: normal gait, normal ROM in bilateral shoulders  Lymph nodes: no cervical, supraclavicular or axillary lymphadenopathy  Breast:   Right: Sp mastopexy with well-healed incisions. Flaps and NAC healthy. No visible abnormalities on inspection while seated, with arms raised or hands on hips. No masses, skin changes, or nipple " abnormalities.  Left: Sp mastopexy with well-healed incisions, mild scarring of NAC incisions. Flaps and NAC healthy. No visible abnormalities on inspection while seated, with arms raised or hands on hips. No masses, skin changes, or nipple abnormalities.      Assessment:   1)   77 y.o. F with a diagnosis of right breast cancer: Low grade, invasive lobular carcinoma, ER/SD+, Her2 negative. She underwent right partial mastectomy with oncoplastic closure and left reduction for symmetry on 1/3/22, pT2Nx. Arimidex, aromasin currently    2)  Obesity, BMI 38    Discussion:  We discussed her follow up which includes clinical breast exam every 6 months for 2 years (11/2023), with mammogram annually then  mammogram and exam annually until 5 years from diagnosis ( 11/26).    BMI is 38  Patient was counseled on the importance of avoidance of obesity for a number of health issues including breast cancer. Diet changes and exercise were recommended.     Class 2 Severe Obesity (BMI >=35 and <=39.9). Obesity-related health conditions include the following: GERD. Obesity is unchanged. BMI is is above average; BMI management plan is completed. We discussed portion control and increasing exercise.        Plan:  -Continue follow-up with Dr. Jaramillo.  - continue follow up with Dr Celaya  - exam in 6 months   -She was instructed to call sooner with any questions, concerns or changes on BSE.    MANUEL Mancini      CC:   Dr. Sri Vasquez MD

## 2022-11-01 ENCOUNTER — TRANSCRIBE ORDERS (OUTPATIENT)
Dept: ADMINISTRATIVE | Facility: HOSPITAL | Age: 77
End: 2022-11-01

## 2022-11-01 DIAGNOSIS — Z13.6 ENCOUNTER FOR SCREENING FOR VASCULAR DISEASE: Primary | ICD-10-CM

## 2023-01-19 ENCOUNTER — OFFICE VISIT (OUTPATIENT)
Dept: ONCOLOGY | Facility: CLINIC | Age: 78
End: 2023-01-19
Payer: MEDICARE

## 2023-01-19 ENCOUNTER — LAB (OUTPATIENT)
Dept: LAB | Facility: HOSPITAL | Age: 78
End: 2023-01-19
Payer: MEDICARE

## 2023-01-19 VITALS
DIASTOLIC BLOOD PRESSURE: 72 MMHG | RESPIRATION RATE: 18 BRPM | TEMPERATURE: 97 F | HEIGHT: 65 IN | HEART RATE: 76 BPM | WEIGHT: 235.6 LBS | BODY MASS INDEX: 39.25 KG/M2 | SYSTOLIC BLOOD PRESSURE: 119 MMHG | OXYGEN SATURATION: 96 %

## 2023-01-19 DIAGNOSIS — Z17.0 MALIGNANT NEOPLASM OF UPPER-OUTER QUADRANT OF RIGHT BREAST IN FEMALE, ESTROGEN RECEPTOR POSITIVE: Primary | ICD-10-CM

## 2023-01-19 DIAGNOSIS — Z79.811 AROMATASE INHIBITOR USE: ICD-10-CM

## 2023-01-19 DIAGNOSIS — C50.411 MALIGNANT NEOPLASM OF UPPER-OUTER QUADRANT OF RIGHT BREAST IN FEMALE, ESTROGEN RECEPTOR POSITIVE: Primary | ICD-10-CM

## 2023-01-19 DIAGNOSIS — Z09 ENCOUNTER FOR FOLLOW-UP EXAMINATION AFTER COMPLETED TREATMENT FOR CONDITIONS OTHER THAN MALIGNANT NEOPLASM: ICD-10-CM

## 2023-01-19 DIAGNOSIS — Z17.0 MALIGNANT NEOPLASM OF UPPER-OUTER QUADRANT OF RIGHT BREAST IN FEMALE, ESTROGEN RECEPTOR POSITIVE: ICD-10-CM

## 2023-01-19 DIAGNOSIS — C50.411 MALIGNANT NEOPLASM OF UPPER-OUTER QUADRANT OF RIGHT BREAST IN FEMALE, ESTROGEN RECEPTOR POSITIVE: ICD-10-CM

## 2023-01-19 LAB
ALBUMIN SERPL-MCNC: 4.3 G/DL (ref 3.5–5.2)
ALBUMIN/GLOB SERPL: 1.7 G/DL (ref 1.1–2.4)
ALP SERPL-CCNC: 95 U/L (ref 38–116)
ALT SERPL W P-5'-P-CCNC: 15 U/L (ref 0–33)
ANION GAP SERPL CALCULATED.3IONS-SCNC: 12.2 MMOL/L (ref 5–15)
AST SERPL-CCNC: 18 U/L (ref 0–32)
BASOPHILS # BLD AUTO: 0.04 10*3/MM3 (ref 0–0.2)
BASOPHILS NFR BLD AUTO: 0.8 % (ref 0–1.5)
BILIRUB SERPL-MCNC: 0.6 MG/DL (ref 0.2–1.2)
BUN SERPL-MCNC: 18 MG/DL (ref 6–20)
BUN/CREAT SERPL: 18.4 (ref 7.3–30)
CALCIUM SPEC-SCNC: 9.7 MG/DL (ref 8.5–10.2)
CHLORIDE SERPL-SCNC: 103 MMOL/L (ref 98–107)
CO2 SERPL-SCNC: 24.8 MMOL/L (ref 22–29)
CREAT SERPL-MCNC: 0.98 MG/DL (ref 0.6–1.1)
DEPRECATED RDW RBC AUTO: 50.1 FL (ref 37–54)
EGFRCR SERPLBLD CKD-EPI 2021: 60.3 ML/MIN/1.73
EOSINOPHIL # BLD AUTO: 0.11 10*3/MM3 (ref 0–0.4)
EOSINOPHIL NFR BLD AUTO: 2.2 % (ref 0.3–6.2)
ERYTHROCYTE [DISTWIDTH] IN BLOOD BY AUTOMATED COUNT: 13.6 % (ref 12.3–15.4)
GLOBULIN UR ELPH-MCNC: 2.6 GM/DL (ref 1.8–3.5)
GLUCOSE SERPL-MCNC: 96 MG/DL (ref 74–124)
HCT VFR BLD AUTO: 43.4 % (ref 34–46.6)
HGB BLD-MCNC: 14.3 G/DL (ref 12–15.9)
IMM GRANULOCYTES # BLD AUTO: 0.01 10*3/MM3 (ref 0–0.05)
IMM GRANULOCYTES NFR BLD AUTO: 0.2 % (ref 0–0.5)
LYMPHOCYTES # BLD AUTO: 1.01 10*3/MM3 (ref 0.7–3.1)
LYMPHOCYTES NFR BLD AUTO: 19.8 % (ref 19.6–45.3)
MCH RBC QN AUTO: 32.7 PG (ref 26.6–33)
MCHC RBC AUTO-ENTMCNC: 32.9 G/DL (ref 31.5–35.7)
MCV RBC AUTO: 99.3 FL (ref 79–97)
MONOCYTES # BLD AUTO: 0.64 10*3/MM3 (ref 0.1–0.9)
MONOCYTES NFR BLD AUTO: 12.5 % (ref 5–12)
NEUTROPHILS NFR BLD AUTO: 3.29 10*3/MM3 (ref 1.7–7)
NEUTROPHILS NFR BLD AUTO: 64.5 % (ref 42.7–76)
NRBC BLD AUTO-RTO: 0 /100 WBC (ref 0–0.2)
PLATELET # BLD AUTO: 197 10*3/MM3 (ref 140–450)
PMV BLD AUTO: 10.9 FL (ref 6–12)
POTASSIUM SERPL-SCNC: 4.3 MMOL/L (ref 3.5–4.7)
PROT SERPL-MCNC: 6.9 G/DL (ref 6.3–8)
RBC # BLD AUTO: 4.37 10*6/MM3 (ref 3.77–5.28)
SODIUM SERPL-SCNC: 140 MMOL/L (ref 134–145)
WBC NRBC COR # BLD: 5.1 10*3/MM3 (ref 3.4–10.8)

## 2023-01-19 PROCEDURE — 85025 COMPLETE CBC W/AUTO DIFF WBC: CPT

## 2023-01-19 PROCEDURE — 99214 OFFICE O/P EST MOD 30 MIN: CPT | Performed by: INTERNAL MEDICINE

## 2023-01-19 PROCEDURE — 80053 COMPREHEN METABOLIC PANEL: CPT

## 2023-01-19 PROCEDURE — 36415 COLL VENOUS BLD VENIPUNCTURE: CPT

## 2023-01-19 NOTE — PROGRESS NOTES
Subjective     REASON FOR CONSULTATION:  pT2Nx grade 1 lobular carcinoma right breast strongly ER/[place] positive HER2 negative postlumpectomy1/3/2022  Provide an opinion on any further workup or treatment                             REQUESTING PHYSICIAN: MD Sri Willis MD    History of Present Illness patient is a 76-year-old lady with a lobular breast cancer low-grade treated with lumpectomy and and radiation and initially on Arimidex for a month.  This was  started In mid March  She stopped the medication because of side effects and at her last visit we initiated Aromasin and she is tolerating this much better.    She still has some aches and pains but not severe    Mammogram was done in September and benign  I think we can do a bone density again in April 2023 to make sure were not losing further ground and at that point add Prolia if we are      Past Medical History:   Diagnosis Date   • Arthritis    • Arthritis of back 2015   • Arthritis of neck    • Bulging lumbar disc     L 3-4   • Bursitis of hip    • Clotting disorder ([facility])    • COVID-19    • Fracture, fibula    • Fracture, tibia and fibula     Stress fracture   • Frozen shoulder    • GERD (gastroesophageal reflux disease)    • Hip arthrosis    • History of kidney stones    • History of MRSA infection 2020    MICHEL EARS   • History of transfusion 1971    no reaction   • Hx of blood clots 2011   • Hypothyroid    • Kidney stones    • Knee swelling    • Left knee pain    • Lumbosacral disc disease    • Malignant neoplasm of upper-outer quadrant of right breast in female, estrogen receptor positive ([facility]) 12/09/2021   • Neck strain    • Neuroma of foot 2000    Right foot   • Ovarian cyst    • Periarthritis of shoulder    • PONV (postoperative nausea and vomiting)     states gets really sick lasted for 4 days aafter surgery the last time    • Pulmonary embolism, bilateral ([facility])    • Scoliosis 2020    • Stress fracture    • Tear of meniscus of knee    • Thrombopenia (HCC)    • Wears glasses         Past Surgical History:   Procedure Laterality Date   • APPENDECTOMY     • AUGMENTATION MAMMAPLASTY  1976   • BREAST AUGMENTATION      years ago has implants   • BREAST EXCISIONAL BIOPSY Right 10/2021   • BREAST EXCISIONAL BIOPSY Left    • BREAST LUMPECTOMY Right 01/03/2022    Procedure: Right needle-localized, bracketed, partial mastectomy;  Surgeon: Adela Cardoso MD;  Location: [facility] OR;  Service: General;  Laterality: Right;   • BREAST SURGERY Bilateral 01/03/2022    Procedure: RIGHT ONCOPLASTIC REDUCTION AND LEFT REDUCTION FOR SYMMETRY, BILATERAL IMPLANT REMOVAL AND  BILATERAL CAPSULECTOMIES;  Surgeon: Delbert Butcher MD;  Location: [facility] OR;  Service: Plastics;  Laterality: Bilateral;   • CHOLECYSTECTOMY     • COLONOSCOPY N/A 12/14/2018    Procedure: COLONOSCOPY into cecum/termial ileum with polypectomy;  Surgeon: Jose Daniel Dooley MD;  Location: [facility] ENDOSCOPY;  Service: Gastroenterology   • ENDOSCOPY N/A 12/14/2018    Procedure: ESOPHAGOGASTRODUODENOSCOPY with biopsy;  Surgeon: Jose Daniel Dooley MD;  Location: [facility] ENDOSCOPY;  Service: Gastroenterology   • EXPLORATORY LAPAROTOMY      bleeding from ruptured ovarian cyst   • HERNIA REPAIR      umbilical x 2   • OVARIAN CYST DRAINAGE/EXCISION      ruptured ovarian cyst with vblood accumulatine in abdomin   • TONSILLECTOMY     • TOTAL KNEE ARTHROPLASTY Left 06/24/2021    Procedure: TOTAL KNEE ARTHROPLASTY;  Surgeon: Clarence Suarez MD;  Location: [facility] OR;  Service: Orthopedics;  Laterality: Left;   • UMBILICAL HERNIA REPAIR      x2      patient is a 76-year-old white female with a 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    Patient  reports she was doing her mammograms every 2 years for the last couple of years and had a benign breast biopsy on the left about 10 years ago but otherwise had not had any callbacks or other abnormalities on her imaging.    Patient had diagnostic imaging and a biopsyOn 2021 which revealed atypical lobular hyperplasia at 11:00 and invasive lobular carcinoma grade 1 measuring 14 mm at 11:00 ER % MN % HER2 negative Ki-67 of 7%  Patient was referred to Dr. Adela Cardoso who ordered an MRI which confirmed unifocal disease on the right and a biopsy cavity from the [facility] biopsy with no suspicious enhancement and no obvious adenopathy and a normal left breast.  She then proceeded with a lumpectomy on 1/3/2022 which showed residual 24 mm  low-grade lobular carcinoma with associated lobular carcinoma in situ with no lymphovascular invasion and clear margins no sentinel nodes were removed.  Implants which had been in for over 40 years were both removed    She has had a little problem with some infection in the inframammary area bilaterally but is on antibiotic and this is improving.  She is here to discuss adjuvant treatment options    She is  5 para 3 with 2 miscarriages-first childbirth was at age 21 she did not breast-feed.  Menarche was age 16 menopause at 57 and she took hormones for a few months and stopped.    Family history is completely negative for malignancy except in a maternal grandmother who may have had cervical cancer her 47 gene Invitae panel was negative    She is not a smoker or drinker  She has been on anticoagulation for 7 to 8 years for unprovoked massive PE with a negative work-up  She has had breast implants since  and they were removed at the time of surgery  She has not had a heart attack or stroke    Explained in detail the rationale for surgery and adjuvant treatment with micrometastasis that can give rise to recurrence of her cancer if not treated adequately.  We  discussed the fact that the use of hormonal blockade would decrease the risk of recurrence by 30 to 40% depending on grade and histology .    Based on her thrombotic risk and anticoagulation I have recommended avoiding tamoxifen and using aromatase inhibitor and we will start with Arimidex which we would recommend for 5 years at least.  The side effects and toxicities of the Aromatase inhibitors was discussed with the patient including, hot flashes, mood swings and hair thinning.Significant arthralgias and worsening bone density were also discussed. Baseline bone density evaluation was ordered.    She has been referred to radiation and they will decide whether radiation is indicated and if so she will start her Arimidex towards the end of radiation but if no radiation is planned she can start in about 2 weeks    She is agreeable and I told her to call if she has problems with the hormonal blockade  The patient and her daughter are comfortable with the decision to proceed with hormonal blockade    She will receive return in 3 to 4 months to assess her tolerance with a DEXA scan and we will consider the use of Prolia if her bone density is worse    8/22    She is here with her daughter today who states that her personality change she was achy and very uncomfortable and felt terrible overall and stopped it about a month ago and she is now back to baseline    We discussed why we gave adjuvant therapy and they misunderstood and thought it was to prevent a second cancer in her other breast we talked about micrometastasis and the risk of recurrence the 25 to 30% range with the size of her breast cancer with no additional therapy and we have decided to try exemestane and if that causes problems consider tamoxifen because she is on lifelong anticoagulation and should not have clots and it would also help with her bone density    Her bone density is actually stable even though there is significant osteopenia in her hips and  therefore we will hold off on the Prolia for now.  She is taking Tums for calcium but no additional vitamin D except for multivitamin and have asked her to take 1000 units of vitamin D and see how  we do with that        Current Outpatient Medications on File Prior to Visit   Medication Sig Dispense Refill   • acetaminophen (TYLENOL) 325 MG tablet Take 2 tablets by mouth Every 4 (Four) Hours As Needed for Mild Pain . 60 tablet 0   • calcium carbonate (TUMS) 500 MG chewable tablet Chew 1 tablet As Needed for Indigestion or Heartburn.     • cephalexin (KEFLEX) 500 MG capsule Take 4 capsules by mouth one hour prior to procedure. 4 capsule 2   • cetirizine (zyrTEC) 10 MG tablet Take 10 mg by mouth Every Night.     • cholecalciferol (VITAMIN D3) 25 MCG (1000 UT) tablet Take 1,000 Units by mouth Every Morning.     • exemestane (AROMASIN) 25 MG chemo tablet Take 1 tablet by mouth Daily. 90 tablet 3   • rivaroxaban (Xarelto) 20 MG tablet Take 1 tablet by mouth Daily With Dinner. 30 tablet 0   • SYNTHROID 112 MCG tablet Take 112 mcg by mouth Every Night.       Current Facility-Administered Medications on File Prior to Visit   Medication Dose Route Frequency Provider Last Rate Last Admin   • Chlorhexidine Gluconate 2 % pads 1 each  1 each Apply externally Take As Directed Clarence Suarez MD            ALLERGIES:    Allergies   Allergen Reactions   • Penicillins Other (See Comments)     Passes out   • Latex Rash   • Sulfa Antibiotics Nausea And Vomiting        Social History     Socioeconomic History   • Marital status:      Spouse name: SANDRA   • Number of children: 3   • Years of education: COLLEGE   Tobacco Use   • Smoking status: Former     Packs/day: 1.00     Years: 10.00     Pack years: 10.00     Types: Cigarettes     Start date: 1964     Quit date: 1976     Years since quittin.4   • Smokeless tobacco: Never   Vaping Use   • Vaping Use: Never used   Substance and Sexual Activity   • Alcohol use:  "Yes     Alcohol/week: 3.0 standard drinks     Types: 3 Glasses of wine per week   • Drug use: No   • Sexual activity: Not Currently     Partners: Male     Birth control/protection: Inserts, Post-menopausal, None        Family History   Problem Relation Age of Onset   • Hypertension Mother    • Osteoporosis Mother    • Scoliosis Mother    • Arthritis Mother    • Hypertension Father    • Arthritis Father    • Stroke Paternal Aunt    • Cervical cancer Maternal Grandmother 68   • Prostate cancer Brother 74   • Hypertension Brother    • Hyperlipidemia Brother    • Alzheimer's disease Brother    • Malig Hyperthermia Neg Hx         Review of Systems   Gastrointestinal:        Reflux symptoms   Skin: Negative for color change.   Neurological: Positive for headaches (Migraines).        Objective     Vitals:    01/19/23 1425   BP: 119/72   Pulse: 76   Resp: 18   Temp: 97 °F (36.1 °C)   TempSrc: Temporal   SpO2: 96%   Weight: 107 kg (235 lb 9.6 oz)   Height: 165.1 cm (65\")   PainSc: 0-No pain     Current Status 1/19/2023   ECOG score 0       Physical Exam       CONSTITUTIONAL:  Vital signs reviewed.  No distress, looks comfortable.  EYES:  Conjunctivae and lids unremarkable.  PERRLA  EARS,NOSE,MOUTH,THROAT:  Ears and nose appear unremarkable.  Lips, teeth, gums appear unremarkable.  RESPIRATORY:  Normal respiratory effort.  Lungs clear to auscultation bilaterally.  BREASTS: Right breast shows a lift and no definite lumpectomy scar left breast also shows left with some redness and a small furuncle below the left breast  CARDIOVASCULAR:  Normal S1, S2.  No murmurs rubs or gallops.  No significant lower extremity edema.  GASTROINTESTINAL: Abdomen appears unremarkable.  Nontender.  No hepatomegaly.  No splenomegaly.  LYMPHATIC:  No cervical, supraclavicular, axillary lymphadenopathy.  SKIN:  Warm.  No rashes.  PSYCHIATRIC:  Normal judgment and insight.  Normal mood and affect.  I have reexamined the patient and the results are " consistent with the previously documented exam. Len Jaramillo MD       RECENT LABS:  Hematology WBC   Date Value Ref Range Status   01/19/2023 5.10 3.40 - 10.80 10*3/mm3 Final     RBC   Date Value Ref Range Status   01/19/2023 4.37 3.77 - 5.28 10*6/mm3 Final     Hemoglobin   Date Value Ref Range Status   01/19/2023 14.3 12.0 - 15.9 g/dL Final     Hematocrit   Date Value Ref Range Status   01/19/2023 43.4 34.0 - 46.6 % Final     Platelets   Date Value Ref Range Status   01/19/2023 197 140 - 450 10*3/mm3 Final        Synoptic Checklist     INVASIVE CARCINOMA OF THE BREAST: Resection  8th Edition - Protocol posted: 6/30/2021  INVASIVE CARCINOMA OF THE BREAST: COMPLETE EXCISION - All Specimens  SPECIMEN   Procedure  Excision (less than total mastectomy)    Specimen Laterality  Right    TUMOR   Tumor Site  Upper outer quadrant    Histologic Type  Invasive lobular carcinoma    Histologic Grade (Maryann Histologic Score)     Glandular (Acinar) / Tubular Differentiation  Score 3    Nuclear Pleomorphism  Score 1    Mitotic Rate  Score 1    Overall Grade  Grade 1 (scores of 3, 4 or 5)    Tumor Size  Greatest dimension of largest invasive focus (Millimeters): 24 mm   Tumor Focality  Single focus of invasive carcinoma    Ductal Carcinoma In Situ (DCIS)  Not identified    Lobular Carcinoma In Situ (LCIS)  Present    Tumor Extent     Lymphovascular Invasion  Not identified    Dermal Lymphovascular Invasion  Not identified    Microcalcifications  Present in non-neoplastic tissue    Treatment Effect in the Breast  No known presurgical therapy    MARGINS   Margin Status for Invasive Carcinoma  All margins negative for invasive carcinoma    Distance from Invasive Carcinoma to Closest Margin  2 mm   Closest Margin(s) to Invasive Carcinoma  Posterior    Distance from Invasive Carcinoma to Anterior Margin  20 mm   Distance from Invasive Carcinoma to Posterior Margin  2 mm   Distance from Invasive Carcinoma to Superior Margin   53 mm   Distance from Invasive Carcinoma to Inferior Margin  15.3 mm   Distance from Invasive Carcinoma to Medial Margin  28 mm   Distance from Invasive Carcinoma to Lateral Margin  16.2 mm   REGIONAL LYMPH NODES   Regional Lymph Node Status  Not applicable (no regional lymph nodes submitted or found)    PATHOLOGIC STAGE CLASSIFICATION (pTNM, AJCC 8th Edition)      pT Category  pT2    pN Category  pN not assigned (no nodes submitted or found)    Breast Biomarker Testing Performed on Previous Biopsy     Estrogen Receptor (ER) Status  Positive (greater than 10% of cells demonstrate nuclear positivity)    Percentage of Cells with Nuclear Positivity  %    Breast Biomarker Testing Performed on Previous Biopsy     Progesterone Receptor (PgR) Status  Positive    Percentage of Cells with Nuclear Positivity  %    Breast Biomarker Testing Performed on Previous Biopsy     HER2 (by immunohistochemistry)  Negative (Score 1+)    Breast Biomarker Testing Performed on Previous Biopsy     Ki-67 Percentage of Positive Nuclei  7 %            Assessment & Plan   1.pT2Nx grade 1 invasive lobular carcinoma left breast strongly ER/MS positive HER2 negative postlumpectomy with associated LCIS and ALH  · Arimidex started in 3/22  · Stopped in 8/22-changed to Aromasin in 9/22  · Tolerating Aromasin well and 1/23      2. DVT and PE on lifelong anticoagulation with Xarelto    3, hypothyroidism on treatment    4, 37 gene Invitae panel negative    5. osteopenia in the hips on calcium and vitamin D-  · DEXA scan stable in 4 /22  · hold off on Prolia for now and recheck bone density in 1 year    Plan  1.  Continue Aromasin 25 mg daily  2.  Supplement vitamin D and calcium  3.  See  me in NP in 4 months and see me in 8 months.    4. We will repeat a bone density in April 2023.  Add Prolia if it is worsening  5.  Mammogram due again in September 2023

## 2023-03-14 ENCOUNTER — HOSPITAL ENCOUNTER (OUTPATIENT)
Dept: CARDIOLOGY | Facility: HOSPITAL | Age: 78
Discharge: HOME | End: 2023-03-14
Admitting: INTERNAL MEDICINE

## 2023-03-14 VITALS
WEIGHT: 234 LBS | HEART RATE: 75 BPM | SYSTOLIC BLOOD PRESSURE: 133 MMHG | DIASTOLIC BLOOD PRESSURE: 83 MMHG | HEIGHT: 66 IN | BODY MASS INDEX: 37.61 KG/M2

## 2023-03-14 DIAGNOSIS — Z13.6 ENCOUNTER FOR SCREENING FOR VASCULAR DISEASE: ICD-10-CM

## 2023-03-14 LAB
BH CV ECHO MEAS - DIST AO DIAM: 1.89 CM
BH CV VAS BP LEFT ARM: NORMAL MMHG
BH CV VAS BP RIGHT ARM: NORMAL MMHG
BH CV XLRA MEAS - MID AO DIAM: 2.15 CM
BH CV XLRA MEAS - PAD LEFT ABI DP: 1.34
BH CV XLRA MEAS - PAD LEFT ABI PT: 1.28
BH CV XLRA MEAS - PAD LEFT ARM: 133 MMHG
BH CV XLRA MEAS - PAD LEFT LEG DP: 178 MMHG
BH CV XLRA MEAS - PAD LEFT LEG PT: 170 MMHG
BH CV XLRA MEAS - PAD RIGHT ABI DP: 1.24
BH CV XLRA MEAS - PAD RIGHT ABI PT: 1.28
BH CV XLRA MEAS - PAD RIGHT ARM: 132 MMHG
BH CV XLRA MEAS - PAD RIGHT LEG DP: 165 MMHG
BH CV XLRA MEAS - PAD RIGHT LEG PT: 170 MMHG
BH CV XLRA MEAS - PROX AO DIAM: 2 CM
BH CV XLRA MEAS LEFT ICA/CCA RATIO: 1.03
BH CV XLRA MEAS LEFT MID CCA PSV: NORMAL CM/SEC
BH CV XLRA MEAS LEFT MID ICA PSV: NORMAL CM/SEC
BH CV XLRA MEAS LEFT PROX ECA PSV: NORMAL CM/SEC
BH CV XLRA MEAS RIGHT ICA/CCA RATIO: 0.74
BH CV XLRA MEAS RIGHT MID CCA PSV: NORMAL CM/SEC
BH CV XLRA MEAS RIGHT MID ICA PSV: NORMAL CM/SEC
BH CV XLRA MEAS RIGHT PROX ECA PSV: NORMAL CM/SEC
MAXIMAL PREDICTED HEART RATE: 145 BPM
STRESS TARGET HR: 123 BPM

## 2023-03-14 PROCEDURE — 93799 UNLISTED CV SVC/PROCEDURE: CPT

## 2023-04-04 NOTE — PROGRESS NOTES
Subjective     No ref. provider found    Cancer Staging.    4 week follow up pT2nx invasive lobular carcinoma of right breast, ER CA Pos Her 2 neg.                                 S:    I had the pleasure of seeing Iris Yee  [facility].  76 year old female with pT2nx invasive lobular carcinoma of right breast, ER CA Pos Her 2 neg. She returns today for follow up now 4 weeks out from completion of her radiation therapy to the breast.  She has been doing well since I last saw her.      Review of Systems   Constitutional: Negative.    Skin: Positive for color change.         Past Medical History:   Diagnosis Date   • Arthritis    • Arthritis of back 2015   • Arthritis of neck    • Bulging lumbar disc     L 3-4   • Bursitis of hip    • Clotting disorder (HCC)    • Fracture, tibia and fibula     Stress fracture   • GERD (gastroesophageal reflux disease)    • Hip arthrosis    • History of kidney stones    • History of MRSA infection 2020    MICHEL EARS   • History of transfusion 1971    no reaction   • Hx of blood clots 2011   • Hypothyroid    • Kidney stones    • Knee swelling    • Left knee pain    • Lumbosacral disc disease    • Malignant neoplasm of upper-outer quadrant of right breast in female, estrogen receptor positive (HCC) 12/9/2021   • Neck strain    • Neuroma of foot 2000    Right foot   • Ovarian cyst    • Periarthritis of shoulder    • PONV (postoperative nausea and vomiting)     states gets really sick lasted for 4 days aafter surgery the last time    • Pulmonary embolism, bilateral (HCC)    • Scoliosis 2020   • Stress fracture    • Tear of meniscus of knee    • Thrombopenia (HCC)          Past Surgical History:   Procedure Laterality Date   • APPENDECTOMY     • AUGMENTATION MAMMAPLASTY  1976   • BREAST AUGMENTATION      years ago has implants   • BREAST EXCISIONAL BIOPSY Right 10/2021   • BREAST EXCISIONAL BIOPSY Left    • BREAST LUMPECTOMY Right 1/3/2022    Procedure: Right  needle-localized, bracketed, partial mastectomy;  Surgeon: Adela Cardoso MD;  Location: [facility] MAIN OR;  Service: General;  Laterality: Right;   • BREAST SURGERY Bilateral 1/3/2022    Procedure: RIGHT ONCOPLASTIC REDUCTION AND LEFT REDUCTION FOR SYMMETRY, BILATERAL IMPLANT REMOVAL AND  BILATERAL CAPSULECTOMIES;  Surgeon: Delbert Butcher MD;  Location: [facility] MAIN OR;  Service: Plastics;  Laterality: Bilateral;   • CHOLECYSTECTOMY     • COLONOSCOPY N/A 2018    Procedure: COLONOSCOPY into cecum/termial ileum with polypectomy;  Surgeon: Jose Daniel Dooley MD;  Location: [facility] ENDOSCOPY;  Service: Gastroenterology   • ENDOSCOPY N/A 2018    Procedure: ESOPHAGOGASTRODUODENOSCOPY with biopsy;  Surgeon: Jose Daniel Dooley MD;  Location: [facility] ENDOSCOPY;  Service: Gastroenterology   • EXPLORATORY LAPAROTOMY      bleeding from ruptured ovarian cyst   • HERNIA REPAIR      umbilical x 2   • OVARIAN CYST DRAINAGE/EXCISION      ruptured ovarian cyst with vblood accumulatine in abdomin   • TONSILLECTOMY     • TOTAL KNEE ARTHROPLASTY Left 2021    Procedure: TOTAL KNEE ARTHROPLASTY;  Surgeon: Clarence Suarez MD;  Location: [facility] MAIN OR;  Service: Orthopedics;  Laterality: Left;   • UMBILICAL HERNIA REPAIR      x2         Social History     Socioeconomic History   • Marital status:      Spouse name: JOSE DANIEL   • Number of children: 3   • Years of education: COLLEGE   Tobacco Use   • Smoking status: Former Smoker     Packs/day: 1.00     Years: 10.00     Pack years: 10.00     Types: Cigarettes     Start date: 1964     Quit date: 1976     Years since quittin.6   • Smokeless tobacco: Never Used   Vaping Use   • Vaping Use: Never used   Substance and Sexual Activity   • Alcohol use: Yes     Alcohol/week: 3.0 standard drinks     Types: 3 Glasses of wine per week   • Drug use: Never         Family History   Problem Relation Age of Onset   • Hypertension Mother    • Osteoporosis  Mother    • Scoliosis Mother    • Arthritis Mother    • Hypertension Father    • Arthritis Father    • Stroke Paternal Aunt    • Cervical cancer Maternal Grandmother 68   • Prostate cancer Brother 74   • Hypertension Brother    • Hyperlipidemia Brother    • Alzheimer's disease Brother    • Malig Hyperthermia Neg Hx           Objective    Physical Exam  Constitutional:       Appearance: Normal appearance.   Chest:          Comments: Mild hyperpigmentation over right breast  Neurological:      Mental Status: She is alert.           Current Outpatient Medications on File Prior to Visit   Medication Sig Dispense Refill   • acetaminophen (TYLENOL) 325 MG tablet Take 2 tablets by mouth Every 4 (Four) Hours As Needed for Mild Pain . 60 tablet 0   • anastrozole (ARIMIDEX) 1 MG tablet Take 1 mg by mouth Daily.     • calcium carbonate (TUMS) 500 MG chewable tablet Chew 1 tablet As Needed for Indigestion or Heartburn.     • cephalexin (KEFLEX) 500 MG capsule Take 4 capsules by mouth one hour prior to procedure. 4 capsule 2   • cetirizine (zyrTEC) 10 MG tablet Take 10 mg by mouth Every Night.     • rivaroxaban (Xarelto) 20 MG tablet Take 1 tablet by mouth Daily With Dinner. 30 tablet 0   • SYNTHROID 112 MCG tablet Take 112 mcg by mouth Every Night.     • cholecalciferol (VITAMIN D3) 25 MCG (1000 UT) tablet Take 1,000 Units by mouth Every Morning.       Current Facility-Administered Medications on File Prior to Visit   Medication Dose Route Frequency Provider Last Rate Last Admin   • Chlorhexidine Gluconate 2 % pads 1 each  1 each Apply externally Take As Directed Clarence Suarez MD           ALLERGIES:    Allergies   Allergen Reactions   • Penicillins Other (See Comments)     Passes out   • Sulfa Antibiotics Nausea And Vomiting       /95   Pulse 83   LMP  (LMP Unknown)   SpO2 96%      Current Status 1/18/2022   ECOG score 0         Assessment/Plan     76 year old female with pT2nx invasive lobular carcinoma of right  breast, ER MO Pos Her 2 neg. She will due for her yearly mammogram in September 2022 and follow up with Dr. Cardoso shortly after.  She will have regular follow up with her medical oncologist.  I will see her back in one year for follow up.  She knows to call for this appointment.             Thank you very much for allowing me to participate in the care of this very pleasant patient.    Sincerely,      Ciara Celaya MD      Patient is a 75-year-old female who undergoes hemodialysis 3 times a week.  Had just completed her full dialysis treatment this morning, was getting on the scale to get her weight after her dialysis tripped and fell on the scale falling on her outstretched right arm.  She complained of sudden onset of pain to her right humerus.  She denies striking her head.  No loss of consciousness.  No chest pain or shortness of breath.  No other symptoms.  Brought in by emergency ambulance for evaluation.  According to the paramedic or transported the patient she usually has postdialysis confusion, but the patient is now awake alert and oriented.  She denies any other symptoms.  Patient was ambulatory, assisted to the bed by the paramedic from the ambulance stretcher with her walker.

## 2023-04-10 ENCOUNTER — HOSPITAL ENCOUNTER (OUTPATIENT)
Dept: BONE DENSITY | Facility: HOSPITAL | Age: 78
Discharge: HOME | End: 2023-04-10
Admitting: INTERNAL MEDICINE
Payer: MEDICARE

## 2023-04-10 DIAGNOSIS — Z09 ENCOUNTER FOR FOLLOW-UP EXAMINATION AFTER COMPLETED TREATMENT FOR CONDITIONS OTHER THAN MALIGNANT NEOPLASM: ICD-10-CM

## 2023-04-10 DIAGNOSIS — C50.411 MALIGNANT NEOPLASM OF UPPER-OUTER QUADRANT OF RIGHT BREAST IN FEMALE, ESTROGEN RECEPTOR POSITIVE: ICD-10-CM

## 2023-04-10 DIAGNOSIS — Z17.0 MALIGNANT NEOPLASM OF UPPER-OUTER QUADRANT OF RIGHT BREAST IN FEMALE, ESTROGEN RECEPTOR POSITIVE: ICD-10-CM

## 2023-04-10 PROCEDURE — 77080 DXA BONE DENSITY AXIAL: CPT

## 2023-04-17 ENCOUNTER — TELEPHONE (OUTPATIENT)
Dept: ONCOLOGY | Facility: CLINIC | Age: 78
End: 2023-04-17
Payer: MEDICARE

## 2023-04-18 ENCOUNTER — TELEPHONE (OUTPATIENT)
Dept: RADIATION ONCOLOGY | Facility: HOSPITAL | Age: 78
End: 2023-04-18
Payer: MEDICARE

## 2023-04-19 ENCOUNTER — OFFICE VISIT (OUTPATIENT)
Dept: RADIATION ONCOLOGY | Facility: HOSPITAL | Age: 78
End: 2023-04-19
Payer: MEDICARE

## 2023-04-19 ENCOUNTER — HOSPITAL ENCOUNTER (OUTPATIENT)
Dept: RADIATION ONCOLOGY | Facility: HOSPITAL | Age: 78
Setting detail: RADIATION/ONCOLOGY SERIES
End: 2023-04-19
Payer: MEDICARE

## 2023-04-19 VITALS
SYSTOLIC BLOOD PRESSURE: 113 MMHG | HEART RATE: 79 BPM | WEIGHT: 234 LBS | DIASTOLIC BLOOD PRESSURE: 73 MMHG | OXYGEN SATURATION: 94 % | BODY MASS INDEX: 38.35 KG/M2

## 2023-04-19 DIAGNOSIS — C50.411 MALIGNANT NEOPLASM OF UPPER-OUTER QUADRANT OF RIGHT BREAST IN FEMALE, ESTROGEN RECEPTOR POSITIVE: Primary | ICD-10-CM

## 2023-04-19 DIAGNOSIS — Z17.0 MALIGNANT NEOPLASM OF UPPER-OUTER QUADRANT OF RIGHT BREAST IN FEMALE, ESTROGEN RECEPTOR POSITIVE: Primary | ICD-10-CM

## 2023-04-19 PROCEDURE — G0463 HOSPITAL OUTPT CLINIC VISIT: HCPCS

## 2023-04-19 NOTE — PROGRESS NOTES
Subjective     No ref. provider found     Cancer Staging   CC: 1 year follow up for  (pT2, pNX, G1, ER+, NM+, HER2-)                                 S:    I had the pleasure of seeing Iris Yee  [facility].  She is a 76 year old female with pT2nx invasive lobular carcinoma of right breast, ER NM Pos Her 2 neg. She returns today for follow up now 1 year out from completion of her radiation therapy to the breast.  She completed a dose of 3990cGy on 2/28/23.  She has been doing well since I last saw her.  she had a mammogram in September 2022 which was benign.       Review of Systems   Constitutional: Negative.    Skin: Negative.    Psychiatric/Behavioral: Negative.          Past Medical History:   Diagnosis Date   • Arthritis    • Arthritis of back 2015   • Arthritis of neck    • Bulging lumbar disc     L 3-4   • Bursitis of hip    • Clotting disorder    • COVID-19    • Fracture, fibula    • Fracture, tibia and fibula     Stress fracture   • Frozen shoulder    • GERD (gastroesophageal reflux disease)    • Hip arthrosis    • History of kidney stones    • History of MRSA infection 2020    MICHEL EARS   • History of transfusion 1971    no reaction   • Hx of blood clots 2011   • Hypothyroid    • Kidney stones    • Knee swelling    • Left knee pain    • Lumbosacral disc disease    • Malignant neoplasm of upper-outer quadrant of right breast in female, estrogen receptor positive 12/09/2021   • Neck strain    • Neuroma of foot 2000    Right foot   • Ovarian cyst    • Periarthritis of shoulder    • PONV (postoperative nausea and vomiting)     states gets really sick lasted for 4 days aafter surgery the last time    • Pulmonary embolism, bilateral    • Scoliosis 2020   • Stress fracture    • Tear of meniscus of knee    • Thrombopenia    • Wears glasses          Past Surgical History:   Procedure Laterality Date   • APPENDECTOMY     • AUGMENTATION MAMMAPLASTY  1976   • BREAST AUGMENTATION      years  ago has implants   • BREAST EXCISIONAL BIOPSY Right 10/2021   • BREAST EXCISIONAL BIOPSY Left    • BREAST LUMPECTOMY Right 2022    Procedure: Right needle-localized, bracketed, partial mastectomy;  Surgeon: Adela Cardoso MD;  Location: [facility] OR;  Service: General;  Laterality: Right;   • BREAST SURGERY Bilateral 2022    Procedure: RIGHT ONCOPLASTIC REDUCTION AND LEFT REDUCTION FOR SYMMETRY, BILATERAL IMPLANT REMOVAL AND  BILATERAL CAPSULECTOMIES;  Surgeon: Delbert Butcher MD;  Location: [facility] MAIN OR;  Service: Plastics;  Laterality: Bilateral;   • CHOLECYSTECTOMY     • COLONOSCOPY N/A 2018    Procedure: COLONOSCOPY into cecum/termial ileum with polypectomy;  Surgeon: Jose Daniel Dooley MD;  Location: [facility] ENDOSCOPY;  Service: Gastroenterology   • ENDOSCOPY N/A 2018    Procedure: ESOPHAGOGASTRODUODENOSCOPY with biopsy;  Surgeon: Jose Daniel Dooley MD;  Location: [facility] ENDOSCOPY;  Service: Gastroenterology   • EXPLORATORY LAPAROTOMY      bleeding from ruptured ovarian cyst   • HERNIA REPAIR      umbilical x 2   • OVARIAN CYST DRAINAGE/EXCISION      ruptured ovarian cyst with vblood accumulatine in abdomin   • TONSILLECTOMY     • TOTAL KNEE ARTHROPLASTY Left 2021    Procedure: TOTAL KNEE ARTHROPLASTY;  Surgeon: Clarence Suarez MD;  Location: [facility] OR;  Service: Orthopedics;  Laterality: Left;   • UMBILICAL HERNIA REPAIR      x2         Social History     Socioeconomic History   • Marital status:      Spouse name: JOSE DANIEL   • Number of children: 3   • Years of education: COLLEGE   Tobacco Use   • Smoking status: Former     Packs/day: 1.00     Years: 10.00     Pack years: 10.00     Types: Cigarettes     Start date: 1964     Quit date: 1976     Years since quittin.6   • Smokeless tobacco: Never   Vaping Use   • Vaping Use: Never used   Substance and Sexual Activity   • Alcohol use: Yes     Alcohol/week: 3.0 standard drinks     Types:  3 Glasses of wine per week   • Drug use: No   • Sexual activity: Not Currently     Partners: Male     Birth control/protection: Inserts, Post-menopausal, None         Family History   Problem Relation Age of Onset   • Hypertension Mother    • Osteoporosis Mother    • Scoliosis Mother    • Arthritis Mother    • Hypertension Father    • Arthritis Father    • Stroke Paternal Aunt    • Cervical cancer Maternal Grandmother 68   • Prostate cancer Brother 74   • Hypertension Brother    • Hyperlipidemia Brother    • Alzheimer's disease Brother    • Malig Hyperthermia Neg Hx           Objective    Physical Exam  Constitutional:       Appearance: Normal appearance.   Chest:          Comments: No hyperpigmentation , no palpable masses in either breast or axilla  Neurological:      General: No focal deficit present.      Mental Status: She is alert.      Gait: Gait normal.   Psychiatric:         Mood and Affect: Mood normal.           Current Outpatient Medications on File Prior to Visit   Medication Sig Dispense Refill   • acetaminophen (TYLENOL) 325 MG tablet Take 2 tablets by mouth Every 4 (Four) Hours As Needed for Mild Pain . 60 tablet 0   • calcium carbonate (TUMS) 500 MG chewable tablet Chew 1 tablet As Needed for Indigestion or Heartburn.     • cephalexin (KEFLEX) 500 MG capsule Take 4 capsules by mouth one hour prior to procedure. 4 capsule 2   • cetirizine (zyrTEC) 10 MG tablet Take 10 mg by mouth Every Night.     • cholecalciferol (VITAMIN D3) 25 MCG (1000 UT) tablet Take 1,000 Units by mouth Every Morning.     • exemestane (AROMASIN) 25 MG chemo tablet Take 1 tablet by mouth Daily. 90 tablet 3   • rivaroxaban (Xarelto) 20 MG tablet Take 1 tablet by mouth Daily With Dinner. 30 tablet 0   • SYNTHROID 112 MCG tablet Take 112 mcg by mouth Every Night.       Current Facility-Administered Medications on File Prior to Visit   Medication Dose Route Frequency Provider Last Rate Last Admin   • Chlorhexidine Gluconate 2 % pads  1 each  1 each Apply externally Take As Directed Clarence Suarez MD           ALLERGIES:    Allergies   Allergen Reactions   • Penicillins Other (See Comments)     Passes out   • Latex Rash   • Sulfa Antibiotics Nausea And Vomiting       LMP  (LMP Unknown)          1/19/2023     2:21 PM   Current Status   ECOG score 0         Assessment & Plan     77 year old female with pT2Nx invasive lobular carcinoma of right breast, ER UT Pos Her 2 neg. She will due for her yearly mammogram in September 2023 and follow up with Dr. Cardoso shortly after.  She will have regular follow up with her medical oncologist.  I have not scheduled a follow up with me but I asked her to call with any questions or concerns in the future.     I personally spent greater than 15 minutes today assessing, managing, discussing and documenting my visit with the patient. That time includes review of records, imaging and pathology reports, obtaining my own history, performing a medically appropriate evaluation, counseling and educating the patient, discussing goals, logistics, alternatives and risks of my recommendations, surveillance options and potential outcomes. It also includes the time documenting the clinical information in the EMR and communicating my recommendations to the other involved physicians.               Thank you very much for allowing me to participate in the care of this very pleasant patient.    Sincerely,      Ciara Celaya MD     Subjective

## 2023-04-25 ENCOUNTER — TELEPHONE (OUTPATIENT)
Dept: SURGERY | Facility: CLINIC | Age: 78
End: 2023-04-25
Payer: MEDICARE

## 2023-04-25 ENCOUNTER — OFFICE VISIT (OUTPATIENT)
Dept: SURGERY | Facility: CLINIC | Age: 78
End: 2023-04-25
Payer: MEDICARE

## 2023-04-25 VITALS
WEIGHT: 234 LBS | HEIGHT: 66 IN | DIASTOLIC BLOOD PRESSURE: 70 MMHG | SYSTOLIC BLOOD PRESSURE: 130 MMHG | BODY MASS INDEX: 37.61 KG/M2

## 2023-04-25 DIAGNOSIS — Z17.0 MALIGNANT NEOPLASM OF UPPER-OUTER QUADRANT OF RIGHT BREAST IN FEMALE, ESTROGEN RECEPTOR POSITIVE: Primary | ICD-10-CM

## 2023-04-25 DIAGNOSIS — Z12.31 ENCOUNTER FOR SCREENING MAMMOGRAM FOR MALIGNANT NEOPLASM OF BREAST: ICD-10-CM

## 2023-04-25 DIAGNOSIS — C50.411 MALIGNANT NEOPLASM OF UPPER-OUTER QUADRANT OF RIGHT BREAST IN FEMALE, ESTROGEN RECEPTOR POSITIVE: Primary | ICD-10-CM

## 2023-04-25 PROCEDURE — 1159F MED LIST DOCD IN RCRD: CPT | Performed by: NURSE PRACTITIONER

## 2023-04-25 PROCEDURE — 99213 OFFICE O/P EST LOW 20 MIN: CPT | Performed by: NURSE PRACTITIONER

## 2023-04-25 PROCEDURE — 1160F RVW MEDS BY RX/DR IN RCRD: CPT | Performed by: NURSE PRACTITIONER

## 2023-04-25 NOTE — PROGRESS NOTES
[facility]     Referring Provider:  Dr. Sri Conner     Chief complaint: breast cancer follow up     HPI:   12/9/21: seen by Dr Cardoso  MsHever Yee is a 75 yo woman, seen at the request of Dr. Sri Conner, for a new diagnosis of right breast cancer. This was initially detected as an imaging abnormality on routine screening. Her work-up is detailed in the oncologic history below. Prior to the biopsies, she denies any breast lumps, pain, skin changes, or nipple discharge. She has a past history of a benign left breast surgical biopsy in 2000. She also has a past history of prepectoral saline implants placed in 1976. She has a past history of an unprovoked pulmonary embolus in 2011, for which she is on Xarelto. She held this over the summer for her knee replacement surgery without any issues. She denies any family history of breast or ovarian cancer, however her brother did have prostate cancer.      1/20/22: seen by Dr Cardoso  She underwent right partial mastectomy with oncoplastic closure and left reduction for symmetry on 1/3/22. See surgery & pathology details below in oncologic history. She has been recovering well, aside from itching and a generalized rash. She has already seen Dr. Jaramillo postoperatively and discussed eventually starting Arimidex.    4/27/22:  She returns today for follow up with no breast concerns, she started arimidex 3/1/22 with no noted side effects.  In late 3/22 she fell with injury to her left knee, no injury to knee implant thankfully.    Her last clinic visit with Dr Celaya was in 2/22:  Iris Yee has recently completed the course of radiation therapy prescribed for the above-mentioned diagnosis    She met with Dr Jaramillo in 1/22:  Based on her thrombotic risk and anticoagulation I have recommended avoiding tamoxifen and using aromatase inhibitor and we will start with Arimidex which we would recommend for 5 years at least.     10/18/22:  She returns  today for follow up with no breast complaints, she is now taking aromasin with no adverse effects.    Her last clinic visit with Dr Jaramillo was in 9/22: She is here with her daughter today who states that her personality change she was achy and very uncomfortable and felt terrible overall and stopped it about a month ago and she is now back to baseline.  Stop Arimidex 1 mg daily changed to Aromasin 25 mg daily    Screening with tomosynthesis on 9/23/22 was stable, BiRads 2.  (see full report below)    4/25/23, Interval History:  She returns today for follow up with only occasional breast discomfort.  Tolerating aromasin much better than arimidex.    On 4/19/23 she followed up with Dr Celaya: She has been doing well since I last saw her.   I have not scheduled a follow up with me but I asked her to call with any questions or concerns in the future.     She was last seen by Dr Jaramillo in 1/23:  initially on Arimidex for a month.  This was  started In mid March  She stopped the medication because of side effects and at her last visit we initiated Aromasin and she is tolerating this much better.       Oncology/Hematology History Overview Note   12/10/2019, Screening MMG with Jorge ([facility]):  Negative mammogram with breast implants in place and showing no change from 07/24/2017.  BI-RADS 1: Negative.     Malignant neoplasm of upper-outer quadrant of right breast in female, estrogen receptor positive   9/20/2021 Initial Diagnosis    Malignant neoplasm of upper-outer quadrant of right breast in female, estrogen receptor positive (HCC)     9/21/2021 Imaging    Screening MMG with Jorge ([facility]):  Scattered areas of fibroglandular density. There are bilateral retroglandular silicone breast implants with capsular calcifications. There is an area of architectural distortion in the slightly upper central middle to anterior right breast, best appreciated on Tomosynthesis. There are no suspicious masses, calcifications, or areas of  architectural distortion in the left breast.  BI-RADS 0: Incomplete.     11/2/2021 Imaging    Right Diagnostic MMG with Jorge & Right Breast US ( [facility]):  MMG:  With additional imaging there is persistence of the area of architectural distortion in the middle third of the right breast lateral to the plane of the nipple.   US:  At the 11 o'clock position on the order 3 cm from the nipple there is a 1.2 cm ill-defined hypoechoic region with posterior acoustic shadowing and mild detectable internal vascularity.   There is an adjacent 0.6 cm ill-defined hypoechoic lesion that is also seen at the 11 o'clock position on the order of 3 cm from the nipple that demonstrates posterior acoustic shadowing.  BI-RADS 4: Suspicious.     11/18/2021 Biopsy    Right Breast, US-Guided Biopsy x 2 ( [facility]):    1. Right Breast at 11:00 o'clock, 3 cm from Nipple (not for calcifications), Core Biopsy:                 A. Multifocal atypical lobular hyperplasia (ALH) with foci of usual ductal hyperplasia (UDH).               B. No ductal carcinoma in situ nor invasive carcinoma identified  -Tribell clip.     2. Right Breast mass at 11:00 o'clock, 3 cm from Nipple (not for calcifications), Core Biopsy:                 A. Invasive lobular carcinoma:                            1. Overall Lewis grade I (tubular score=3, nuclear score=1, mitotic score=1).                            2. Invasive carcinoma measures at least 14 mm.                3. No lymphovascular space invasion identified.  B. Associated atypical lobular hyperplasia (ALH) noted.  C. No definitive in situ carcinoma component identified.  D. Focal columnar cell change, usual duct hyperplasia (UDH) and micropapilloma noted.  -Bowtie clip.     ER+ (%, strong)  PA+ (%, strong)  HER2 negative (IHC 1+)  Ki-67 7%     12/3/2021 Imaging    Bilateral Breast MRI ( [facility]):  In the right breast centered at the 11:30 position centered on the order of 3 cm from the nipple there  are 2 focal signal voids that are  by 1.1 cm. This represents the tri-bell-shaped and bowtie shaped metallic clips. The bowtie-shaped metallic clip is the more medial and slightly posterior metallic clip and represents the site of  invasive lobular carcinoma. There is evidence of an irregularly-shaped T1 hyperintense peripherally enhancing mass that surrounds the biopsy clips that measures on the order of 3.1 cm in anterior to posterior dimension, 1.5 cm in the superior to inferior dimension, and 2.6 cm in the medial to lateral dimension, and is consistent with a hematoma cavity. The bowtie-shaped metallic clip which represents the biopsy-proven site of malignancy is on the order of 0.6 cm anterior to an area of architectural distortion with mild enhancement measures on the order of 1.3 cm in superior to inferior dimension, 1.1 cm in the medial to lateral dimension and 1.0 cm in anterior to posterior dimension. This is most consistent with residual malignancy at the biopsy-proven site of malignancy. The tri-bell-shaped metallic clip is not surrounded by any abnormal enhancement but is within a hematoma  cavity.   No other areas of abnormal enhancement or morphology are seen in the right breast. I see no evidence for abnormal right breast skin, nipple or chest wall enhancement and there is no evidence for right axillary or  internal mammary chain adenopathy.   There are no areas of abnormal enhancement or morphology in the left breast. I see no evidence for abnormal left breast skin, nipple or chest wall enhancement and there is no evidence for left axillary or internal mammary chain adenopathy.  Bilateral retroglandular silicone implants are noted. The left silicone implant shows irregularity at the posterior margin of the implant, but no evidence for free silicone is appreciated.  BI-RADS 6: Known malignancy.     12/9/2021 Genetic Testing    [facility] Cancers Panel (37 genes):    Negative      1/3/2022 Surgery    Right needle-localized, bracketed, partial mastectomy with oncoplastic closure and left reduction for symmetry    1. Right Breast, Oriented Needle Localization Lumpectomy (69 grams):  INVASIVE LOBULAR CARCINOMA.               A. Tumor site:  Upper outer quadrant (11:00 o'clock, 11:30).               B. Histologic grade:  [place] score I (tubules=3, nuclei=1, mitosis=1).               C. Tumor size: ~24 mm maximally (present focally in slices 7 and 11 and throughout slices 8-10, slices 6mm).               D. Tumor focality:  Single focus.               E. Ductal carcinoma in-situ (DCIS):  Not identified.               F. Lobular carcinoma in-situ (LCIS):  Present.               G. Tumor extent:  Overlying skin is uninvolved by carcinoma.               H. No lymphovascular invasion identified.               I.  Margins:  Uninvolved by in-situ and invasive carcinoma.                            1. Invasive carcinoma is present 0.3 mm from the original inferior margin, 1.2 mm from the lateral                                margin, 2 mm from the posterior margin, 13 mm from the medial margin, 20 mm from the anterior                                margin and 38 mm from the superior margin of excision in specimen 1.               J. Lymph nodes:  Not submitted.               K. Hormone receptor status:  ER - % Positive, VT - % Positive, Her2/jed - 1+ Negative, Ki67 - 7% (performed on prior biopsy [id number]).    L. Pathologic stage:  pT2, NX.     2. Right Breast, Additional Medial/Superior Margin, Oriented Excision:               A. Benign unremarkable breast tissue.               B. Final medial and superior margin are free of tumor by an additional 15 mm.     3. Right Breast, Additional Inferior/Lateral Margin, Oriented Excision:               A. Benign breast tissue with foci of usual ductal hyperplasia, atypical lobular hyperplasia and lobular carcinoma in-situ (LCIS).                B. No evidence of invasive carcinoma identified.               C. Final inferior and lateral margin are free by an additional 15 mm.     4. Left Breast, Reduction Mammoplasty (163 grams):                A. Benign breast tissue with foci of pseudoangiomatous stromal hyperplasia (PASH).               B. Benign microcalcifications.               C. Unremarkable skin.               D. No evidence of in-situ nor invasive carcinoma identified.     5. Skin, Right Breast, Excision:  Benign unremarkable skin.     6. Right Breast, Capsulectomy:                A. Explanted intact breast prosthesis.               B. Benign fibrous capsule.      7. Left Breast, Capsulectomy:  Benign hyalinized breast capsule with fat necrosis and dystrophic calcification.     2/8/2022 - 2/28/2022 Radiation    Radiation OncologyTreatment Course:  Iris Yee received 4050 cGy in 15 fractions to Right breast     2/9/2022 -  Hormonal Therapy    Anastrozole (Arimidex)     5/12/2022 Cancer Staged    Staging form: Breast, AJCC 8th Edition  - Pathologic: Stage Unknown (pT2, pNX, G1, ER+, AZ+, HER2-) - Signed by Len Jaramillo MD on 5/12/2022 9/23/2022 Imaging    Bilateral screening mammogram with tomosynthesis at [facility]  FINDINGS: Bilateral digital CC and MLO mammographic and digitalTomosynthesis images were obtained. Comparison is made to prior studiesdated 9/21/2021, 11/2/2021 and 11/18/2021 .   The parenchyma of both breasts is largely fatty-replaced. Postsurgical change of the right breast is noted. There is an area of increased density in the posterior one third of the right breast associated with prior breast implant removal. There is no evidence for axillary lymphadenopathy or nipple retraction.   IMPRESSION:  1. There is no evidence for malignancy in either breast. Routine followup mammography is recommended.   BI-RADS category 2: Benign     5/12/2023 -  Chemotherapy    OP SUPPORTIVE Denosumab (Prolia) Q6M         Review of  "Systems:  See interval history.       Medications:    Current Outpatient Medications:   •  acetaminophen (TYLENOL) 325 MG tablet, Take 2 tablets by mouth Every 4 (Four) Hours As Needed for Mild Pain ., Disp: 60 tablet, Rfl: 0  •  calcium carbonate (TUMS) 500 MG chewable tablet, Chew 1 tablet As Needed for Indigestion or Heartburn., Disp: , Rfl:   •  cetirizine (zyrTEC) 10 MG tablet, Take 10 mg by mouth Every Night., Disp: , Rfl:   •  cholecalciferol (VITAMIN D3) 25 MCG (1000 UT) tablet, Take 1,000 Units by mouth Every Morning., Disp: , Rfl:   •  exemestane (AROMASIN) 25 MG chemo tablet, Take 1 tablet by mouth Daily., Disp: 90 tablet, Rfl: 3  •  rivaroxaban (Xarelto) 20 MG tablet, Take 1 tablet by mouth Daily With Dinner., Disp: 30 tablet, Rfl: 0  •  SYNTHROID 112 MCG tablet, Take 112 mcg by mouth Every Night., Disp: , Rfl:   No current facility-administered medications for this visit.    Facility-Administered Medications Ordered in Other Visits:   •  Chlorhexidine Gluconate 2 % pads 1 each, 1 each, Apply externally, Take As Directed, Clarence Suarez MD      Allergies   Allergen Reactions   • Penicillins Other (See Comments)     Passes out   • Latex Rash   • Sulfa Antibiotics Nausea And Vomiting       Family History   Problem Relation Age of Onset   • Hypertension Mother    • Osteoporosis Mother    • Scoliosis Mother    • Arthritis Mother    • Hypertension Father    • Arthritis Father    • Stroke Paternal Aunt    • Cervical cancer Maternal Grandmother 68   • Prostate cancer Brother 74   • Hypertension Brother    • Hyperlipidemia Brother    • Alzheimer's disease Brother    • Malig Hyperthermia Neg Hx        PHYSICAL EXAMINATION:   Vitals:    04/25/23 1324   BP: 130/70      /70 (BP Location: Right arm)   Ht 166.4 cm (65.5\")   Wt 106 kg (234 lb)   LMP  (LMP Unknown)   BMI 38.35 kg/m²     I reviewed physical exam, no changes noted    ECOG 0 - Asymptomatic  General: NAD, well appearing  Psych: a&o x3, normal " mood and affect  Eyes: EOMI, no scleral icterus  ENMT: neck supple without masses or thyromegaly, mucous membranes moist  MSK: normal gait, normal ROM in bilateral shoulders  Lymph nodes: no cervical, supraclavicular or axillary lymphadenopathy  Breast:   Right: Sp mastopexy with well-healed incisions. No visible abnormalities on inspection while seated, with arms raised or hands on hips. No masses, skin changes, or nipple abnormalities.  Left: Sp mastopexy with well-healed incisions, mild scarring of NAC incisions c/w mild keloid changes. No visible abnormalities on inspection while seated, with arms raised or hands on hips. No masses, skin changes, or nipple abnormalities.      Assessment:   1)   77 y.o. F with a diagnosis of right breast cancer 11/2021: Low grade, invasive lobular carcinoma, ER/SC+, Her2 negative. She underwent right partial mastectomy with oncoplastic closure and left reduction for symmetry on 1/3/22, pT2Nx. Arimidex, aromasin currently    2)  Obesity, BMI 38    Discussion:  We discussed her follow up which includes clinical breast exam every 6 months for 2 years (11/2023), with mammogram annually then  mammogram and exam annually until 5 years from diagnosis ( 11/26).    BMI is 38  Patient was counseled on the importance of avoidance of obesity for a number of health issues including breast cancer. Diet changes and exercise were recommended.     Class 2 Severe Obesity (BMI >=35 and <=39.9). Obesity-related health conditions include the following: GERD. Obesity is unchanged. BMI is is above average; BMI management plan is completed. We discussed portion control and increasing exercise.      Plan:  -Continue follow-up with Dr. Jaramillo.  - continue follow up with Dr Celaya  - bilateral screening mammogram with tomosynthesis in 6 months at [facility] followed by exam.  -She was instructed to call sooner with any questions, concerns or changes on BSE.    MANUEL Mancini      CC:   Dr. Fierro  Dalila Vasquez MD

## 2023-04-25 NOTE — LETTER
April 25, 2023     Sri Conner MD  [address]  [address]  [facility] [address]    Patient: Iris Yee   YOB: 1945   Date of Visit: 4/25/2023       Dear Dr. Dalila MD:    Thank you for referring Iris Yee to me for evaluation. Below are the relevant portions of my assessment and plan of care.    If you have questions, please do not hesitate to call me. I look forward to following Iris along with you.         Sincerely,        Vanessa Mims, MANUEL        CC: MD Prasanna Obrien Mary Ellen, APRABILIO  04/25/23 1336  Sign when Signing Visit  [facility]     Referring Provider:  Dr. Sri Conenr     Chief complaint: breast cancer follow up     HPI:   12/9/21: seen by Dr Cardoso  Ms. Iris Yee is a 77 yo woman, seen at the request of Dr. Sri Conner, for a new diagnosis of right breast cancer. This was initially detected as an imaging abnormality on routine screening. Her work-up is detailed in the oncologic history below. Prior to the biopsies, she denies any breast lumps, pain, skin changes, or nipple discharge. She has a past history of a benign left breast surgical biopsy in 2000. She also has a past history of prepectoral saline implants placed in 1976. She has a past history of an unprovoked pulmonary embolus in 2011, for which she is on Xarelto. She held this over the summer for her knee replacement surgery without any issues. She denies any family history of breast or ovarian cancer, however her brother did have prostate cancer.      1/20/22: seen by Dr Cardoso  She underwent right partial mastectomy with oncoplastic closure and left reduction for symmetry on 1/3/22. See surgery & pathology details below in oncologic history. She has been recovering well, aside from itching and a generalized rash. She has already seen Dr. Jaramillo postoperatively and discussed eventually starting Arimidex.    4/27/22:  She returns today for follow up with no breast  concerns, she started arimidex 3/1/22 with no noted side effects.  In late 3/22 she fell with injury to her left knee, no injury to knee implant thankfully.    Her last clinic visit with Dr Celaya was in 2/22:  Iris Yee has recently completed the course of radiation therapy prescribed for the above-mentioned diagnosis    She met with Dr Jaramillo in 1/22:  Based on her thrombotic risk and anticoagulation I have recommended avoiding tamoxifen and using aromatase inhibitor and we will start with Arimidex which we would recommend for 5 years at least.     10/18/22:  She returns today for follow up with no breast complaints, she is now taking aromasin with no adverse effects.    Her last clinic visit with Dr Jaramillo was in 9/22: She is here with her daughter today who states that her personality change she was achy and very uncomfortable and felt terrible overall and stopped it about a month ago and she is now back to baseline.  Stop Arimidex 1 mg daily changed to Aromasin 25 mg daily    Screening with tomosynthesis on 9/23/22 was stable, BiRads 2.  (see full report below)    4/25/23, Interval History:  She returns today for follow up with only occasional breast discomfort.  Tolerating aromasin much better than arimidex.    On 4/19/23 she followed up with Dr Celaya: She has been doing well since I last saw her.   I have not scheduled a follow up with me but I asked her to call with any questions or concerns in the future.     She was last seen by Dr Jaramillo in 1/23:  initially on Arimidex for a month.  This was  started In mid March  She stopped the medication because of side effects and at her last visit we initiated Aromasin and she is tolerating this much better.       Oncology/Hematology History Overview Note   12/10/2019, Screening MMG with [facility] ( [facility]):  Negative mammogram with breast implants in place and showing no change from 07/24/2017.  BI-RADS 1: Negative.     Malignant neoplasm of upper-outer  quadrant of right breast in female, estrogen receptor positive   9/20/2021 Initial Diagnosis    Malignant neoplasm of upper-outer quadrant of right breast in female, estrogen receptor positive (HCC)     9/21/2021 Imaging    Screening MMG with Jorge ( [facility]):  Scattered areas of fibroglandular density. There are bilateral retroglandular silicone breast implants with capsular calcifications. There is an area of architectural distortion in the slightly upper central middle to anterior right breast, best appreciated on Tomosynthesis. There are no suspicious masses, calcifications, or areas of architectural distortion in the left breast.  BI-RADS 0: Incomplete.     11/2/2021 Imaging    Right Diagnostic MMG with Jorge & Right Breast US ( [facility]):  MMG:  With additional imaging there is persistence of the area of architectural distortion in the middle third of the right breast lateral to the plane of the nipple.   US:  At the 11 o'clock position on the order 3 cm from the nipple there is a 1.2 cm ill-defined hypoechoic region with posterior acoustic shadowing and mild detectable internal vascularity.   There is an adjacent 0.6 cm ill-defined hypoechoic lesion that is also seen at the 11 o'clock position on the order of 3 cm from the nipple that demonstrates posterior acoustic shadowing.  BI-RADS 4: Suspicious.     11/18/2021 Biopsy    Right Breast, US-Guided Biopsy x 2 ( [facility]):    1. Right Breast at 11:00 o'clock, 3 cm from Nipple (not for calcifications), Core Biopsy:                 A. Multifocal atypical lobular hyperplasia (ALH) with foci of usual ductal hyperplasia (UDH).               B. No ductal carcinoma in situ nor invasive carcinoma identified  -Tribell clip.     2. Right Breast mass at 11:00 o'clock, 3 cm from Nipple (not for calcifications), Core Biopsy:                 A. Invasive lobular carcinoma:                            1. Overall Maryann grade I (tubular score=3, nuclear score=1, mitotic score=1).                             2. Invasive carcinoma measures at least 14 mm.                3. No lymphovascular space invasion identified.  B. Associated atypical lobular hyperplasia (ALH) noted.  C. No definitive in situ carcinoma component identified.  D. Focal columnar cell change, usual duct hyperplasia (UDH) and micropapilloma noted.  -Bowtie clip.     ER+ (%, strong)  VA+ (%, strong)  HER2 negative (IHC 1+)  Ki-67 7%     12/3/2021 Imaging    Bilateral Breast MRI ([facility]):  In the right breast centered at the 11:30 position centered on the order of 3 cm from the nipple there are 2 focal signal voids that are  by 1.1 cm. This represents the tri-bell-shaped and bowtie shaped metallic clips. The bowtie-shaped metallic clip is the more medial and slightly posterior metallic clip and represents the site of  invasive lobular carcinoma. There is evidence of an irregularly-shaped T1 hyperintense peripherally enhancing mass that surrounds the biopsy clips that measures on the order of 3.1 cm in anterior to posterior dimension, 1.5 cm in the superior to inferior dimension, and 2.6 cm in the medial to lateral dimension, and is consistent with a hematoma cavity. The bowtie-shaped metallic clip which represents the biopsy-proven site of malignancy is on the order of 0.6 cm anterior to an area of architectural distortion with mild enhancement measures on the order of 1.3 cm in superior to inferior dimension, 1.1 cm in the medial to lateral dimension and 1.0 cm in anterior to posterior dimension. This is most consistent with residual malignancy at the biopsy-proven site of malignancy. The tri-bell-shaped metallic clip is not surrounded by any abnormal enhancement but is within a hematoma  cavity.   No other areas of abnormal enhancement or morphology are seen in the right breast. I see no evidence for abnormal right breast skin, nipple or chest wall enhancement and there is no evidence for right axillary  or  internal mammary chain adenopathy.   There are no areas of abnormal enhancement or morphology in the left breast. I see no evidence for abnormal left breast skin, nipple or chest wall enhancement and there is no evidence for left axillary or internal mammary chain adenopathy.  Bilateral retroglandular silicone implants are noted. The left silicone implant shows irregularity at the posterior margin of the implant, but no evidence for free silicone is appreciated.  BI-RADS 6: Known malignancy.     12/9/2021 Genetic Testing    [facility] Cancers Panel (37 genes):    Negative     1/3/2022 Surgery    Right needle-localized, bracketed, partial mastectomy with oncoplastic closure and left reduction for symmetry    1. Right Breast, Oriented Needle Localization Lumpectomy (69 grams):  INVASIVE LOBULAR CARCINOMA.               A. Tumor site:  Upper outer quadrant (11:00 o'clock, 11:30).               B. Histologic grade:  Maryann score I (tubules=3, nuclei=1, mitosis=1).               C. Tumor size: ~24 mm maximally (present focally in slices 7 and 11 and throughout slices 8-10, slices 6mm).               D. Tumor focality:  Single focus.               E. Ductal carcinoma in-situ (DCIS):  Not identified.               F. Lobular carcinoma in-situ (LCIS):  Present.               G. Tumor extent:  Overlying skin is uninvolved by carcinoma.               H. No lymphovascular invasion identified.               I.  Margins:  Uninvolved by in-situ and invasive carcinoma.                            1. Invasive carcinoma is present 0.3 mm from the original inferior margin, 1.2 mm from the lateral                                margin, 2 mm from the posterior margin, 13 mm from the medial margin, 20 mm from the anterior                                margin and 38 mm from the superior margin of excision in specimen 1.               J. Lymph nodes:  Not submitted.               K. Hormone receptor status:  ER - %  Positive, W[place] - % Positive, Her2/jed - 1+ Negative, Ki67 - 7% (performed on prior biopsy [id number]).    L. Pathologic stage:  pT2, NX.     2. Right Breast, Additional Medial/Superior Margin, Oriented Excision:               A. Benign unremarkable breast tissue.               B. Final medial and superior margin are free of tumor by an additional 15 mm.     3. Right Breast, Additional Inferior/Lateral Margin, Oriented Excision:               A. Benign breast tissue with foci of usual ductal hyperplasia, atypical lobular hyperplasia and lobular carcinoma in-situ (LCIS).               B. No evidence of invasive carcinoma identified.               C. Final inferior and lateral margin are free by an additional 15 mm.     4. Left Breast, Reduction Mammoplasty (163 grams):                A. Benign breast tissue with foci of pseudoangiomatous stromal hyperplasia (PASH).               B. Benign microcalcifications.               C. Unremarkable skin.               D. No evidence of in-situ nor invasive carcinoma identified.     5. Skin, Right Breast, Excision:  Benign unremarkable skin.     6. Right Breast, Capsulectomy:                A. Explanted intact breast prosthesis.               B. Benign fibrous capsule.      7. Left Breast, Capsulectomy:  Benign hyalinized breast capsule with fat necrosis and dystrophic calcification.     2/8/2022 - 2/28/2022 Radiation    Radiation OncologyTreatment Course:  Iris Yee received 4050 cGy in 15 fractions to Right breast     2/9/2022 -  Hormonal Therapy    Anastrozole (Arimidex)     5/12/2022 Cancer Staged    Staging form: Breast, AJCC 8th Edition  - Pathologic: Stage Unknown (pT2, pNX, G1, ER+, WY+, HER2-) - Signed by Len Jaramillo MD on 5/12/2022 9/23/2022 Imaging    Bilateral screening mammogram with tomosynthesis at [facility]  FINDINGS: Bilateral digital CC and MLO mammographic and digitalTomosynthesis images were obtained. Comparison is made to prior  studiesdated 9/21/2021, 11/2/2021 and 11/18/2021 .   The parenchyma of both breasts is largely fatty-replaced. Postsurgical change of the right breast is noted. There is an area of increased density in the posterior one third of the right breast associated with prior breast implant removal. There is no evidence for axillary lymphadenopathy or nipple retraction.   IMPRESSION:  1. There is no evidence for malignancy in either breast. Routine followup mammography is recommended.   BI-RADS category 2: Benign     5/12/2023 -  Chemotherapy    OP SUPPORTIVE Denosumab (Prolia) Q6M         Review of Systems:  See interval history.       Medications:    Current Outpatient Medications:   •  acetaminophen (TYLENOL) 325 MG tablet, Take 2 tablets by mouth Every 4 (Four) Hours As Needed for Mild Pain ., Disp: 60 tablet, Rfl: 0  •  calcium carbonate (TUMS) 500 MG chewable tablet, Chew 1 tablet As Needed for Indigestion or Heartburn., Disp: , Rfl:   •  cetirizine (zyrTEC) 10 MG tablet, Take 10 mg by mouth Every Night., Disp: , Rfl:   •  cholecalciferol (VITAMIN D3) 25 MCG (1000 UT) tablet, Take 1,000 Units by mouth Every Morning., Disp: , Rfl:   •  exemestane (AROMASIN) 25 MG chemo tablet, Take 1 tablet by mouth Daily., Disp: 90 tablet, Rfl: 3  •  rivaroxaban (Xarelto) 20 MG tablet, Take 1 tablet by mouth Daily With Dinner., Disp: 30 tablet, Rfl: 0  •  SYNTHROID 112 MCG tablet, Take 112 mcg by mouth Every Night., Disp: , Rfl:   No current facility-administered medications for this visit.    Facility-Administered Medications Ordered in Other Visits:   •  Chlorhexidine Gluconate 2 % pads 1 each, 1 each, Apply externally, Take As Directed, Clarence Suarez MD      Allergies   Allergen Reactions   • Penicillins Other (See Comments)     Passes out   • Latex Rash   • Sulfa Antibiotics Nausea And Vomiting       Family History   Problem Relation Age of Onset   • Hypertension Mother    • Osteoporosis Mother    • Scoliosis Mother    •  "Arthritis Mother    • Hypertension Father    • Arthritis Father    • Stroke Paternal Aunt    • Cervical cancer Maternal Grandmother 68   • Prostate cancer Brother 74   • Hypertension Brother    • Hyperlipidemia Brother    • Alzheimer's disease Brother    • Malig Hyperthermia Neg Hx        PHYSICAL EXAMINATION:   Vitals:    04/25/23 1324   BP: 130/70      /70 (BP Location: Right arm)   Ht 166.4 cm (65.5\")   Wt 106 kg (234 lb)   LMP  (LMP Unknown)   BMI 38.35 kg/m²     I reviewed physical exam, no changes noted    ECOG 0 - Asymptomatic  General: NAD, well appearing  Psych: a&o x3, normal mood and affect  Eyes: EOMI, no scleral icterus  ENMT: neck supple without masses or thyromegaly, mucous membranes moist  MSK: normal gait, normal ROM in bilateral shoulders  Lymph nodes: no cervical, supraclavicular or axillary lymphadenopathy  Breast:   Right: Sp mastopexy with well-healed incisions. No visible abnormalities on inspection while seated, with arms raised or hands on hips. No masses, skin changes, or nipple abnormalities.  Left: Sp mastopexy with well-healed incisions, mild scarring of NAC incisions c/w mild keloid changes. No visible abnormalities on inspection while seated, with arms raised or hands on hips. No masses, skin changes, or nipple abnormalities.      Assessment:   1)   77 y.o. F with a diagnosis of right breast cancer 11/2021: Low grade, invasive lobular carcinoma, ER/MS+, Her2 negative. She underwent right partial mastectomy with oncoplastic closure and left reduction for symmetry on 1/3/22, pT2Nx. Arimidex, aromasin currently    2)  Obesity, BMI 38    Discussion:  We discussed her follow up which includes clinical breast exam every 6 months for 2 years (11/2023), with mammogram annually then  mammogram and exam annually until 5 years from diagnosis ( 11/26).    BMI is 38  Patient was counseled on the importance of avoidance of obesity for a number of health issues including breast cancer. Diet " changes and exercise were recommended.     Class 2 Severe Obesity (BMI >=35 and <=39.9). Obesity-related health conditions include the following: GERD. Obesity is unchanged. BMI is is above average; BMI management plan is completed. We discussed portion control and increasing exercise.      Plan:  -Continue follow-up with Dr. Jaramillo.  - continue follow up with Dr Celaya  - bilateral screening mammogram with tomosynthesis in 6 months at [facility] followed by exam.  -She was instructed to call sooner with any questions, concerns or changes on BSE.    MANUEL Mancini      CC:   Dr. Sri Vasquez MD

## 2023-05-15 ENCOUNTER — OFFICE VISIT (OUTPATIENT)
Dept: ONCOLOGY | Facility: CLINIC | Age: 78
End: 2023-05-15
Payer: MEDICARE

## 2023-05-15 ENCOUNTER — LAB (OUTPATIENT)
Dept: LAB | Facility: HOSPITAL | Age: 78
End: 2023-05-15
Payer: MEDICARE

## 2023-05-15 VITALS
HEIGHT: 66 IN | WEIGHT: 236.7 LBS | HEART RATE: 75 BPM | RESPIRATION RATE: 18 BRPM | TEMPERATURE: 96.3 F | DIASTOLIC BLOOD PRESSURE: 84 MMHG | BODY MASS INDEX: 38.04 KG/M2 | SYSTOLIC BLOOD PRESSURE: 142 MMHG | OXYGEN SATURATION: 95 %

## 2023-05-15 DIAGNOSIS — T45.1X5A AROMATASE INHIBITOR-ASSOCIATED ARTHRALGIA: ICD-10-CM

## 2023-05-15 DIAGNOSIS — M85.851 OSTEOPENIA OF BOTH HIPS: ICD-10-CM

## 2023-05-15 DIAGNOSIS — Z17.0 MALIGNANT NEOPLASM OF UPPER-OUTER QUADRANT OF RIGHT BREAST IN FEMALE, ESTROGEN RECEPTOR POSITIVE: ICD-10-CM

## 2023-05-15 DIAGNOSIS — M85.852 OSTEOPENIA OF BOTH HIPS: ICD-10-CM

## 2023-05-15 DIAGNOSIS — Z17.0 MALIGNANT NEOPLASM OF UPPER-OUTER QUADRANT OF RIGHT BREAST IN FEMALE, ESTROGEN RECEPTOR POSITIVE: Primary | ICD-10-CM

## 2023-05-15 DIAGNOSIS — C50.411 MALIGNANT NEOPLASM OF UPPER-OUTER QUADRANT OF RIGHT BREAST IN FEMALE, ESTROGEN RECEPTOR POSITIVE: ICD-10-CM

## 2023-05-15 DIAGNOSIS — C50.411 MALIGNANT NEOPLASM OF UPPER-OUTER QUADRANT OF RIGHT BREAST IN FEMALE, ESTROGEN RECEPTOR POSITIVE: Primary | ICD-10-CM

## 2023-05-15 DIAGNOSIS — Z79.811 AROMATASE INHIBITOR USE: ICD-10-CM

## 2023-05-15 DIAGNOSIS — M25.50 AROMATASE INHIBITOR-ASSOCIATED ARTHRALGIA: ICD-10-CM

## 2023-05-15 LAB
ALBUMIN SERPL-MCNC: 4.3 G/DL (ref 3.5–5.2)
ALBUMIN/GLOB SERPL: 1.7 G/DL (ref 1.1–2.4)
ALP SERPL-CCNC: 91 U/L (ref 38–116)
ALT SERPL W P-5'-P-CCNC: 15 U/L (ref 0–33)
ANION GAP SERPL CALCULATED.3IONS-SCNC: 11.1 MMOL/L (ref 5–15)
AST SERPL-CCNC: 19 U/L (ref 0–32)
BASOPHILS # BLD AUTO: 0.04 10*3/MM3 (ref 0–0.2)
BASOPHILS NFR BLD AUTO: 0.9 % (ref 0–1.5)
BILIRUB SERPL-MCNC: 0.8 MG/DL (ref 0.2–1.2)
BUN SERPL-MCNC: 17 MG/DL (ref 6–20)
BUN/CREAT SERPL: 17 (ref 7.3–30)
CALCIUM SPEC-SCNC: 9.8 MG/DL (ref 8.5–10.2)
CHLORIDE SERPL-SCNC: 102 MMOL/L (ref 98–107)
CO2 SERPL-SCNC: 24.9 MMOL/L (ref 22–29)
CREAT SERPL-MCNC: 1 MG/DL (ref 0.6–1.1)
DEPRECATED RDW RBC AUTO: 48.7 FL (ref 37–54)
EGFRCR SERPLBLD CKD-EPI 2021: 58.9 ML/MIN/1.73
EOSINOPHIL # BLD AUTO: 0.09 10*3/MM3 (ref 0–0.4)
EOSINOPHIL NFR BLD AUTO: 2 % (ref 0.3–6.2)
ERYTHROCYTE [DISTWIDTH] IN BLOOD BY AUTOMATED COUNT: 13.2 % (ref 12.3–15.4)
GLOBULIN UR ELPH-MCNC: 2.6 GM/DL (ref 1.8–3.5)
GLUCOSE SERPL-MCNC: 108 MG/DL (ref 74–124)
HCT VFR BLD AUTO: 44.5 % (ref 34–46.6)
HGB BLD-MCNC: 14.4 G/DL (ref 12–15.9)
IMM GRANULOCYTES # BLD AUTO: 0 10*3/MM3 (ref 0–0.05)
IMM GRANULOCYTES NFR BLD AUTO: 0 % (ref 0–0.5)
LYMPHOCYTES # BLD AUTO: 1.04 10*3/MM3 (ref 0.7–3.1)
LYMPHOCYTES NFR BLD AUTO: 22.6 % (ref 19.6–45.3)
MCH RBC QN AUTO: 32.1 PG (ref 26.6–33)
MCHC RBC AUTO-ENTMCNC: 32.4 G/DL (ref 31.5–35.7)
MCV RBC AUTO: 99.3 FL (ref 79–97)
MONOCYTES # BLD AUTO: 0.52 10*3/MM3 (ref 0.1–0.9)
MONOCYTES NFR BLD AUTO: 11.3 % (ref 5–12)
NEUTROPHILS NFR BLD AUTO: 2.91 10*3/MM3 (ref 1.7–7)
NEUTROPHILS NFR BLD AUTO: 63.2 % (ref 42.7–76)
NRBC BLD AUTO-RTO: 0 /100 WBC (ref 0–0.2)
PLATELET # BLD AUTO: 203 10*3/MM3 (ref 140–450)
PMV BLD AUTO: 10.7 FL (ref 6–12)
POTASSIUM SERPL-SCNC: 4.2 MMOL/L (ref 3.5–4.7)
PROT SERPL-MCNC: 6.9 G/DL (ref 6.3–8)
RBC # BLD AUTO: 4.48 10*6/MM3 (ref 3.77–5.28)
SODIUM SERPL-SCNC: 138 MMOL/L (ref 134–145)
WBC NRBC COR # BLD: 4.6 10*3/MM3 (ref 3.4–10.8)

## 2023-05-15 PROCEDURE — 36415 COLL VENOUS BLD VENIPUNCTURE: CPT

## 2023-05-15 PROCEDURE — 85025 COMPLETE CBC W/AUTO DIFF WBC: CPT

## 2023-05-15 PROCEDURE — 80053 COMPREHEN METABOLIC PANEL: CPT

## 2023-05-15 NOTE — PROGRESS NOTES
Subjective     REASON FOR CONSULTATION:  pT2Nx grade 1 lobular carcinoma right breast strongly ER/AZ positive HER2 negative postlumpectomy1/3/2022  Provide an opinion on any further workup or treatment                             REQUESTING PHYSICIAN: MD Sri Willis MD    History of Present Illness patient is a 76-year-old lady with a lobular breast cancer low-grade treated with lumpectomy and and radiation and initially on Arimidex for a month.  This was  started In mid March 2022.  She stopped the medication because of side effects and initiated Aromasin in September 2022.  Patient reports she is still been struggling with side effects of arthralgias.  She has a lack of motivation she reports to do tasks, in part because she knows that it will be a struggle physically.  She is asking for a break from the Aromasin to see if these things improve.  She denies any feelings of depression or anxiety.  She states that she remains active and even when traveling with her daughter, her daughter notes that her daily activity seems affected.  Patient also has feelings of shortness of breath at times which she relates to decreased stamina.  She denies pain with deep breath.    Mammogram is due in September.    DEXA scan performed 4/10/2023 showing osteopenia at the right hip though unchanged from previous a year ago.  Lumbar spine did have 3.9% interval decrease though T score is still just 0.5.      Past Medical History:   Diagnosis Date   • Arthritis    • Arthritis of back 2015   • Arthritis of neck    • Bulging lumbar disc     L 3-4   • Bursitis of hip    • Clotting disorder    • COVID-19    • Fracture, fibula    • Fracture, tibia and fibula     Stress fracture   • Frozen shoulder    • GERD (gastroesophageal reflux disease)    • Hip arthrosis    • History of kidney stones    • History of MRSA infection 2020    MICHEL EARS   • History of transfusion 1971     no reaction   • Hx of blood clots 2011   • Hypothyroid    • Kidney stones    • Knee swelling    • Left knee pain    • Lumbosacral disc disease    • Malignant neoplasm of upper-outer quadrant of right breast in female, estrogen receptor positive 12/09/2021   • Neck strain    • Neuroma of foot 2000    Right foot   • Ovarian cyst    • Periarthritis of shoulder    • PONV (postoperative nausea and vomiting)     states gets really sick lasted for 4 days aafter surgery the last time    • Pulmonary embolism, bilateral    • Scoliosis 2020   • Stress fracture    • Tear of meniscus of knee    • Thrombopenia    • Wears glasses         Past Surgical History:   Procedure Laterality Date   • APPENDECTOMY     • AUGMENTATION MAMMAPLASTY  1976   • BREAST AUGMENTATION      years ago has implants   • BREAST EXCISIONAL BIOPSY Right 10/2021   • BREAST EXCISIONAL BIOPSY Left    • BREAST LUMPECTOMY Right 01/03/2022    Procedure: Right needle-localized, bracketed, partial mastectomy;  Surgeon: Adela Cardoso MD;  Location: [facility] OR;  Service: General;  Laterality: Right;   • BREAST SURGERY Bilateral 01/03/2022    Procedure: RIGHT ONCOPLASTIC REDUCTION AND LEFT REDUCTION FOR SYMMETRY, BILATERAL IMPLANT REMOVAL AND  BILATERAL CAPSULECTOMIES;  Surgeon: Delbert Butcher MD;  Location: [facility] MAIN OR;  Service: Plastics;  Laterality: Bilateral;   • CHOLECYSTECTOMY     • COLONOSCOPY N/A 12/14/2018    Procedure: COLONOSCOPY into cecum/termial ileum with polypectomy;  Surgeon: Jose Daniel Dooley MD;  Location: [facility] ENDOSCOPY;  Service: Gastroenterology   • ENDOSCOPY N/A 12/14/2018    Procedure: ESOPHAGOGASTRODUODENOSCOPY with biopsy;  Surgeon: Jose Daniel Dooley MD;  Location: [facility] ENDOSCOPY;  Service: Gastroenterology   • EXPLORATORY LAPAROTOMY      bleeding from ruptured ovarian cyst   • HERNIA REPAIR      umbilical x 2   • OVARIAN CYST DRAINAGE/EXCISION      ruptured ovarian cyst with vblood accumulatine in abdomin    • TONSILLECTOMY     • TOTAL KNEE ARTHROPLASTY Left 2021    Procedure: TOTAL KNEE ARTHROPLASTY;  Surgeon: Clarence Suarez MD;  Location: [facility];  Service: Orthopedics;  Laterality: Left;   • UMBILICAL HERNIA REPAIR      x2      patient is a 76-year-old white female with a 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    Patient reports she was doing her mammograms every 2 years for the last couple of years and had a benign breast biopsy on the left about 10 years ago but otherwise had not had any callbacks or other abnormalities on her imaging.    Patient had diagnostic imaging and a biopsyOn 2021 which revealed atypical lobular hyperplasia at 11:00 and invasive lobular carcinoma grade 1 measuring 14 mm at 11:00 ER % MD % HER2 negative Ki-67 of 7%  Patient was referred to Dr. Adela Cardoso who ordered an MRI which confirmed unifocal disease on the right and a biopsy cavity from the [facility] biopsy with no suspicious enhancement and no obvious adenopathy and a normal left breast.  She then proceeded with a lumpectomy on 1/3/2022 which showed residual 24 mm  low-grade lobular carcinoma with associated lobular carcinoma in situ with no lymphovascular invasion and clear margins no sentinel nodes were removed.  Implants which had been in for over 40 years were both removed    She has had a little problem with some infection in the inframammary area bilaterally but is on antibiotic and this is improving.  She is here to discuss adjuvant treatment options    She is  5 para 3 with 2 miscarriages-first childbirth was at age 21 she did not breast-feed.  Menarche was age 16 menopause at 57 and she took hormones for a few months and stopped.    Family history is completely negative for malignancy except in a  maternal grandmother who may have had cervical cancer her 47 gene Invitae panel was negative    She is not a smoker or drinker  She has been on anticoagulation for 7 to 8 years for unprovoked massive PE with a negative work-up  She has had breast implants since 1977 and they were removed at the time of surgery  She has not had a heart attack or stroke    Explained in detail the rationale for surgery and adjuvant treatment with micrometastasis that can give rise to recurrence of her cancer if not treated adequately.  We discussed the fact that the use of hormonal blockade would decrease the risk of recurrence by 30 to 40% depending on grade and histology .    Based on her thrombotic risk and anticoagulation I have recommended avoiding tamoxifen and using aromatase inhibitor and we will start with Arimidex which we would recommend for 5 years at least.  The side effects and toxicities of the Aromatase inhibitors was discussed with the patient including, hot flashes, mood swings and hair thinning.Significant arthralgias and worsening bone density were also discussed. Baseline bone density evaluation was ordered.    She has been referred to radiation and they will decide whether radiation is indicated and if so she will start her Arimidex towards the end of radiation but if no radiation is planned she can start in about 2 weeks    She is agreeable and I told her to call if she has problems with the hormonal blockade  The patient and her daughter are comfortable with the decision to proceed with hormonal blockade    She will receive return in 3 to 4 months to assess her tolerance with a DEXA scan and we will consider the use of Prolia if her bone density is worse    8/22    She is here with her daughter today who states that her personality change she was achy and very uncomfortable and felt terrible overall and stopped it about a month ago and she is now back to baseline    We discussed why we gave adjuvant therapy and  they misunderstood and thought it was to prevent a second cancer in her other breast we talked about micrometastasis and the risk of recurrence the 25 to 30% range with the size of her breast cancer with no additional therapy and we have decided to try exemestane and if that causes problems consider tamoxifen because she is on lifelong anticoagulation and should not have clots and it would also help with her bone density    Her bone density is actually stable even though there is significant osteopenia in her hips and therefore we will hold off on the Prolia for now.  She is taking Tums for calcium but no additional vitamin D except for multivitamin and have asked her to take 1000 units of vitamin D and see how  we do with that        Current Outpatient Medications on File Prior to Visit   Medication Sig Dispense Refill   • acetaminophen (TYLENOL) 325 MG tablet Take 2 tablets by mouth Every 4 (Four) Hours As Needed for Mild Pain . 60 tablet 0   • calcium carbonate (TUMS) 500 MG chewable tablet Chew 1 tablet As Needed for Indigestion or Heartburn.     • cetirizine (zyrTEC) 10 MG tablet Take 1 tablet by mouth Every Night.     • cholecalciferol (VITAMIN D3) 25 MCG (1000 UT) tablet Take 1 tablet by mouth Every Morning.     • exemestane (AROMASIN) 25 MG chemo tablet Take 1 tablet by mouth Daily. 90 tablet 3   • rivaroxaban (Xarelto) 20 MG tablet Take 1 tablet by mouth Daily With Dinner. 30 tablet 0   • SYNTHROID 112 MCG tablet Take 1 tablet by mouth Every Night.       Current Facility-Administered Medications on File Prior to Visit   Medication Dose Route Frequency Provider Last Rate Last Admin   • Chlorhexidine Gluconate 2 % pads 1 each  1 each Apply externally Take As Directed Clarence Suarez MD            ALLERGIES:    Allergies   Allergen Reactions   • Penicillins Other (See Comments)     Passes out   • Latex Rash   • Sulfa Antibiotics Nausea And Vomiting        Social History     Socioeconomic History   • Marital  status:      Spouse name: SANDRA   • Number of children: 3   • Years of education: COLLEGE   Tobacco Use   • Smoking status: Former     Packs/day: 1.00     Years: 10.00     Pack years: 10.00     Types: Cigarettes     Start date: 1964     Quit date: 1976     Years since quittin.7   • Smokeless tobacco: Never   Vaping Use   • Vaping Use: Never used   Substance and Sexual Activity   • Alcohol use: Yes     Alcohol/week: 3.0 standard drinks     Types: 3 Glasses of wine per week   • Drug use: No   • Sexual activity: Not Currently     Partners: Male     Birth control/protection: Inserts, Post-menopausal, None        Family History   Problem Relation Age of Onset   • Hypertension Mother    • Osteoporosis Mother    • Scoliosis Mother    • Arthritis Mother    • Hypertension Father    • Arthritis Father    • Stroke Paternal Aunt    • Cervical cancer Maternal Grandmother 68   • Prostate cancer Brother 74   • Hypertension Brother    • Hyperlipidemia Brother    • Alzheimer's disease Brother    • Malig Hyperthermia Neg Hx         Review of Systems   Gastrointestinal:        Reflux symptoms   Skin: Negative for color change.   Neurological: Positive for headaches (Migraines).        Objective     There were no vitals filed for this visit.      5/15/2023     1:22 PM   Current Status   ECOG score 0       Physical Exam       CONSTITUTIONAL:  Vital signs reviewed.  No distress, looks comfortable.  EYES:  Conjunctivae and lids unremarkable.  PERRLA  EARS,NOSE,MOUTH,THROAT:  Ears and nose appear unremarkable.  Lips, teeth, gums appear unremarkable.  RESPIRATORY:  Normal respiratory effort.  Lungs clear to auscultation bilaterally.  BREASTS: Right breast shows a lift and no definite lumpectomy scar left breast also shows left with some redness and a small furuncle below the left breast  CARDIOVASCULAR:  Normal S1, S2.  No murmurs rubs or gallops.  No significant lower extremity edema.  GASTROINTESTINAL: Abdomen  appears unremarkable.  Nontender.  No hepatomegaly.  No splenomegaly.  LYMPHATIC:  No cervical, supraclavicular, axillary lymphadenopathy.  SKIN:  Warm.  No rashes.  PSYCHIATRIC:  Normal judgment and insight.  Normal mood and affect.  I have reexamined the patient and the results are consistent with the previously documented exam. MANUEL Tafoya       RECENT LABS:  Hematology WBC   Date Value Ref Range Status   05/15/2023 4.60 3.40 - 10.80 10*3/mm3 Final     RBC   Date Value Ref Range Status   05/15/2023 4.48 3.77 - 5.28 10*6/mm3 Final     Hemoglobin   Date Value Ref Range Status   05/15/2023 14.4 12.0 - 15.9 g/dL Final     Hematocrit   Date Value Ref Range Status   05/15/2023 44.5 34.0 - 46.6 % Final     Platelets   Date Value Ref Range Status   05/15/2023 203 140 - 450 10*3/mm3 Final        Synoptic Checklist     INVASIVE CARCINOMA OF THE BREAST: Resection  8th Edition - Protocol posted: 6/30/2021  INVASIVE CARCINOMA OF THE BREAST: COMPLETE EXCISION - All Specimens  SPECIMEN   Procedure  Excision (less than total mastectomy)    Specimen Laterality  Right    TUMOR   Tumor Site  Upper outer quadrant    Histologic Type  Invasive lobular carcinoma    Histologic Grade ([place] Histologic Score)     Glandular (Acinar) / Tubular Differentiation  Score 3    Nuclear Pleomorphism  Score 1    Mitotic Rate  Score 1    Overall Grade  Grade 1 (scores of 3, 4 or 5)    Tumor Size  Greatest dimension of largest invasive focus (Millimeters): 24 mm   Tumor Focality  Single focus of invasive carcinoma    Ductal Carcinoma In Situ (DCIS)  Not identified    Lobular Carcinoma In Situ (LCIS)  Present    Tumor Extent     Lymphovascular Invasion  Not identified    Dermal Lymphovascular Invasion  Not identified    Microcalcifications  Present in non-neoplastic tissue    Treatment Effect in the Breast  No known presurgical therapy    MARGINS   Margin Status for Invasive Carcinoma  All margins negative for invasive carcinoma     Distance from Invasive Carcinoma to Closest Margin  2 mm   Closest Margin(s) to Invasive Carcinoma  Posterior    Distance from Invasive Carcinoma to Anterior Margin  20 mm   Distance from Invasive Carcinoma to Posterior Margin  2 mm   Distance from Invasive Carcinoma to Superior Margin  53 mm   Distance from Invasive Carcinoma to Inferior Margin  15.3 mm   Distance from Invasive Carcinoma to Medial Margin  28 mm   Distance from Invasive Carcinoma to Lateral Margin  16.2 mm   REGIONAL LYMPH NODES   Regional Lymph Node Status  Not applicable (no regional lymph nodes submitted or found)    PATHOLOGIC STAGE CLASSIFICATION (pTNM, AJCC 8th Edition)      pT Category  pT2    pN Category  pN not assigned (no nodes submitted or found)    Breast Biomarker Testing Performed on Previous Biopsy     Estrogen Receptor (ER) Status  Positive (greater than 10% of cells demonstrate nuclear positivity)    Percentage of Cells with Nuclear Positivity  %    Breast Biomarker Testing Performed on Previous Biopsy     Progesterone Receptor (PgR) Status  Positive    Percentage of Cells with Nuclear Positivity  %    Breast Biomarker Testing Performed on Previous Biopsy     HER2 (by immunohistochemistry)  Negative (Score 1+)    Breast Biomarker Testing Performed on Previous Biopsy     Ki-67 Percentage of Positive Nuclei  7 %            Assessment & Plan   1.pT2Nx grade 1 invasive lobular carcinoma left breast strongly ER/KS positive HER2 negative postlumpectomy with associated LCIS and ALH  · Arimidex started in 3/22  · Stopped in 8/22-changed to Aromasin in 9/22  · Tolerating Aromasin well and 1/23  · Patient seen 5/15/2023 continuing on Aromasin though reports arthralgias, decreased stamina, and interference with her daily activities.  Patient is asking to hold the medication for 1 month to see if these things improve.  States she is struggled since the switch though it was better than when on Arimidex though she previously has  not set anything.  She states her daughter notices that her daily activity is different.  We will hold the Aromasin for 1 month.  The patient will then return to discuss how she is feeling with consideration of either restarting Aromasin or possibly moving to letrozole.  She is not a candidate for tamoxifen due to her previous PE/DVT.      2. DVT and PE on lifelong anticoagulation with Xarelto.      3, hypothyroidism on treatment    4, 37 gene Invitae panel negative    5. osteopenia in the hips on calcium and vitamin D-  · DEXA scan stable in 4 /22  · DEXA scan stable in 4/2023  · hold off on Prolia for now and recheck bone density in 1 year    Plan  1.  Hold Aromasin x1 month  2.  Continue calcium and vitamin D  3.  Consider repeating DEXA scan in 1 year and add Prolia if worsening.  4.  Mammogram due again in September 2023  5.  Follow-up in 1 month with me for evaluation of symptoms with holding Aromasin and consideration of either restarting or possibly letrozole  6.  Follow-up with Dr. Jaramillo in 4 months.

## 2023-06-13 ENCOUNTER — LAB (OUTPATIENT)
Dept: LAB | Facility: HOSPITAL | Age: 78
End: 2023-06-13
Payer: MEDICARE

## 2023-06-13 ENCOUNTER — OFFICE VISIT (OUTPATIENT)
Dept: ONCOLOGY | Facility: CLINIC | Age: 78
End: 2023-06-13
Payer: MEDICARE

## 2023-06-13 VITALS
OXYGEN SATURATION: 95 % | HEART RATE: 85 BPM | RESPIRATION RATE: 18 BRPM | BODY MASS INDEX: 37.51 KG/M2 | WEIGHT: 233.4 LBS | DIASTOLIC BLOOD PRESSURE: 86 MMHG | HEIGHT: 66 IN | SYSTOLIC BLOOD PRESSURE: 130 MMHG | TEMPERATURE: 98.7 F

## 2023-06-13 DIAGNOSIS — Z17.0 MALIGNANT NEOPLASM OF UPPER-OUTER QUADRANT OF RIGHT BREAST IN FEMALE, ESTROGEN RECEPTOR POSITIVE: ICD-10-CM

## 2023-06-13 DIAGNOSIS — Z79.811 AROMATASE INHIBITOR USE: Primary | ICD-10-CM

## 2023-06-13 DIAGNOSIS — C50.411 MALIGNANT NEOPLASM OF UPPER-OUTER QUADRANT OF RIGHT BREAST IN FEMALE, ESTROGEN RECEPTOR POSITIVE: ICD-10-CM

## 2023-06-13 RX ORDER — EXEMESTANE 25 MG/1
25 TABLET ORAL DAILY
Qty: 90 TABLET | Refills: 3 | Status: SHIPPED | OUTPATIENT
Start: 2023-06-13

## 2023-06-13 NOTE — PROGRESS NOTES
Subjective     REASON FOR CONSULTATION:  pT2Nx grade 1 lobular carcinoma right breast strongly ER/MI positive HER2 negative postlumpectomy1/3/2022  Provide an opinion on any further workup or treatment                             REQUESTING PHYSICIAN: MD Sri Willis MD    History of Present Illness patient is a 76-year-old lady with a lobular breast cancer low-grade treated with lumpectomy and and radiation and initially on Arimidex for a month.  This was  started In mid March 2022.  She stopped the medication because of side effects and initiated Aromasin in September 2022.  Patient reports she is still been struggling with side effects of arthralgias.  She has a lack of motivation she reports to do tasks, in part because she knows that it will be a struggle physically.  She is asking for a break from the Aromasin to see if these things improve.  She denies any feelings of depression or anxiety.  She states that she remains active and even when traveling with her daughter, her daughter notes that her daily activity seems affected.  Patient also has feelings of shortness of breath at times which she relates to decreased stamina.  She denies pain with deep breath.    Mammogram is due in September.    DEXA scan performed 4/10/2023 showing osteopenia at the right hip though unchanged from previous a year ago.  Lumbar spine did have 3.9% interval decrease though T score is still just 0.5.    Returns after a 1 month break from exemestane.  She had a slight improvement in her arthralgias but does not notice an overall change in performance.  She states that she would rather start the exemestane back and deal with the side effects and then to switch to another medication and possibly have more side effects.She states sh she did recently sell her home however remains living there until she finds somewhere else she would like to buy or will build.        Past Medical History:   Diagnosis Date    Arthritis     Arthritis of back 2015    Arthritis of neck     Bulging lumbar disc     L 3-4    Bursitis of hip     Clotting disorder     COVID-19     Fracture, fibula     Fracture, tibia and fibula     Stress fracture    Frozen shoulder     GERD (gastroesophageal reflux disease)     Hip arthrosis     History of kidney stones     History of MRSA infection 2020    MICHEL EARS    History of transfusion 1971    no reaction    Hx of blood clots 2011    Hypothyroid     Kidney stones     Knee swelling     Left knee pain     Lumbosacral disc disease     Malignant neoplasm of upper-outer quadrant of right breast in female, estrogen receptor positive 12/09/2021    Neck strain     Neuroma of foot 2000    Right foot    Ovarian cyst     Periarthritis of shoulder     PONV (postoperative nausea and vomiting)     states gets really sick lasted for 4 days aafter surgery the last time     Pulmonary embolism, bilateral     Scoliosis 2020    Stress fracture     Tear of meniscus of knee     Thrombopenia     Wears glasses         Past Surgical History:   Procedure Laterality Date    APPENDECTOMY      AUGMENTATION MAMMAPLASTY  1976    BREAST AUGMENTATION      years ago has implants    BREAST EXCISIONAL BIOPSY Right 10/2021    BREAST EXCISIONAL BIOPSY Left     BREAST LUMPECTOMY Right 01/03/2022    Procedure: Right needle-localized, bracketed, partial mastectomy;  Surgeon: Adela Cardoso MD;  Location: [facility];  Service: General;  Laterality: Right;    BREAST SURGERY Bilateral 01/03/2022    Procedure: RIGHT ONCOPLASTIC REDUCTION AND LEFT REDUCTION FOR SYMMETRY, BILATERAL IMPLANT REMOVAL AND  BILATERAL CAPSULECTOMIES;  Surgeon: Delbert Butcher MD;  Location: [facility];  Service: Plastics;  Laterality: Bilateral;    CHOLECYSTECTOMY      COLONOSCOPY N/A 12/14/2018    Procedure: COLONOSCOPY into cecum/termial ileum with polypectomy;  Surgeon: Jose Daniel Dooley MD;   Location: [facility] ENDOSCOPY;  Service: Gastroenterology    ENDOSCOPY N/A 12/14/2018    Procedure: ESOPHAGOGASTRODUODENOSCOPY with biopsy;  Surgeon: Jose Daniel Dooley MD;  Location: [facility] ENDOSCOPY;  Service: Gastroenterology    EXPLORATORY LAPAROTOMY      bleeding from ruptured ovarian cyst    HERNIA REPAIR      umbilical x 2    OVARIAN CYST DRAINAGE/EXCISION      ruptured ovarian cyst with vblood accumulatine in abdomin    TONSILLECTOMY      TOTAL KNEE ARTHROPLASTY Left 06/24/2021    Procedure: TOTAL KNEE ARTHROPLASTY;  Surgeon: Clarence Suarez MD;  Location: [facility] MAIN OR;  Service: Orthopedics;  Laterality: Left;    UMBILICAL HERNIA REPAIR      x2      patient is a 76-year-old white female with a 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    Patient reports she was doing her mammograms every 2 years for the last couple of years and had a benign breast biopsy on the left about 10 years ago but otherwise had not had any callbacks or other abnormalities on her imaging.    Patient had diagnostic imaging and a biopsyOn 11/18/2021 which revealed atypical lobular hyperplasia at 11:00 and invasive lobular carcinoma grade 1 measuring 14 mm at 11:00 ER % PA % HER2 negative Ki-67 of 7%  Patient was referred to Dr. Adela Cardoso who ordered an MRI which confirmed unifocal disease on the right and a biopsy cavity from the [facility] biopsy with no suspicious enhancement and no obvious adenopathy and a normal left breast.  She then proceeded with a lumpectomy on 1/3/2022 which showed residual 24 mm  low-grade lobular carcinoma with associated lobular carcinoma in situ with no lymphovascular invasion and clear margins no sentinel nodes were removed.  Implants which had been in for over 40 years were both removed    She has had a  little problem with some infection in the inframammary area bilaterally but is on antibiotic and this is improving.  She is here to discuss adjuvant treatment options    She is  5 para 3 with 2 miscarriages-first childbirth was at age 21 she did not breast-feed.  Menarche was age 16 menopause at 57 and she took hormones for a few months and stopped.    Family history is completely negative for malignancy except in a maternal grandmother who may have had cervical cancer her 47 gene Invitae panel was negative    She is not a smoker or drinker  She has been on anticoagulation for 7 to 8 years for unprovoked massive PE with a negative work-up  She has had breast implants since  and they were removed at the time of surgery  She has not had a heart attack or stroke    Explained in detail the rationale for surgery and adjuvant treatment with micrometastasis that can give rise to recurrence of her cancer if not treated adequately.  We discussed the fact that the use of hormonal blockade would decrease the risk of recurrence by 30 to 40% depending on grade and histology .    Based on her thrombotic risk and anticoagulation I have recommended avoiding tamoxifen and using aromatase inhibitor and we will start with Arimidex which we would recommend for 5 years at least.  The side effects and toxicities of the Aromatase inhibitors was discussed with the patient including, hot flashes, mood swings and hair thinning.Significant arthralgias and worsening bone density were also discussed. Baseline bone density evaluation was ordered.    She has been referred to radiation and they will decide whether radiation is indicated and if so she will start her Arimidex towards the end of radiation but if no radiation is planned she can start in about 2 weeks    She is agreeable and I told her to call if she has problems with the hormonal blockade  The patient and her daughter are comfortable with the decision to proceed with  hormonal blockade    She will receive return in 3 to 4 months to assess her tolerance with a DEXA scan and we will consider the use of Prolia if her bone density is worse    8/22    She is here with her daughter today who states that her personality change she was achy and very uncomfortable and felt terrible overall and stopped it about a month ago and she is now back to baseline    We discussed why we gave adjuvant therapy and they misunderstood and thought it was to prevent a second cancer in her other breast we talked about micrometastasis and the risk of recurrence the 25 to 30% range with the size of her breast cancer with no additional therapy and we have decided to try exemestane and if that causes problems consider tamoxifen because she is on lifelong anticoagulation and should not have clots and it would also help with her bone density    Her bone density is actually stable even though there is significant osteopenia in her hips and therefore we will hold off on the Prolia for now.  She is taking Tums for calcium but no additional vitamin D except for multivitamin and have asked her to take 1000 units of vitamin D and see how  we do with that        Current Outpatient Medications on File Prior to Visit   Medication Sig Dispense Refill    acetaminophen (TYLENOL) 325 MG tablet Take 2 tablets by mouth Every 4 (Four) Hours As Needed for Mild Pain . 60 tablet 0    calcium carbonate (TUMS) 500 MG chewable tablet Chew 1 tablet As Needed for Indigestion or Heartburn.      cetirizine (zyrTEC) 10 MG tablet Take 1 tablet by mouth Every Night.      cholecalciferol (VITAMIN D3) 25 MCG (1000 UT) tablet Take 1 tablet by mouth Every Morning.      exemestane (AROMASIN) 25 MG chemo tablet Take 1 tablet by mouth Daily. 90 tablet 3    rivaroxaban (Xarelto) 20 MG tablet Take 1 tablet by mouth Daily With Dinner. 30 tablet 0    SYNTHROID 112 MCG tablet Take 1 tablet by mouth Every Night.       Current Facility-Administered  Medications on File Prior to Visit   Medication Dose Route Frequency Provider Last Rate Last Admin    Chlorhexidine Gluconate 2 % pads 1 each  1 each Apply externally Take As Directed Clarence Suarez MD            ALLERGIES:    Allergies   Allergen Reactions    Penicillins Other (See Comments)     Passes out    Latex Rash    Sulfa Antibiotics Nausea And Vomiting        Social History     Socioeconomic History    Marital status:      Spouse name: SANDRA    Number of children: 3    Years of education: COLLEGE   Tobacco Use    Smoking status: Former     Packs/day: 1.00     Years: 10.00     Pack years: 10.00     Types: Cigarettes     Start date: 1964     Quit date: 1976     Years since quittin.8    Smokeless tobacco: Never   Vaping Use    Vaping Use: Never used   Substance and Sexual Activity    Alcohol use: Yes     Alcohol/week: 3.0 standard drinks     Types: 3 Glasses of wine per week    Drug use: No    Sexual activity: Not Currently     Partners: Male     Birth control/protection: Inserts, Post-menopausal, None        Family History   Problem Relation Age of Onset    Hypertension Mother     Osteoporosis Mother     Scoliosis Mother     Arthritis Mother     Hypertension Father     Arthritis Father     Stroke Paternal Aunt     Cervical cancer Maternal Grandmother 68    Prostate cancer Brother 74    Hypertension Brother     Hyperlipidemia Brother     Alzheimer's disease Brother     Malig Hyperthermia Neg Hx         Review of Systems   Gastrointestinal:        Reflux symptoms   Skin: Negative for color change.   Neurological: Positive for headaches (Migraines).        Objective     There were no vitals filed for this visit.      2023     3:33 PM   Current Status   ECOG score 0       Physical Exam       CONSTITUTIONAL:  Vital signs reviewed.  No distress, looks comfortable.  EYES:  Conjunctivae and lids unremarkable.  PERRLA  EARS,NOSE,MOUTH,THROAT:  Ears and nose appear unremarkable.  Lips,  teeth, gums appear unremarkable.  RESPIRATORY:  Normal respiratory effort.  Lungs clear to auscultation bilaterally.  BREASTS: Right breast shows a lift and no definite lumpectomy scar left breast also shows left with some redness and a small furuncle below the left breast  CARDIOVASCULAR:  Normal S1, S2.  No murmurs rubs or gallops.  No significant lower extremity edema.  GASTROINTESTINAL: Abdomen appears unremarkable.  Nontender.  No hepatomegaly.  No splenomegaly.  LYMPHATIC:  No cervical, supraclavicular, axillary lymphadenopathy.  SKIN:  Warm.  No rashes.  PSYCHIATRIC:  Normal judgment and insight.  Normal mood and affect.  I have reexamined the patient and the results are consistent with the previously documented exam. Vannesa Cisneros, MANUEL       RECENT LABS:  Hematology WBC   Date Value Ref Range Status   05/15/2023 4.60 3.40 - 10.80 10*3/mm3 Final     RBC   Date Value Ref Range Status   05/15/2023 4.48 3.77 - 5.28 10*6/mm3 Final     Hemoglobin   Date Value Ref Range Status   05/15/2023 14.4 12.0 - 15.9 g/dL Final     Hematocrit   Date Value Ref Range Status   05/15/2023 44.5 34.0 - 46.6 % Final     Platelets   Date Value Ref Range Status   05/15/2023 203 140 - 450 10*3/mm3 Final        Synoptic Checklist     INVASIVE CARCINOMA OF THE BREAST: Resection  8th Edition - Protocol posted: 6/30/2021  INVASIVE CARCINOMA OF THE BREAST: COMPLETE EXCISION - All Specimens  SPECIMEN   Procedure  Excision (less than total mastectomy)    Specimen Laterality  Right    TUMOR   Tumor Site  Upper outer quadrant    Histologic Type  Invasive lobular carcinoma    Histologic Grade (Maryann Histologic Score)     Glandular (Acinar) / Tubular Differentiation  Score 3    Nuclear Pleomorphism  Score 1    Mitotic Rate  Score 1    Overall Grade  Grade 1 (scores of 3, 4 or 5)    Tumor Size  Greatest dimension of largest invasive focus (Millimeters): 24 mm   Tumor Focality  Single focus of invasive carcinoma    Ductal Carcinoma In Situ  (DCIS)  Not identified    Lobular Carcinoma In Situ (LCIS)  Present    Tumor Extent     Lymphovascular Invasion  Not identified    Dermal Lymphovascular Invasion  Not identified    Microcalcifications  Present in non-neoplastic tissue    Treatment Effect in the Breast  No known presurgical therapy    MARGINS   Margin Status for Invasive Carcinoma  All margins negative for invasive carcinoma    Distance from Invasive Carcinoma to Closest Margin  2 mm   Closest Margin(s) to Invasive Carcinoma  Posterior    Distance from Invasive Carcinoma to Anterior Margin  20 mm   Distance from Invasive Carcinoma to Posterior Margin  2 mm   Distance from Invasive Carcinoma to Superior Margin  53 mm   Distance from Invasive Carcinoma to Inferior Margin  15.3 mm   Distance from Invasive Carcinoma to Medial Margin  28 mm   Distance from Invasive Carcinoma to Lateral Margin  16.2 mm   REGIONAL LYMPH NODES   Regional Lymph Node Status  Not applicable (no regional lymph nodes submitted or found)    PATHOLOGIC STAGE CLASSIFICATION (pTNM, AJCC 8th Edition)      pT Category  pT2    pN Category  pN not assigned (no nodes submitted or found)    Breast Biomarker Testing Performed on Previous Biopsy     Estrogen Receptor (ER) Status  Positive (greater than 10% of cells demonstrate nuclear positivity)    Percentage of Cells with Nuclear Positivity  %    Breast Biomarker Testing Performed on Previous Biopsy     Progesterone Receptor (PgR) Status  Positive    Percentage of Cells with Nuclear Positivity  %    Breast Biomarker Testing Performed on Previous Biopsy     HER2 (by immunohistochemistry)  Negative (Score 1+)    Breast Biomarker Testing Performed on Previous Biopsy     Ki-67 Percentage of Positive Nuclei  7 %            Assessment & Plan   1.pT2Nx grade 1 invasive lobular carcinoma left breast strongly ER/KY positive HER2 negative postlumpectomy with associated LCIS and ALH  Arimidex started in 3/22  Stopped in 8/22-changed to  Aromasin in 9/22  Tolerating Aromasin well and 1/23  Patient seen 5/15/2023 continuing on Aromasin though reports arthralgias, decreased stamina, and interference with her daily activities.  Patient is asking to hold the medication for 1 month to see if these things improve.  States she is struggled since the switch though it was better than when on Arimidex though she previously has not set anything.  She states her daughter notices that her daily activity is different.  We will hold the Aromasin for 1 month.  The patient will then return to discuss how she is feeling with consideration of either restarting Aromasin or possibly moving to letrozole.  She is not a candidate for tamoxifen due to her previous PE/DVT.  6/13/2023 patient returns today in follow-up having taken a 1 month break from exemestane though and denying improvement to the extent that she would want to not start back exemestane but she would rather deal with any possible side effects then switch to a new medication.      2. DVT and PE on lifelong anticoagulation with Xarelto.      3, hypothyroidism on treatment    4, 37 gene Invitae panel negative    5. osteopenia in the hips on calcium and vitamin D-  DEXA scan stable in 4 /22  DEXA scan stable in 4/2023  hold off on Prolia for now and recheck bone density in 1 year    Plan  1.  Patient will restart Aromasin daily  2.  Continue calcium and vitamin D  3.  Consider repeating DEXA scan in 1 year and add Prolia if worsening.  4.  Mammogram due again in Se3tember 2023  5  Follow-up with Dr. Jaramillo in 4 months.

## 2023-07-31 ENCOUNTER — TELEPHONE (OUTPATIENT)
Dept: ONCOLOGY | Facility: CLINIC | Age: 78
End: 2023-07-31
Payer: MEDICARE

## 2023-09-11 ENCOUNTER — TELEPHONE (OUTPATIENT)
Dept: GASTROENTEROLOGY | Facility: CLINIC | Age: 78
End: 2023-09-11

## 2023-09-11 NOTE — TELEPHONE ENCOUNTER
"    Hub staff attempted to follow warm transfer process and was unsuccessful     Caller: Iris Yee \"Adniel\"    Relationship to patient: Self    Best call back number: [phone] (Mobile)        Patient is needing: PT IS CALLING BECAUSE SHE HAS HAD PCP SEND OVER REFERRAL FOR DR PEREZ SHE SAYS ABOUT FIVE TIMES, BUT WE ARE NOT RECEIVING IT. T LOOKS LIKE SHE IS A CURRENT PATIENT OF MD, AND  THERE IS A RECALL LETTER REGARDING A REPEAT COLONOSCOPY SCHEDULED TO BE SENT IN NOV. PT WOULD LIKE TO EITHER SCHEDULE A F/U W/ SOMEONE TO DISCUSS THIS OR HAVE SOMEONE CALL HER. SHE IS NOT SURE SHE IS NEEDING TO MOVE FORWARD WITH A REPEAT PROCED. PLEASE CALL HER BACK ASAP.   "

## 2023-09-14 ENCOUNTER — OFFICE VISIT (OUTPATIENT)
Dept: ONCOLOGY | Facility: CLINIC | Age: 78
End: 2023-09-14
Payer: MEDICARE

## 2023-09-14 ENCOUNTER — LAB (OUTPATIENT)
Dept: LAB | Facility: HOSPITAL | Age: 78
End: 2023-09-14
Payer: MEDICARE

## 2023-09-14 VITALS
HEIGHT: 66 IN | HEART RATE: 83 BPM | WEIGHT: 233.7 LBS | RESPIRATION RATE: 18 BRPM | OXYGEN SATURATION: 94 % | TEMPERATURE: 98.1 F | SYSTOLIC BLOOD PRESSURE: 131 MMHG | BODY MASS INDEX: 37.56 KG/M2 | DIASTOLIC BLOOD PRESSURE: 84 MMHG

## 2023-09-14 DIAGNOSIS — Z79.811 AROMATASE INHIBITOR USE: ICD-10-CM

## 2023-09-14 DIAGNOSIS — Z79.811 AROMATASE INHIBITOR USE: Primary | ICD-10-CM

## 2023-09-14 DIAGNOSIS — Z17.0 MALIGNANT NEOPLASM OF UPPER-OUTER QUADRANT OF RIGHT BREAST IN FEMALE, ESTROGEN RECEPTOR POSITIVE: ICD-10-CM

## 2023-09-14 DIAGNOSIS — C50.411 MALIGNANT NEOPLASM OF UPPER-OUTER QUADRANT OF RIGHT BREAST IN FEMALE, ESTROGEN RECEPTOR POSITIVE: ICD-10-CM

## 2023-09-14 LAB
ALBUMIN SERPL-MCNC: 4.2 G/DL (ref 3.5–5.2)
ALBUMIN/GLOB SERPL: 1.6 G/DL
ALP SERPL-CCNC: 95 U/L (ref 39–117)
ALT SERPL W P-5'-P-CCNC: 16 U/L (ref 1–33)
ANION GAP SERPL CALCULATED.3IONS-SCNC: 16 MMOL/L (ref 5–15)
AST SERPL-CCNC: 20 U/L (ref 1–32)
BASOPHILS # BLD AUTO: 0.05 10*3/MM3 (ref 0–0.2)
BASOPHILS NFR BLD AUTO: 0.7 % (ref 0–1.5)
BILIRUB SERPL-MCNC: 0.5 MG/DL (ref 0–1.2)
BUN SERPL-MCNC: 21 MG/DL (ref 8–23)
BUN/CREAT SERPL: 18.4 (ref 7–25)
CALCIUM SPEC-SCNC: 9.4 MG/DL (ref 8.6–10.5)
CHLORIDE SERPL-SCNC: 103 MMOL/L (ref 98–107)
CO2 SERPL-SCNC: 21 MMOL/L (ref 22–29)
CREAT SERPL-MCNC: 1.14 MG/DL (ref 0.6–1.1)
DEPRECATED RDW RBC AUTO: 49.7 FL (ref 37–54)
EGFRCR SERPLBLD CKD-EPI 2021: 50 ML/MIN/1.73
EOSINOPHIL # BLD AUTO: 0.11 10*3/MM3 (ref 0–0.4)
EOSINOPHIL NFR BLD AUTO: 1.6 % (ref 0.3–6.2)
ERYTHROCYTE [DISTWIDTH] IN BLOOD BY AUTOMATED COUNT: 13.4 % (ref 12.3–15.4)
GLOBULIN UR ELPH-MCNC: 2.6 GM/DL
GLUCOSE SERPL-MCNC: 95 MG/DL (ref 65–99)
HCT VFR BLD AUTO: 44.7 % (ref 34–46.6)
HGB BLD-MCNC: 14.8 G/DL (ref 12–15.9)
IMM GRANULOCYTES # BLD AUTO: 0.02 10*3/MM3 (ref 0–0.05)
IMM GRANULOCYTES NFR BLD AUTO: 0.3 % (ref 0–0.5)
LYMPHOCYTES # BLD AUTO: 1.28 10*3/MM3 (ref 0.7–3.1)
LYMPHOCYTES NFR BLD AUTO: 18.9 % (ref 19.6–45.3)
MCH RBC QN AUTO: 32.9 PG (ref 26.6–33)
MCHC RBC AUTO-ENTMCNC: 33.1 G/DL (ref 31.5–35.7)
MCV RBC AUTO: 99.3 FL (ref 79–97)
MONOCYTES # BLD AUTO: 0.7 10*3/MM3 (ref 0.1–0.9)
MONOCYTES NFR BLD AUTO: 10.4 % (ref 5–12)
NEUTROPHILS NFR BLD AUTO: 4.6 10*3/MM3 (ref 1.7–7)
NEUTROPHILS NFR BLD AUTO: 68.1 % (ref 42.7–76)
NRBC BLD AUTO-RTO: 0 /100 WBC (ref 0–0.2)
PLATELET # BLD AUTO: 192 10*3/MM3 (ref 140–450)
PMV BLD AUTO: 11.3 FL (ref 6–12)
POTASSIUM SERPL-SCNC: 4.4 MMOL/L (ref 3.5–5.2)
PROT SERPL-MCNC: 6.8 G/DL (ref 6–8.5)
RBC # BLD AUTO: 4.5 10*6/MM3 (ref 3.77–5.28)
SODIUM SERPL-SCNC: 140 MMOL/L (ref 136–145)
WBC NRBC COR # BLD: 6.76 10*3/MM3 (ref 3.4–10.8)

## 2023-09-14 PROCEDURE — 36415 COLL VENOUS BLD VENIPUNCTURE: CPT

## 2023-09-14 PROCEDURE — 85025 COMPLETE CBC W/AUTO DIFF WBC: CPT

## 2023-09-14 PROCEDURE — 80053 COMPREHEN METABOLIC PANEL: CPT

## 2023-09-14 RX ORDER — DULOXETIN HYDROCHLORIDE 20 MG/1
20 CAPSULE, DELAYED RELEASE ORAL DAILY
Qty: 30 CAPSULE | Refills: 4 | Status: SHIPPED | OUTPATIENT
Start: 2023-09-14

## 2023-09-14 RX ORDER — LETROZOLE 2.5 MG/1
2.5 TABLET, FILM COATED ORAL DAILY
Qty: 30 TABLET | Refills: 6 | Status: SHIPPED | OUTPATIENT
Start: 2023-09-14 | End: 2024-04-11

## 2023-09-14 NOTE — PROGRESS NOTES
Subjective     REASON FOR CONSULTATION:  pT2Nx grade 1 lobular carcinoma right breast strongly ER/IL positive HER2 negative postlumpectomy1/3/2022  Provide an opinion on any further workup or treatment                             REQUESTING PHYSICIAN: MD Sri Willis MD    History of Present Illness patient is a 76-year-old lady with a lobular breast cancer low-grade treated with lumpectomy and and radiation and initially on Arimidex for a month.  This was  started In mid March 2022.  She stopped the medication because of side effects and initiated Aromasin in September 2022.  Patient reports she is still been struggling with side effects of arthralgias.  She has a lack of motivation she reports to do tasks, in part because she knows that it will be a struggle physically.  At this point cost of the Aromasin is too much for her and she would rather go back to Arimidex because she does not see a big difference.  I suggested switching to Geraldine for now because I am wanting to avoid tamoxifen because of her hypercoagulable history although the anticoagulation should make it safer relatively    I have asked her to take 1 month break and then try from air and if she has joint pains and arthralgias to try Cymbalta 20 mg daily and I have sent this prescription to her also but she will wait for at least 2 months before starting Cymbalta    Mammogram is due in September. 23    DEXA scan performed 4/10/2023 showing osteopenia at the right hip though unchanged from previous a year ago.  Lumbar spine did have 3.9% interval decrease though T score is still just 0.5.      Past Medical History:   Diagnosis Date    Arthritis     Arthritis of back 2015    Arthritis of neck     Bulging lumbar disc     L 3-4    Bursitis of hip     Clotting disorder     COVID-19     Fracture, fibula     Fracture, tibia and fibula     Stress fracture    Frozen shoulder     GERD  (gastroesophageal reflux disease)     Hip arthrosis     History of kidney stones     History of MRSA infection 2020    MICHEL EARS    History of transfusion 1971    no reaction    Hx of blood clots 2011    Hypothyroid     Kidney stones     Knee swelling     Left knee pain     Lumbosacral disc disease     Malignant neoplasm of upper-outer quadrant of right breast in female, estrogen receptor positive 12/09/2021    Neck strain     Neuroma of foot 2000    Right foot    Ovarian cyst     Periarthritis of shoulder     PONV (postoperative nausea and vomiting)     states gets really sick lasted for 4 days aafter surgery the last time     Pulmonary embolism, bilateral     Scoliosis 2020    Stress fracture     Tear of meniscus of knee     Thrombopenia     Wears glasses         Past Surgical History:   Procedure Laterality Date    APPENDECTOMY      AUGMENTATION MAMMAPLASTY  1976    BREAST AUGMENTATION      years ago has implants    BREAST EXCISIONAL BIOPSY Right 10/2021    BREAST EXCISIONAL BIOPSY Left     BREAST LUMPECTOMY Right 01/03/2022    Procedure: Right needle-localized, bracketed, partial mastectomy;  Surgeon: Adela Cardoso MD;  Location: [facility] OR;  Service: General;  Laterality: Right;    BREAST SURGERY Bilateral 01/03/2022    Procedure: RIGHT ONCOPLASTIC REDUCTION AND LEFT REDUCTION FOR SYMMETRY, BILATERAL IMPLANT REMOVAL AND  BILATERAL CAPSULECTOMIES;  Surgeon: Delbert Butcher MD;  [place] [facility];  Service: Plastics;  Laterality: Bilateral;    CHOLECYSTECTOMY      COLONOSCOPY N/A 12/14/2018    Procedure: COLONOSCOPY into cecum/termial ileum with polypectomy;  Surgeon: Jose Daniel Dooley MD;  [place] [facility];  Service: Gastroenterology    ENDOSCOPY N/A 12/14/2018    Procedure: ESOPHAGOGASTRODUODENOSCOPY with biopsy;  Surgeon: Jose Daniel Dooley MD;  Location[place] [facility];  Service: Gastroenterology    EXPLORATORY LAPAROTOMY      bleeding from ruptured ovarian cyst     HERNIA REPAIR      umbilical x 2    OVARIAN CYST DRAINAGE/EXCISION      ruptured ovarian cyst with vblood accumulatine in abdomin    TONSILLECTOMY      TOTAL KNEE ARTHROPLASTY Left 2021    Procedure: TOTAL KNEE ARTHROPLASTY;  Surgeon: Clarence Suarez MD;  Location: [facility];  Service: Orthopedics;  Laterality: Left;    UMBILICAL HERNIA REPAIR      x2      patient is a 76-year-old white female with a 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    Patient reports she was doing her mammograms every 2 years for the last couple of years and had a benign breast biopsy on the left about 10 years ago but otherwise had not had any callbacks or other abnormalities on her imaging.    Patient had diagnostic imaging and a biopsyOn 2021 which revealed atypical lobular hyperplasia at 11:00 and invasive lobular carcinoma grade 1 measuring 14 mm at 11:00 ER % MA % HER2 negative Ki-67 of 7%  Patient was referred to Dr. Adela Cardoso who ordered an MRI which confirmed unifocal disease on the right and a biopsy cavity from the [facility] biopsy with no suspicious enhancement and no obvious adenopathy and a normal left breast.  She then proceeded with a lumpectomy on 1/3/2022 which showed residual 24 mm  low-grade lobular carcinoma with associated lobular carcinoma in situ with no lymphovascular invasion and clear margins no sentinel nodes were removed.  Implants which had been in for over 40 years were both removed    She has had a little problem with some infection in the inframammary area bilaterally but is on antibiotic and this is improving.  She is here to discuss adjuvant treatment options    She is  5 para 3 with 2 miscarriages-first childbirth was at age 21 she did not breast-feed.  Menarche was age 16 menopause  at 57 and she took hormones for a few months and stopped.    Family history is completely negative for malignancy except in a maternal grandmother who may have had cervical cancer her 47 gene Invitae panel was negative    She is not a smoker or drinker  She has been on anticoagulation for 7 to 8 years for unprovoked massive PE with a negative work-up  She has had breast implants since 1977 and they were removed at the time of surgery  She has not had a heart attack or stroke    Explained in detail the rationale for surgery and adjuvant treatment with micrometastasis that can give rise to recurrence of her cancer if not treated adequately.  We discussed the fact that the use of hormonal blockade would decrease the risk of recurrence by 30 to 40% depending on grade and histology .    Based on her thrombotic risk and anticoagulation I have recommended avoiding tamoxifen and using aromatase inhibitor and we will start with Arimidex which we would recommend for 5 years at least.  The side effects and toxicities of the Aromatase inhibitors was discussed with the patient including, hot flashes, mood swings and hair thinning.Significant arthralgias and worsening bone density were also discussed. Baseline bone density evaluation was ordered.    She has been referred to radiation and they will decide whether radiation is indicated and if so she will start her Arimidex towards the end of radiation but if no radiation is planned she can start in about 2 weeks    She is agreeable and I told her to call if she has problems with the hormonal blockade  The patient and her daughter are comfortable with the decision to proceed with hormonal blockade    She will receive return in 3 to 4 months to assess her tolerance with a DEXA scan and we will consider the use of Prolia if her bone density is worse    8/22    She is here with her daughter today who states that her personality change she was achy and very uncomfortable and felt  terrible overall and stopped it about a month ago and she is now back to baseline    We discussed why we gave adjuvant therapy and they misunderstood and thought it was to prevent a second cancer in her other breast we talked about micrometastasis and the risk of recurrence the 25 to 30% range with the size of her breast cancer with no additional therapy and we have decided to try exemestane and if that causes problems consider tamoxifen because she is on lifelong anticoagulation and should not have clots and it would also help with her bone density    Her bone density is actually stable even though there is significant osteopenia in her hips and therefore we will hold off on the Prolia for now.  She is taking Tums for calcium but no additional vitamin D except for multivitamin and have asked her to take 1000 units of vitamin D and see how  we do with that        Current Outpatient Medications on File Prior to Visit   Medication Sig Dispense Refill    acetaminophen (TYLENOL) 325 MG tablet Take 2 tablets by mouth Every 4 (Four) Hours As Needed for Mild Pain . 60 tablet 0    calcium carbonate (TUMS) 500 MG chewable tablet Chew 1 tablet As Needed for Indigestion or Heartburn.      cetirizine (zyrTEC) 10 MG tablet Take 1 tablet by mouth Every Night.      cholecalciferol (VITAMIN D3) 25 MCG (1000 UT) tablet Take 1 tablet by mouth Every Morning.      exemestane (AROMASIN) 25 MG tablet Take 1 tablet by mouth Daily. 90 tablet 3    rivaroxaban (Xarelto) 20 MG tablet Take 1 tablet by mouth Daily With Dinner. 30 tablet 0    SYNTHROID 112 MCG tablet Take 1 tablet by mouth Every Night.       Current Facility-Administered Medications on File Prior to Visit   Medication Dose Route Frequency Provider Last Rate Last Admin    Chlorhexidine Gluconate 2 % pads 1 each  1 each Apply externally Take As Directed Clarence Suarez MD            ALLERGIES:    Allergies   Allergen Reactions    Penicillins Other (See Comments)     Passes out  "   Latex Rash    Sulfa Antibiotics Nausea And Vomiting        Social History     Socioeconomic History    Marital status:      Spouse name: SANDRA    Number of children: 3    Years of education: COLLEGE   Tobacco Use    Smoking status: Former     Packs/day: 1.00     Years: 10.00     Pack years: 10.00     Types: Cigarettes     Start date: 1964     Quit date: 1976     Years since quittin.0    Smokeless tobacco: Never   Vaping Use    Vaping Use: Never used   Substance and Sexual Activity    Alcohol use: Yes     Alcohol/week: 3.0 standard drinks     Types: 3 Glasses of wine per week    Drug use: No    Sexual activity: Not Currently     Partners: Male     Birth control/protection: Inserts, Post-menopausal, None        Family History   Problem Relation Age of Onset    Hypertension Mother     Osteoporosis Mother     Scoliosis Mother     Arthritis Mother     Hypertension Father     Arthritis Father     Stroke Paternal Aunt     Cervical cancer Maternal Grandmother 68    Prostate cancer Brother 74    Hypertension Brother     Hyperlipidemia Brother     Alzheimer's disease Brother     Malig Hyperthermia Neg Hx         Review of Systems   Gastrointestinal:         Reflux symptoms   Skin:  Negative for color change.   Neurological:  Positive for headaches (Migraines).      Objective     Vitals:    23 1446   BP: 131/84   Pulse: 83   Resp: 18   Temp: 98.1 °F (36.7 °C)   TempSrc: Temporal   SpO2: 94%   Weight: 106 kg (233 lb 11.2 oz)   Height: 166.4 cm (65.51\")   PainSc: 0-No pain         2023     2:40 PM   Current Status   ECOG score 0       Physical Exam       CONSTITUTIONAL:  Vital signs reviewed.  No distress, looks comfortable.  EYES:  Conjunctivae and lids unremarkable.  PERRLA  EARS,NOSE,MOUTH,THROAT:  Ears and nose appear unremarkable.  Lips, teeth, gums appear unremarkable.  RESPIRATORY:  Normal respiratory effort.  Lungs clear to auscultation bilaterally.  BREASTS: Right breast shows a lift " and no definite lumpectomy scar left breast CARDIOVASCULAR:  Normal S1, S2.  No murmurs rubs or gallops.  No significant lower extremity edema.  GASTROINTESTINAL: Abdomen appears unremarkable.  Nontender.  No hepatomegaly.  No splenomegaly.  LYMPHATIC:  No cervical, supraclavicular, axillary lymphadenopathy.  SKIN:  Warm.  No rashes.  PSYCHIATRIC:  Normal judgment and insight.  Normal mood and affect.  I have reexamined the patient and the results are consistent with the previously documented exam. Len Jaramillo MD       RECENT LABS:  Hematology WBC   Date Value Ref Range Status   09/14/2023 6.76 3.40 - 10.80 10*3/mm3 Final     RBC   Date Value Ref Range Status   09/14/2023 4.50 3.77 - 5.28 10*6/mm3 Final     Hemoglobin   Date Value Ref Range Status   09/14/2023 14.8 12.0 - 15.9 g/dL Final     Hematocrit   Date Value Ref Range Status   09/14/2023 44.7 34.0 - 46.6 % Final     Platelets   Date Value Ref Range Status   09/14/2023 192 140 - 450 10*3/mm3 Final        Synoptic Checklist     INVASIVE CARCINOMA OF THE BREAST: Resection  8th Edition - Protocol posted: 6/30/2021  INVASIVE CARCINOMA OF THE BREAST: COMPLETE EXCISION - All Specimens  SPECIMEN   Procedure  Excision (less than total mastectomy)    Specimen Laterality  Right    TUMOR   Tumor Site  Upper outer quadrant    Histologic Type  Invasive lobular carcinoma    Histologic Grade ([place] Histologic Score)     Glandular (Acinar) / Tubular Differentiation  Score 3    Nuclear Pleomorphism  Score 1    Mitotic Rate  Score 1    Overall Grade  Grade 1 (scores of 3, 4 or 5)    Tumor Size  Greatest dimension of largest invasive focus (Millimeters): 24 mm   Tumor Focality  Single focus of invasive carcinoma    Ductal Carcinoma In Situ (DCIS)  Not identified    Lobular Carcinoma In Situ (LCIS)  Present    Tumor Extent     Lymphovascular Invasion  Not identified    Dermal Lymphovascular Invasion  Not identified    Microcalcifications  Present in non-neoplastic  tissue    Treatment Effect in the Breast  No known presurgical therapy    MARGINS   Margin Status for Invasive Carcinoma  All margins negative for invasive carcinoma    Distance from Invasive Carcinoma to Closest Margin  2 mm   Closest Margin(s) to Invasive Carcinoma  Posterior    Distance from Invasive Carcinoma to Anterior Margin  20 mm   Distance from Invasive Carcinoma to Posterior Margin  2 mm   Distance from Invasive Carcinoma to Superior Margin  53 mm   Distance from Invasive Carcinoma to Inferior Margin  15.3 mm   Distance from Invasive Carcinoma to Medial Margin  28 mm   Distance from Invasive Carcinoma to Lateral Margin  16.2 mm   REGIONAL LYMPH NODES   Regional Lymph Node Status  Not applicable (no regional lymph nodes submitted or found)    PATHOLOGIC STAGE CLASSIFICATION (pTNM, AJCC 8th Edition)      pT Category  pT2    pN Category  pN not assigned (no nodes submitted or found)    Breast Biomarker Testing Performed on Previous Biopsy     Estrogen Receptor (ER) Status  Positive (greater than 10% of cells demonstrate nuclear positivity)    Percentage of Cells with Nuclear Positivity  %    Breast Biomarker Testing Performed on Previous Biopsy     Progesterone Receptor (PgR) Status  Positive    Percentage of Cells with Nuclear Positivity  %    Breast Biomarker Testing Performed on Previous Biopsy     HER2 (by immunohistochemistry)  Negative (Score 1+)    Breast Biomarker Testing Performed on Previous Biopsy     Ki-67 Percentage of Positive Nuclei  7 %            Assessment & Plan   1.pT2Nx grade 1 invasive lobular carcinoma left breast strongly ER/ID positive HER2 negative postlumpectomy with associated LCIS and ALH  Arimidex started in 3/22  Stopped in 8/22-changed to Aromasin in 9/22  Tolerating Aromasin well and 1/23  Patient seen 5/15/2023 continuing on Aromasin though reports arthralgias, decreased stamina, and interference with her daily activities.  Patient is asking to hold the  medication for 1 month to see if these things improve.  States she is struggled since the switch though it was better than when on Arimidex though she previously has not set anything.  She states her daughter notices that her daily activity is different.  We will hold the Aromasin for 1 month.  The patient will then return to discuss how she is feeling with consideration of either restarting Aromasin or possibly moving to letrozole.  She is not a candidate for tamoxifen due to her previous PE/DVT.  6/13/2023 patient returns today in follow-up having taken a 1 month break from exemestane though and denying improvement to the extent that she would want to not start back exemestane but she would rather deal with any possible side effects then switch to a new medication.  Stop exemestane due to side effects and the fact that it is too expensive and switch to Femara-if joint pains resume try Cymbalta 20 mg daily      2. DVT and PE on lifelong anticoagulation with Xarelto.      3, hypothyroidism on treatment    4, 37 gene Invitae panel negative    5. osteopenia in the hips on calcium and vitamin D-  DEXA scan stable in 4 /22  DEXA scan stable in 4/2023  hold off on Prolia for now and recheck bone density in 1 year    Plan  1.  Patient will stop Aromasin after months start Femara 2.5 mg daily  2.  Continue calcium and vitamin D  3.  If her joint pains recurrent from air start Cymbalta 20 mg daily  4.  Mammogram due again in Se3tember 2023  5  Follow-up with Dr. Jaramillo in 6 months.

## 2023-09-25 ENCOUNTER — HOSPITAL ENCOUNTER (OUTPATIENT)
Dept: MAMMOGRAPHY | Facility: HOSPITAL | Age: 78
Discharge: HOME | End: 2023-09-25
Admitting: NURSE PRACTITIONER
Payer: MEDICARE

## 2023-09-25 DIAGNOSIS — Z12.31 ENCOUNTER FOR SCREENING MAMMOGRAM FOR MALIGNANT NEOPLASM OF BREAST: ICD-10-CM

## 2023-09-25 DIAGNOSIS — Z17.0 MALIGNANT NEOPLASM OF UPPER-OUTER QUADRANT OF RIGHT BREAST IN FEMALE, ESTROGEN RECEPTOR POSITIVE: ICD-10-CM

## 2023-09-25 DIAGNOSIS — C50.411 MALIGNANT NEOPLASM OF UPPER-OUTER QUADRANT OF RIGHT BREAST IN FEMALE, ESTROGEN RECEPTOR POSITIVE: ICD-10-CM

## 2023-09-25 PROCEDURE — 77063 BREAST TOMOSYNTHESIS BI: CPT

## 2023-09-25 PROCEDURE — 77067 SCR MAMMO BI INCL CAD: CPT

## 2023-09-27 ENCOUNTER — TELEPHONE (OUTPATIENT)
Dept: ONCOLOGY | Facility: CLINIC | Age: 78
End: 2023-09-27
Payer: MEDICARE

## 2023-09-27 DIAGNOSIS — R59.9 SWOLLEN LYMPH NODES: ICD-10-CM

## 2023-09-27 DIAGNOSIS — C50.411 MALIGNANT NEOPLASM OF UPPER-OUTER QUADRANT OF RIGHT BREAST IN FEMALE, ESTROGEN RECEPTOR POSITIVE: Primary | ICD-10-CM

## 2023-09-27 DIAGNOSIS — Z17.0 MALIGNANT NEOPLASM OF UPPER-OUTER QUADRANT OF RIGHT BREAST IN FEMALE, ESTROGEN RECEPTOR POSITIVE: Primary | ICD-10-CM

## 2023-09-27 NOTE — TELEPHONE ENCOUNTER
Called the patient back and she states she has lymph nodes on her neck that are swollen and she was at the dentist today and they felt it and recommended a follow up with us and potentially a scan. I let her know I would tlak with Dr. Jaramillo. Patient v/u.

## 2023-09-27 NOTE — TELEPHONE ENCOUNTER
Called the patient to let her know Dr. Jaramillo ordered a CT neck and chest for her and that scheduling would be reaching out to her. Patient v/u.

## 2023-09-27 NOTE — TELEPHONE ENCOUNTER
Provider: Clint   Caller: patient  Relationship to Patient: self  Call Back Phone Number: [phone]  Reason for Call: Pt calling to see about getting a scan done on her swollen lymph nodes in her back. She does not have a sore throat.

## 2023-09-28 NOTE — TELEPHONE ENCOUNTER
“Mailed OA questionnaire to patient to complete for screening.     Once questionnaire is completed, returned to office, and reviewed by doctor then the colonoscopy can be scheduled.

## 2023-10-02 ENCOUNTER — TELEPHONE (OUTPATIENT)
Dept: ONCOLOGY | Facility: CLINIC | Age: 78
End: 2023-10-02

## 2023-10-02 NOTE — TELEPHONE ENCOUNTER
"  Caller: Iris Yee \"Daniel\"    Relationship: Self    Best call back number: [phone]    What is the best time to reach you: ANYTIME    Who are you requesting to speak with (clinical staff, provider,  specific staff member): SHELDON    Do you know the name of the person who called:     What was the call regarding: PATIENT DROPPED OFF A STUDENT LOAN PAPERWORK TO SHELDON ON 9/28/2023, HAS IT BEEN COMPLETED? PLEASE CALL PATIENT TO LET HER KNOW WHEN SHE CAN PICK IT UP.    Is it okay if the provider responds through MyChart: YES    "

## 2023-10-03 ENCOUNTER — TELEPHONE (OUTPATIENT)
Dept: SURGERY | Facility: CLINIC | Age: 78
End: 2023-10-03
Payer: MEDICARE

## 2023-10-03 ENCOUNTER — TELEPHONE (OUTPATIENT)
Dept: ONCOLOGY | Facility: CLINIC | Age: 78
End: 2023-10-03
Payer: MEDICARE

## 2023-10-03 DIAGNOSIS — R92.8 ABNORMAL FINDING ON BREAST IMAGING: Primary | ICD-10-CM

## 2023-10-03 NOTE — TELEPHONE ENCOUNTER
Results of screening mammogram completed 9/25/23 reported to patient.  Results are BiRads 0, left breast asymmetry noted, left diagnostic mammogram and US recommended.  We will assist in scheduling and call results along with new clinic appointment.    IMPRESSION:  1. There is an area of focal asymmetry in the middle third medial aspect  of the left breast. Further evaluation with spot compression CC  mammographic and tomosynthesis images and possible targeted left breast  sonography is recommended.  2. There are no findings suspicious for malignancy in the right breast.     BI-RADS CATEGORY 0: Incomplete - Needs additional imaging evaluation.

## 2023-10-04 ENCOUNTER — HOSPITAL ENCOUNTER (OUTPATIENT)
Dept: CT IMAGING | Facility: HOSPITAL | Age: 78
Discharge: HOME | End: 2023-10-04
Admitting: INTERNAL MEDICINE
Payer: MEDICARE

## 2023-10-04 ENCOUNTER — APPOINTMENT (OUTPATIENT)
Dept: CT IMAGING | Facility: HOSPITAL | Age: 78
End: 2023-10-04
Payer: MEDICARE

## 2023-10-04 DIAGNOSIS — Z17.0 MALIGNANT NEOPLASM OF UPPER-OUTER QUADRANT OF RIGHT BREAST IN FEMALE, ESTROGEN RECEPTOR POSITIVE: ICD-10-CM

## 2023-10-04 DIAGNOSIS — C50.411 MALIGNANT NEOPLASM OF UPPER-OUTER QUADRANT OF RIGHT BREAST IN FEMALE, ESTROGEN RECEPTOR POSITIVE: ICD-10-CM

## 2023-10-04 DIAGNOSIS — R59.9 SWOLLEN LYMPH NODES: ICD-10-CM

## 2023-10-04 PROCEDURE — 70491 CT SOFT TISSUE NECK W/DYE: CPT

## 2023-10-04 PROCEDURE — 25510000001 IOPAMIDOL 61 % SOLUTION: Performed by: INTERNAL MEDICINE

## 2023-10-04 PROCEDURE — 71260 CT THORAX DX C+: CPT

## 2023-10-04 RX ADMIN — IOPAMIDOL 100 ML: 612 INJECTION, SOLUTION INTRAVENOUS at 15:25

## 2023-10-06 ENCOUNTER — TELEPHONE (OUTPATIENT)
Dept: ONCOLOGY | Facility: CLINIC | Age: 78
End: 2023-10-06
Payer: MEDICARE

## 2023-10-06 DIAGNOSIS — R93.89 ABNORMAL CT SCAN, NECK: ICD-10-CM

## 2023-10-06 DIAGNOSIS — C50.411 MALIGNANT NEOPLASM OF UPPER-OUTER QUADRANT OF RIGHT BREAST IN FEMALE, ESTROGEN RECEPTOR POSITIVE: Primary | ICD-10-CM

## 2023-10-06 DIAGNOSIS — Z17.0 MALIGNANT NEOPLASM OF UPPER-OUTER QUADRANT OF RIGHT BREAST IN FEMALE, ESTROGEN RECEPTOR POSITIVE: Primary | ICD-10-CM

## 2023-10-06 DIAGNOSIS — R59.9 SWOLLEN LYMPH NODES: ICD-10-CM

## 2023-10-06 NOTE — TELEPHONE ENCOUNTER
Provider: Clint  Caller: patient  Relationship to Patient: self  Call Back Phone Number: [phone]  Reason for Call: patient wants to talk to nurse about her CT results

## 2023-10-06 NOTE — TELEPHONE ENCOUNTER
----- Message from Clemencia Irvin RN sent at 10/6/2023 10:48 AM EDT -----  Regarding: PET next available  Dr Jaramillo talked to her on the phone and she is aware we have ordered and will be scheduling. Can you please arrange and we will seen her back after the pet

## 2023-10-09 ENCOUNTER — TELEPHONE (OUTPATIENT)
Dept: ONCOLOGY | Facility: CLINIC | Age: 78
End: 2023-10-09
Payer: MEDICARE

## 2023-10-09 NOTE — TELEPHONE ENCOUNTER
Returned call to pt and let her know the CT chest results are scheduled to release to her my chart at 12:09pm today. She v/u

## 2023-10-09 NOTE — TELEPHONE ENCOUNTER
Provider: Clint  Caller: patient  Relationship to Patient: self  Call Back Phone Number: [phone]  Reason for Call: Her CT results are not on My chart.

## 2023-10-12 ENCOUNTER — TELEPHONE (OUTPATIENT)
Dept: ONCOLOGY | Facility: CLINIC | Age: 78
End: 2023-10-12
Payer: MEDICARE

## 2023-10-12 NOTE — TELEPHONE ENCOUNTER
----- Message from Adamaris Estrada sent at 10/11/2023 12:37 PM EDT -----    ----- Message -----  From: Bonita Mullins RegSched Rep  Sent: 10/11/2023  12:18 PM EDT  To: Shaina Bianchi; #    PT WANTS TO SEE THIS PT AS SOON AS SCAN IS DONE, SO IS OK TO MOVE, BUT STILL NEEDS TO BE DONE ASAPHever MYERS    ----- Message -----  From: Shaina Bianchi  Sent: 10/11/2023  10:23 AM EDT  To: Adamaris Kahn; #    PATIENT: HELEN ALCOCER  MRN: [id number]  LOCATION: [facility]  PROCEDURE: NM PET/CT SKULL BASE TO MID THIGH  DATE OF SERVICE: 10/12/23  INSURANCE: [facility] MEDICARE    PLEASE SEND AN IN BASKET MESSAGE TO [facility].     AUTH IS STILL PENDING WITH THE INSURANCE. NEED THE OKAY TO RESCHEDULE IF THIS IS NOT APPROVED BY 3 PM TODAY.

## 2023-10-12 NOTE — TELEPHONE ENCOUNTER
Called patient with her appt date and time that her pet scan had to be rescheduled due to ins approval - pt verbally understood pt asked that she be called to if this has to be changed again , advised that we would call her if it needed to be cancelled again

## 2023-10-17 ENCOUNTER — HOSPITAL ENCOUNTER (OUTPATIENT)
Dept: PET IMAGING | Facility: HOSPITAL | Age: 78
Discharge: HOME | End: 2023-10-17
Payer: MEDICARE

## 2023-10-17 DIAGNOSIS — R93.89 ABNORMAL CT SCAN, NECK: ICD-10-CM

## 2023-10-17 DIAGNOSIS — C50.411 MALIGNANT NEOPLASM OF UPPER-OUTER QUADRANT OF RIGHT BREAST IN FEMALE, ESTROGEN RECEPTOR POSITIVE: ICD-10-CM

## 2023-10-17 DIAGNOSIS — R59.9 SWOLLEN LYMPH NODES: ICD-10-CM

## 2023-10-17 DIAGNOSIS — Z17.0 MALIGNANT NEOPLASM OF UPPER-OUTER QUADRANT OF RIGHT BREAST IN FEMALE, ESTROGEN RECEPTOR POSITIVE: ICD-10-CM

## 2023-10-17 LAB — GLUCOSE BLDC GLUCOMTR-MCNC: 97 MG/DL (ref 70–130)

## 2023-10-17 PROCEDURE — 82948 REAGENT STRIP/BLOOD GLUCOSE: CPT

## 2023-10-17 PROCEDURE — A9552 F18 FDG: HCPCS | Performed by: INTERNAL MEDICINE

## 2023-10-17 PROCEDURE — 78815 PET IMAGE W/CT SKULL-THIGH: CPT

## 2023-10-17 PROCEDURE — 0 FLUDEOXYGLUCOSE F18 SOLUTION: Performed by: INTERNAL MEDICINE

## 2023-10-17 RX ADMIN — FLUDEOXYGLUCOSE F18 1 DOSE: 300 INJECTION INTRAVENOUS at 09:49

## 2023-10-20 ENCOUNTER — TELEPHONE (OUTPATIENT)
Dept: ONCOLOGY | Facility: CLINIC | Age: 78
End: 2023-10-20
Payer: MEDICARE

## 2023-10-20 NOTE — TELEPHONE ENCOUNTER
Provider: Clint  Caller: patient  Relationship to Patient: self  Call Back Phone Number: [phone]  Reason for Call: patient has concerns about some results

## 2023-10-23 ENCOUNTER — TELEPHONE (OUTPATIENT)
Dept: ONCOLOGY | Facility: CLINIC | Age: 78
End: 2023-10-23
Payer: MEDICARE

## 2023-10-23 NOTE — TELEPHONE ENCOUNTER
Reviewed PET scan results with Dr. Jaramillo and let pt know that there was not concerning uptake in her lymph nodes. Will await upcoming diagnostic mammogram and ultrasound and arrange for a visit with Dr. Jaramillo after this is done per pt request.

## 2023-10-23 NOTE — TELEPHONE ENCOUNTER
Provider: Clint  Caller: patient   Relationship to Patient: self  Call Back Phone Number: [phone]  Reason for Call: Calling about Pet scan results .

## 2023-11-02 ENCOUNTER — HOSPITAL ENCOUNTER (OUTPATIENT)
Dept: MAMMOGRAPHY | Facility: HOSPITAL | Age: 78
Discharge: HOME | End: 2023-11-02
Admitting: NURSE PRACTITIONER
Payer: MEDICARE

## 2023-11-02 ENCOUNTER — HOSPITAL ENCOUNTER (OUTPATIENT)
Dept: ULTRASOUND IMAGING | Facility: HOSPITAL | Age: 78
Discharge: HOME | End: 2023-11-02
Payer: MEDICARE

## 2023-11-02 DIAGNOSIS — R92.8 ABNORMAL FINDING ON BREAST IMAGING: ICD-10-CM

## 2023-11-02 PROCEDURE — 76642 ULTRASOUND BREAST LIMITED: CPT

## 2023-11-02 PROCEDURE — 77065 DX MAMMO INCL CAD UNI: CPT

## 2023-11-02 PROCEDURE — G0279 TOMOSYNTHESIS, MAMMO: HCPCS

## 2023-11-06 ENCOUNTER — TELEPHONE (OUTPATIENT)
Dept: SURGERY | Facility: CLINIC | Age: 78
End: 2023-11-06
Payer: MEDICARE

## 2023-11-06 NOTE — TELEPHONE ENCOUNTER
Spoke to pt and told her Vanessa would be looking at the results tomorrow when she returns to the office.         ----- Message from Adamaris Mariscal sent at 11/2/2023  3:47 PM EDT -----  Contact: [phone]  Please call patient re: needing bx

## 2023-11-07 ENCOUNTER — TELEPHONE (OUTPATIENT)
Dept: SURGERY | Facility: CLINIC | Age: 78
End: 2023-11-07
Payer: MEDICARE

## 2023-11-07 ENCOUNTER — PREP FOR SURGERY (OUTPATIENT)
Dept: OTHER | Facility: HOSPITAL | Age: 78
End: 2023-11-07
Payer: MEDICARE

## 2023-11-07 DIAGNOSIS — R92.8 ABNORMAL FINDING ON BREAST IMAGING: Primary | ICD-10-CM

## 2023-11-07 NOTE — TELEPHONE ENCOUNTER
Breast biopsy [facility] 11/15/23 @ 11:30.   Patient is aware and she's aware to hold her Xarelto for 24 hours before.

## 2023-11-07 NOTE — TELEPHONE ENCOUNTER
Left diagnostic mammogram and US results reported to patient.  Left stereotactic biopsy is recommended and will be scheduled. I will call her with pathology results and follow up recommendations.    IMPRESSION:  Suspicious 0.4 cm focal asymmetry in the inner left breast without a  sonographic correlate. Recommend for further evaluation with  stereotactic/tomosynthesis guided core needle biopsy.     Findings and recommendations were discussed with the patient in person  at the time of her examination. An epic in basket message was sent to  MANUEL Mancini on 11/2/2023.     BI-RADS Category 4: Suspicious

## 2023-11-07 NOTE — PROGRESS NOTES
11/15/23 0001   Pre-Procedure Phone Call   Procedure Time Verified Yes   Arrival Time 1130   Procedure Location Verified Yes   Medical History Reviewed Yes   NPO Status Reinforced No   Ride and Caregiver Arranged N/A   Patient Knows to Bring Current Medications No   Bring Outside Films Requested No

## 2023-11-15 ENCOUNTER — HOSPITAL ENCOUNTER (OUTPATIENT)
Dept: MAMMOGRAPHY | Facility: HOSPITAL | Age: 78
Discharge: HOME | End: 2023-11-15
Admitting: NURSE PRACTITIONER
Payer: MEDICARE

## 2023-11-15 VITALS
OXYGEN SATURATION: 96 % | TEMPERATURE: 98.4 F | BODY MASS INDEX: 37.56 KG/M2 | HEIGHT: 66 IN | DIASTOLIC BLOOD PRESSURE: 90 MMHG | SYSTOLIC BLOOD PRESSURE: 150 MMHG | HEART RATE: 83 BPM | WEIGHT: 233.7 LBS | RESPIRATION RATE: 17 BRPM

## 2023-11-15 DIAGNOSIS — R92.8 ABNORMAL FINDING ON BREAST IMAGING: ICD-10-CM

## 2023-11-15 PROCEDURE — A4648 IMPLANTABLE TISSUE MARKER: HCPCS

## 2023-11-15 PROCEDURE — 88305 TISSUE EXAM BY PATHOLOGIST: CPT | Performed by: NURSE PRACTITIONER

## 2023-11-15 PROCEDURE — 25010000002 LIDOCAINE 1 % SOLUTION: Performed by: NURSE PRACTITIONER

## 2023-11-15 RX ORDER — LIDOCAINE HYDROCHLORIDE 10 MG/ML
10 INJECTION, SOLUTION INFILTRATION; PERINEURAL ONCE
Status: COMPLETED | OUTPATIENT
Start: 2023-11-15 | End: 2023-11-15

## 2023-11-15 RX ORDER — DIAZEPAM 5 MG/1
5 TABLET ORAL ONCE AS NEEDED
Status: DISCONTINUED | OUTPATIENT
Start: 2023-11-15 | End: 2023-11-16 | Stop reason: HOSPADM

## 2023-11-15 RX ADMIN — LIDOCAINE HYDROCHLORIDE 15 ML: 10; .005 INJECTION, SOLUTION EPIDURAL; INFILTRATION; INTRACAUDAL; PERINEURAL at 12:53

## 2023-11-15 RX ADMIN — LIDOCAINE HYDROCHLORIDE 1 ML: 10 INJECTION, SOLUTION INFILTRATION; PERINEURAL at 12:52

## 2023-11-15 NOTE — H&P
Name: Iris Yee ADMIT: 11/15/2023   : 1945  PCP: Sri Conner MD    MRN: [id number] LOS: 0 days   AGE/SEX: 78 y.o. female  ROOM: Room/bed info not found       Chief complaint left breast lesion    Present Illness or Internal History:  Patient is a 78 y.o. female presents with a left breast lesion.     Past Surgical History:  Past Surgical History:   Procedure Laterality Date    APPENDECTOMY      AUGMENTATION MAMMAPLASTY      BREAST AUGMENTATION      years ago has implants    BREAST EXCISIONAL BIOPSY Right 10/2021    BREAST EXCISIONAL BIOPSY Left     BREAST LUMPECTOMY Right 2022    Procedure: Right needle-localized, bracketed, partial mastectomy;  Surgeon: Adela Cardoso MD;  Location: [facility] OR;  Service: General;  Laterality: Right;    BREAST SURGERY Bilateral 2022    Procedure: RIGHT ONCOPLASTIC REDUCTION AND LEFT REDUCTION FOR SYMMETRY, BILATERAL IMPLANT REMOVAL AND  BILATERAL CAPSULECTOMIES;  Surgeon: Delbert Butcher MD;  Location: [facility] MAIN OR;  Service: Plastics;  Laterality: Bilateral;    CHOLECYSTECTOMY      COLONOSCOPY N/A 2018    Procedure: COLONOSCOPY into cecum/termial ileum with polypectomy;  Surgeon: Jose Daniel Dooley MD;  Location: [facility] ENDOSCOPY;  Service: Gastroenterology    ENDOSCOPY N/A 2018    Procedure: ESOPHAGOGASTRODUODENOSCOPY with biopsy;  Surgeon: Jose Daniel Dooley MD;  Location: [facility] ENDOSCOPY;  Service: Gastroenterology    EXPLORATORY LAPAROTOMY      bleeding from ruptured ovarian cyst    HERNIA REPAIR      umbilical x 2    OVARIAN CYST DRAINAGE/EXCISION      ruptured ovarian cyst with vblood accumulatine in abdomin    TONSILLECTOMY      TOTAL KNEE ARTHROPLASTY Left 2021    Procedure: TOTAL KNEE ARTHROPLASTY;  Surgeon: Clarence Suarez MD;  Location: [facility] MAIN OR;  Service: Orthopedics;  Laterality: Left;    UMBILICAL HERNIA REPAIR      x2       Past Medical History:  Past Medical History:    Diagnosis Date    Arthritis     Arthritis of back     Arthritis of neck     Bulging lumbar disc     L 3-4    Bursitis of hip     Clotting disorder     COVID-19     Encounter for screening mammogram for malignant neoplasm of breast 2018    Fracture, fibula     Fracture, tibia and fibula     Stress fracture    Frozen shoulder     GERD (gastroesophageal reflux disease)     Hip arthrosis     History of kidney stones     History of MRSA infection     MICHEL EARS    History of transfusion     no reaction    Hx of blood clots     Hypothyroid     Kidney stones     Knee swelling     Left knee pain     Lumbosacral disc disease     Malignant neoplasm of upper-outer quadrant of right breast in female, estrogen receptor positive 2021    Neck strain     Neuroma of foot     Right foot    Ovarian cyst     Periarthritis of shoulder     PONV (postoperative nausea and vomiting)     states gets really sick lasted for 4 days aafter surgery the last time     Pulmonary embolism, bilateral     Scoliosis     Stress fracture     Tear of meniscus of knee     Thrombopenia     Wears glasses        Home Medications:  (Not in a hospital admission)      Allergies:  Penicillins, Latex, and Sulfa antibiotics    Family History:  Family History   Problem Relation Age of Onset    Hypertension Mother     Osteoporosis Mother     Scoliosis Mother     Arthritis Mother     Hypertension Father     Arthritis Father     Stroke Paternal Aunt     Cervical cancer Maternal Grandmother 68    Prostate cancer Brother 74    Hypertension Brother     Hyperlipidemia Brother     Alzheimer's disease Brother     Malig Hyperthermia Neg Hx        Social History:  Social History     Tobacco Use    Smoking status: Former     Packs/day: 1.00     Years: 10.00     Additional pack years: 0.00     Total pack years: 10.00     Types: Cigarettes     Start date: 1964     Quit date: 1976     Years since quittin.2    Smokeless tobacco: Never    Vaping Use    Vaping Use: Never used   Substance Use Topics    Alcohol use: Yes     Alcohol/week: 3.0 standard drinks of alcohol     Types: 3 Glasses of wine per week    Drug use: No        Objective     Physical Exam:    No exam performed today,    Vital Signs  Temp:  [98.4 °F (36.9 °C)] 98.4 °F (36.9 °C)  Heart Rate:  [85] 85  Resp:  [17] 17  BP: (132)/(84) 132/84    Anticipated Surgical Procedure:  Tomosynthesis guided left breast biopsy with clip placement    The risks, benefits and alternatives of this procedure have been discussed with the patient or responsible party: Yes        Tye Ardon Jr., MD  11/15/23  12:23 EST

## 2023-11-15 NOTE — NURSING NOTE
Biopsy site to left inner upper breast clear with Dermabond dry and intact. No firmness or swelling noted at or around biopsy site. Denies pain. Ice pack with protective covering applied to biopsy site. Discharge instructions discussed with understanding voiced by patient. Copies provided to patient. No distress noted. To home via personal vehicle.

## 2023-11-16 ENCOUNTER — TELEPHONE (OUTPATIENT)
Dept: MAMMOGRAPHY | Facility: HOSPITAL | Age: 78
End: 2023-11-16
Payer: MEDICARE

## 2023-11-16 LAB
LAB AP CASE REPORT: NORMAL
PATH REPORT.FINAL DX SPEC: NORMAL
PATH REPORT.GROSS SPEC: NORMAL

## 2023-11-16 NOTE — TELEPHONE ENCOUNTER
"Post call complete checking on patient after biopsy yesterday. Patient states, \"I feel just about normal and just having a little soreness today.\" Patient using the ice as directed and has no questions. Patient thankful for call and care provided yesterday.   "

## 2023-11-20 ENCOUNTER — TELEPHONE (OUTPATIENT)
Dept: ONCOLOGY | Facility: CLINIC | Age: 78
End: 2023-11-20
Payer: MEDICARE

## 2023-11-20 ENCOUNTER — TELEPHONE (OUTPATIENT)
Dept: SURGERY | Facility: CLINIC | Age: 78
End: 2023-11-20
Payer: MEDICARE

## 2023-11-20 NOTE — TELEPHONE ENCOUNTER
Provider: Clint   Caller: patient  Relationship to Patient: self  Call Back Phone Number: [phone]  Reason for Call: Pt calling about following up with  after negative biopsy.

## 2023-11-20 NOTE — TELEPHONE ENCOUNTER
I called the patient and discussed her benign stereotactic breast biopsy results.  Please get her set up with a follow-up appoint with Vanessa within the next couple of weeks

## 2023-11-20 NOTE — TELEPHONE ENCOUNTER
Called the patient and she thought Dr. Jaramillo told her to come in after her biopsy. Her biopsy was negative and I talked with Dr. Molina and she stated we would just f/u with her in April. Patient was appreciative and v/u.

## 2023-11-25 ENCOUNTER — HOSPITAL ENCOUNTER (EMERGENCY)
Facility: HOSPITAL | Age: 78
Discharge: HOME | End: 2023-11-25
Attending: EMERGENCY MEDICINE
Payer: MEDICARE

## 2023-11-25 VITALS
TEMPERATURE: 98.1 F | OXYGEN SATURATION: 99 % | HEIGHT: 65 IN | BODY MASS INDEX: 39.15 KG/M2 | HEART RATE: 90 BPM | RESPIRATION RATE: 18 BRPM | SYSTOLIC BLOOD PRESSURE: 159 MMHG | WEIGHT: 235 LBS | DIASTOLIC BLOOD PRESSURE: 86 MMHG

## 2023-11-25 DIAGNOSIS — L23.9 ALLERGIC DERMATITIS: Primary | ICD-10-CM

## 2023-11-25 PROCEDURE — 99282 EMERGENCY DEPT VISIT SF MDM: CPT

## 2023-11-25 RX ORDER — PREDNISONE 10 MG/1
20 TABLET ORAL DAILY
Qty: 10 TABLET | Refills: 0 | Status: SHIPPED | OUTPATIENT
Start: 2023-11-25 | End: 2023-11-29

## 2023-11-25 RX ORDER — DIAPER,BRIEF,INFANT-TODD,DISP
1 EACH MISCELLANEOUS AS NEEDED
COMMUNITY

## 2023-11-25 RX ORDER — HYDROXYZINE PAMOATE 50 MG/1
50 CAPSULE ORAL 3 TIMES DAILY PRN
Qty: 20 CAPSULE | Refills: 0 | Status: SHIPPED | OUTPATIENT
Start: 2023-11-25 | End: 2023-11-29

## 2023-11-25 RX ORDER — DIPHENHYDRAMINE HCL 25 MG
25 CAPSULE ORAL AS NEEDED
COMMUNITY
End: 2023-11-25

## 2023-11-25 NOTE — ED PROVIDER NOTES
EMERGENCY DEPARTMENT ENCOUNTER    Room Number:  32/32  Date seen:  11/25/2023  PCP: Sri Conner MD  Historian: Patient      HPI:  Chief Complaint: Rash  A complete HPI/ROS/PMH/PSH/SH/FH are unobtainable due to:   Context: Iris Yee is a 78 y.o. female who presents to the ED c/o rash.  Rash began last week several weeks after a breast biopsy.  She thinks she may have an allergic reaction to the glue that was used.  Rash was described as a redness and itching initially around the biopsy site in the mid chest.  It is spread somewhat through the mid chest and upper abdominal area.  She has used some Benadryl which helps a little bit.  She is also been using some hydrocortisone cream which was prescribed by her surgeon.  Denies chest pain or trouble breathing.      MEDICAL RECORD REVIEW (non ED)  I reviewed prior medical record including most recent oncology note with Dr. Jensen Jaramillo.  Patient has history of breast cancer and is receiving chemotherapy.    PAST MEDICAL HISTORY  Active Ambulatory Problems     Diagnosis Date Noted    History of pulmonary infarction 06/27/2016    Acquired hypothyroidism 06/27/2016    Pap smear abnormality of cervix with ASCUS favoring benign 07/01/2016    Postmenopausal bleeding 02/27/2017    Encounter for screening mammogram for malignant neoplasm of breast 11/13/2018    Gastroesophageal reflux disease 11/13/2018    Osteopenia of both hips 01/03/2020    Arthritis of knee 03/23/2021    Malignant neoplasm of upper-outer quadrant of right breast in female, estrogen receptor positive 12/09/2021    Aromatase inhibitor use 05/12/2022    Clinical diagnosis of COVID-19 07/05/2022    Adult BMI 38.0-38.9 kg/sq m 10/18/2022    Abnormal finding on breast imaging 10/03/2023     Resolved Ambulatory Problems     Diagnosis Date Noted    Ankle pain 09/26/2016    Achilles tendinitis of left lower extremity 09/26/2016    Tendonitis, Achilles, left 12/15/2016     Past Medical History:    Diagnosis Date    Arthritis     Arthritis of back 2015    Arthritis of neck     Bulging lumbar disc     Bursitis of hip     Clotting disorder     COVID-19     Fracture, fibula     Fracture, tibia and fibula     Frozen shoulder     GERD (gastroesophageal reflux disease)     Hip arthrosis     History of kidney stones     History of MRSA infection 2020    History of transfusion 1971    Hx of blood clots 2011    Hypothyroid     Kidney stones     Knee swelling     Left knee pain     Lumbosacral disc disease     Neck strain     Neuroma of foot 2000    Ovarian cyst     Periarthritis of shoulder     PONV (postoperative nausea and vomiting)     Pulmonary embolism, bilateral     Scoliosis 2020    Stress fracture     Tear of meniscus of knee     Thrombopenia     Wears glasses          PAST SURGICAL HISTORY  Past Surgical History:   Procedure Laterality Date    APPENDECTOMY      AUGMENTATION MAMMAPLASTY  1976    BREAST AUGMENTATION      years ago has implants    BREAST EXCISIONAL BIOPSY Right 10/2021    BREAST EXCISIONAL BIOPSY Left     BREAST LUMPECTOMY Right 01/03/2022    Procedure: Right needle-localized, bracketed, partial mastectomy;  Surgeon: Adela Cardoso MD;  Location: [facility] OR;  Service: General;  Laterality: Right;    BREAST SURGERY Bilateral 01/03/2022    Procedure: RIGHT ONCOPLASTIC REDUCTION AND LEFT REDUCTION FOR SYMMETRY, BILATERAL IMPLANT REMOVAL AND  BILATERAL CAPSULECTOMIES;  Surgeon: Delbert Butcher MD;  Location: [facility] OR;  Service: Plastics;  Laterality: Bilateral;    CHOLECYSTECTOMY      COLONOSCOPY N/A 12/14/2018    Procedure: COLONOSCOPY into cecum/termial ileum with polypectomy;  Surgeon: Jose Daniel Dooley MD;  Location: [facility] ENDOSCOPY;  Service: Gastroenterology    ENDOSCOPY N/A 12/14/2018    Procedure: ESOPHAGOGASTRODUODENOSCOPY with biopsy;  Surgeon: Jose Daniel Dooley MD;  Location: [facility] ENDOSCOPY;  Service: Gastroenterology    EXPLORATORY LAPAROTOMY       bleeding from ruptured ovarian cyst    HERNIA REPAIR      umbilical x 2    OVARIAN CYST DRAINAGE/EXCISION      ruptured ovarian cyst with vblood accumulatine in abdomin    TONSILLECTOMY      TOTAL KNEE ARTHROPLASTY Left 2021    Procedure: TOTAL KNEE ARTHROPLASTY;  Surgeon: Clarence Suarez MD;  Location: [facility] OR;  Service: Orthopedics;  Laterality: Left;    UMBILICAL HERNIA REPAIR      x2         FAMILY HISTORY  Family History   Problem Relation Age of Onset    Hypertension Mother     Osteoporosis Mother     Scoliosis Mother     Arthritis Mother     Hypertension Father     Arthritis Father     Stroke Paternal Aunt     Cervical cancer Maternal Grandmother 68    Prostate cancer Brother 74    Hypertension Brother     Hyperlipidemia Brother     Alzheimer's disease Brother     Malig Hyperthermia Neg Hx          SOCIAL HISTORY  Social History     Socioeconomic History    Marital status:      Spouse name: SANDRA    Number of children: 3    Years of education: COLLEGE   Tobacco Use    Smoking status: Former     Packs/day: 1.00     Years: 10.00     Additional pack years: 0.00     Total pack years: 10.00     Types: Cigarettes     Start date: 1964     Quit date: 1976     Years since quittin.2    Smokeless tobacco: Never   Vaping Use    Vaping Use: Never used   Substance and Sexual Activity    Alcohol use: Yes     Alcohol/week: 3.0 standard drinks of alcohol     Types: 3 Glasses of wine per week    Drug use: No    Sexual activity: Not Currently     Partners: Male     Birth control/protection: Inserts, Post-menopausal, None         ALLERGIES  Penicillins, Latex, and Sulfa antibiotics        REVIEW OF SYSTEMS  Review of Systems   Constitutional:  Negative for fever.   Respiratory:  Negative for shortness of breath.    Cardiovascular:  Negative for chest pain.   Skin:  Positive for rash.   All other systems reviewed and are negative.           PHYSICAL EXAM  ED Triage Vitals   Temp Heart Rate  Resp BP SpO2   11/25/23 1124 11/25/23 1124 11/25/23 1124 11/25/23 1131 11/25/23 1124   98.1 °F (36.7 °C) 90 18 159/86 99 %      Temp src Heart Rate Source Patient Position BP Location FiO2 (%)   -- -- -- 11/25/23 1131 --      Left arm        Physical Exam    GENERAL: Alert and pleasant female no obvious distress.  Triage vitals reviewed and are fairly unremarkable.  Temperature O2 sats benign.  HENT: nares patent  EYES: no scleral icterus  CV: regular rhythm, regular rate-no murmur  RESPIRATORY: normal effort, clear to auscultation bilaterally  ABDOMEN: soft, obese-nontender  MUSCULOSKELETAL: no deformity  NEURO: Strength sensation and coordination are grossly intact.  Speech and mentation are unremarkable  SKIN: Skin in the mid chest shows a biopsy site with glue in the medial portion of the right breast.  There is some mild surrounding erythema and papules.  There is also some small papules to the right middle breast area in the upper abdomen.  Exam is consistent with mild dermatitis.      Vital signs and nursing notes reviewed.          LAB RESULTS  No results found for this or any previous visit (from the past 24 hour(s)).    Ordered the above labs and independently interpreted results. My findings will be discussed in the medical decision making section below        RADIOLOGY  No Radiology Exams Resulted Within Past 24 Hours        PROCEDURES  Procedures          MEDICATIONS GIVEN IN ER  Medications - No data to display            MEDICAL DECISION MAKING, PROGRESS, and CONSULTS    All labs have been independently reviewed by me.  All radiology studies have been reviewed by me and I have also reviewed the radiology report.   EKG's independently viewed and interpreted by me.  Discussion below represents my analysis of pertinent findings related to patient's condition, differential diagnosis, treatment plan and final disposition.      Additional sources:  - Discussed/ obtained information from independent  historians:      - External (non-ED) record review: Please see documented above    - Chronic or social conditions impacting care: Breast cancer, recent biopsy    - Shared decision making: I discussed ED evaluation and treatment plan with patient who is in agreement.  See ED course      Orders placed during this visit:  No orders of the defined types were placed in this encounter.          Differential diagnosis:    Please see as documented below in ED course      Independent interpretation of labs, radiology studies, and discussions with consultants:  ED Course as of 11/25/23 1224   Sat Nov 25, 2023   1222 MDM-exam is consistent with likely dermatitis likely related to reaction to surgical adhesive.  Exam does not suggest acute infection or abscess.    Patient has not gotten better with Benadryl and hydrocortisone cream.  Will go ahead and prescribe a short course of steroids, prednisone 40 x 5 days as well as Atarax instead of Benadryl.  Continue hydrocortisone cream. [DB]      ED Course User Index  [DB] Efrain Santos MD               DIAGNOSIS  Final diagnoses:   Allergic dermatitis         DISPOSITION  DISCHARGE    Patient discharged in stable condition.    Reviewed implications of results, diagnosis, meds, responsibility to follow up, warning signs and symptoms of possible worsening, potential complications and reasons to return to ER, including worsening symptoms or as needed.    Patient/Family voiced understanding of above instructions.    Discussed plan for discharge, as there is no emergent indication for admission. Patient referred to primary care provider for BP management due to today's BP. Pt/family is agreeable and understands need for follow up and repeat testing.  Pt is aware that discharge does not mean that nothing is wrong but it indicates no emergency is present that requires admission and they must continue care with follow-up as given below or physician of their choice.     FOLLOW-UP  No  follow-up provider specified.       Medication List        New Prescriptions      hydrOXYzine pamoate 50 MG capsule  Commonly known as: VISTARIL  Take 1 capsule by mouth 3 (Three) Times a Day As Needed for Itching.     predniSONE 10 MG tablet  Commonly known as: DELTASONE  Take 2 tablets by mouth Daily for 5 days.            Stop      diphenhydrAMINE 25 mg capsule  Commonly known as: BENADRYL     DULoxetine 20 MG capsule  Commonly known as: CYMBALTA     rivaroxaban 20 MG tablet  Commonly known as: Xarelto               Where to Get Your Medications        These medications were sent to [id number] - [place], [place] - [address] AT [facility] [address] - [phone]  - [phone]   [address], [facility] [address]      Phone: [phone]   hydrOXYzine pamoate 50 MG capsule  predniSONE 10 MG tablet                   Latest Documented Vital Signs:  As of 12:24 EST  BP- 159/86 HR- 90 Temp- 98.1 °F (36.7 °C) O2 sat- 99%              --    Please note that portions of this were completed with a voice recognition program.       Note Disclaimer: At [facility], we believe that sharing information builds trust and better relationships. You are receiving this note because you are receiving care at [facility] or recently visited. It is possible you will see health information before a provider has talked with you about it. This kind of information can be easy to misunderstand. To help you fully understand what it means for your health, we urge you to discuss this note with your provider.             Efrain Santos MD  11/25/23 3651

## 2023-11-29 ENCOUNTER — HOSPITAL ENCOUNTER (OUTPATIENT)
Dept: RADIATION ONCOLOGY | Facility: HOSPITAL | Age: 78
Setting detail: RADIATION/ONCOLOGY SERIES
End: 2023-11-29
Payer: MEDICARE

## 2023-11-29 ENCOUNTER — OFFICE VISIT (OUTPATIENT)
Dept: RADIATION ONCOLOGY | Facility: HOSPITAL | Age: 78
End: 2023-11-29
Payer: MEDICARE

## 2023-11-29 VITALS
SYSTOLIC BLOOD PRESSURE: 142 MMHG | HEART RATE: 68 BPM | DIASTOLIC BLOOD PRESSURE: 80 MMHG | OXYGEN SATURATION: 97 % | WEIGHT: 228 LBS | BODY MASS INDEX: 37.94 KG/M2

## 2023-11-29 DIAGNOSIS — Z17.0 MALIGNANT NEOPLASM OF UPPER-OUTER QUADRANT OF RIGHT BREAST IN FEMALE, ESTROGEN RECEPTOR POSITIVE: Primary | ICD-10-CM

## 2023-11-29 DIAGNOSIS — C50.411 MALIGNANT NEOPLASM OF UPPER-OUTER QUADRANT OF RIGHT BREAST IN FEMALE, ESTROGEN RECEPTOR POSITIVE: Primary | ICD-10-CM

## 2023-11-29 PROCEDURE — G0463 HOSPITAL OUTPT CLINIC VISIT: HCPCS | Performed by: RADIOLOGY

## 2023-11-29 NOTE — PROGRESS NOTES
"  Subjective     No ref. provider found     Cancer Staging   Stage Unknown (pT2, pNX, G1, ER+, MI+, HER2-)   CC: 20 month post radiation for pT2, pNX, G1, ER+, MI+, HER2-R breast cancer with new skin rash over right breast                                  S:     I had the pleasure of seeing Iris Yee  today in the [facility].  She is a 76 year old female with pT2nx invasive lobular carcinoma of right breast, ER MI Pos Her 2 neg. She completed radiation to the right breast a dose of 3990cGy on 2/28/22.  She comes in today for evaluation of a rash she has developed over her mid chest and left breast.  She had a biopsy of the left breast 2 weeks ago and shortly after developed the rash which has been very \"itchy\".  She went to the ER a few days ago and was prescribed steroids.        Review of Systems   Constitutional:  Positive for fatigue.   Skin:  Positive for rash.   Psychiatric/Behavioral:  The patient is nervous/anxious.        Past Medical History:   Diagnosis Date    Arthritis     Arthritis of back 2015    Arthritis of neck     Bulging lumbar disc     L 3-4    Bursitis of hip     Clotting disorder     COVID-19     Encounter for screening mammogram for malignant neoplasm of breast 11/13/2018    Fracture, fibula     Fracture, tibia and fibula     Stress fracture    Frozen shoulder     GERD (gastroesophageal reflux disease)     Hip arthrosis     History of kidney stones     History of MRSA infection 2020    MICHEL EARS    History of transfusion 1971    no reaction    Hx of blood clots 2011    Hypothyroid     Kidney stones     Knee swelling     Left knee pain     Lumbosacral disc disease     Malignant neoplasm of upper-outer quadrant of right breast in female, estrogen receptor positive 12/09/2021    Neck strain     Neuroma of foot 2000    Right foot    Ovarian cyst     Periarthritis of shoulder     PONV (postoperative nausea and vomiting)     states gets really sick lasted for 4 days aafter surgery the " last time     Pulmonary embolism, bilateral     Scoliosis 2020    Stress fracture     Tear of meniscus of knee     Thrombopenia     Wears glasses          Past Surgical History:   Procedure Laterality Date    APPENDECTOMY      AUGMENTATION MAMMAPLASTY  1976    BREAST AUGMENTATION      years ago has implants    BREAST EXCISIONAL BIOPSY Right 10/2021    BREAST EXCISIONAL BIOPSY Left     BREAST LUMPECTOMY Right 01/03/2022    Procedure: Right needle-localized, bracketed, partial mastectomy;  Surgeon: Adela Cardoso MD;  Location: [facility] MAIN OR;  Service: General;  Laterality: Right;    BREAST SURGERY Bilateral 01/03/2022    Procedure: RIGHT ONCOPLASTIC REDUCTION AND LEFT REDUCTION FOR SYMMETRY, BILATERAL IMPLANT REMOVAL AND  BILATERAL CAPSULECTOMIES;  Surgeon: Delbert Butcher MD;  Location: [facility] MAIN OR;  Service: Plastics;  Laterality: Bilateral;    CHOLECYSTECTOMY      COLONOSCOPY N/A 12/14/2018    Procedure: COLONOSCOPY into cecum/termial ileum with polypectomy;  Surgeon: Jose Daniel Dooley MD;  Location: [facility] ENDOSCOPY;  Service: Gastroenterology    ENDOSCOPY N/A 12/14/2018    Procedure: ESOPHAGOGASTRODUODENOSCOPY with biopsy;  Surgeon: Jose Daniel Dooley MD;  Location: [facility] ENDOSCOPY;  Service: Gastroenterology    EXPLORATORY LAPAROTOMY      bleeding from ruptured ovarian cyst    HERNIA REPAIR      umbilical x 2    OVARIAN CYST DRAINAGE/EXCISION      ruptured ovarian cyst with vblood accumulatine in abdomin    TONSILLECTOMY      TOTAL KNEE ARTHROPLASTY Left 06/24/2021    Procedure: TOTAL KNEE ARTHROPLASTY;  Surgeon: Clarence Suarez MD;  Location: [facility] MAIN OR;  Service: Orthopedics;  Laterality: Left;    UMBILICAL HERNIA REPAIR      x2         Social History     Socioeconomic History    Marital status:      Spouse name: JOSE DANIEL    Number of children: 3    Years of education: COLLEGE   Tobacco Use    Smoking status: Former     Packs/day: 1.00     Years: 10.00     Additional pack  years: 0.00     Total pack years: 10.00     Types: Cigarettes     Start date: 1964     Quit date: 1976     Years since quittin.2    Smokeless tobacco: Never   Vaping Use    Vaping Use: Never used   Substance and Sexual Activity    Alcohol use: Yes     Alcohol/week: 3.0 standard drinks of alcohol     Types: 3 Glasses of wine per week    Drug use: No    Sexual activity: Not Currently     Partners: Male     Birth control/protection: Inserts, Post-menopausal, None         Family History   Problem Relation Age of Onset    Hypertension Mother     Osteoporosis Mother     Scoliosis Mother     Arthritis Mother     Hypertension Father     Arthritis Father     Stroke Paternal Aunt     Cervical cancer Maternal Grandmother 68    Prostate cancer Brother 74    Hypertension Brother     Hyperlipidemia Brother     Alzheimer's disease Brother     Malig Hyperthermia Neg Hx           Objective    Physical Exam  Constitutional:       Appearance: Normal appearance.   Chest:          Comments: Very mild erythematous papular rash over medial left breast and mid chest extending onto upper abdomen  Neurological:      Mental Status: She is alert.         Current Outpatient Medications on File Prior to Visit   Medication Sig Dispense Refill    acetaminophen (TYLENOL) 325 MG tablet Take 2 tablets by mouth Every 4 (Four) Hours As Needed for Mild Pain . 60 tablet 0    calcium carbonate (TUMS) 500 MG chewable tablet Chew 1 tablet As Needed for Indigestion or Heartburn.      cetirizine (zyrTEC) 10 MG tablet Take 1 tablet by mouth Every Night.      cholecalciferol (VITAMIN D3) 25 MCG (1000 UT) tablet Take 1 tablet by mouth Every Morning.      hydrocortisone 1 % cream Apply 1 application  topically to the appropriate area as directed As Needed for Rash.      hydrOXYzine pamoate (VISTARIL) 50 MG capsule Take 1 capsule by mouth 3 (Three) Times a Day As Needed for Itching. 20 capsule 0    letrozole (Femara) 2.5 MG tablet Take 1 tablet by  mouth Daily for 210 days. 30 tablet 6    predniSONE (DELTASONE) 10 MG tablet Take 2 tablets by mouth Daily for 5 days. 10 tablet 0    SYNTHROID 112 MCG tablet Take 1 tablet by mouth Every Night.       Current Facility-Administered Medications on File Prior to Visit   Medication Dose Route Frequency Provider Last Rate Last Admin    Chlorhexidine Gluconate 2 % pads 1 each  1 each Apply externally Take As Directed Clarence Suarez MD           ALLERGIES:    Allergies   Allergen Reactions    Penicillins Other (See Comments)     Passes out    Latex Rash    Sulfa Antibiotics Nausea And Vomiting       LMP  (LMP Unknown)          9/14/2023     2:40 PM   Current Status   ECOG score 0         Assessment & Plan     78 year old female with pT2Nx invasive lobular carcinoma of right breast, ER MT Pos Her 2 neg now 1 1/2 years out from radiation with a 2 week hx of rash over left breast and mid chest which began after a left breast biopsy. I explained I do not think this rash is related to her previous radiation especially in light of fact it is outside the radiation field.  It is most likely due to adhesive from recent left breast biopsy.  I encouraged her to use hydrocortisone cream and she has a follow up with the surgeon next week to take a look at it.  I have not scheduled a follow up with me but I asked her to call with any questions or concerns in the future.  I encouraged her to see a dermatologist if the rash does not improve or resolve in the next few weeks.     I personally spent greater than 15 minutes today assessing, managing, discussing and documenting my visit with the patient. That time includes review of records, imaging and pathology reports, obtaining my own history, performing a medically appropriate evaluation, counseling and educating the patient, discussing goals, logistics, alternatives and risks of my recommendations, surveillance options and potential outcomes. It also includes the time documenting the  clinical information in the EMR and communicating my recommendations to the other involved physicians.            Thank you very much for allowing me to participate in the care of this very pleasant patient.    Sincerely,      Ciara Celaya MD

## 2023-12-05 ENCOUNTER — TELEPHONE (OUTPATIENT)
Dept: SURGERY | Facility: CLINIC | Age: 78
End: 2023-12-05
Payer: MEDICARE

## 2023-12-05 ENCOUNTER — OFFICE VISIT (OUTPATIENT)
Dept: SURGERY | Facility: CLINIC | Age: 78
End: 2023-12-05
Payer: MEDICARE

## 2023-12-05 VITALS
HEIGHT: 65 IN | WEIGHT: 228 LBS | DIASTOLIC BLOOD PRESSURE: 80 MMHG | BODY MASS INDEX: 37.99 KG/M2 | SYSTOLIC BLOOD PRESSURE: 130 MMHG

## 2023-12-05 DIAGNOSIS — Z12.31 ENCOUNTER FOR SCREENING MAMMOGRAM FOR MALIGNANT NEOPLASM OF BREAST: ICD-10-CM

## 2023-12-05 DIAGNOSIS — C50.411 MALIGNANT NEOPLASM OF UPPER-OUTER QUADRANT OF RIGHT BREAST IN FEMALE, ESTROGEN RECEPTOR POSITIVE: Primary | ICD-10-CM

## 2023-12-05 DIAGNOSIS — Z17.0 MALIGNANT NEOPLASM OF UPPER-OUTER QUADRANT OF RIGHT BREAST IN FEMALE, ESTROGEN RECEPTOR POSITIVE: Primary | ICD-10-CM

## 2023-12-05 DIAGNOSIS — N64.1 BREAST, FAT NECROSIS: ICD-10-CM

## 2023-12-05 NOTE — PROGRESS NOTES
[facility]     Referring Provider:  Dr. Sri Conner     Chief complaint: breast cancer follow up     HPI:   12/9/21: seen by Dr Cardoso  MsHever Yee is a 77 yo woman, seen at the request of Dr. Sri Conner, for a new diagnosis of right breast cancer. This was initially detected as an imaging abnormality on routine screening. Her work-up is detailed in the oncologic history below. Prior to the biopsies, she denies any breast lumps, pain, skin changes, or nipple discharge. She has a past history of a benign left breast surgical biopsy in 2000. She also has a past history of prepectoral saline implants placed in 1976. She has a past history of an unprovoked pulmonary embolus in 2011, for which she is on Xarelto. She held this over the summer for her knee replacement surgery without any issues. She denies any family history of breast or ovarian cancer, however her brother did have prostate cancer.      1/20/22: seen by Dr Cardoso  She underwent right partial mastectomy with oncoplastic closure and left reduction for symmetry on 1/3/22. See surgery & pathology details below in oncologic history. She has been recovering well, aside from itching and a generalized rash. She has already seen Dr. Jaramillo postoperatively and discussed eventually starting Arimidex.    4/27/22:  She returns today for follow up with no breast concerns, she started arimidex 3/1/22 with no noted side effects.  In late 3/22 she fell with injury to her left knee, no injury to knee implant thankfully.    Her last clinic visit with Dr Celaya was in 2/22:  Iris Yee has recently completed the course of radiation therapy prescribed for the above-mentioned diagnosis    She met with Dr Jaramillo in 1/22:  Based on her thrombotic risk and anticoagulation I have recommended avoiding tamoxifen and using aromatase inhibitor and we will start with Arimidex which we would recommend for 5 years at least.     10/18/22:  She returns  today for follow up with no breast complaints, she is now taking aromasin with no adverse effects.    Her last clinic visit with Dr Jaramillo was in 9/22: She is here with her daughter today who states that her personality change she was achy and very uncomfortable and felt terrible overall and stopped it about a month ago and she is now back to baseline.  Stop Arimidex 1 mg daily changed to Aromasin 25 mg daily    Screening with tomosynthesis on 9/23/22 was stable, BiRads 2.  (see full report below)    4/25/23:  She returns today for follow up with only occasional breast discomfort.  Tolerating aromasin much better than arimidex.    On 4/19/23 she followed up with Dr Celaya: She has been doing well since I last saw her.   I have not scheduled a follow up with me but I asked her to call with any questions or concerns in the future.     She was last seen by Dr Jaramillo in 1/23:  initially on Arimidex for a month.  This was  started In mid March  She stopped the medication because of side effects and at her last visit we initiated Aromasin and she is tolerating this much better.    12/5/23, Interval History:  She returns today for follow up with continued mild post biopsy discomfort.  She developed a rash on her left breast shortly after the breast biopsy.  It was itchy and finally is resolving with use of OTC hydrocortisone ointment, benadryl and cortisone  She is now taking femara since 9/23, did not need to initiate cymbalta for arthralgias..  Follow up scans, both CT and PET scan of neck for enlarged lymph nodes is stable as compared to prior studies.    She was seen by Dr Celaya recently on 11/29/23: 2 week hx of rash over left breast and mid chest which began after a left breast biopsy. I explained I do not think this rash is related to her previous radiation especially in light of fact it is outside the radiation field.  It is most likely due to adhesive from recent left breast biopsy.  I encouraged her to use  hydrocortisone cream and she has a follow up with the surgeon next week to take a look at it.  I have not scheduled a follow up with me but I asked her to call with any questions or concerns in the future.     Her last clinic visit with Dr Jaramillo was in 9/23:  At this point cost of the Aromasin is too much for her and she would rather go back to Arimidex because she does not see a big difference.  I suggested switching to Geraldine for now because I am wanting to avoid tamoxifen because of her hypercoagulable history although the anticoagulation should make it safer relatively   I have asked her to take 1 month break and then try from air and if she has joint pains and arthralgias to try Cymbalta 20 mg daily and I have sent this prescription to her also but she will wait for at least 2 months before starting Cymbalta    9/25/23: bilateral screening mammogram with tomosynthesis, biRAds 0, focal asymmetry left breast for which additional diagnostic imaging is recommended.  (see full report below)  11/2/2023: left diagnostic mammogram/limited US, BiRAds 4 focal asymmetry left breast without sonographic correlate, stereotactic biopsy recommended.  (see full report below)  11/15/23: left breast stereotactic biopsy with benign and concordant results, fat necrosis, routine follow up recommended.  (see full report below)         Oncology/Hematology History Overview Note   12/10/2019, Screening MMG with Jorge ([facility]):  Negative mammogram with breast implants in place and showing no change from 07/24/2017.  BI-RADS 1: Negative.     Malignant neoplasm of upper-outer quadrant of right breast in female, estrogen receptor positive   9/20/2021 Initial Diagnosis    Malignant neoplasm of upper-outer quadrant of right breast in female, estrogen receptor positive (HCC)     9/21/2021 Imaging    Screening MMG with Jorge ([facility]):  Scattered areas of fibroglandular density. There are bilateral retroglandular silicone breast implants with  capsular calcifications. There is an area of architectural distortion in the slightly upper central middle to anterior right breast, best appreciated on Tomosynthesis. There are no suspicious masses, calcifications, or areas of architectural distortion in the left breast.  BI-RADS 0: Incomplete.     11/2/2021 Imaging    Right Diagnostic MMG with Jorge & Right Breast US ([facility]):  MMG:  With additional imaging there is persistence of the area of architectural distortion in the middle third of the right breast lateral to the plane of the nipple.   US:  At the 11 o'clock position on the order 3 cm from the nipple there is a 1.2 cm ill-defined hypoechoic region with posterior acoustic shadowing and mild detectable internal vascularity.   There is an adjacent 0.6 cm ill-defined hypoechoic lesion that is also seen at the 11 o'clock position on the order of 3 cm from the nipple that demonstrates posterior acoustic shadowing.  BI-RADS 4: Suspicious.     11/18/2021 Biopsy    Right Breast[facility]    1. Right Breast at 11:00 o'clock, 3 cm from Nipple (not for calcifications), Core Biopsy:                 A. Multifocal atypical lobular hyperplasia (ALH) with foci of usual ductal hyperplasia (UDH).               B. No ductal carcinoma in situ nor invasive carcinoma identified  -Tribell clip.     2. Right Breast mass at 11:00 o'clock, 3 cm from Nipple (not for calcifications), Core Biopsy:                 A. Invasive lobular carcinoma:                            1. Overall [place] grade I (tubular score=3, nuclear score=1, mitotic score=1).                            2. Invasive carcinoma measures at least 14 mm.                3. No lymphovascular space invasion identified.  B. Associated atypical lobular hyperplasia (ALH) noted.  C. No definitive in situ carcinoma component identified.  D. Focal columnar cell change, usual duct hyperplasia (UDH) and micropapilloma noted.  -Bowtie clip.     ER+  (%, strong)  MD+ (%, strong)  HER2 negative (IHC 1+)  Ki-67 7%     12/3/2021 Imaging    Bilateral Breast MRI ([facility]):  In the right breast centered at the 11:30 position centered on the order of 3 cm from the nipple there are 2 focal signal voids that are  by 1.1 cm. This represents the tri-bell-shaped and bowtie shaped metallic clips. The bowtie-shaped metallic clip is the more medial and slightly posterior metallic clip and represents the site of  invasive lobular carcinoma. There is evidence of an irregularly-shaped T1 hyperintense peripherally enhancing mass that surrounds the biopsy clips that measures on the order of 3.1 cm in anterior to posterior dimension, 1.5 cm in the superior to inferior dimension, and 2.6 cm in the medial to lateral dimension, and is consistent with a hematoma cavity. The bowtie-shaped metallic clip which represents the biopsy-proven site of malignancy is on the order of 0.6 cm anterior to an area of architectural distortion with mild enhancement measures on the order of 1.3 cm in superior to inferior dimension, 1.1 cm in the medial to lateral dimension and 1.0 cm in anterior to posterior dimension. This is most consistent with residual malignancy at the biopsy-proven site of malignancy. The tri-bell-shaped metallic clip is not surrounded by any abnormal enhancement but is within a hematoma  cavity.   No other areas of abnormal enhancement or morphology are seen in the right breast. I see no evidence for abnormal right breast skin, nipple or chest wall enhancement and there is no evidence for right axillary or  internal mammary chain adenopathy.   There are no areas of abnormal enhancement or morphology in the left breast. I see no evidence for abnormal left breast skin, nipple or chest wall enhancement and there is no evidence for left axillary or internal mammary chain adenopathy.  Bilateral retroglandular silicone implants are noted. The left silicone implant  shows irregularity at the posterior margin of the implant, but no evidence for free silicone is appreciated.  BI-RADS 6: Known malignancy.     12/9/2021 Genetic Testing    [facility] Cancers Panel (37 genes):    Negative     1/3/2022 Surgery    Right needle-localized, bracketed, partial mastectomy with oncoplastic closure and left reduction for symmetry    1. Right Breast, Oriented Needle Localization Lumpectomy (69 grams):  INVASIVE LOBULAR CARCINOMA.               A. Tumor site:  Upper outer quadrant (11:00 o'clock, 11:30).               B. Histologic grade:  Maryann score I (tubules=3, nuclei=1, mitosis=1).               C. Tumor size: ~24 mm maximally (present focally in slices 7 and 11 and throughout slices 8-10, slices 6mm).               D. Tumor focality:  Single focus.               E. Ductal carcinoma in-situ (DCIS):  Not identified.               F. Lobular carcinoma in-situ (LCIS):  Present.               G. Tumor extent:  Overlying skin is uninvolved by carcinoma.               H. No lymphovascular invasion identified.               I.  Margins:  Uninvolved by in-situ and invasive carcinoma.                            1. Invasive carcinoma is present 0.3 mm from the original inferior margin, 1.2 mm from the lateral                                margin, 2 mm from the posterior margin, 13 mm from the medial margin, 20 mm from the anterior                                margin and 38 mm from the superior margin of excision in specimen 1.               J. Lymph nodes:  Not submitted.               K. Hormone receptor status:  ER - % Positive, IL - % Positive, Her2/jed - 1+ Negative, Ki67 - 7% (performed on prior biopsy [id number]).    L. Pathologic stage:  pT2, NX.     2. Right Breast, Additional Medial/Superior Margin, Oriented Excision:               A. Benign unremarkable breast tissue.               B. Final medial and superior margin are free of tumor by an additional 15 mm.      3. Right Breast, Additional Inferior/Lateral Margin, Oriented Excision:               A. Benign breast tissue with foci of usual ductal hyperplasia, atypical lobular hyperplasia and lobular carcinoma in-situ (LCIS).               B. No evidence of invasive carcinoma identified.               C. Final inferior and lateral margin are free by an additional 15 mm.     4. Left Breast, Reduction Mammoplasty (163 grams):                A. Benign breast tissue with foci of pseudoangiomatous stromal hyperplasia (PASH).               B. Benign microcalcifications.               C. Unremarkable skin.               D. No evidence of in-situ nor invasive carcinoma identified.     5. Skin, Right Breast, Excision:  Benign unremarkable skin.     6. Right Breast, Capsulectomy:                A. Explanted intact breast prosthesis.               B. Benign fibrous capsule.      7. Left Breast, Capsulectomy:  Benign hyalinized breast capsule with fat necrosis and dystrophic calcification.     2/8/2022 - 2/28/2022 Radiation    Radiation OncologyTreatment Course:  Iris Yee received 4050 cGy in 15 fractions to Right breast     2/9/2022 -  Hormonal Therapy    Anastrozole (Arimidex)     5/12/2022 Cancer Staged    Staging form: Breast, AJCC 8th Edition  - Pathologic: Stage Unknown (pT2, pNX, G1, ER+, [place]+, HER2-) - Signed by Len Jaramillo MD on 5/12/2022 9/23/2022 Imaging    Bilateral screening mammogram with tomosynthesis at [facility]  FINDINGS: Bilateral digital CC and MLO mammographic and digitalTomosynthesis images were obtained. Comparison is made to prior studiesdated 9/21/2021, 11/2/2021 and 11/18/2021 .   The parenchyma of both breasts is largely fatty-replaced. Postsurgical change of the right breast is noted. There is an area of increased density in the posterior one third of the right breast associated with prior breast implant removal. There is no evidence for axillary lymphadenopathy or nipple retraction.    IMPRESSION:  1. There is no evidence for malignancy in either breast. Routine followup mammography is recommended.   BI-RADS category 2: Benign     5/12/2023 -  Chemotherapy    OP SUPPORTIVE Denosumab (Prolia) Q6M     9/25/2023 Imaging    Bilateral screening mammogram with tomosynthesis at [facility]  FINDINGS: Bilateral digital CC and MLO mammographic and digital  Tomosynthesis images were obtained. Comparison is made to prior studies  dated 09/23/2022 and 09/21/2021. The parenchyma of both breasts is  largely fatty-replaced. Stable postsurgical change of the right breast  is noted. In the middle third medial aspect of the left breast there is  an area of focal asymmetry. I see no new or dominant masses, areas of  architectural distortion or skin thickening. There is no evidence for  axillary lymphadenopathy or nipple retraction.     IMPRESSION:  1. There is an area of focal asymmetry in the middle third medial aspect of the left breast. Further evaluation with spot compression CC mammographic and tomosynthesis images and possible targeted left breast sonography is recommended.  2. There are no findings suspicious for malignancy in the right breast.   BI-RADS CATEGORY 0: Incomplete - Needs additional imaging evaluation.     11/2/2023 Imaging    Left diagnostic mammogram with tomosynthesis, left breast limited US at [facility]  FINDINGS:     MAMMOGRAM: Left CC spot compression and lateral 2D and tomosynthesis views were obtained.     There are scattered areas of fibroglandular density.   In the slightly lower inner middle left breast, there is a persistent 0.4 cm focal asymmetry. This area was further assessed with ultrasound.   ULTRASOUND:  Targeted sonographic evaluation of the left breast was performed from 8:00 to 9:00 in the region of the mammographic abnormality.  At 930, 8 cm from the nipple, there is an incidental 0.4 x 0.2 x 0.6 cm benign-appearing cyst. No sonographic correlate is identified for the mammographic focal  asymmetry.   IMPRESSION:  Suspicious 0.4 cm focal asymmetry in the inner left breast without a sonographic correlate. Recommend for further evaluation with  stereotactic/tomosynthesis guided core needle biopsy.   Findings and recommendations were discussed with the patient in person at the time of her examination. An epic in basket message was sent to MANUEL Mancini on 11/2/2023.   BI-RADS Category 4: Suspicious          11/15/2023 Biopsy    Left breast stereotactic biopsy at [facility]  PROCEDURE NOTE: Informed consent was obtained. Preliminary tomosynthesis images of the left breast were obtained. The overlying skin was prepped in the usual sterile fashion. Local anesthesia was achieved with 1% lidocaine. A nick was made in the skin with a scalpel. Through the nick was inserted an 8 gauge Mammotome vacuum assisted device. Repeat stereotactic/Tomosynthesis images were obtained demonstrating adequate placement of the biopsy needle. Multiple tissue specimens were obtained.  A specimen radiograph was obtained demonstrating a focal area of increased density within the specimen. A bowtie shaped metallic clip was placed to darleen the site. Pressure was applied until bleeding subsided and the overlying skin was cleaned and bandaged. Postbiopsy mammography of the left  breast demonstrates placement of a bowtie shaped metallic clip at the 8 o'clock position.   The pathology result has returned as fat necrosis. This is concordant with imaging findings.  IMPRESSION:  Technically successful tomosynthesis guided Mammotome vacuum assisted left breast biopsy with placement of a bowtie metallic clip. The pathology result has returned as benign. Routine mammographic follow-up is recommended.        11/15/2023 Biopsy    Pathology left breast stereotactic biopsy  Final Diagnosis   1.  Left breast at 8:00, (not for calcifications), stereotactic core biopsy:               A.  Focal organizing fat necrosis with foreign body giant cell  reaction and refractile foreign                        material and stromal fibrosis.               B.  No atypical hyperplasia, carcinoma in-situ nor invasive carcinoma identified.             Review of Systems:  See interval history.       Medications:    Current Outpatient Medications:     acetaminophen (TYLENOL) 325 MG tablet, Take 2 tablets by mouth Every 4 (Four) Hours As Needed for Mild Pain ., Disp: 60 tablet, Rfl: 0    calcium carbonate (TUMS) 500 MG chewable tablet, Chew 1 tablet As Needed for Indigestion or Heartburn., Disp: , Rfl:     cetirizine (zyrTEC) 10 MG tablet, Take 1 tablet by mouth Every Night., Disp: , Rfl:     cholecalciferol (VITAMIN D3) 25 MCG (1000 UT) tablet, Take 1 tablet by mouth Every Morning., Disp: , Rfl:     hydrocortisone 1 % cream, Apply 1 application  topically to the appropriate area as directed As Needed for Rash., Disp: , Rfl:     letrozole (Femara) 2.5 MG tablet, Take 1 tablet by mouth Daily for 210 days., Disp: 30 tablet, Rfl: 6    SYNTHROID 112 MCG tablet, Take 1 tablet by mouth Every Night., Disp: , Rfl:   No current facility-administered medications for this visit.    Facility-Administered Medications Ordered in Other Visits:     Chlorhexidine Gluconate 2 % pads 1 each, 1 each, Apply externally, Take As Directed, Clarence Suarez MD      Allergies   Allergen Reactions    Penicillins Other (See Comments)     Passes out    Latex Rash    Sulfa Antibiotics Nausea And Vomiting    Wound Dressing Adhesive Rash       Family History   Problem Relation Age of Onset    Hypertension Mother     Osteoporosis Mother     Scoliosis Mother     Arthritis Mother     Hypertension Father     Arthritis Father     Stroke Paternal Aunt     Cervical cancer Maternal Grandmother 68    Prostate cancer Brother 74    Hypertension Brother     Hyperlipidemia Brother     Alzheimer's disease Brother     Malig Hyperthermia Neg Hx        PHYSICAL EXAMINATION:   Vitals:    12/05/23 0849   BP: 130/80        BP  "130/80 (BP Location: Left arm)   Ht 165.1 cm (65\")   Wt 103 kg (228 lb)   LMP  (LMP Unknown)   BMI 37.94 kg/m²     I reviewed physical exam, no changes noted    ECOG 0 - Asymptomatic  General: NAD, well appearing  Psych: a&o x3, normal mood and affect  Eyes: EOMI, no scleral icterus  ENMT: neck supple without masses or thyromegaly, mucous membranes moist, mild fullness left neck  MSK: normal gait, normal ROM in bilateral shoulders  Lymph nodes: no cervical, supraclavicular or axillary lymphadenopathy  Breast:   Right: Sp mastopexy with well-healed incisions. No visible abnormalities on inspection while seated, with arms raised or hands on hips. No masses, skin changes, or nipple abnormalities.  Left: Sp mastopexy with well-healed incisions, mild scarring of NAC incisions c/w mild keloid changes. No visible abnormalities on inspection while seated, with arms raised or hands on hips. No masses, skin changes, or nipple abnormalities.  Very mild area of resolving rash lower inner breast at biopsy site.      Assessment:   1)   78 y.o. F with a diagnosis of right breast cancer 11/2021: Low grade, invasive lobular carcinoma, ER/MS+, Her2 negative. She underwent right partial mastectomy with oncoplastic closure and left reduction for symmetry on 1/3/22, pT2Nx. Arimidex, aromasin currently    2)  Obesity, BMI 38    3)  biopsy proven fat necrosis left breast 11/2023, routine follow up recommended      Discussion:  Imaging and pathology findings were reviewed.  Her pathology results confirmed fat necrosis.  I advised her that fat necrosis and oil cysts of the breast is a benign breast condition that happens when an area of the fatty breast tissue is damaged, usually as a result of injury to the breast.  It can also happen after breast surgery or radiation treatment.  The pathology was concordant with imaging findings and routine follow up is recommended.  She will be due her screening mammogram in 12 months and is in " agreement with this plan.    The rash on the left breast is nearly resolved, likely r/t either prep or adhesive bandage.     We discussed her follow up which includes clinical breast exam every 6 months for 2 years (11/2023), with mammogram annually then  mammogram and exam annually until 5 years from diagnosis ( 11/26).    BMI is 38  Patient was counseled on the importance of avoidance of obesity for a number of health issues including breast cancer. Diet changes and exercise were recommended.     Class 2 Severe Obesity (BMI >=35 and <=39.9). Obesity-related health conditions include the following: GERD. Obesity is unchanged. BMI is is above average; BMI management plan is completed. We discussed portion control and increasing exercise.      Plan:  -Continue follow-up with Dr. Jaramillo.  - bilateral screening mammogram with tomosynthesis in 12 months at [facility] followed by exam.  -She was instructed to call sooner with any questions, concerns or changes on BSE.    MANUEL Mancini      CC:   Dr. Sri Vasquez MD

## 2023-12-05 NOTE — TELEPHONE ENCOUNTER
Screening [facility] 9/26/24 @ 9:00.   Patient said she would see appt in MyChart.

## 2023-12-05 NOTE — LETTER
December 5, 2023     Sri Conner MD  [address]  [facility] [address]    Patient: Iris Yee   YOB: 1945   Date of Visit: 12/5/2023     Dear Sri Conner MD:       Thank you for referring Iris Yee to me for evaluation. Below are the relevant portions of my assessment and plan of care.    If you have questions, please do not hesitate to call me. I look forward to following Iris along with you.         Sincerely,        Vanessa Mims, MANUEL        CC: MD Prasanna Obrien Mary Ellen, MANUEL  12/05/23 0919  Sign when Signing Visit  [facility]     Referring Provider:  Dr. Sri Conner     Chief complaint: breast cancer follow up     HPI:   12/9/21: seen by Dr Cardoso  Ms. Iris Yee is a 75 yo woman, seen at the request of Dr. Sri Conner, for a new diagnosis of right breast cancer. This was initially detected as an imaging abnormality on routine screening. Her work-up is detailed in the oncologic history below. Prior to the biopsies, she denies any breast lumps, pain, skin changes, or nipple discharge. She has a past history of a benign left breast surgical biopsy in 2000. She also has a past history of prepectoral saline implants placed in 1976. She has a past history of an unprovoked pulmonary embolus in 2011, for which she is on Xarelto. She held this over the summer for her knee replacement surgery without any issues. She denies any family history of breast or ovarian cancer, however her brother did have prostate cancer.      1/20/22: seen by Dr Cardoso  She underwent right partial mastectomy with oncoplastic closure and left reduction for symmetry on 1/3/22. See surgery & pathology details below in oncologic history. She has been recovering well, aside from itching and a generalized rash. She has already seen Dr. Jaramillo postoperatively and discussed eventually starting Arimidex.    4/27/22:  She returns today for follow up with no breast  concerns, she started arimidex 3/1/22 with no noted side effects.  In late 3/22 she fell with injury to her left knee, no injury to knee implant thankfully.    Her last clinic visit with Dr Celaya was in 2/22:  Iris LIEBERMAN Joselito has recently completed the course of radiation therapy prescribed for the above-mentioned diagnosis    She met with Dr Jaramillo in 1/22:  Based on her thrombotic risk and anticoagulation I have recommended avoiding tamoxifen and using aromatase inhibitor and we will start with Arimidex which we would recommend for 5 years at least.     10/18/22:  She returns today for follow up with no breast complaints, she is now taking aromasin with no adverse effects.    Her last clinic visit with Dr Jaramillo was in 9/22: She is here with her daughter today who states that her personality change she was achy and very uncomfortable and felt terrible overall and stopped it about a month ago and she is now back to baseline.  Stop Arimidex 1 mg daily changed to Aromasin 25 mg daily    Screening with tomosynthesis on 9/23/22 was stable, BiRads 2.  (see full report below)    4/25/23:  She returns today for follow up with only occasional breast discomfort.  Tolerating aromasin much better than arimidex.    On 4/19/23 she followed up with Dr Celaya: She has been doing well since I last saw her.   I have not scheduled a follow up with me but I asked her to call with any questions or concerns in the future.     She was last seen by Dr Jaramillo in 1/23:  initially on Arimidex for a month.  This was  started In mid March  She stopped the medication because of side effects and at her last visit we initiated Aromasin and she is tolerating this much better.    12/5/23, Interval History:  She returns today for follow up with continued mild post biopsy discomfort.  She developed a rash on her left breast shortly after the breast biopsy.  It was itchy and finally is resolving with use of OTC hydrocortisone ointment,  benadryl and cortisone  She is now taking femara since 9/23, did not need to initiate cymbalta for arthralgias..  Follow up scans, both CT and PET scan of neck for enlarged lymph nodes is stable as compared to prior studies.    She was seen by Dr Celaya recently on 11/29/23: 2 week hx of rash over left breast and mid chest which began after a left breast biopsy. I explained I do not think this rash is related to her previous radiation especially in light of fact it is outside the radiation field.  It is most likely due to adhesive from recent left breast biopsy.  I encouraged her to use hydrocortisone cream and she has a follow up with the surgeon next week to take a look at it.  I have not scheduled a follow up with me but I asked her to call with any questions or concerns in the future.     Her last clinic visit with Dr Jaramillo was in 9/23:  At this point cost of the Aromasin is too much for her and she would rather go back to Arimidex because she does not see a big difference.  I suggested switching to Geraldine for now because I am wanting to avoid tamoxifen because of her hypercoagulable history although the anticoagulation should make it safer relatively   I have asked her to take 1 month break and then try from air and if she has joint pains and arthralgias to try Cymbalta 20 mg daily and I have sent this prescription to her also but she will wait for at least 2 months before starting Cymbalta    9/25/23: bilateral screening mammogram with tomosynthesis, biRAds 0, focal asymmetry left breast for which additional diagnostic imaging is recommended.  (see full report below)  11/2/2023: left diagnostic mammogram/limited US, BiRAds 4 focal asymmetry left breast without sonographic correlate, stereotactic biopsy recommended.  (see full report below)  11/15/23: left breast stereotactic biopsy with benign and concordant results, fat necrosis, routine follow up recommended.  (see full report below)          Oncology/Hematology History Overview Note   12/10/2019, Screening MMG with [facility] ( [facility]):  Negative mammogram with breast implants in place and showing no change from 07/24/2017.  BI-RADS 1: Negative.     Malignant neoplasm of upper-outer quadrant of right breast in female, estrogen receptor positive   9/20/2021 Initial Diagnosis    Malignant neoplasm of upper-outer quadrant of right breast in female, estrogen receptor positive (HCC)     9/21/2021 Imaging    Screening MMG with [facility] ( [facility]):  Scattered areas of fibroglandular density. There are bilateral retroglandular silicone breast implants with capsular calcifications. There is an area of architectural distortion in the slightly upper central middle to anterior right breast, best appreciated on Tomosynthesis. There are no suspicious masses, calcifications, or areas of architectural distortion in the left breast.  BI-RADS 0: Incomplete.     11/2/2021 Imaging    Right Diagnostic [facility] with [facility] & [facility]  MMG:  With additional imaging there is persistence of the area of architectural distortion in the middle third of the right breast lateral to the plane of the nipple.   US:  At the 11 o'clock position on the order 3 cm from the nipple there is a 1.2 cm ill-defined hypoechoic region with posterior acoustic shadowing and mild detectable internal vascularity.   There is an adjacent 0.6 cm ill-defined hypoechoic lesion that is also seen at the 11 o'clock position on the order of 3 cm from the nipple that demonstrates posterior acoustic shadowing.  BI-RADS 4: Suspicious.     11/18/2021 Biopsy    Right Breast, US-Guided Biopsy x 2 ( [facility]):    1. Right Breast at 11:00 o'clock, 3 cm from Nipple (not for calcifications), Core Biopsy:                 A. Multifocal atypical lobular hyperplasia (ALH) with foci of usual ductal hyperplasia (UDH).               B. No ductal carcinoma in situ nor invasive carcinoma identified  -Tribell clip.     2. Right  Breast mass at 11:00 o'clock, 3 cm from Nipple (not for calcifications), Core Biopsy:                 A. Invasive lobular carcinoma:                            1. Overall Maryann grade I (tubular score=3, nuclear score=1, mitotic score=1).                            2. Invasive carcinoma measures at least 14 mm.                3. No lymphovascular space invasion identified.  B. Associated atypical lobular hyperplasia (ALH) noted.  C. No definitive in situ carcinoma component identified.  D. Focal columnar cell change, usual duct hyperplasia (UDH) and micropapilloma noted.  -Bowtie clip.     ER+ (%, strong)  KS+ (%, strong)  HER2 negative (IHC 1+)  Ki-67 7%     12/3/2021 Imaging    Bilateral Breast MRI ( [facility]):  In the right breast centered at the 11:30 position centered on the order of 3 cm from the nipple there are 2 focal signal voids that are  by 1.1 cm. This represents the tri-bell-shaped and bowtie shaped metallic clips. The bowtie-shaped metallic clip is the more medial and slightly posterior metallic clip and represents the site of  invasive lobular carcinoma. There is evidence of an irregularly-shaped T1 hyperintense peripherally enhancing mass that surrounds the biopsy clips that measures on the order of 3.1 cm in anterior to posterior dimension, 1.5 cm in the superior to inferior dimension, and 2.6 cm in the medial to lateral dimension, and is consistent with a hematoma cavity. The bowtie-shaped metallic clip which represents the biopsy-proven site of malignancy is on the order of 0.6 cm anterior to an area of architectural distortion with mild enhancement measures on the order of 1.3 cm in superior to inferior dimension, 1.1 cm in the medial to lateral dimension and 1.0 cm in anterior to posterior dimension. This is most consistent with residual malignancy at the biopsy-proven site of malignancy. The tri-bell-shaped metallic clip is not surrounded by any abnormal enhancement but  is within a hematoma  cavity.   No other areas of abnormal enhancement or morphology are seen in the right breast. I see no evidence for abnormal right breast skin, nipple or chest wall enhancement and there is no evidence for right axillary or  internal mammary chain adenopathy.   There are no areas of abnormal enhancement or morphology in the left breast. I see no evidence for abnormal left breast skin, nipple or chest wall enhancement and there is no evidence for left axillary or internal mammary chain adenopathy.  Bilateral retroglandular silicone implants are noted. The left silicone implant shows irregularity at the posterior margin of the implant, but no evidence for free silicone is appreciated.  BI-RADS 6: Known malignancy.     12/9/2021 Genetic Testing    [employer] Cancers Panel (37 genes):    Negative     1/3/2022 Surgery    Right needle-localized, bracketed, partial mastectomy with oncoplastic closure and left reduction for symmetry    1. Right Breast, Oriented Needle Localization Lumpectomy (69 grams):  INVASIVE LOBULAR CARCINOMA.               A. Tumor site:  Upper outer quadrant (11:00 o'clock, 11:30).               B. Histologic grade:  [place] score I (tubules=3, nuclei=1, mitosis=1).               C. Tumor size: ~24 mm maximally (present focally in slices 7 and 11 and throughout slices 8-10, slices 6mm).               D. Tumor focality:  Single focus.               E. Ductal carcinoma in-situ (DCIS):  Not identified.               F. Lobular carcinoma in-situ (LCIS):  Present.               G. Tumor extent:  Overlying skin is uninvolved by carcinoma.               H. No lymphovascular invasion identified.               I.  Margins:  Uninvolved by in-situ and invasive carcinoma.                            1. Invasive carcinoma is present 0.3 mm from the original inferior margin, 1.2 mm from the lateral                                margin, 2 mm from the posterior margin, 13 mm from  the medial margin, 20 mm from the anterior                                margin and 38 mm from the superior margin of excision in specimen 1.               J. Lymph nodes:  Not submitted.               K. Hormone receptor status:  ER - % Positive, AZ - % Positive, Her2/jed - 1+ Negative, Ki67 - 7% (performed on prior biopsy [id number]).    L. Pathologic stage:  pT2, NX.     2. Right Breast, Additional Medial/Superior Margin, Oriented Excision:               A. Benign unremarkable breast tissue.               B. Final medial and superior margin are free of tumor by an additional 15 mm.     3. Right Breast, Additional Inferior/Lateral Margin, Oriented Excision:               A. Benign breast tissue with foci of usual ductal hyperplasia, atypical lobular hyperplasia and lobular carcinoma in-situ (LCIS).               B. No evidence of invasive carcinoma identified.               C. Final inferior and lateral margin are free by an additional 15 mm.     4. Left Breast, Reduction Mammoplasty (163 grams):                A. Benign breast tissue with foci of pseudoangiomatous stromal hyperplasia (PASH).               B. Benign microcalcifications.               C. Unremarkable skin.               D. No evidence of in-situ nor invasive carcinoma identified.     5. Skin, Right Breast, Excision:  Benign unremarkable skin.     6. Right Breast, Capsulectomy:                A. Explanted intact breast prosthesis.               B. Benign fibrous capsule.      7. Left Breast, Capsulectomy:  Benign hyalinized breast capsule with fat necrosis and dystrophic calcification.     2/8/2022 - 2/28/2022 Radiation    Radiation OncologyTreatment Course:  Iris Yee received 4050 cGy in 15 fractions to Right breast     2/9/2022 -  Hormonal Therapy    Anastrozole (Arimidex)     5/12/2022 Cancer Staged    Staging form: Breast, AJCC 8th Edition  - Pathologic: Stage Unknown (pT2, pNX, G1, ER+, AZ+, HER2-) - Signed by Clint  Len Renteria MD on 5/12/2022 9/23/2022 Imaging    Bilateral screening mammogram with tomosynthesis at [facility]  FINDINGS: Bilateral digital CC and MLO mammographic and digitalTomosynthesis images were obtained. Comparison is made to prior studiesdated 9/21/2021, 11/2/2021 and 11/18/2021 .   The parenchyma of both breasts is largely fatty-replaced. Postsurgical change of the right breast is noted. There is an area of increased density in the posterior one third of the right breast associated with prior breast implant removal. There is no evidence for axillary lymphadenopathy or nipple retraction.   IMPRESSION:  1. There is no evidence for malignancy in either breast. Routine followup mammography is recommended.   BI-RADS category 2: Benign     5/12/2023 -  Chemotherapy    OP SUPPORTIVE Denosumab (Prolia) Q6M     9/25/2023 Imaging    Bilateral screening mammogram with tomosynthesis at [facility]  FINDINGS: Bilateral digital CC and MLO mammographic and digital  Tomosynthesis images were obtained. Comparison is made to prior studies  dated 09/23/2022 and 09/21/2021. The parenchyma of both breasts is  largely fatty-replaced. Stable postsurgical change of the right breast  is noted. In the middle third medial aspect of the left breast there is  an area of focal asymmetry. I see no new or dominant masses, areas of  architectural distortion or skin thickening. There is no evidence for  axillary lymphadenopathy or nipple retraction.     IMPRESSION:  1. There is an area of focal asymmetry in the middle third medial aspect of the left breast. Further evaluation with spot compression CC mammographic and tomosynthesis images and possible targeted left breast sonography is recommended.  2. There are no findings suspicious for malignancy in the right breast.   BI-RADS CATEGORY 0: Incomplete - Needs additional imaging evaluation.     11/2/2023 Imaging    Left diagnostic mammogram with tomosynthesis, left breast limited US at  [facility]  FINDINGS:     MAMMOGRAM: Left CC spot compression and lateral 2D and tomosynthesis views were obtained.     There are scattered areas of fibroglandular density.   In the slightly lower inner middle left breast, there is a persistent 0.4 cm focal asymmetry. This area was further assessed with ultrasound.   ULTRASOUND:  Targeted sonographic evaluation of the left breast was performed from 8:00 to 9:00 in the region of the mammographic abnormality.  At 930, 8 cm from the nipple, there is an incidental 0.4 x 0.2 x 0.6 cm benign-appearing cyst. No sonographic correlate is identified for the mammographic focal asymmetry.   IMPRESSION:  Suspicious 0.4 cm focal asymmetry in the inner left breast without a sonographic correlate. Recommend for further evaluation with  stereotactic/tomosynthesis guided core needle biopsy.   Findings and recommendations were discussed with the patient in person at the time of her examination. An epic in basket message was sent to MANUEL Mancini on 11/2/2023.   BI-RADS Category 4: Suspicious          11/15/2023 Biopsy    Left breast stereotactic biopsy at [facility]  PROCEDURE NOTE: Informed consent was obtained. Preliminary tomosynthesis images of the left breast were obtained. The overlying skin was prepped in the usual sterile fashion. Local anesthesia was achieved with 1% lidocaine. A nick was made in the skin with a scalpel. Through the nick was inserted an 8 gauge Mammotome vacuum assisted device. Repeat stereotactic/Tomosynthesis images were obtained demonstrating adequate placement of the biopsy needle. Multiple tissue specimens were obtained.  A specimen radiograph was obtained demonstrating a focal area of increased density within the specimen. A bowtie shaped metallic clip was placed to darleen the site. Pressure was applied until bleeding subsided and the overlying skin was cleaned and bandaged. Postbiopsy mammography of the left  breast demonstrates placement of a bowtie shaped  metallic clip at the 8 o'clock position.   The pathology result has returned as fat necrosis. This is concordant with imaging findings.  IMPRESSION:  Technically successful tomosynthesis guided Mammotome vacuum assisted left breast biopsy with placement of a bowtie metallic clip. The pathology result has returned as benign. Routine mammographic follow-up is recommended.        11/15/2023 Biopsy    Pathology left breast stereotactic biopsy  Final Diagnosis   1.  Left breast at 8:00, (not for calcifications), stereotactic core biopsy:               A.  Focal organizing fat necrosis with foreign body giant cell reaction and refractile foreign                        material and stromal fibrosis.               B.  No atypical hyperplasia, carcinoma in-situ nor invasive carcinoma identified.             Review of Systems:  See interval history.       Medications:    Current Outpatient Medications:   •  acetaminophen (TYLENOL) 325 MG tablet, Take 2 tablets by mouth Every 4 (Four) Hours As Needed for Mild Pain ., Disp: 60 tablet, Rfl: 0  •  calcium carbonate (TUMS) 500 MG chewable tablet, Chew 1 tablet As Needed for Indigestion or Heartburn., Disp: , Rfl:   •  cetirizine (zyrTEC) 10 MG tablet, Take 1 tablet by mouth Every Night., Disp: , Rfl:   •  cholecalciferol (VITAMIN D3) 25 MCG (1000 UT) tablet, Take 1 tablet by mouth Every Morning., Disp: , Rfl:   •  hydrocortisone 1 % cream, Apply 1 application  topically to the appropriate area as directed As Needed for Rash., Disp: , Rfl:   •  letrozole (Femara) 2.5 MG tablet, Take 1 tablet by mouth Daily for 210 days., Disp: 30 tablet, Rfl: 6  •  SYNTHROID 112 MCG tablet, Take 1 tablet by mouth Every Night., Disp: , Rfl:   No current facility-administered medications for this visit.    Facility-Administered Medications Ordered in Other Visits:   •  Chlorhexidine Gluconate 2 % pads 1 each, 1 each, Apply externally, Take As Directed, Clarence Suarez MD      Allergies   Allergen  "Reactions   • Penicillins Other (See Comments)     Passes out   • Latex Rash   • Sulfa Antibiotics Nausea And Vomiting   • Wound Dressing Adhesive Rash       Family History   Problem Relation Age of Onset   • Hypertension Mother    • Osteoporosis Mother    • Scoliosis Mother    • Arthritis Mother    • Hypertension Father    • Arthritis Father    • Stroke Paternal Aunt    • Cervical cancer Maternal Grandmother 68   • Prostate cancer Brother 74   • Hypertension Brother    • Hyperlipidemia Brother    • Alzheimer's disease Brother    • Malig Hyperthermia Neg Hx        PHYSICAL EXAMINATION:   Vitals:    12/05/23 0849   BP: 130/80        /80 (BP Location: Left arm)   Ht 165.1 cm (65\")   Wt 103 kg (228 lb)   LMP  (LMP Unknown)   BMI 37.94 kg/m²     I reviewed physical exam, no changes noted    ECOG 0 - Asymptomatic  General: NAD, well appearing  Psych: a&o x3, normal mood and affect  Eyes: EOMI, no scleral icterus  ENMT: neck supple without masses or thyromegaly, mucous membranes moist, mild fullness left neck  MSK: normal gait, normal ROM in bilateral shoulders  Lymph nodes: no cervical, supraclavicular or axillary lymphadenopathy  Breast:   Right: Sp mastopexy with well-healed incisions. No visible abnormalities on inspection while seated, with arms raised or hands on hips. No masses, skin changes, or nipple abnormalities.  Left: Sp mastopexy with well-healed incisions, mild scarring of NAC incisions c/w mild keloid changes. No visible abnormalities on inspection while seated, with arms raised or hands on hips. No masses, skin changes, or nipple abnormalities.  Very mild area of resolving rash lower inner breast at biopsy site.      Assessment:   1)   78 y.o. F with a diagnosis of right breast cancer 11/2021: Low grade, invasive lobular carcinoma, ER/TX+, Her2 negative. She underwent right partial mastectomy with oncoplastic closure and left reduction for symmetry on 1/3/22, pT2Nx. Arimidex, aromasin " currently    2)  Obesity, BMI 38    3)  biopsy proven fat necrosis left breast 11/2023, routine follow up recommended      Discussion:  Imaging and pathology findings were reviewed.  Her pathology results confirmed fat necrosis.  I advised her that fat necrosis and oil cysts of the breast is a benign breast condition that happens when an area of the fatty breast tissue is damaged, usually as a result of injury to the breast.  It can also happen after breast surgery or radiation treatment.  The pathology was concordant with imaging findings and routine follow up is recommended.  She will be due her screening mammogram in 12 months and is in agreement with this plan.    The rash on the left breast is nearly resolved, likely r/t either prep or adhesive bandage.     We discussed her follow up which includes clinical breast exam every 6 months for 2 years (11/2023), with mammogram annually then  mammogram and exam annually until 5 years from diagnosis ( 11/26).    BMI is 38  Patient was counseled on the importance of avoidance of obesity for a number of health issues including breast cancer. Diet changes and exercise were recommended.     Class 2 Severe Obesity (BMI >=35 and <=39.9). Obesity-related health conditions include the following: GERD. Obesity is unchanged. BMI is is above average; BMI management plan is completed. We discussed portion control and increasing exercise.      Plan:  -Continue follow-up with Dr. Jaramillo.  - bilateral screening mammogram with tomosynthesis in 12 months at [facility] followed by exam.  -She was instructed to call sooner with any questions, concerns or changes on BSE.    MANUEL Mancini      CC:   Dr. Sri Vasquez MD

## 2024-01-17 ENCOUNTER — TRANSCRIBE ORDERS (OUTPATIENT)
Dept: ADMINISTRATIVE | Facility: HOSPITAL | Age: 79
End: 2024-01-17
Payer: MEDICARE

## 2024-01-17 DIAGNOSIS — Z13.6 SCREENING FOR ISCHEMIC HEART DISEASE: Primary | ICD-10-CM

## 2024-02-02 ENCOUNTER — HOSPITAL ENCOUNTER (OUTPATIENT)
Facility: HOSPITAL | Age: 79
Discharge: HOME | End: 2024-02-02
Admitting: INTERNAL MEDICINE

## 2024-02-02 DIAGNOSIS — Z13.6 SCREENING FOR ISCHEMIC HEART DISEASE: ICD-10-CM

## 2024-02-02 PROCEDURE — 75571 CT HRT W/O DYE W/CA TEST: CPT

## 2024-02-19 RX ORDER — LETROZOLE 2.5 MG/1
2.5 TABLET, FILM COATED ORAL DAILY
Qty: 30 TABLET | Refills: 5 | Status: SHIPPED | OUTPATIENT
Start: 2024-02-19

## 2024-03-07 ENCOUNTER — TELEPHONE (OUTPATIENT)
Dept: GASTROENTEROLOGY | Facility: CLINIC | Age: 79
End: 2024-03-07
Payer: OTHER GOVERNMENT

## 2024-03-07 NOTE — TELEPHONE ENCOUNTER
"Hub staff attempted to follow warm transfer process and was unsuccessful     Caller: Iris Yee \"Daniel\"    Relationship to patient: Self    Best call back number: [phone]    Patient is needing: PATENT CALLED IN, SHE WAS DUE FOR HER COLONOSCOPY IN DECEMBER 2023 AND NEEDS A CALL TO SCHEDULE THAT PLEASE. YOU CAN LEAVE A VMAIL.      "

## 2024-03-14 ENCOUNTER — LAB (OUTPATIENT)
Dept: LAB | Facility: HOSPITAL | Age: 79
End: 2024-03-14
Payer: MEDICARE

## 2024-03-14 ENCOUNTER — OFFICE VISIT (OUTPATIENT)
Dept: ONCOLOGY | Facility: CLINIC | Age: 79
End: 2024-03-14
Payer: OTHER GOVERNMENT

## 2024-03-14 VITALS
RESPIRATION RATE: 18 BRPM | SYSTOLIC BLOOD PRESSURE: 118 MMHG | TEMPERATURE: 98 F | HEIGHT: 65 IN | OXYGEN SATURATION: 96 % | BODY MASS INDEX: 37.5 KG/M2 | DIASTOLIC BLOOD PRESSURE: 80 MMHG | HEART RATE: 87 BPM | WEIGHT: 225.1 LBS

## 2024-03-14 DIAGNOSIS — C50.411 MALIGNANT NEOPLASM OF UPPER-OUTER QUADRANT OF RIGHT BREAST IN FEMALE, ESTROGEN RECEPTOR POSITIVE: ICD-10-CM

## 2024-03-14 DIAGNOSIS — Z17.0 MALIGNANT NEOPLASM OF UPPER-OUTER QUADRANT OF RIGHT BREAST IN FEMALE, ESTROGEN RECEPTOR POSITIVE: ICD-10-CM

## 2024-03-14 DIAGNOSIS — Z79.811 AROMATASE INHIBITOR USE: ICD-10-CM

## 2024-03-14 DIAGNOSIS — Z17.0 MALIGNANT NEOPLASM OF UPPER-OUTER QUADRANT OF RIGHT BREAST IN FEMALE, ESTROGEN RECEPTOR POSITIVE: Primary | ICD-10-CM

## 2024-03-14 DIAGNOSIS — C50.411 MALIGNANT NEOPLASM OF UPPER-OUTER QUADRANT OF RIGHT BREAST IN FEMALE, ESTROGEN RECEPTOR POSITIVE: Primary | ICD-10-CM

## 2024-03-14 LAB
ALBUMIN SERPL-MCNC: 3.8 G/DL (ref 3.5–5.2)
ALBUMIN/GLOB SERPL: 1.6 G/DL
ALP SERPL-CCNC: 90 U/L (ref 39–117)
ALT SERPL W P-5'-P-CCNC: 22 U/L (ref 1–33)
ANION GAP SERPL CALCULATED.3IONS-SCNC: 13.2 MMOL/L (ref 5–15)
AST SERPL-CCNC: 23 U/L (ref 1–32)
BASOPHILS # BLD AUTO: 0.03 10*3/MM3 (ref 0–0.2)
BASOPHILS NFR BLD AUTO: 0.6 % (ref 0–1.5)
BILIRUB SERPL-MCNC: 0.3 MG/DL (ref 0–1.2)
BUN SERPL-MCNC: 16 MG/DL (ref 8–23)
BUN/CREAT SERPL: 15.7 (ref 7–25)
CALCIUM SPEC-SCNC: 9.3 MG/DL (ref 8.6–10.5)
CHLORIDE SERPL-SCNC: 106 MMOL/L (ref 98–107)
CO2 SERPL-SCNC: 24.8 MMOL/L (ref 22–29)
CREAT SERPL-MCNC: 1.02 MG/DL (ref 0.57–1)
DEPRECATED RDW RBC AUTO: 48.8 FL (ref 37–54)
EGFRCR SERPLBLD CKD-EPI 2021: 57.1 ML/MIN/1.73
EOSINOPHIL # BLD AUTO: 0.06 10*3/MM3 (ref 0–0.4)
EOSINOPHIL NFR BLD AUTO: 1.2 % (ref 0.3–6.2)
ERYTHROCYTE [DISTWIDTH] IN BLOOD BY AUTOMATED COUNT: 13.4 % (ref 12.3–15.4)
GLOBULIN UR ELPH-MCNC: 2.4 GM/DL
GLUCOSE SERPL-MCNC: 82 MG/DL (ref 65–99)
HCT VFR BLD AUTO: 42.6 % (ref 34–46.6)
HGB BLD-MCNC: 14.7 G/DL (ref 12–15.9)
IMM GRANULOCYTES # BLD AUTO: 0.03 10*3/MM3 (ref 0–0.05)
IMM GRANULOCYTES NFR BLD AUTO: 0.6 % (ref 0–0.5)
LYMPHOCYTES # BLD AUTO: 0.87 10*3/MM3 (ref 0.7–3.1)
LYMPHOCYTES NFR BLD AUTO: 17.2 % (ref 19.6–45.3)
MCH RBC QN AUTO: 33.8 PG (ref 26.6–33)
MCHC RBC AUTO-ENTMCNC: 34.5 G/DL (ref 31.5–35.7)
MCV RBC AUTO: 97.9 FL (ref 79–97)
MONOCYTES # BLD AUTO: 0.61 10*3/MM3 (ref 0.1–0.9)
MONOCYTES NFR BLD AUTO: 12.1 % (ref 5–12)
NEUTROPHILS NFR BLD AUTO: 3.46 10*3/MM3 (ref 1.7–7)
NEUTROPHILS NFR BLD AUTO: 68.3 % (ref 42.7–76)
NRBC BLD AUTO-RTO: 0 /100 WBC (ref 0–0.2)
PLATELET # BLD AUTO: 230 10*3/MM3 (ref 140–450)
PMV BLD AUTO: 10.5 FL (ref 6–12)
POTASSIUM SERPL-SCNC: 4.5 MMOL/L (ref 3.5–5.2)
PROT SERPL-MCNC: 6.2 G/DL (ref 6–8.5)
RBC # BLD AUTO: 4.35 10*6/MM3 (ref 3.77–5.28)
SODIUM SERPL-SCNC: 144 MMOL/L (ref 136–145)
WBC NRBC COR # BLD AUTO: 5.06 10*3/MM3 (ref 3.4–10.8)

## 2024-03-14 PROCEDURE — 36415 COLL VENOUS BLD VENIPUNCTURE: CPT

## 2024-03-14 PROCEDURE — 85025 COMPLETE CBC W/AUTO DIFF WBC: CPT

## 2024-03-14 PROCEDURE — 80053 COMPREHEN METABOLIC PANEL: CPT

## 2024-03-14 RX ORDER — RIVAROXABAN 20 MG/1
20 TABLET, FILM COATED ORAL
COMMUNITY
Start: 2010-10-26

## 2024-03-14 RX ORDER — ONDANSETRON 4 MG/1
4 TABLET, FILM COATED ORAL
COMMUNITY
Start: 2024-03-10

## 2024-03-14 NOTE — PROGRESS NOTES
Subjective     REASON FOR CONSULTATION:  pT2Nx grade 1 lobular carcinoma right breast strongly ER/IA positive HER2 negative postlumpectomy1/3/2022  Provide an opinion on any further workup or treatment                             REQUESTING PHYSICIAN: MD Sri Willis MD    History of Present Illness patient is a 76-year-old lady with a lobular breast cancer low-grade treated with lumpectomy and and radiation and initially on Arimidex for a month.  This was  started In mid March 2022.  She stopped the medication because of side effects and initiated Aromasin in September 2022.  Patient reports she is still been struggling with side effects of arthralgias.  She has a lack of motivation she reports to do tasks, in part because she knows that it will be a struggle physically.I suggested switching to femara for now because I am wanting to avoid tamoxifen because of her hypercoagulable history although the anticoagulation should make it safer relatively-she is scared about doing tamoxifen    She continues with hot flashes and arthralgias and I strongly recommended a trial of Cymbalta which she never tried even though I called again and she is going to do that because she still has noted 3 years of treatment that she has to put up with    She is complaining of some nausea and GI discomfort in the epigastrium and has been seeing her family doctor to evaluate this.  She had a couple of bouts of diarrhea and I am not sure if this is a variant of the norovirus which has been going on.  She states she stopped the letrozole for a week and nothing changed so she is back on it-she had a PET scan last October which was completely negative so I am not too concerned that there is significant pathology in the abdomen but if her symptoms persist a CT of the abdomen would be indicated  I told her to wait until the symptoms subside before starting the  Cymbalta    Mammogram is due in September. 24    DEXA scan performed 4/10/2023 showing osteopenia at the right hip though unchanged from previous a year ago.  Lumbar spine did have 3.9% interval decrease though T score is still just 0.5.      Past Medical History:   Diagnosis Date    Arthritis     Arthritis of back 2015    Arthritis of neck     Breast, fat necrosis 12/5/2023    Bulging lumbar disc     L 3-4    Bursitis of hip     Clotting disorder     COVID-19     Encounter for screening mammogram for malignant neoplasm of breast 11/13/2018    Fracture, fibula     Fracture, tibia and fibula     Stress fracture    Frozen shoulder     GERD (gastroesophageal reflux disease)     Hip arthrosis     History of kidney stones     History of MRSA infection 2020    MICHEL EARS    History of transfusion 1971    no reaction    Hx of blood clots 2011    Hypothyroid     Kidney stones     Knee swelling     Left knee pain     Lumbosacral disc disease     Malignant neoplasm of upper-outer quadrant of right breast in female, estrogen receptor positive 12/09/2021    Neck strain     Neuroma of foot 2000    Right foot    Ovarian cyst     Periarthritis of shoulder     PONV (postoperative nausea and vomiting)     states gets really sick lasted for 4 days aafter surgery the last time     Pulmonary embolism, bilateral     Scoliosis 2020    Stress fracture     Tear of meniscus of knee     Thrombopenia     Wears glasses         Past Surgical History:   Procedure Laterality Date    APPENDECTOMY      AUGMENTATION MAMMAPLASTY  1976    BREAST AUGMENTATION      years ago has implants    BREAST EXCISIONAL BIOPSY Right 10/2021    BREAST EXCISIONAL BIOPSY Left     BREAST LUMPECTOMY Right 01/03/2022    Procedure: Right needle-localized, bracketed, partial mastectomy;  Surgeon: Adela Cardoso MD;  Location: [facility];  Service: General;  Laterality: Right;    BREAST SURGERY Bilateral 01/03/2022    Procedure: RIGHT ONCOPLASTIC REDUCTION AND LEFT  REDUCTION FOR SYMMETRY, BILATERAL IMPLANT REMOVAL AND  BILATERAL CAPSULECTOMIES;  Surgeon: Delbert Butcher MD;  Location: [facility] MAIN OR;  Service: Plastics;  Laterality: Bilateral;    CHOLECYSTECTOMY      COLONOSCOPY N/A 12/14/2018    Procedure: COLONOSCOPY into cecum/termial ileum with polypectomy;  Surgeon: Jose Daniel Dooley MD;  Location: [facility] ENDOSCOPY;  Service: Gastroenterology    ENDOSCOPY N/A 12/14/2018    Procedure: ESOPHAGOGASTRODUODENOSCOPY with biopsy;  Surgeon: Jose Daniel Dooley MD;  Location: [facility] ENDOSCOPY;  Service: Gastroenterology    EXPLORATORY LAPAROTOMY      bleeding from ruptured ovarian cyst    HERNIA REPAIR      umbilical x 2    OVARIAN CYST DRAINAGE/EXCISION      ruptured ovarian cyst with vblood accumulatine in abdomin    TONSILLECTOMY      TOTAL KNEE ARTHROPLASTY Left 06/24/2021    Procedure: TOTAL KNEE ARTHROPLASTY;  Surgeon: Clarence Suarez MD;  Location: [facility] MAIN OR;  Service: Orthopedics;  Laterality: Left;    UMBILICAL HERNIA REPAIR      x2      patient is a 76-year-old white female with a 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    Patient reports she was doing her mammograms every 2 years for the last couple of years and had a benign breast biopsy on the left about 10 years ago but otherwise had not had any callbacks or other abnormalities on her imaging.    Patient had diagnostic imaging and a biopsyOn 11/18/2021 which revealed atypical lobular hyperplasia at 11:00 and invasive lobular carcinoma grade 1 measuring 14 mm at 11:00 ER % TN % HER2 negative Ki-67 of 7%  Patient was referred to Dr. Adela Cardoso who ordered an MRI which confirmed unifocal disease on the right and a biopsy cavity from the [facility] biopsy with no suspicious enhancement and no obvious  adenopathy and a normal left breast.  She then proceeded with a lumpectomy on 1/3/2022 which showed residual 24 mm  low-grade lobular carcinoma with associated lobular carcinoma in situ with no lymphovascular invasion and clear margins no sentinel nodes were removed.  Implants which had been in for over 40 years were both removed    She has had a little problem with some infection in the inframammary area bilaterally but is on antibiotic and this is improving.  She is here to discuss adjuvant treatment options    She is  5 para 3 with 2 miscarriages-first childbirth was at age 21 she did not breast-feed.  Menarche was age 16 menopause at 57 and she took hormones for a few months and stopped.    Family history is completely negative for malignancy except in a maternal grandmother who may have had cervical cancer her 47 gene Invitae panel was negative    She is not a smoker or drinker  She has been on anticoagulation for 7 to 8 years for unprovoked massive PE with a negative work-up  She has had breast implants since  and they were removed at the time of surgery  She has not had a heart attack or stroke    Explained in detail the rationale for surgery and adjuvant treatment with micrometastasis that can give rise to recurrence of her cancer if not treated adequately.  We discussed the fact that the use of hormonal blockade would decrease the risk of recurrence by 30 to 40% depending on grade and histology .    Based on her thrombotic risk and anticoagulation I have recommended avoiding tamoxifen and using aromatase inhibitor and we will start with Arimidex which we would recommend for 5 years at least.  The side effects and toxicities of the Aromatase inhibitors was discussed with the patient including, hot flashes, mood swings and hair thinning.Significant arthralgias and worsening bone density were also discussed. Baseline bone density evaluation was ordered.    She has been referred to radiation and they  will decide whether radiation is indicated and if so she will start her Arimidex towards the end of radiation but if no radiation is planned she can start in about 2 weeks    She is agreeable and I told her to call if she has problems with the hormonal blockade  The patient and her daughter are comfortable with the decision to proceed with hormonal blockade    She will receive return in 3 to 4 months to assess her tolerance with a DEXA scan and we will consider the use of Prolia if her bone density is worse    8/22    She is here with her daughter today who states that her personality change she was achy and very uncomfortable and felt terrible overall and stopped it about a month ago and she is now back to baseline    We discussed why we gave adjuvant therapy and they misunderstood and thought it was to prevent a second cancer in her other breast we talked about micrometastasis and the risk of recurrence the 25 to 30% range with the size of her breast cancer with no additional therapy and we have decided to try exemestane and if that causes problems consider tamoxifen because she is on lifelong anticoagulation and should not have clots and it would also help with her bone density    Her bone density is actually stable even though there is significant osteopenia in her hips and therefore we will hold off on the Prolia for now.  She is taking Tums for calcium but no additional vitamin D except for multivitamin and have asked her to take 1000 units of vitamin D and see how  we do with that        Current Outpatient Medications on File Prior to Visit   Medication Sig Dispense Refill    acetaminophen (TYLENOL) 325 MG tablet Take 2 tablets by mouth Every 4 (Four) Hours As Needed for Mild Pain . 60 tablet 0    calcium carbonate (TUMS) 500 MG chewable tablet Chew 1 tablet As Needed for Indigestion or Heartburn.      cetirizine (zyrTEC) 10 MG tablet Take 1 tablet by mouth Every Night.      cholecalciferol (VITAMIN D3) 25  MCG (1000 UT) tablet Take 1 tablet by mouth Every Morning.      letrozole (FEMARA) 2.5 MG tablet TAKE 1 TABLET DAILY 30 tablet 5    ondansetron (ZOFRAN) 4 MG tablet Take 1 tablet by mouth.      SYNTHROID 112 MCG tablet Take 1 tablet by mouth Every Night.      Xarelto 20 MG tablet Take 1 tablet by mouth.      [DISCONTINUED] hydrocortisone 1 % cream Apply 1 application  topically to the appropriate area as directed As Needed for Rash.       Current Facility-Administered Medications on File Prior to Visit   Medication Dose Route Frequency Provider Last Rate Last Admin    Chlorhexidine Gluconate 2 % pads 1 each  1 each Apply externally Take As Directed Clarence Suarez MD            ALLERGIES:    Allergies   Allergen Reactions    Penicillins Other (See Comments)     Passes out    Latex Rash    Sulfa Antibiotics Nausea And Vomiting    Wound Dressing Adhesive Rash        Social History     Socioeconomic History    Marital status:      Spouse name: SANDRA    Number of children: 3    Years of education: COLLEGE   Tobacco Use    Smoking status: Former     Current packs/day: 0.00     Average packs/day: 1 pack/day for 12.7 years (12.7 ttl pk-yrs)     Types: Cigarettes     Start date: 1964     Quit date: 1976     Years since quittin.5    Smokeless tobacco: Never   Vaping Use    Vaping status: Never Used   Substance and Sexual Activity    Alcohol use: Yes     Alcohol/week: 3.0 standard drinks of alcohol     Types: 3 Glasses of wine per week    Drug use: No    Sexual activity: Not Currently     Partners: Male     Birth control/protection: Inserts, Post-menopausal, None        Family History   Problem Relation Age of Onset    Hypertension Mother     Osteoporosis Mother     Scoliosis Mother     Arthritis Mother     Hypertension Father     Arthritis Father     Stroke Paternal Aunt     Cervical cancer Maternal Grandmother 68    Prostate cancer Brother 74    Hypertension Brother     Hyperlipidemia Brother      "Alzheimer's disease Brother     Katarina Hyperthermia Neg Hx         Review of Systems   Gastrointestinal:         Reflux symptoms   Skin:  Negative for color change.   Neurological:  Positive for headaches (Migraines).        Objective     Vitals:    03/14/24 1031   BP: 118/80   Pulse: 87   Resp: 18   Temp: 98 °F (36.7 °C)   TempSrc: Temporal   SpO2: 96%   Weight: 102 kg (225 lb 1.6 oz)   Height: 165.1 cm (65\")   PainSc: 0-No pain           3/14/2024    10:07 AM   Current Status   ECOG score 0       Physical Exam       CONSTITUTIONAL:  Vital signs reviewed.  No distress, looks comfortable.  EYES:  Conjunctivae and lids unremarkable.  PERRLA  EARS,NOSE,MOUTH,THROAT:  Ears and nose appear unremarkable.  Lips, teeth, gums appear unremarkable.  RESPIRATORY:  Normal respiratory effort.  Lungs clear to auscultation bilaterally.  BREASTS: Right breast shows a lift and no definite lumpectomy scar left breast CARDIOVASCULAR:  Normal S1, S2.  No murmurs rubs or gallops.  No significant lower extremity edema.  GASTROINTESTINAL: Abdomen appears unremarkable.  Nontender.  No hepatomegaly.  No splenomegaly.  LYMPHATIC:  No cervical, supraclavicular, axillary lymphadenopathy.  SKIN:  Warm.  No rashes.  PSYCHIATRIC:  Normal judgment and insight.  Normal mood and affect.  I have reexamined the patient and the results are consistent with the previously documented exam. Len Jaramillo MD       RECENT LABS:  Hematology WBC   Date Value Ref Range Status   03/14/2024 5.06 3.40 - 10.80 10*3/mm3 Final     RBC   Date Value Ref Range Status   03/14/2024 4.35 3.77 - 5.28 10*6/mm3 Final     Hemoglobin   Date Value Ref Range Status   03/14/2024 14.7 12.0 - 15.9 g/dL Final     Hematocrit   Date Value Ref Range Status   03/14/2024 42.6 34.0 - 46.6 % Final     Platelets   Date Value Ref Range Status   03/14/2024 230 140 - 450 10*3/mm3 Final        Synoptic Checklist     INVASIVE CARCINOMA OF THE BREAST: Resection  8th Edition - Protocol " posted: 6/30/2021  INVASIVE CARCINOMA OF THE BREAST: COMPLETE EXCISION - All Specimens  SPECIMEN   Procedure  Excision (less than total mastectomy)    Specimen Laterality  Right    TUMOR   Tumor Site  Upper outer quadrant    Histologic Type  Invasive lobular carcinoma    Histologic Grade (Maryann Histologic Score)     Glandular (Acinar) / Tubular Differentiation  Score 3    Nuclear Pleomorphism  Score 1    Mitotic Rate  Score 1    Overall Grade  Grade 1 (scores of 3, 4 or 5)    Tumor Size  Greatest dimension of largest invasive focus (Millimeters): 24 mm   Tumor Focality  Single focus of invasive carcinoma    Ductal Carcinoma In Situ (DCIS)  Not identified    Lobular Carcinoma In Situ (LCIS)  Present    Tumor Extent     Lymphovascular Invasion  Not identified    Dermal Lymphovascular Invasion  Not identified    Microcalcifications  Present in non-neoplastic tissue    Treatment Effect in the Breast  No known presurgical therapy    MARGINS   Margin Status for Invasive Carcinoma  All margins negative for invasive carcinoma    Distance from Invasive Carcinoma to Closest Margin  2 mm   Closest Margin(s) to Invasive Carcinoma  Posterior    Distance from Invasive Carcinoma to Anterior Margin  20 mm   Distance from Invasive Carcinoma to Posterior Margin  2 mm   Distance from Invasive Carcinoma to Superior Margin  53 mm   Distance from Invasive Carcinoma to Inferior Margin  15.3 mm   Distance from Invasive Carcinoma to Medial Margin  28 mm   Distance from Invasive Carcinoma to Lateral Margin  16.2 mm   REGIONAL LYMPH NODES   Regional Lymph Node Status  Not applicable (no regional lymph nodes submitted or found)    PATHOLOGIC STAGE CLASSIFICATION (pTNM, AJCC 8th Edition)      pT Category  pT2    pN Category  pN not assigned (no nodes submitted or found)    Breast Biomarker Testing Performed on Previous Biopsy     Estrogen Receptor (ER) Status  Positive (greater than 10% of cells demonstrate nuclear positivity)     Percentage of Cells with Nuclear Positivity  %    Breast Biomarker Testing Performed on Previous Biopsy     Progesterone Receptor (PgR) Status  Positive    Percentage of Cells with Nuclear Positivity  %    Breast Biomarker Testing Performed on Previous Biopsy     HER2 (by immunohistochemistry)  Negative (Score 1+)    Breast Biomarker Testing Performed on Previous Biopsy     Ki-67 Percentage of Positive Nuclei  7 %            Assessment & Plan   1.pT2Nx grade 1 invasive lobular carcinoma right breast strongly ER/DC positive HER2 negative postlumpectomy with associated LCIS and ALH  Arimidex started in 3/22  Stopped in 8/22-changed to Aromasin in 9/22  Tolerating Aromasin well and 1/23  Patient seen 5/15/2023 continuing on Aromasin though reports arthralgias, decreased stamina, and interference with her daily activities.  Patient is asking to hold the medication for 1 month to see if these things improve.  States she is struggled since the switch though it was better than when on Arimidex though she previously has not set anything.  She states her daughter notices that her daily activity is different.  We will hold the Aromasin for 1 month.  The patient will then return to discuss how she is feeling with consideration of either restarting Aromasin or possibly moving to letrozole.  She is not a candidate for tamoxifen due to her previous PE/DVT.  6/13/2023 patient returns today in follow-up having taken a 1 month break from exemestane though and denying improvement to the extent that she would want to not start back exemestane but she would rather deal with any possible side effects then switch to a new medication.  Stop exemestane due to side effects and the fact that it is too expensive and switch to Femara-if joint pains resume try Cymbalta 20 mg daily      2. DVT and PE on lifelong anticoagulation with Xarelto.      3, hypothyroidism on treatment    4, 37 gene Invitae panel negative    5. osteopenia  in the hips on calcium and vitamin D-  DEXA scan stable in 4 /22  DEXA scan stable in 4/2023  hold off on Prolia for now and recheck bone density in 1 year    6.  Tender palpable left submandibular nodes in 10/23 CAT scans ordered that showed borderline nodes PET recommended  PET scan in 10/23 was essentially negative and this is felt to be reactive lymph nodes    Plan  1.   Continue Femara 2.5 mg daily  2.  Continue calcium and vitamin D  3.  start Cymbalta 20 mg daily  4.  Mammogram due again in Se3tember 2024  5  Follow-up with Dr. Jaramillo in 6 months.

## 2024-03-19 ENCOUNTER — TRANSCRIBE ORDERS (OUTPATIENT)
Dept: ADMINISTRATIVE | Facility: HOSPITAL | Age: 79
End: 2024-03-19
Payer: OTHER GOVERNMENT

## 2024-03-19 DIAGNOSIS — R10.84 ABDOMINAL PAIN, GENERALIZED: Primary | ICD-10-CM

## 2024-03-20 ENCOUNTER — HOSPITAL ENCOUNTER (OUTPATIENT)
Dept: CT IMAGING | Facility: HOSPITAL | Age: 79
Discharge: HOME | End: 2024-03-20
Admitting: INTERNAL MEDICINE
Payer: MEDICARE

## 2024-03-20 DIAGNOSIS — R10.84 ABDOMINAL PAIN, GENERALIZED: ICD-10-CM

## 2024-03-20 PROCEDURE — 25510000001 IOPAMIDOL 61 % SOLUTION: Performed by: INTERNAL MEDICINE

## 2024-03-20 PROCEDURE — 74177 CT ABD & PELVIS W/CONTRAST: CPT

## 2024-03-20 PROCEDURE — 0 DIATRIZOATE MEGLUMINE & SODIUM PER 1 ML: Performed by: INTERNAL MEDICINE

## 2024-03-20 RX ADMIN — DIATRIZOATE MEGLUMINE AND DIATRIZOATE SODIUM 30 ML: 660; 100 LIQUID ORAL; RECTAL at 11:13

## 2024-03-20 RX ADMIN — IOPAMIDOL 85 ML: 612 INJECTION, SOLUTION INTRAVENOUS at 11:13

## 2024-03-26 ENCOUNTER — HOSPITAL ENCOUNTER (OUTPATIENT)
Facility: HOSPITAL | Age: 79
Setting detail: OBSERVATION
LOS: 1 days | Discharge: HOME | End: 2024-03-28
Attending: EMERGENCY MEDICINE | Admitting: INTERNAL MEDICINE
Payer: MEDICARE

## 2024-03-26 ENCOUNTER — APPOINTMENT (OUTPATIENT)
Dept: CT IMAGING | Facility: HOSPITAL | Age: 79
End: 2024-03-26
Payer: MEDICARE

## 2024-03-26 ENCOUNTER — OFFICE VISIT (OUTPATIENT)
Dept: GASTROENTEROLOGY | Facility: CLINIC | Age: 79
End: 2024-03-26
Payer: MEDICARE

## 2024-03-26 VITALS
OXYGEN SATURATION: 93 % | DIASTOLIC BLOOD PRESSURE: 84 MMHG | HEIGHT: 65 IN | BODY MASS INDEX: 36.82 KG/M2 | HEART RATE: 78 BPM | WEIGHT: 221 LBS | SYSTOLIC BLOOD PRESSURE: 138 MMHG | TEMPERATURE: 98.6 F

## 2024-03-26 DIAGNOSIS — R10.84 GENERALIZED ABDOMINAL PAIN: ICD-10-CM

## 2024-03-26 DIAGNOSIS — K21.9 GASTROESOPHAGEAL REFLUX DISEASE, UNSPECIFIED WHETHER ESOPHAGITIS PRESENT: Primary | ICD-10-CM

## 2024-03-26 DIAGNOSIS — R10.9 ABDOMINAL PAIN, UNSPECIFIED ABDOMINAL LOCATION: ICD-10-CM

## 2024-03-26 DIAGNOSIS — R19.7 DIARRHEA, UNSPECIFIED TYPE: Primary | ICD-10-CM

## 2024-03-26 DIAGNOSIS — R11.0 NAUSEA: ICD-10-CM

## 2024-03-26 LAB
ADV 40+41 DNA STL QL NAA+NON-PROBE: NOT DETECTED
ALBUMIN SERPL-MCNC: 4.1 G/DL (ref 3.5–5.2)
ALBUMIN/GLOB SERPL: 1.8 G/DL
ALP SERPL-CCNC: 113 U/L (ref 39–117)
ALT SERPL W P-5'-P-CCNC: 20 U/L (ref 1–33)
ANION GAP SERPL CALCULATED.3IONS-SCNC: 9.6 MMOL/L (ref 5–15)
AST SERPL-CCNC: 17 U/L (ref 1–32)
ASTRO TYP 1-8 RNA STL QL NAA+NON-PROBE: NOT DETECTED
BACTERIA UR QL AUTO: ABNORMAL /HPF
BASOPHILS # BLD AUTO: 0.02 10*3/MM3 (ref 0–0.2)
BASOPHILS NFR BLD AUTO: 0.4 % (ref 0–1.5)
BILIRUB SERPL-MCNC: 0.8 MG/DL (ref 0–1.2)
BILIRUB UR QL STRIP: NEGATIVE
BUN SERPL-MCNC: 12 MG/DL (ref 8–23)
BUN/CREAT SERPL: 11.4 (ref 7–25)
C CAYETANENSIS DNA STL QL NAA+NON-PROBE: NOT DETECTED
C COLI+JEJ+UPSA DNA STL QL NAA+NON-PROBE: NOT DETECTED
CALCIUM SPEC-SCNC: 9.6 MG/DL (ref 8.6–10.5)
CHLORIDE SERPL-SCNC: 104 MMOL/L (ref 98–107)
CLARITY UR: ABNORMAL
CO2 SERPL-SCNC: 24.4 MMOL/L (ref 22–29)
COD CRY URNS QL: ABNORMAL /HPF
COLOR UR: YELLOW
CREAT SERPL-MCNC: 1.05 MG/DL (ref 0.57–1)
CRYPTOSP DNA STL QL NAA+NON-PROBE: NOT DETECTED
DEPRECATED RDW RBC AUTO: 44.8 FL (ref 37–54)
E HISTOLYT DNA STL QL NAA+NON-PROBE: NOT DETECTED
EAEC PAA PLAS AGGR+AATA ST NAA+NON-PRB: NOT DETECTED
EC STX1+STX2 GENES STL QL NAA+NON-PROBE: NOT DETECTED
EGFRCR SERPLBLD CKD-EPI 2021: 55.2 ML/MIN/1.73
EOSINOPHIL # BLD AUTO: 0.11 10*3/MM3 (ref 0–0.4)
EOSINOPHIL NFR BLD AUTO: 2 % (ref 0.3–6.2)
EPEC EAE GENE STL QL NAA+NON-PROBE: NOT DETECTED
ERYTHROCYTE [DISTWIDTH] IN BLOOD BY AUTOMATED COUNT: 12.7 % (ref 12.3–15.4)
ETEC LTA+ST1A+ST1B TOX ST NAA+NON-PROBE: NOT DETECTED
G LAMBLIA DNA STL QL NAA+NON-PROBE: NOT DETECTED
GEN 5 2HR TROPONIN T REFLEX: 9 NG/L
GLOBULIN UR ELPH-MCNC: 2.3 GM/DL
GLUCOSE SERPL-MCNC: 99 MG/DL (ref 65–99)
GLUCOSE UR STRIP-MCNC: NEGATIVE MG/DL
HCT VFR BLD AUTO: 42.5 % (ref 34–46.6)
HGB BLD-MCNC: 14.4 G/DL (ref 12–15.9)
HGB UR QL STRIP.AUTO: ABNORMAL
HYALINE CASTS UR QL AUTO: ABNORMAL /LPF
IMM GRANULOCYTES # BLD AUTO: 0.01 10*3/MM3 (ref 0–0.05)
IMM GRANULOCYTES NFR BLD AUTO: 0.2 % (ref 0–0.5)
INR PPP: 1.53 (ref 0.9–1.1)
KETONES UR QL STRIP: ABNORMAL
LEUKOCYTE ESTERASE UR QL STRIP.AUTO: ABNORMAL
LIPASE SERPL-CCNC: 24 U/L (ref 13–60)
LYMPHOCYTES # BLD AUTO: 0.73 10*3/MM3 (ref 0.7–3.1)
LYMPHOCYTES NFR BLD AUTO: 13.5 % (ref 19.6–45.3)
MAGNESIUM SERPL-MCNC: 2.2 MG/DL (ref 1.6–2.4)
MCH RBC QN AUTO: 32.4 PG (ref 26.6–33)
MCHC RBC AUTO-ENTMCNC: 33.9 G/DL (ref 31.5–35.7)
MCV RBC AUTO: 95.7 FL (ref 79–97)
MONOCYTES # BLD AUTO: 0.59 10*3/MM3 (ref 0.1–0.9)
MONOCYTES NFR BLD AUTO: 10.9 % (ref 5–12)
NEUTROPHILS NFR BLD AUTO: 3.96 10*3/MM3 (ref 1.7–7)
NEUTROPHILS NFR BLD AUTO: 73 % (ref 42.7–76)
NITRITE UR QL STRIP: NEGATIVE
NOROVIRUS GI+II RNA STL QL NAA+NON-PROBE: NOT DETECTED
NRBC BLD AUTO-RTO: 0 /100 WBC (ref 0–0.2)
P SHIGELLOIDES DNA STL QL NAA+NON-PROBE: NOT DETECTED
PH UR STRIP.AUTO: 5.5 [PH] (ref 5–8)
PLATELET # BLD AUTO: 209 10*3/MM3 (ref 140–450)
PMV BLD AUTO: 10.9 FL (ref 6–12)
POTASSIUM SERPL-SCNC: 4 MMOL/L (ref 3.5–5.2)
PROT SERPL-MCNC: 6.4 G/DL (ref 6–8.5)
PROT UR QL STRIP: ABNORMAL
PROTHROMBIN TIME: 18.6 SECONDS (ref 11.7–14.2)
RBC # BLD AUTO: 4.44 10*6/MM3 (ref 3.77–5.28)
RBC # UR STRIP: ABNORMAL /HPF
REF LAB TEST METHOD: ABNORMAL
RVA RNA STL QL NAA+NON-PROBE: NOT DETECTED
S ENT+BONG DNA STL QL NAA+NON-PROBE: NOT DETECTED
SAPO I+II+IV+V RNA STL QL NAA+NON-PROBE: NOT DETECTED
SHIGELLA SP+EIEC IPAH ST NAA+NON-PROBE: NOT DETECTED
SODIUM SERPL-SCNC: 138 MMOL/L (ref 136–145)
SP GR UR STRIP: 1.02 (ref 1–1.03)
SQUAMOUS #/AREA URNS HPF: ABNORMAL /HPF
TROPONIN T DELTA: -1 NG/L
TROPONIN T SERPL HS-MCNC: 10 NG/L
UROBILINOGEN UR QL STRIP: ABNORMAL
V CHOL+PARA+VUL DNA STL QL NAA+NON-PROBE: NOT DETECTED
V CHOLERAE DNA STL QL NAA+NON-PROBE: NOT DETECTED
WBC # UR STRIP: ABNORMAL /HPF
WBC NRBC COR # BLD AUTO: 5.42 10*3/MM3 (ref 3.4–10.8)
Y ENTEROCOL DNA STL QL NAA+NON-PROBE: NOT DETECTED

## 2024-03-26 PROCEDURE — 96375 TX/PRO/DX INJ NEW DRUG ADDON: CPT

## 2024-03-26 PROCEDURE — G0378 HOSPITAL OBSERVATION PER HR: HCPCS

## 2024-03-26 PROCEDURE — 1159F MED LIST DOCD IN RCRD: CPT | Performed by: INTERNAL MEDICINE

## 2024-03-26 PROCEDURE — 74177 CT ABD & PELVIS W/CONTRAST: CPT

## 2024-03-26 PROCEDURE — 25010000002 ONDANSETRON PER 1 MG: Performed by: EMERGENCY MEDICINE

## 2024-03-26 PROCEDURE — 25510000001 IOPAMIDOL 61 % SOLUTION: Performed by: INTERNAL MEDICINE

## 2024-03-26 PROCEDURE — 87086 URINE CULTURE/COLONY COUNT: CPT | Performed by: EMERGENCY MEDICINE

## 2024-03-26 PROCEDURE — 80053 COMPREHEN METABOLIC PANEL: CPT | Performed by: EMERGENCY MEDICINE

## 2024-03-26 PROCEDURE — 87040 BLOOD CULTURE FOR BACTERIA: CPT | Performed by: INTERNAL MEDICINE

## 2024-03-26 PROCEDURE — 83690 ASSAY OF LIPASE: CPT | Performed by: EMERGENCY MEDICINE

## 2024-03-26 PROCEDURE — 25010000002 SODIUM CHLORIDE 0.9 % WITH KCL 20 MEQ 20-0.9 MEQ/L-% SOLUTION: Performed by: INTERNAL MEDICINE

## 2024-03-26 PROCEDURE — 83605 ASSAY OF LACTIC ACID: CPT | Performed by: INTERNAL MEDICINE

## 2024-03-26 PROCEDURE — 87493 C DIFF AMPLIFIED PROBE: CPT | Performed by: INTERNAL MEDICINE

## 2024-03-26 PROCEDURE — 99202 OFFICE O/P NEW SF 15 MIN: CPT | Performed by: INTERNAL MEDICINE

## 2024-03-26 PROCEDURE — 36415 COLL VENOUS BLD VENIPUNCTURE: CPT

## 2024-03-26 PROCEDURE — 96361 HYDRATE IV INFUSION ADD-ON: CPT

## 2024-03-26 PROCEDURE — 87507 IADNA-DNA/RNA PROBE TQ 12-25: CPT | Performed by: EMERGENCY MEDICINE

## 2024-03-26 PROCEDURE — 81001 URINALYSIS AUTO W/SCOPE: CPT | Performed by: EMERGENCY MEDICINE

## 2024-03-26 PROCEDURE — 25810000003 SODIUM CHLORIDE 0.9 % SOLUTION: Performed by: EMERGENCY MEDICINE

## 2024-03-26 PROCEDURE — 83735 ASSAY OF MAGNESIUM: CPT | Performed by: EMERGENCY MEDICINE

## 2024-03-26 PROCEDURE — 25010000002 METRONIDAZOLE 500 MG/100ML SOLUTION: Performed by: INTERNAL MEDICINE

## 2024-03-26 PROCEDURE — 99285 EMERGENCY DEPT VISIT HI MDM: CPT

## 2024-03-26 PROCEDURE — 85610 PROTHROMBIN TIME: CPT | Performed by: EMERGENCY MEDICINE

## 2024-03-26 PROCEDURE — 84484 ASSAY OF TROPONIN QUANT: CPT | Performed by: EMERGENCY MEDICINE

## 2024-03-26 PROCEDURE — 1160F RVW MEDS BY RX/DR IN RCRD: CPT | Performed by: INTERNAL MEDICINE

## 2024-03-26 PROCEDURE — 85025 COMPLETE CBC W/AUTO DIFF WBC: CPT | Performed by: EMERGENCY MEDICINE

## 2024-03-26 RX ORDER — ACETAMINOPHEN 650 MG/1
650 SUPPOSITORY RECTAL EVERY 4 HOURS PRN
Status: DISCONTINUED | OUTPATIENT
Start: 2024-03-26 | End: 2024-03-28 | Stop reason: HOSPADM

## 2024-03-26 RX ORDER — ACETAMINOPHEN 325 MG/1
650 TABLET ORAL EVERY 4 HOURS PRN
Status: DISCONTINUED | OUTPATIENT
Start: 2024-03-26 | End: 2024-03-28 | Stop reason: HOSPADM

## 2024-03-26 RX ORDER — ONDANSETRON 2 MG/ML
4 INJECTION INTRAMUSCULAR; INTRAVENOUS EVERY 6 HOURS PRN
Status: DISCONTINUED | OUTPATIENT
Start: 2024-03-26 | End: 2024-03-27

## 2024-03-26 RX ORDER — NITROFURANTOIN 25; 75 MG/1; MG/1
1 CAPSULE ORAL EVERY 12 HOURS SCHEDULED
COMMUNITY
Start: 2024-03-18 | End: 2024-03-28 | Stop reason: HOSPADM

## 2024-03-26 RX ORDER — LETROZOLE 2.5 MG/1
2.5 TABLET, FILM COATED ORAL NIGHTLY
Status: DISCONTINUED | OUTPATIENT
Start: 2024-03-26 | End: 2024-03-28 | Stop reason: HOSPADM

## 2024-03-26 RX ORDER — POLYETHYLENE GLYCOL 3350 17 G/17G
17 POWDER, FOR SOLUTION ORAL DAILY PRN
Status: DISCONTINUED | OUTPATIENT
Start: 2024-03-26 | End: 2024-03-28 | Stop reason: HOSPADM

## 2024-03-26 RX ORDER — BISACODYL 5 MG/1
5 TABLET, DELAYED RELEASE ORAL DAILY PRN
Status: DISCONTINUED | OUTPATIENT
Start: 2024-03-26 | End: 2024-03-28 | Stop reason: HOSPADM

## 2024-03-26 RX ORDER — ACETAMINOPHEN 160 MG/5ML
650 SOLUTION ORAL EVERY 4 HOURS PRN
Status: DISCONTINUED | OUTPATIENT
Start: 2024-03-26 | End: 2024-03-28 | Stop reason: HOSPADM

## 2024-03-26 RX ORDER — SODIUM CHLORIDE 0.9 % (FLUSH) 0.9 %
10 SYRINGE (ML) INJECTION AS NEEDED
Status: DISCONTINUED | OUTPATIENT
Start: 2024-03-26 | End: 2024-03-28 | Stop reason: HOSPADM

## 2024-03-26 RX ORDER — SODIUM CHLORIDE 9 MG/ML
125 INJECTION, SOLUTION INTRAVENOUS CONTINUOUS
Status: DISCONTINUED | OUTPATIENT
Start: 2024-03-26 | End: 2024-03-26 | Stop reason: SDUPTHER

## 2024-03-26 RX ORDER — LEVOTHYROXINE SODIUM 112 UG/1
112 TABLET ORAL
Status: DISCONTINUED | OUTPATIENT
Start: 2024-03-27 | End: 2024-03-28 | Stop reason: HOSPADM

## 2024-03-26 RX ORDER — CETIRIZINE HYDROCHLORIDE 10 MG/1
10 TABLET ORAL NIGHTLY
Status: DISCONTINUED | OUTPATIENT
Start: 2024-03-27 | End: 2024-03-28 | Stop reason: HOSPADM

## 2024-03-26 RX ORDER — AMOXICILLIN 250 MG
2 CAPSULE ORAL 2 TIMES DAILY PRN
Status: DISCONTINUED | OUTPATIENT
Start: 2024-03-26 | End: 2024-03-28 | Stop reason: HOSPADM

## 2024-03-26 RX ORDER — METRONIDAZOLE 500 MG/100ML
500 INJECTION, SOLUTION INTRAVENOUS EVERY 8 HOURS
Status: DISCONTINUED | OUTPATIENT
Start: 2024-03-26 | End: 2024-03-28 | Stop reason: HOSPADM

## 2024-03-26 RX ORDER — LETROZOLE 2.5 MG/1
2.5 TABLET, FILM COATED ORAL DAILY
Status: DISCONTINUED | OUTPATIENT
Start: 2024-03-27 | End: 2024-03-26

## 2024-03-26 RX ORDER — BISACODYL 10 MG
10 SUPPOSITORY, RECTAL RECTAL DAILY PRN
Status: DISCONTINUED | OUTPATIENT
Start: 2024-03-26 | End: 2024-03-28 | Stop reason: HOSPADM

## 2024-03-26 RX ORDER — ONDANSETRON 2 MG/ML
4 INJECTION INTRAMUSCULAR; INTRAVENOUS ONCE
Status: COMPLETED | OUTPATIENT
Start: 2024-03-26 | End: 2024-03-26

## 2024-03-26 RX ORDER — SODIUM CHLORIDE AND POTASSIUM CHLORIDE 150; 900 MG/100ML; MG/100ML
100 INJECTION, SOLUTION INTRAVENOUS CONTINUOUS
Status: DISPENSED | OUTPATIENT
Start: 2024-03-26 | End: 2024-03-27

## 2024-03-26 RX ADMIN — SODIUM CHLORIDE 125 ML/HR: 9 INJECTION, SOLUTION INTRAVENOUS at 15:05

## 2024-03-26 RX ADMIN — METRONIDAZOLE 500 MG: 500 INJECTION, SOLUTION INTRAVENOUS at 23:58

## 2024-03-26 RX ADMIN — ONDANSETRON 4 MG: 2 INJECTION INTRAMUSCULAR; INTRAVENOUS at 12:14

## 2024-03-26 RX ADMIN — SODIUM CHLORIDE 500 ML: 9 INJECTION, SOLUTION INTRAVENOUS at 12:14

## 2024-03-26 RX ADMIN — IOPAMIDOL 85 ML: 612 INJECTION, SOLUTION INTRAVENOUS at 16:55

## 2024-03-26 RX ADMIN — POTASSIUM CHLORIDE AND SODIUM CHLORIDE 100 ML/HR: 900; 150 INJECTION, SOLUTION INTRAVENOUS at 19:35

## 2024-03-26 RX ADMIN — ACETAMINOPHEN 325MG 650 MG: 325 TABLET ORAL at 22:11

## 2024-03-26 RX ADMIN — LETROZOLE 2.5 MG: 2.5 TABLET ORAL at 23:58

## 2024-03-26 NOTE — PROGRESS NOTES
Clinical Pharmacy Services: Medication History    Iris Yee is a 78 y.o. female presenting to [facility] for   Chief Complaint   Patient presents with    Nausea    Vomiting    Fatigue       She  has a past medical history of Arthritis, Arthritis of back (2015), Arthritis of neck, Breast, fat necrosis (12/5/2023), Bulging lumbar disc, Bursitis of hip, Clotting disorder, COVID-19, Encounter for screening mammogram for malignant neoplasm of breast (11/13/2018), Fracture, fibula, Fracture, tibia and fibula, Frozen shoulder, GERD (gastroesophageal reflux disease), Hip arthrosis, History of kidney stones, History of MRSA infection (2020), History of transfusion (1971), blood clots (2011), Hypothyroid, Kidney stones, Knee swelling, Left knee pain, Lumbosacral disc disease, Malignant neoplasm of upper-outer quadrant of right breast in female, estrogen receptor positive (12/09/2021), Neck strain, Neuroma of foot (2000), Ovarian cyst, Periarthritis of shoulder, PONV (postoperative nausea and vomiting), Pulmonary embolism, bilateral, Scoliosis (2020), Stress fracture, Tear of meniscus of knee, Thrombopenia, and Wears glasses.    Allergies as of 03/26/2024 - Reviewed 03/26/2024   Allergen Reaction Noted    Penicillins Other (See Comments) 05/24/2016    Latex Rash 09/01/2022    Sulfa antibiotics Nausea And Vomiting 06/14/2021    Wound dressing adhesive Rash 12/05/2023       Medication information was obtained from: Patient   Pharmacy and Phone Number:     Prior to Admission Medications       Prescriptions Last Dose Informant Patient Reported? Taking?    acetaminophen (TYLENOL) 325 MG tablet  Self No Yes    Take 2 tablets by mouth Every 4 (Four) Hours As Needed for Mild Pain .    calcium carbonate (TUMS) 500 MG chewable tablet  Self Yes Yes    Chew 1 tablet As Needed for Indigestion or Heartburn.    cetirizine (zyrTEC) 10 MG tablet 3/25/2024 Self Yes Yes    Take 1 tablet by mouth Every Night.     cholecalciferol (VITAMIN D3) 25 MCG (1000 UT) tablet 3/25/2024 Self Yes Yes    Take 1 tablet by mouth Every Evening.    letrozole (FEMARA) 2.5 MG tablet 3/25/2024 Self No Yes    TAKE 1 TABLET DAILY    ondansetron (ZOFRAN) 4 MG tablet  Self Yes Yes    Take 1-2 tablets by mouth Every 8 (Eight) Hours As Needed for Nausea or Vomiting.    SYNTHROID 112 MCG tablet 3/26/2024 Self Yes Yes    Take 1 tablet by mouth Every Morning.    Xarelto 20 MG tablet 3/25/2024 Self Yes Yes    Take 1 tablet by mouth Daily With Dinner.    nitrofurantoin, macrocrystal-monohydrate, (MACROBID) 100 MG capsule   Yes No    Take 1 capsule by mouth Every 12 (Twelve) Hours.              Medication notes:     This medication list is complete to the best of my knowledge as of 3/26/2024    Please call if questions.    Jr Bradley  Medication History Technician   [phone]    3/26/2024 17:05 EDT

## 2024-03-26 NOTE — ED TRIAGE NOTES
Pt sent from gi office, reports n/v/d and fatigue, CT scan last week showed poss enteritis, s/s ongoing for about 3 weeks

## 2024-03-26 NOTE — Clinical Note
Level of Care: Med/Surg [1]   Diagnosis: Diarrhea [787.91.ICD-9-CM]   Admitting Physician: TREVER BRICENO [[id number]]   Attending Physician: TREVER BRICENO [[id number]]   Certification: I Certify That Inpatient Hospital Services Are Medically Necessary For Greater Than 2 Midnights

## 2024-03-26 NOTE — ED NOTES
Nursing report ED to floor  Iris Yee  78 y.o.  female    HPI :  HPI (Adult)  Stated Reason for Visit: n/v/fatigue  History Obtained From: patient    Chief Complaint  Chief Complaint   Patient presents with    Nausea    Vomiting    Fatigue       Admitting doctor:   Maggy Hope MD    Admitting diagnosis:   The primary encounter diagnosis was Diarrhea, unspecified type. A diagnosis of Generalized abdominal pain was also pertinent to this visit.    Code status:   Current Code Status       Date Active Code Status Order ID Comments User Context       Prior            Allergies:   Penicillins, Latex, Sulfa antibiotics, and Wound dressing adhesive    Isolation:   No active isolations    Intake and Output    Intake/Output Summary (Last 24 hours) at 3/26/2024 1601  Last data filed at 3/26/2024 1244  Gross per 24 hour   Intake 500 ml   Output --   Net 500 ml       Weight:       03/26/24  1101   Weight: 100 kg (220 lb 7.4 oz)       Most recent vitals:   Vitals:    03/26/24 1331 03/26/24 1332 03/26/24 1510 03/26/24 1511   BP: 134/78  133/75    BP Location:       Patient Position:       Pulse: 71 72 75 76   Resp: 18  18    Temp:       SpO2: 95% 93% 94% 94%   Weight:       Height:           Active LDAs/IV Access:   Lines, Drains & Airways       Active LDAs       Name Placement date Placement time Site Days    Peripheral IV 03/26/24 1137 Right Antecubital 03/26/24  1137  Antecubital  less than 1                    Labs (abnormal labs have a star):   Labs Reviewed   COMPREHENSIVE METABOLIC PANEL - Abnormal; Notable for the following components:       Result Value    Creatinine 1.05 (*)     eGFR 55.2 (*)     All other components within normal limits    Narrative:     GFR Normal >60  Chronic Kidney Disease <60  Kidney Failure <15    The GFR formula is only valid for adults with stable renal function between ages 18 and 70.   PROTIME-INR - Abnormal; Notable for the following components:    Protime 18.6 (*)     INR  1.53 (*)     All other components within normal limits   URINALYSIS W/ MICROSCOPIC IF INDICATED (NO CULTURE) - Abnormal; Notable for the following components:    Appearance, UA Cloudy (*)     Ketones, UA Trace (*)     Blood, UA Moderate (2+) (*)     Protein, UA Trace (*)     Leuk Esterase, UA Large (3+) (*)     All other components within normal limits   CBC WITH AUTO DIFFERENTIAL - Abnormal; Notable for the following components:    Lymphocyte % 13.5 (*)     All other components within normal limits   URINALYSIS, MICROSCOPIC ONLY - Abnormal; Notable for the following components:    RBC, UA 3-5 (*)     WBC, UA 11-20 (*)     Bacteria, UA 2+ (*)     Squamous Epithelial Cells, UA 7-12 (*)     All other components within normal limits   LIPASE - Normal   TROPONIN - Normal    Narrative:     High Sensitive Troponin T Reference Range:  <14.0 ng/L- Negative Female for AMI  <22.0 ng/L- Negative Male for AMI  >=14 - Abnormal Female indicating possible myocardial injury.  >=22 - Abnormal Male indicating possible myocardial injury.   Clinicians would have to utilize clinical acumen, EKG, Troponin, and serial changes to determine if it is an Acute Myocardial Infarction or myocardial injury due to an underlying chronic condition.        MAGNESIUM - Normal   HIGH SENSITIVITIY TROPONIN T 2HR - Normal    Narrative:     High Sensitive Troponin T Reference Range:  <14.0 ng/L- Negative Female for AMI  <22.0 ng/L- Negative Male for AMI  >=14 - Abnormal Female indicating possible myocardial injury.  >=22 - Abnormal Male indicating possible myocardial injury.   Clinicians would have to utilize clinical acumen, EKG, Troponin, and serial changes to determine if it is an Acute Myocardial Infarction or myocardial injury due to an underlying chronic condition.        GASTROINTESTINAL PANEL, PCR (PREFERRED) DOES NOT INCLUDE CDIFF   URINE CULTURE   CBC AND DIFFERENTIAL    Narrative:     The following orders were created for panel order CBC &  Differential.  Procedure                               Abnormality         Status                     ---------                               -----------         ------                     CBC Auto Differential[[id number]]        Abnormal            Final result                 Please view results for these tests on the individual orders.       EKG:   No orders to display       Meds given in ED:   Medications   sodium chloride 0.9 % flush 10 mL (has no administration in time range)   sodium chloride 0.9 % infusion (125 mL/hr Intravenous New Bag 3/26/24 1505)   sodium chloride 0.9 % bolus 500 mL (0 mL Intravenous Stopped 3/26/24 1244)   ondansetron (ZOFRAN) injection 4 mg (4 mg Intravenous Given 3/26/24 1214)       Imaging results:  No radiology results for the last day    Ambulatory status:   Ad wendi    Social issues:   Social History     Socioeconomic History    Marital status:      Spouse name: SANDRA    Number of children: 3    Years of education: COLLEGE   Tobacco Use    Smoking status: Former     Current packs/day: 0.00     Average packs/day: 1 pack/day for 12.7 years (12.7 ttl pk-yrs)     Types: Cigarettes     Start date: 1964     Quit date: 1976     Years since quittin.5    Smokeless tobacco: Never   Vaping Use    Vaping status: Never Used   Substance and Sexual Activity    Alcohol use: Yes     Alcohol/week: 3.0 standard drinks of alcohol     Types: 3 Glasses of wine per week    Drug use: No    Sexual activity: Not Currently     Partners: Male     Birth control/protection: Inserts, Post-menopausal, None       Peripheral Neurovascular  Peripheral Neurovascular (Adult)  Peripheral Neurovascular WDL: WDL    Neuro Cognitive  Neuro Cognitive (Adult)  Cognitive/Neuro/Behavioral WDL: WDL    Learning  Learning Assessment (Adult)  Learning Readiness and Ability: no barriers identified    Respiratory  Respiratory WDL  Respiratory WDL: WDL    Abdominal Pain       Pain Assessments  Pain (Adult)  (0-10)  Pain Rating: Rest: 0    NIH Stroke Scale       Kezia Colon RN  03/26/24 16:01 EDT

## 2024-03-26 NOTE — ED PROVIDER NOTES
EMERGENCY DEPARTMENT ENCOUNTER    Room Number:  P683/1  Date of encounter:  3/26/2024  PCP: Sri Conner MD  Historian: Patient and 2 daughters  Relevant information and history provided by sources other than the patient will be included below and in the ED Course.  Review of pertinent past medical records may also be included in record below and ED Course.    HPI:  Chief Complaint: Persistent diarrhea  A complete HPI/ROS/PMH/PSH/SH/FH are unobtainable due to: Not applicable  Context: Iris Yee is a 78 y.o. female who presents to the ED c/o patient has had diarrhea for 3 weeks.  Has been on Imodium with minimal relief.  The diarrhea has been worsening the past 6 days.  She is going 4-5 times a day it is generally watery.  She does not appreciate any black stool or obvious blood in the stool.  Whenever she eats or drinks the diarrhea is worse.  Will also cause some crampy abdominal pain as well after she eats or drinks.  Currently she has very mild discomfort in her lower abdomen.  She has had bouts of nausea but no vomiting.  She has no chest pain or shortness of breath.  She did recently have a C. difficile test that was done as an outpatient by Dr. Villalobos that was negative.  Also did have a recent CT scan that was done at the end of last week that did not show any obvious abnormality.  Because her symptoms persisted she decided to come in here to the emergency department for further evaluation.  She is not aware of any raw or uncooked food that she is consumed.  She not aware of any ill contacts.  She has had no fever.  She has been compliant with Xarelto.        Previous Episodes: No history of diarrhea to this extent.  No history of inflammatory bowel disease.  Current Symptoms: Mild abdominal pain and bouts of diarrhea.  Has had 2 bouts of diarrhea this morning    MEDICAL HISTORY REVIEWED  Looking old records I see this patient does have a history of breast cancer is estrogen receptor positive.   See that she has a history of GERD.  She sees Dr. Jaramillo from oncology.  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  She has osteopenia.  I reviewed the note from Dr. Jaramillo from 3/14/2024.    I can see the report of his CT scan of the abdomen and pelvis the patient had with contrast on 3/20/2024.  There is questionable mild fat stranding adjacent to the small bowel loops in the left hemiabdomen which could represent a mild nonspecific enteritis.  There is diverticulosis.  Otherwise no acute abnormality appreciated.  PAST MEDICAL HISTORY  Active Ambulatory Problems     Diagnosis Date Noted    History of pulmonary infarction 06/27/2016    Acquired hypothyroidism 06/27/2016    Pap smear abnormality of cervix with ASCUS favoring benign 07/01/2016    Postmenopausal bleeding 02/27/2017    Encounter for screening mammogram for malignant neoplasm of breast 11/13/2018    Gastroesophageal reflux disease 11/13/2018    Osteopenia of both hips 01/03/2020    Arthritis of knee 03/23/2021    Malignant neoplasm of upper-outer quadrant of right breast in female, estrogen receptor positive 12/09/2021    Aromatase inhibitor use 05/12/2022    Clinical diagnosis of COVID-19 07/05/2022    Adult BMI 38.0-38.9 kg/sq m 10/18/2022    Abnormal finding on breast imaging 10/03/2023    Breast, fat necrosis 12/05/2023     Resolved Ambulatory Problems     Diagnosis Date Noted    Ankle pain 09/26/2016    Achilles tendinitis of left lower extremity 09/26/2016    Tendonitis, Achilles, left 12/15/2016     Past Medical History:   Diagnosis Date    Arthritis     Arthritis of back 2015    Arthritis of neck     Bulging lumbar disc     Bursitis of hip     Clotting disorder     COVID-19     Fracture, fibula     Fracture, tibia and fibula     Frozen shoulder     GERD (gastroesophageal reflux disease)     Hip arthrosis     History of kidney stones     History of MRSA infection 2020    History of  transfusion 1971    Hx of blood clots 2011    Hypothyroid     Kidney stones     Knee swelling     Left knee pain     Lumbosacral disc disease     Neck strain     Neuroma of foot 2000    Ovarian cyst     Periarthritis of shoulder     PONV (postoperative nausea and vomiting)     Pulmonary embolism, bilateral     Scoliosis 2020    Stress fracture     Tear of meniscus of knee     Thrombopenia     Wears glasses          PAST SURGICAL HISTORY  Past Surgical History:   Procedure Laterality Date    APPENDECTOMY      AUGMENTATION MAMMAPLASTY  1976    BREAST AUGMENTATION      years ago has implants    BREAST EXCISIONAL BIOPSY Right 10/2021    BREAST EXCISIONAL BIOPSY Left     BREAST LUMPECTOMY Right 01/03/2022    Procedure: Right needle-localized, bracketed, partial mastectomy;  Surgeon: Adela Cardoso MD;  Location: [facility] MAIN OR;  Service: General;  Laterality: Right;    BREAST SURGERY Bilateral 01/03/2022    Procedure: RIGHT ONCOPLASTIC REDUCTION AND LEFT REDUCTION FOR SYMMETRY, BILATERAL IMPLANT REMOVAL AND  BILATERAL CAPSULECTOMIES;  Surgeon: Delbert Butcher MD;  Location: [facility] MAIN OR;  Service: Plastics;  Laterality: Bilateral;    CHOLECYSTECTOMY      COLONOSCOPY N/A 12/14/2018    Procedure: COLONOSCOPY into cecum/termial ileum with polypectomy;  Surgeon: Jose Daniel Dooley MD;  Location: [facility] ENDOSCOPY;  Service: Gastroenterology    ENDOSCOPY N/A 12/14/2018    Procedure: ESOPHAGOGASTRODUODENOSCOPY with biopsy;  Surgeon: Jose Daniel Dooley MD;  Location: [facility] ENDOSCOPY;  Service: Gastroenterology    EXPLORATORY LAPAROTOMY      bleeding from ruptured ovarian cyst    HERNIA REPAIR      umbilical x 2    OVARIAN CYST DRAINAGE/EXCISION      ruptured ovarian cyst with vblood accumulatine in abdomin    TONSILLECTOMY      TOTAL KNEE ARTHROPLASTY Left 06/24/2021    Procedure: TOTAL KNEE ARTHROPLASTY;  Surgeon: Clarence Suarez MD;  Location: [facility] MAIN OR;  Service: Orthopedics;  Laterality: Left;     UMBILICAL HERNIA REPAIR      x2         FAMILY HISTORY  Family History   Problem Relation Age of Onset    Hypertension Mother     Osteoporosis Mother     Scoliosis Mother     Arthritis Mother     Hypertension Father     Arthritis Father     Stroke Paternal Aunt     Cervical cancer Maternal Grandmother 68    Prostate cancer Brother 74    Hypertension Brother     Hyperlipidemia Brother     Alzheimer's disease Brother     Malig Hyperthermia Neg Hx          SOCIAL HISTORY  Social History     Socioeconomic History    Marital status:      Spouse name: SANDRA    Number of children: 3    Years of education: COLLEGE   Tobacco Use    Smoking status: Former     Current packs/day: 0.00     Average packs/day: 1 pack/day for 12.7 years (12.7 ttl pk-yrs)     Types: Cigarettes     Start date: 1964     Quit date: 1976     Years since quittin.5    Smokeless tobacco: Never   Vaping Use    Vaping status: Never Used   Substance and Sexual Activity    Alcohol use: Yes     Alcohol/week: 3.0 standard drinks of alcohol     Types: 3 Glasses of wine per week    Drug use: No    Sexual activity: Not Currently     Partners: Male     Birth control/protection: Inserts, Post-menopausal, None         ALLERGIES  Penicillins, Latex, Sulfa antibiotics, and Wound dressing adhesive        REVIEW OF SYSTEMS  Review of Systems     All systems reviewed and negative except for those discussed in HPI.       PHYSICAL EXAM    I have reviewed the triage vital signs and nursing notes.    ED Triage Vitals   Temp Heart Rate Resp BP SpO2   24 1101 24 1101 24 1101 24 1115 24 1101   97 °F (36.1 °C) 96 18 147/91 95 %      Temp src Heart Rate Source Patient Position BP Location FiO2 (%)   -- -- -- -- --              GENERAL: Elderly female.  Looks little weak and tired.  Vital signs on my initial evaluation been reviewed  HENT: nares patent  Head/neck/ face are symmetric without gross deformity, signs of trauma, or  swelling  EYES: no scleral icterus, no conjunctival pallor.  NECK: Supple, no meningismus  CV: regular rhythm, regular rate with intact distal pulses.  RESPIRATORY: normal effort and no respiratory distress.  Auscultation bilaterally  ABDOMEN: soft and obese and mild tenderness more prominent in the lower abdomen.  There is no guarding or rebound.  Positive bowel sounds  MUSCULOSKELETAL: no deformity.  Intact distal pulses that are equal strong and symmetric  NEURO: alert and appropriate, moves all extremities, follows commands.  No focal motor or sensory changes  SKIN: warm, dry    Vital signs and nursing notes reviewed.        LAB RESULTS  Recent Results (from the past 24 hour(s))   Comprehensive Metabolic Panel    Collection Time: 03/26/24 11:36 AM    Specimen: Arm, Right; Blood   Result Value Ref Range    Glucose 99 65 - 99 mg/dL    BUN 12 8 - 23 mg/dL    Creatinine 1.05 (H) 0.57 - 1.00 mg/dL    Sodium 138 136 - 145 mmol/L    Potassium 4.0 3.5 - 5.2 mmol/L    Chloride 104 98 - 107 mmol/L    CO2 24.4 22.0 - 29.0 mmol/L    Calcium 9.6 8.6 - 10.5 mg/dL    Total Protein 6.4 6.0 - 8.5 g/dL    Albumin 4.1 3.5 - 5.2 g/dL    ALT (SGPT) 20 1 - 33 U/L    AST (SGOT) 17 1 - 32 U/L    Alkaline Phosphatase 113 39 - 117 U/L    Total Bilirubin 0.8 0.0 - 1.2 mg/dL    Globulin 2.3 gm/dL    A/G Ratio 1.8 g/dL    BUN/Creatinine Ratio 11.4 7.0 - 25.0    Anion Gap 9.6 5.0 - 15.0 mmol/L    eGFR 55.2 (L) >60.0 mL/min/1.73   Protime-INR    Collection Time: 03/26/24 11:36 AM    Specimen: Arm, Right; Blood   Result Value Ref Range    Protime 18.6 (H) 11.7 - 14.2 Seconds    INR 1.53 (H) 0.90 - 1.10   Lipase    Collection Time: 03/26/24 11:36 AM    Specimen: Arm, Right; Blood   Result Value Ref Range    Lipase 24 13 - 60 U/L   High Sensitivity Troponin T    Collection Time: 03/26/24 11:36 AM    Specimen: Arm, Right; Blood   Result Value Ref Range    HS Troponin T 10 <14 ng/L   Magnesium    Collection Time: 03/26/24 11:36 AM    Specimen:  Arm, Right; Blood   Result Value Ref Range    Magnesium 2.2 1.6 - 2.4 mg/dL   CBC Auto Differential    Collection Time: 03/26/24 11:36 AM    Specimen: Arm, Right; Blood   Result Value Ref Range    WBC 5.42 3.40 - 10.80 10*3/mm3    RBC 4.44 3.77 - 5.28 10*6/mm3    Hemoglobin 14.4 12.0 - 15.9 g/dL    Hematocrit 42.5 34.0 - 46.6 %    MCV 95.7 79.0 - 97.0 fL    MCH 32.4 26.6 - 33.0 pg    MCHC 33.9 31.5 - 35.7 g/dL    RDW 12.7 12.3 - 15.4 %    RDW-SD 44.8 37.0 - 54.0 fl    MPV 10.9 6.0 - 12.0 fL    Platelets 209 140 - 450 10*3/mm3    Neutrophil % 73.0 42.7 - 76.0 %    Lymphocyte % 13.5 (L) 19.6 - 45.3 %    Monocyte % 10.9 5.0 - 12.0 %    Eosinophil % 2.0 0.3 - 6.2 %    Basophil % 0.4 0.0 - 1.5 %    Immature Grans % 0.2 0.0 - 0.5 %    Neutrophils, Absolute 3.96 1.70 - 7.00 10*3/mm3    Lymphocytes, Absolute 0.73 0.70 - 3.10 10*3/mm3    Monocytes, Absolute 0.59 0.10 - 0.90 10*3/mm3    Eosinophils, Absolute 0.11 0.00 - 0.40 10*3/mm3    Basophils, Absolute 0.02 0.00 - 0.20 10*3/mm3    Immature Grans, Absolute 0.01 0.00 - 0.05 10*3/mm3    nRBC 0.0 0.0 - 0.2 /100 WBC   Urinalysis With Microscopic If Indicated (No Culture) - Urine, Clean Catch    Collection Time: 03/26/24 12:09 PM    Specimen: Urine, Clean Catch   Result Value Ref Range    Color, UA Yellow Yellow, Straw    Appearance, UA Cloudy (A) Clear    pH, UA 5.5 5.0 - 8.0    Specific Gravity, UA 1.019 1.005 - 1.030    Glucose, UA Negative Negative    Ketones, UA Trace (A) Negative    Bilirubin, UA Negative Negative    Blood, UA Moderate (2+) (A) Negative    Protein, UA Trace (A) Negative    Leuk Esterase, UA Large (3+) (A) Negative    Nitrite, UA Negative Negative    Urobilinogen, UA 1.0 E.U./dL 0.2 - 1.0 E.U./dL   Urinalysis, Microscopic Only - Urine, Clean Catch    Collection Time: 03/26/24 12:09 PM    Specimen: Urine, Clean Catch   Result Value Ref Range    RBC, UA 3-5 (A) None Seen, 0-2 /HPF    WBC, UA 11-20 (A) None Seen, 0-2 /HPF    Bacteria, UA 2+ (A) None Seen /HPF     Squamous Epithelial Cells, UA 7-12 (A) None Seen, 0-2 /HPF    Hyaline Casts, UA 3-6 None Seen /LPF    Calcium Oxalate Crystals, UA Small/1+ None Seen /HPF    Methodology Manual Light Microscopy    High Sensitivity Troponin T 2Hr    Collection Time: 03/26/24  1:55 PM    Specimen: Blood   Result Value Ref Range    HS Troponin T 9 <14 ng/L    Troponin T Delta -1 >=-4 - <+4 ng/L       Ordered the above labs and independently reviewed the results.        RADIOLOGY  CT Abdomen Pelvis With Contrast    Result Date: 3/26/2024  ABDOMEN AND PELVIS CT WITH CONTRAST  HISTORY: Nausea, vomiting fatigue and persistent diarrhea.  TECHNIQUE: Abdomen and pelvis CT was performed using 85 mL Isovue-300 IV contrast and is correlated with a CT from 03/20/2024.  FINDINGS: The visualized lung bases are clear. Lower mediastinal structures appear normal. Prior cholecystectomy with appropriate mild prominence of the intrahepatic and extrahepatic biliary tree. Parenchyma of the liver, pancreas, adrenals, and spleen appears normal. Multiple parapelvic renal cysts bilaterally as well as renal parenchymal cysts are unchanged. No enhancing renal lesion. No hydronephrosis, hydroureter, or perinephric stranding. The urinary bladder appears normal. Uterus and ovaries appear normal.  Large duodenal diverticulum is noted; these are usually of no significance.  The remainder of the small bowel appears normal. The appendix is not identified, but there is no inflammation around the cecum. The ascending and the transverse colon appear normal. There is diverticulosis along the descending and the sigmoid colon with mild fatty inflammatory change around several diverticula along the posterior aspect of the descending colon. No colon wall thickening, abscess, or free air is present. The rectum appears normal.  Abdominal aorta maintains normal caliber. There is scattered atheromatous vascular calcification. No mesenteric vascular lesion. Degenerative change  in the spine without compression deformity.  Previous umbilical hernia repair.      Diverticulosis along the descending and the sigmoid colon with new, mild but convincing inflammatory change around diverticula along the posterior aspect of the descending colon, consistent with acute, uncomplicated diverticulitis.  I discussed the findings with Dr. Lowe at around 6 p.m.  Radiation dose reduction techniques were utilized, including automated exposure control and exposure modulation based on body size.   This report was finalized on 3/26/2024 6:21 PM by Dr. Gerard Arzola M.D on Workstation: [id number]       I ordered the above noted radiological studies. Reviewed by me and discussed with radiologist.  See dictation for official radiology interpretation.      PROCEDURES    Procedures      MEDICATIONS GIVEN IN ER    Medications   sodium chloride 0.9 % flush 10 mL (has no administration in time range)   acetaminophen (TYLENOL) tablet 650 mg (650 mg Oral Incomplete 3/26/24 1825)     Or   acetaminophen (TYLENOL) 160 MG/5ML oral solution 650 mg ( Oral Not Given:  See Alt 3/26/24 1825)     Or   acetaminophen (TYLENOL) suppository 650 mg ( Rectal Not Given:  See Alt 3/26/24 1825)   ondansetron (ZOFRAN) injection 4 mg (has no administration in time range)   sennosides-docusate (PERICOLACE) 8.6-50 MG per tablet 2 tablet (has no administration in time range)     And   polyethylene glycol (MIRALAX) packet 17 g (has no administration in time range)     And   bisacodyl (DULCOLAX) EC tablet 5 mg (has no administration in time range)     And   bisacodyl (DULCOLAX) suppository 10 mg (has no administration in time range)   sodium chloride 0.9 % with KCl 20 mEq/L infusion (100 mL/hr Intravenous New Bag 3/26/24 1935)   sodium chloride 0.9 % bolus 500 mL (0 mL Intravenous Stopped 3/26/24 1244)   ondansetron (ZOFRAN) injection 4 mg (4 mg Intravenous Given 3/26/24 1214)   iopamidol (ISOVUE-300) 61 % injection 100 mL (85 mL Intravenous  Given by Other 3/26/24 4426)         All labs have been independently reviewed by me.  All radiology studies have been reviewed by me and I discussed with radiologist dictating the report when indicated below.  All EKG's independently viewed and interpreted by me.  Discussion below represents my analysis of pertinent findings related to patient's condition, differential diagnosis, treatment plan and final disposition.        PROGRESS, DATA ANALYSIS, CONSULTS, AND MEDICAL DECISION MAKING    Differential diagnosis includes   - hepatobiliary pathology such as cholecystitis, cholangitis, and symptomatic cholelithiasis  -PUD  -Mesenteric ischemia  - Pancreatitis  - Dyspepsia  - Small bowel or large bowel obstruction  - Appendicitis  - Diverticulitis  - UTI including pyelonephritis  - Ureteral stone  - Zoster  - Colitis, including infectious and ischemic  - Atypical ACS    Will get a check some electrolytes on her and give her some IV fluids and Zofran.  Discussed with patient and daughters about my initial treatment plan test that we will order and all questions answered at this time.    ED Course as of 03/26/24 2007   Tue Mar 26, 2024   1430 Leukocytes, UA(!): Large (3+) [MM]   1430 Protein, UA(!): Trace [MM]   1430 Blood, UA(!): Moderate (2+) [MM]   1430 RBC, UA(!): 3-5 [MM]   1430 WBC, UA(!): 11-20 [MM]   1430 Bacteria, UA(!): 2+  This was a clean-catch urine [MM]   1430 HS Troponin T: 9 [MM]   1503 Patient was seen by GI today in their office.  They referred the patient to the emergency department for further evaluation. [MM]   1523 I did inform the patient and daughter of the results of the test.  And they would like to be admitted.  I think that is appropriate to be admitted.  GI doctor wanted him admitted, Dr. Dooley.  He sent her from the office down here.  She has reported no change in her urinary habits.  No burning with urination or frequent urination.  She has had blood in her urine before.  Informed that  we will repeat a CT scan of the abdomen pelvis.  All questions answered at this time. [MM]   1533 I did discuss the case with Dr. Hope.  Informed her of the patient's presenting symptoms and results of the test.  She agrees to admit the patient to the hospital.  All questions answered. [MM]   1553 Dr. Hope will follow-up with the results of the CT scan of the abdomen pelvis.  Informed the nurse to make sure she gets the CT scan before she goes upstairs. [MM]   1759 I did discuss the results of the CT scan of the abdomen pelvis with radiologist Dr. Arzola and there is diverticulosis.  There might be some small localized area of inflammation potentially diverticulitis.  There is no abscess and there is no free air.  Please see complete dictated report from radiologist.  This patient is upstairs and is no longer down in the emergency department. [MM]      ED Course User Index  [MM] Giovanni Lowe MD       AS OF 20:07 EDT VITALS:    BP - 150/85  HR - 72  TEMP - 97.5 °F (36.4 °C) (Oral)  02 SATS - 96%    SOCIAL DETERMINANTS OF HEALTH THAT IMPACT OR LIMIT CARE (For example..Homelessness,safe discharge, inability to obtain care, follow up, or prescriptions):      DIAGNOSIS  Final diagnoses:   Diarrhea, unspecified type   Generalized abdominal pain         DISPOSITION  I have reviewed the test results with my patient and explained the current treatment plan.  I answered all the patient's questions and concerns.  The patient is hemodynamically stable and is in no distress and is appropriate to be admitted to a medical/surgical floor bed at this time.            DICTATED UTILIZING DRAGON DICTATION    Note Disclaimer: At [facility], we believe that sharing information builds trust and better relationships. You are receiving this note because you recently visited [facility]. It is possible you will see health information before a provider has talked with you about it. This kind of information can be easy to  misunderstand. To help you fully understand what it means for your health, we urge you to discuss this note with your provider.       Giovanni Lowe MD  03/26/24 2007

## 2024-03-26 NOTE — PROGRESS NOTES
Chief Complaint   Patient presents with    Abdominal Pain        Iris Yee is a  78 y.o. female here for an initial visit for abdominal pain, nausea, diarrhea    HPI this 78-year-old female patient of Dr. Sri Conner presents with complaints of acute onset of nausea and subsequent bouts of diarrhea that were initially intermittent but now are constant for the past 6 days.  Usually has about 4 movements per day with no evidence of melena or bright red blood but some questionable mucus in her stools.  She has had lab work done 2 weeks ago with negative findings but describes a lower abdominal aching sensation that may be positional and that it is better when she is supine and worse when sitting and standing and may also be influenced by her bowel function.  A CT scan of her abdomen pelvis suggested possible enteritis.  She complains of being exhausted at this time although it is not clear whether or not she is orthostatic at present and although she has had nausea she has had no vomiting.  She had undergone previous upper and lower endoscopic examinations through this office in December 2018 for reflux as well as a Cologuard positive test.  Colonoscopy revealed a small polyp which was hyperplastic.  She did have erosive esophagitis on the upper examination.  We talked about further assessment including routine labs and possible endoscopic evaluation, however, given her current symptoms she would prefer to have more immediate assessment in the ER setting with possible admission for observation and hydration or further assessment in the inpatient setting if needed.  She did have some recent stool studies done but those results are pending.    Past Medical History:   Diagnosis Date    Arthritis     Arthritis of back 2015    Arthritis of neck     Breast, fat necrosis 12/5/2023    Bulging lumbar disc     L 3-4    Bursitis of hip     Clotting disorder     COVID-19     Encounter for screening mammogram for  malignant neoplasm of breast 11/13/2018    Fracture, fibula     Fracture, tibia and fibula     Stress fracture    Frozen shoulder     GERD (gastroesophageal reflux disease)     Hip arthrosis     History of kidney stones     History of MRSA infection 2020    MICHEL EARS    History of transfusion 1971    no reaction    Hx of blood clots 2011    Hypothyroid     Kidney stones     Knee swelling     Left knee pain     Lumbosacral disc disease     Malignant neoplasm of upper-outer quadrant of right breast in female, estrogen receptor positive 12/09/2021    Neck strain     Neuroma of foot 2000    Right foot    Ovarian cyst     Periarthritis of shoulder     PONV (postoperative nausea and vomiting)     states gets really sick lasted for 4 days aafter surgery the last time     Pulmonary embolism, bilateral     Scoliosis 2020    Stress fracture     Tear of meniscus of knee     Thrombopenia     Wears glasses        Current Outpatient Medications   Medication Sig Dispense Refill    acetaminophen (TYLENOL) 325 MG tablet Take 2 tablets by mouth Every 4 (Four) Hours As Needed for Mild Pain . 60 tablet 0    calcium carbonate (TUMS) 500 MG chewable tablet Chew 1 tablet As Needed for Indigestion or Heartburn.      cetirizine (zyrTEC) 10 MG tablet Take 1 tablet by mouth Every Night.      cholecalciferol (VITAMIN D3) 25 MCG (1000 UT) tablet Take 1 tablet by mouth Every Morning.      letrozole (FEMARA) 2.5 MG tablet TAKE 1 TABLET DAILY 30 tablet 5    nitrofurantoin, macrocrystal-monohydrate, (MACROBID) 100 MG capsule Take 1 capsule by mouth Every 12 (Twelve) Hours.      ondansetron (ZOFRAN) 4 MG tablet Take 1 tablet by mouth.      SYNTHROID 112 MCG tablet Take 1 tablet by mouth Every Night.      Xarelto 20 MG tablet Take 1 tablet by mouth.       No current facility-administered medications for this visit.     Facility-Administered Medications Ordered in Other Visits   Medication Dose Route Frequency Provider Last Rate Last Admin     Chlorhexidine Gluconate 2 % pads 1 each  1 each Apply externally Take As Directed Clarence Suarez MD           PRN Meds:.    Allergies   Allergen Reactions    Penicillins Other (See Comments)     Passes out    Latex Rash    Sulfa Antibiotics Nausea And Vomiting    Wound Dressing Adhesive Rash       Social History     Socioeconomic History    Marital status:      Spouse name: SANDRA    Number of children: 3    Years of education: COLLEGE   Tobacco Use    Smoking status: Former     Current packs/day: 0.00     Average packs/day: 1 pack/day for 12.7 years (12.7 ttl pk-yrs)     Types: Cigarettes     Start date: 1964     Quit date: 1976     Years since quittin.5    Smokeless tobacco: Never   Vaping Use    Vaping status: Never Used   Substance and Sexual Activity    Alcohol use: Yes     Alcohol/week: 3.0 standard drinks of alcohol     Types: 3 Glasses of wine per week    Drug use: No    Sexual activity: Not Currently     Partners: Male     Birth control/protection: Inserts, Post-menopausal, None       Family History   Problem Relation Age of Onset    Hypertension Mother     Osteoporosis Mother     Scoliosis Mother     Arthritis Mother     Hypertension Father     Arthritis Father     Stroke Paternal Aunt     Cervical cancer Maternal Grandmother 68    Prostate cancer Brother 74    Hypertension Brother     Hyperlipidemia Brother     Alzheimer's disease Brother     Malig Hyperthermia Neg Hx        Review of Systems   Constitutional:  Positive for fatigue.   Gastrointestinal:  Positive for abdominal pain, diarrhea and nausea.       Vitals:    24 0956   BP: 138/84   Pulse: 78   Temp: 98.6 °F (37 °C)   SpO2: 93%       Physical Exam  Vitals reviewed.   Constitutional:       General: She is not in acute distress.     Appearance: She is well-developed. She is not diaphoretic.   HENT:      Head: Normocephalic.      Mouth/Throat:      Pharynx: No oropharyngeal exudate.   Eyes:      General: No scleral  icterus.     Conjunctiva/sclera: Conjunctivae normal.   Neck:      Thyroid: No thyromegaly.   Cardiovascular:      Rate and Rhythm: Normal rate and regular rhythm.      Heart sounds: No murmur heard.  Pulmonary:      Effort: No respiratory distress.      Breath sounds: Normal breath sounds. No wheezing or rales.   Abdominal:      General: Bowel sounds are normal. There is no distension.      Palpations: Abdomen is soft. There is no mass.      Tenderness: There is no abdominal tenderness.   Musculoskeletal:         General: No tenderness. Normal range of motion.      Cervical back: Normal range of motion.   Lymphadenopathy:      Cervical: No cervical adenopathy.   Skin:     General: Skin is warm and dry.      Findings: No erythema or rash.   Neurological:      Mental Status: She is alert and oriented to person, place, and time.   Psychiatric:         Behavior: Behavior normal.         ASSESSMENT   #1 diarrhea: Not clear if this is of an infectious nature or other etiology but seems to be progressive  #2 abdominal pain  #3 abnormal CT findings  #4 nausea  #5 history of reflux      PLAN  After discussion with patient and her family she has decided to present to the emergency room for more immediate evaluation and treatment.  Will await that workup and can consider GI assessment if she is to be admitted or can proceed with outpatient workup including endoscopic evaluation if she is discharged.      ICD-10-CM ICD-9-CM   1. Gastroesophageal reflux disease, unspecified whether esophagitis present  K21.9 530.81   2. Nausea  R11.0 787.02   3. Abdominal pain, unspecified abdominal location  R10.9 789.00

## 2024-03-26 NOTE — ED NOTES
Informed pt a stool specimen is needed. Pt states they do not need to have a BM at this time. Specimen collection kit left at bedside, instructions for usage given to pt. Pt advised to alert staff via call light when able to go. Call light within reach.

## 2024-03-27 PROBLEM — K57.92 DIVERTICULITIS: Status: ACTIVE | Noted: 2024-03-27

## 2024-03-27 PROBLEM — E66.01 CLASS 2 SEVERE OBESITY WITH SERIOUS COMORBIDITY IN ADULT: Status: ACTIVE | Noted: 2024-03-27

## 2024-03-27 PROBLEM — Z79.01 LONG TERM CURRENT USE OF ANTICOAGULANT THERAPY: Status: ACTIVE | Noted: 2024-03-27

## 2024-03-27 PROBLEM — R10.9 ABDOMINAL PAIN: Status: ACTIVE | Noted: 2024-03-27

## 2024-03-27 PROBLEM — R11.0 NAUSEA: Status: ACTIVE | Noted: 2024-03-27

## 2024-03-27 PROBLEM — E66.812 CLASS 2 SEVERE OBESITY WITH SERIOUS COMORBIDITY IN ADULT: Status: ACTIVE | Noted: 2024-03-27

## 2024-03-27 LAB
ANION GAP SERPL CALCULATED.3IONS-SCNC: 5.9 MMOL/L (ref 5–15)
BACTERIA SPEC AEROBE CULT: NORMAL
BASOPHILS # BLD AUTO: 0.03 10*3/MM3 (ref 0–0.2)
BASOPHILS NFR BLD AUTO: 0.7 % (ref 0–1.5)
BUN SERPL-MCNC: 13 MG/DL (ref 8–23)
BUN/CREAT SERPL: 15.7 (ref 7–25)
C DIFF TOX GENS STL QL NAA+PROBE: NEGATIVE
CALCIUM SPEC-SCNC: 8.3 MG/DL (ref 8.6–10.5)
CHLORIDE SERPL-SCNC: 109 MMOL/L (ref 98–107)
CO2 SERPL-SCNC: 25.1 MMOL/L (ref 22–29)
CREAT SERPL-MCNC: 0.83 MG/DL (ref 0.57–1)
D-LACTATE SERPL-SCNC: 0.6 MMOL/L (ref 0.5–2)
DEPRECATED RDW RBC AUTO: 42.9 FL (ref 37–54)
EGFRCR SERPLBLD CKD-EPI 2021: 73.2 ML/MIN/1.73
EOSINOPHIL # BLD AUTO: 0.18 10*3/MM3 (ref 0–0.4)
EOSINOPHIL NFR BLD AUTO: 4.4 % (ref 0.3–6.2)
ERYTHROCYTE [DISTWIDTH] IN BLOOD BY AUTOMATED COUNT: 12.4 % (ref 12.3–15.4)
GLUCOSE SERPL-MCNC: 92 MG/DL (ref 65–99)
HCT VFR BLD AUTO: 35.8 % (ref 34–46.6)
HGB BLD-MCNC: 11.7 G/DL (ref 12–15.9)
IMM GRANULOCYTES # BLD AUTO: 0.02 10*3/MM3 (ref 0–0.05)
IMM GRANULOCYTES NFR BLD AUTO: 0.5 % (ref 0–0.5)
LYMPHOCYTES # BLD AUTO: 0.58 10*3/MM3 (ref 0.7–3.1)
LYMPHOCYTES NFR BLD AUTO: 14 % (ref 19.6–45.3)
MCH RBC QN AUTO: 31.2 PG (ref 26.6–33)
MCHC RBC AUTO-ENTMCNC: 32.7 G/DL (ref 31.5–35.7)
MCV RBC AUTO: 95.5 FL (ref 79–97)
MONOCYTES # BLD AUTO: 0.6 10*3/MM3 (ref 0.1–0.9)
MONOCYTES NFR BLD AUTO: 14.5 % (ref 5–12)
NEUTROPHILS NFR BLD AUTO: 2.72 10*3/MM3 (ref 1.7–7)
NEUTROPHILS NFR BLD AUTO: 65.9 % (ref 42.7–76)
NRBC BLD AUTO-RTO: 0 /100 WBC (ref 0–0.2)
PLATELET # BLD AUTO: 173 10*3/MM3 (ref 140–450)
PMV BLD AUTO: 11.1 FL (ref 6–12)
POTASSIUM SERPL-SCNC: 4.4 MMOL/L (ref 3.5–5.2)
RBC # BLD AUTO: 3.75 10*6/MM3 (ref 3.77–5.28)
SODIUM SERPL-SCNC: 140 MMOL/L (ref 136–145)
WBC NRBC COR # BLD AUTO: 4.13 10*3/MM3 (ref 3.4–10.8)

## 2024-03-27 PROCEDURE — 25010000002 METRONIDAZOLE 500 MG/100ML SOLUTION: Performed by: INTERNAL MEDICINE

## 2024-03-27 PROCEDURE — 96367 TX/PROPH/DG ADDL SEQ IV INF: CPT

## 2024-03-27 PROCEDURE — 96365 THER/PROPH/DIAG IV INF INIT: CPT

## 2024-03-27 PROCEDURE — G0378 HOSPITAL OBSERVATION PER HR: HCPCS

## 2024-03-27 PROCEDURE — 25010000002 ONDANSETRON PER 1 MG: Performed by: INTERNAL MEDICINE

## 2024-03-27 PROCEDURE — 25010000002 SODIUM CHLORIDE 0.9 % WITH KCL 20 MEQ 20-0.9 MEQ/L-% SOLUTION: Performed by: INTERNAL MEDICINE

## 2024-03-27 PROCEDURE — 25010000002 CEFTRIAXONE PER 250 MG: Performed by: INTERNAL MEDICINE

## 2024-03-27 PROCEDURE — 99214 OFFICE O/P EST MOD 30 MIN: CPT | Performed by: PHYSICIAN ASSISTANT

## 2024-03-27 PROCEDURE — 96361 HYDRATE IV INFUSION ADD-ON: CPT

## 2024-03-27 PROCEDURE — 96366 THER/PROPH/DIAG IV INF ADDON: CPT

## 2024-03-27 PROCEDURE — 80048 BASIC METABOLIC PNL TOTAL CA: CPT | Performed by: INTERNAL MEDICINE

## 2024-03-27 PROCEDURE — 96376 TX/PRO/DX INJ SAME DRUG ADON: CPT

## 2024-03-27 PROCEDURE — 85025 COMPLETE CBC W/AUTO DIFF WBC: CPT | Performed by: INTERNAL MEDICINE

## 2024-03-27 RX ORDER — PROCHLORPERAZINE EDISYLATE 5 MG/ML
5 INJECTION INTRAMUSCULAR; INTRAVENOUS EVERY 6 HOURS PRN
Status: DISCONTINUED | OUTPATIENT
Start: 2024-03-27 | End: 2024-03-28 | Stop reason: HOSPADM

## 2024-03-27 RX ORDER — CHOLESTYRAMINE 4 G/9G
1 POWDER, FOR SUSPENSION ORAL EVERY 12 HOURS SCHEDULED
Status: DISCONTINUED | OUTPATIENT
Start: 2024-03-27 | End: 2024-03-28 | Stop reason: HOSPADM

## 2024-03-27 RX ORDER — FAMOTIDINE 20 MG/1
20 TABLET, FILM COATED ORAL 2 TIMES DAILY PRN
Status: DISCONTINUED | OUTPATIENT
Start: 2024-03-27 | End: 2024-03-28 | Stop reason: HOSPADM

## 2024-03-27 RX ORDER — ONDANSETRON 2 MG/ML
4 INJECTION INTRAMUSCULAR; INTRAVENOUS EVERY 4 HOURS PRN
Status: DISCONTINUED | OUTPATIENT
Start: 2024-03-27 | End: 2024-03-28 | Stop reason: HOSPADM

## 2024-03-27 RX ADMIN — CEFTRIAXONE 2000 MG: 2 INJECTION, POWDER, FOR SOLUTION INTRAMUSCULAR; INTRAVENOUS at 22:23

## 2024-03-27 RX ADMIN — ONDANSETRON 4 MG: 2 INJECTION INTRAMUSCULAR; INTRAVENOUS at 10:17

## 2024-03-27 RX ADMIN — METRONIDAZOLE 500 MG: 500 INJECTION, SOLUTION INTRAVENOUS at 15:11

## 2024-03-27 RX ADMIN — METRONIDAZOLE 500 MG: 500 INJECTION, SOLUTION INTRAVENOUS at 23:20

## 2024-03-27 RX ADMIN — POTASSIUM CHLORIDE AND SODIUM CHLORIDE 100 ML/HR: 900; 150 INJECTION, SOLUTION INTRAVENOUS at 17:43

## 2024-03-27 RX ADMIN — POTASSIUM CHLORIDE AND SODIUM CHLORIDE 100 ML/HR: 900; 150 INJECTION, SOLUTION INTRAVENOUS at 06:31

## 2024-03-27 RX ADMIN — METRONIDAZOLE 500 MG: 500 INJECTION, SOLUTION INTRAVENOUS at 06:31

## 2024-03-27 RX ADMIN — CEFTRIAXONE 2000 MG: 2 INJECTION, POWDER, FOR SOLUTION INTRAMUSCULAR; INTRAVENOUS at 00:35

## 2024-03-27 RX ADMIN — CHOLESTYRAMINE 1 PACKET: 4 POWDER, FOR SUSPENSION ORAL at 21:54

## 2024-03-27 RX ADMIN — CETIRIZINE HYDROCHLORIDE 10 MG: 10 TABLET ORAL at 00:00

## 2024-03-27 RX ADMIN — CETIRIZINE HYDROCHLORIDE 10 MG: 10 TABLET ORAL at 20:54

## 2024-03-27 RX ADMIN — RIVAROXABAN 20 MG: 20 TABLET, FILM COATED ORAL at 00:00

## 2024-03-27 RX ADMIN — LETROZOLE 2.5 MG: 2.5 TABLET ORAL at 20:54

## 2024-03-27 RX ADMIN — RIVAROXABAN 20 MG: 20 TABLET, FILM COATED ORAL at 17:42

## 2024-03-27 RX ADMIN — ONDANSETRON 4 MG: 2 INJECTION INTRAMUSCULAR; INTRAVENOUS at 15:11

## 2024-03-27 RX ADMIN — LEVOTHYROXINE SODIUM 112 MCG: 112 TABLET ORAL at 06:31

## 2024-03-27 NOTE — PROGRESS NOTES
[facility] Medicine Services  PROGRESS NOTE    Patient Name: Iris Yee  : 1945  MRN: [id number]    Date of Admission: 3/26/2024  Primary Care Physician: Sri Conner MD    Subjective   Subjective     CC:  Follow-up for recent nausea and diarrhea    Subjective: Patient feels little better today.  She is pleased with her progress.    Review of Systems  No current fevers or chills  No current shortness of breath or cough  No current chest pain or palpitations       Objective   Objective     Vital Signs:   Temp:  [97 °F (36.1 °C)-97.5 °F (36.4 °C)] 97.2 °F (36.2 °C)  Heart Rate:  [63-72] 65  Resp:  [15-18] 18  BP: (130-150)/(81-85) 130/81        Physical Exam:  Constitutional:Awake, alert  HENT: NCAT, mucous membranes moist, neck supple  Respiratory: No cough or wheezes, normal respirations, nonlabored breathing   Cardiovascular: Pulse rate is normal, palpable radial pulses  Gastrointestinal:  soft, nontender, nondistended  Musculoskeletal: Obese otherwise normal musculature for age, no lower extremity edema, BMI 36  Psychiatric: Appropriate affect, cooperative, conversational  Neurologic: No slurred speech or facial droop, follows commands  Skin: No rashes or jaundice, warm      Results Reviewed:  Results from last 7 days   Lab Units 24  0626 24  1136   WBC 10*3/mm3 4.13 5.42   HEMOGLOBIN g/dL 11.7* 14.4   HEMATOCRIT % 35.8 42.5   PLATELETS 10*3/mm3 173 209   INR   --  1.53*     Results from last 7 days   Lab Units 24  0626 24  1355 24  1136   SODIUM mmol/L 140  --  138   POTASSIUM mmol/L 4.4  --  4.0   CHLORIDE mmol/L 109*  --  104   CO2 mmol/L 25.1  --  24.4   BUN mg/dL 13  --  12   CREATININE mg/dL 0.83  --  1.05*   GLUCOSE mg/dL 92  --  99   CALCIUM mg/dL 8.3*  --  9.6   ALK PHOS U/L  --   --  113   ALT (SGPT) U/L  --   --  20   AST (SGOT) U/L  --   --  17   HSTROP T ng/L  --  9 10     Estimated Creatinine Clearance: 67.9 mL/min (by C-G formula based on SCr of  0.83 mg/dL).    Microbiology Results Abnormal       Procedure Component Value - Date/Time    Urine Culture - Urine, Urine, Clean Catch [[id number]] Collected: 03/26/24 1209    Lab Status: Final result Specimen: Urine, Clean Catch Updated: 03/27/24 0950     Urine Culture 25,000 CFU/mL Mixed Jonna Isolated    Narrative:      Specimen contains mixed organisms of questionable pathogenicity suggestive of contamination. If symptoms persist, suggest recollection.  Colonization of the urinary tract without infection is common. Treatment is discouraged unless the patient is symptomatic, pregnant, or undergoing an invasive urologic procedure.    Clostridioides difficile Toxin - Stool, Per Rectum [[id number]]  (Normal) Collected: 03/26/24 2134    Lab Status: Final result Specimen: Stool from Per Rectum Updated: 03/27/24 0926    Narrative:      The following orders were created for panel order Clostridioides difficile Toxin - Stool, Per Rectum.  Procedure                               Abnormality         Status                     ---------                               -----------         ------                     Clostridioides difficile...[[id number]]  Normal              Final result                 Please view results for these tests on the individual orders.    Clostridioides difficile Toxin, PCR - Stool, Per Rectum [[id number]]  (Normal) Collected: 03/26/24 2134    Lab Status: Final result Specimen: Stool from Per Rectum Updated: 03/27/24 0926     Toxigenic C. difficile by PCR Negative    Narrative:      The result indicates the absence of toxigenic C. difficile from stool specimen.     Gastrointestinal Panel, PCR - Stool, Per Rectum [[id number]]  (Normal) Collected: 03/26/24 2134    Lab Status: Final result Specimen: Stool from Per Rectum Updated: 03/26/24 2259     Campylobacter Not Detected     Plesiomonas shigelloides Not Detected     Salmonella Not Detected     Vibrio Not Detected     Vibrio cholerae Not Detected      Yersinia enterocolitica Not Detected     Enteroaggregative E. coli (EAEC) Not Detected     Enteropathogenic E. coli (EPEC) Not Detected     Enterotoxigenic E. coli (ETEC) lt/st Not Detected     Shiga-like toxin-producing E. coli (STEC) stx1/stx2 Not Detected     Shigella/Enteroinvasive E. coli (EIEC) Not Detected     Cryptosporidium Not Detected     Cyclospora cayetanensis Not Detected     Entamoeba histolytica Not Detected     Giardia lamblia Not Detected     Adenovirus F40/41 Not Detected     Astrovirus Not Detected     Norovirus GI/GII Not Detected     Rotavirus A Not Detected     Sapovirus (I, II, IV or V) Not Detected    Narrative:      If Aeromonas, Staphylococcus aureus or Bacillus cereus are suspected, please order [id number]: Stool Culture, Aeromonas, S aureus, B Cereus.            Imaging Results (Last 24 Hours)       Procedure Component Value Units Date/Time    CT Abdomen Pelvis With Contrast [[id number]] Collected: 03/26/24 1811     Updated: 03/26/24 1824    Narrative:      ABDOMEN AND PELVIS CT WITH CONTRAST     HISTORY: Nausea, vomiting fatigue and persistent diarrhea.     TECHNIQUE: Abdomen and pelvis CT was performed using 85 mL Isovue-300 IV  contrast and is correlated with a CT from 03/20/2024.     FINDINGS: The visualized lung bases are clear. Lower mediastinal  structures appear normal. Prior cholecystectomy with appropriate mild  prominence of the intrahepatic and extrahepatic biliary tree. Parenchyma  of the liver, pancreas, adrenals, and spleen appears normal. Multiple  parapelvic renal cysts bilaterally as well as renal parenchymal cysts  are unchanged. No enhancing renal lesion. No hydronephrosis,  hydroureter, or perinephric stranding. The urinary bladder appears  normal. Uterus and ovaries appear normal.     Large duodenal diverticulum is noted; these are usually of no  significance.     The remainder of the small bowel appears normal. The appendix is not  identified, but there is no  inflammation around the cecum. The ascending  and the transverse colon appear normal. There is diverticulosis along  the descending and the sigmoid colon with mild fatty inflammatory change  around several diverticula along the posterior aspect of the descending  colon. No colon wall thickening, abscess, or free air is present. The  rectum appears normal.     Abdominal aorta maintains normal caliber. There is scattered  atheromatous vascular calcification. No mesenteric vascular lesion.  Degenerative change in the spine without compression deformity.     Previous umbilical hernia repair.       Impression:      Diverticulosis along the descending and the sigmoid colon  with new, mild but convincing inflammatory change around diverticula  along the posterior aspect of the descending colon, consistent with  acute, uncomplicated diverticulitis.     I discussed the findings with Dr. Lowe at around 6 p.m.     Radiation dose reduction techniques were utilized, including automated  exposure control and exposure modulation based on body size.        This report was finalized on 3/26/2024 6:21 PM by Dr. Gerard Arzola M.D on Workstation: [id number]               Results for orders placed during the hospital encounter of 10/09/17    Adult Transthoracic Echo Complete W/ Cont if Necessary Per Protocol    Interpretation Summary  · Left ventricular systolic function is normal. Calculated EF = 63.7%. Estimated EF was in agreement with the calculated EF. Normal left ventricular cavity size and wall thickness noted. All left ventricular wall segments contract normally.  · Left ventricular diastolic function is normal.  · No valvular stenosis or insufficiency.      I have reviewed the medications:  Scheduled Meds:cefTRIAXone, 2,000 mg, Intravenous, Q24H  cetirizine, 10 mg, Oral, Nightly  cholestyramine, 1 packet, Oral, Q12H  letrozole, 2.5 mg, Oral, Nightly  levothyroxine, 112 mcg, Oral, Q AM  metroNIDAZOLE, 500 mg, Intravenous,  Q8H  rivaroxaban, 20 mg, Oral, Daily With Dinner      Continuous Infusions:sodium chloride 0.9 % with KCl 20 mEq, 100 mL/hr, Last Rate: 100 mL/hr (03/27/24 0631)      PRN Meds:.  acetaminophen **OR** acetaminophen **OR** acetaminophen    senna-docusate sodium **AND** polyethylene glycol **AND** bisacodyl **AND** bisacodyl    ondansetron    [COMPLETED] Insert Peripheral IV **AND** sodium chloride    Assessment & Plan   Assessment & Plan     Active Hospital Problems    Diagnosis  POA    **Diarrhea [R19.7]  Yes    Diverticulitis [K57.92]  Yes    Class 2 severe obesity with serious comorbidity in adult [E66.01]  Yes    Nausea [R11.0]  Yes    Long term current use of anticoagulant therapy [Z79.01]  Not Applicable    Abdominal pain [R10.9]  Yes    Malignant neoplasm of upper-outer quadrant of right breast in female, estrogen receptor positive [C50.411, Z17.0]  Not Applicable    Gastroesophageal reflux disease [K21.9]  Yes    History of pulmonary infarction [Z87.09]  Not Applicable    Acquired hypothyroidism [E03.9]  Yes      Resolved Hospital Problems   No resolved problems to display.        Brief Hospital Course to date:  Iris Yee is a 78 y.o. female     Diverticulitis  Abdominal pain, nausea, diarrhea  Hypothyroidism  Breast cancer on letrozole  GERD  Hypothyroid  Obese  Dehydration, resolved  History of pulmonary embolism on chronic anticoagulation    Discussion/plan for today:  All medical problems are new under my management today.  Labs, vitals, and imaging reviewed.  CT scan images show sigmoid diverticulitis  Broad-spectrum antibiotic coverage for diverticulitis with ceftriaxone and metronidazole.  Stool PCR and C. difficile toxin studies all returned negative.  Patient now adequately hydrated.  Stop IV fluid after few more hours.  Plan to discuss with gastroenterology.  Cholestyramine added per their recommendations  Continue Synthroid for hypothyroid  Continue letrozole.  Continue chronic  anticoagulation for history of PE, no bleeding reported  Cetirizine for allergies  Patient will need to follow-up closely after discharge.  Symptomatic treat supportive care for GI issues.  Increase frequency of Zofran for nausea.    DVT Prophylaxis: Anticoagulated      Disposition: Home in 1 to 2 days    CODE STATUS:   Code Status and Medical Interventions:   Ordered at: 03/26/24 1806     Code Status (Patient has no pulse and is not breathing):    CPR (Attempt to Resuscitate)     Medical Interventions (Patient has pulse or is breathing):    Full Support       Jeovany Juarez MD  03/27/24

## 2024-03-27 NOTE — PROGRESS NOTES
Discharge Planning Assessment  [facility]     Patient Name: Iris Yee  MRN: [id number]  Today's Date: 3/27/2024    Admit Date: 3/26/2024    Plan: Home   Discharge Needs Assessment       Row Name 03/27/24 1130       Living Environment    People in Home alone    Current Living Arrangements home    Potentially Unsafe Housing Conditions none    Primary Care Provided by self    Provides Primary Care For no one    Family Caregiver if Needed child(ayo), adult    Family Caregiver Names Nel shipley and Slime Palmer daughter    Quality of Family Relationships supportive;helpful;involved    Able to Return to Prior Arrangements yes       Resource/Environmental Concerns    Resource/Environmental Concerns none       Transition Planning    Patient/Family Anticipates Transition to home    Patient/Family Anticipated Services at Transition none    Transportation Anticipated car, drives self       Discharge Needs Assessment    Equipment Currently Used at Home none    Concerns to be Addressed denies needs/concerns at this time;no discharge needs identified                   Discharge Plan       Row Name 03/27/24 1132       Plan    Plan Home    Plan Comments Spoke with pt and her daughter at bedside to screen for DCP.  Pt confirmed that she does reside at [facility] and plans to return home at [place].   Pt reports that she is totally independent with self care and mobility.  Pt confirmed PCP is Sri Conner.   Pt also confirmed she has no issues getting or affording her home meds.  Pt reports that her daughters can assist her if needed.  Informed pt that CCP would follow if any DC needs do arise.                  Continued Care and Services - Admitted Since 3/26/2024    No active coordination exists for this encounter.          Demographic Summary       Row Name 03/27/24 1129       General Information    Admission Type observation    Arrived From home    Referral Source admission list    Reason for Consult  discharge planning    Preferred Language English                   Functional Status       Row Name 03/27/24 1129       Functional Status    Usual Activity Tolerance good    Current Activity Tolerance good       Functional Status, IADL    Medications independent    Meal Preparation independent    Housekeeping independent    Laundry independent    Shopping independent       Mental Status    General Appearance WDL WDL       Mental Status Summary    Recent Changes in Mental Status/Cognitive Functioning no changes                   Psychosocial    No documentation.                  Abuse/Neglect    No documentation.                  Legal    No documentation.                  Substance Abuse    No documentation.                  Patient Forms    No documentation.                     CLAUDIA Aguiar

## 2024-03-27 NOTE — H&P
HISTORY AND PHYSICAL   [facility]        Date of Admission: 3/26/2024  Patient Identification:  Name: Iris Yee  Age: 78 y.o.  Sex: female  :  1945  MRN: [id number]                     Primary Care Physician: Sri Conner MD    Chief Complaint:  78 year old female presented to the emergency room with diarrhea for the last three weeks; she has taken otc meds with little relief; she has had some abdominal pain; diarrhea is worse after she eats; no fever or chills; she has been tested for c diff and was negative; she denies sick contacts3    History of Present Illness:   As above    Past Medical History:  Past Medical History:   Diagnosis Date    Arthritis     Arthritis of back     Arthritis of neck     Breast, fat necrosis 2023    Bulging lumbar disc     L 3-4    Bursitis of hip     Clotting disorder     COVID-19     Encounter for screening mammogram for malignant neoplasm of breast 2018    Fracture, fibula     Fracture, tibia and fibula     Stress fracture    Frozen shoulder     GERD (gastroesophageal reflux disease)     Hip arthrosis     History of kidney stones     History of MRSA infection     MICHEL EARS    History of transfusion     no reaction    Hx of blood clots     Hypothyroid     Kidney stones     Knee swelling     Left knee pain     Lumbosacral disc disease     Malignant neoplasm of upper-outer quadrant of right breast in female, estrogen receptor positive 2021    Neck strain     Neuroma of foot     Right foot    Ovarian cyst     Periarthritis of shoulder     PONV (postoperative nausea and vomiting)     states gets really sick lasted for 4 days aafter surgery the last time     Pulmonary embolism, bilateral     Scoliosis     Stress fracture     Tear of meniscus of knee     Thrombopenia     Wears glasses      Past Surgical History:  Past Surgical History:   Procedure Laterality Date    APPENDECTOMY      AUGMENTATION MAMMAPLASTY       BREAST AUGMENTATION      years ago has implants    BREAST EXCISIONAL BIOPSY Right 10/2021    BREAST EXCISIONAL BIOPSY Left     BREAST LUMPECTOMY Right 01/03/2022    Procedure: Right needle-localized, bracketed, partial mastectomy;  Surgeon: Adela Cardoso MD;  Location: [facility] MAIN OR;  Service: General;  Laterality: Right;    BREAST SURGERY Bilateral 01/03/2022    Procedure: RIGHT ONCOPLASTIC REDUCTION AND LEFT REDUCTION FOR SYMMETRY, BILATERAL IMPLANT REMOVAL AND  BILATERAL CAPSULECTOMIES;  Surgeon: Delbert Butcher MD;  Location: [facility] MAIN OR;  Service: Plastics;  Laterality: Bilateral;    CHOLECYSTECTOMY      COLONOSCOPY N/A 12/14/2018    Procedure: COLONOSCOPY into cecum/termial ileum with polypectomy;  Surgeon: Jose Daniel Dooley MD;  Location: [facility] ENDOSCOPY;  Service: Gastroenterology    ENDOSCOPY N/A 12/14/2018    Procedure: ESOPHAGOGASTRODUODENOSCOPY with biopsy;  Surgeon: Jose Daniel Dooley MD;  Location: [facility] ENDOSCOPY;  Service: Gastroenterology    EXPLORATORY LAPAROTOMY      bleeding from ruptured ovarian cyst    HERNIA REPAIR      umbilical x 2    OVARIAN CYST DRAINAGE/EXCISION      ruptured ovarian cyst with vblood accumulatine in abdomin    TONSILLECTOMY      TOTAL KNEE ARTHROPLASTY Left 06/24/2021    Procedure: TOTAL KNEE ARTHROPLASTY;  Surgeon: Clarence Suarez MD;  Location: [facility] MAIN OR;  Service: Orthopedics;  Laterality: Left;    UMBILICAL HERNIA REPAIR      x2      Home Meds:  Medications Prior to Admission   Medication Sig Dispense Refill Last Dose    acetaminophen (TYLENOL) 325 MG tablet Take 2 tablets by mouth Every 4 (Four) Hours As Needed for Mild Pain . 60 tablet 0 Past Month    calcium carbonate (TUMS) 500 MG chewable tablet Chew 1 tablet As Needed for Indigestion or Heartburn.   Past Week    cetirizine (zyrTEC) 10 MG tablet Take 1 tablet by mouth Every Night.   3/25/2024    cholecalciferol (VITAMIN D3) 25 MCG (1000 UT) tablet Take 1 tablet by mouth Every  Evening.   3/25/2024    letrozole (FEMARA) 2.5 MG tablet TAKE 1 TABLET DAILY 30 tablet 5 3/25/2024    ondansetron (ZOFRAN) 4 MG tablet Take 1-2 tablets by mouth Every 8 (Eight) Hours As Needed for Nausea or Vomiting.   Past Month    SYNTHROID 112 MCG tablet Take 1 tablet by mouth Every Morning.   3/26/2024    Xarelto 20 MG tablet Take 1 tablet by mouth Daily With Dinner.   3/25/2024    nitrofurantoin, macrocrystal-monohydrate, (MACROBID) 100 MG capsule Take 1 capsule by mouth Every 12 (Twelve) Hours.          Allergies:  Allergies   Allergen Reactions    Penicillins Other (See Comments)     Passes out    Latex Rash    Sulfa Antibiotics Nausea And Vomiting    Wound Dressing Adhesive Rash     Immunizations:  Immunization History   Administered Date(s) Administered    COVID-19 (PFIZER) Purple Cap Monovalent 2021, 2021    Influenza, Unspecified 10/07/2019, 2022    Shingrix 2022     Social History:   Social History     Social History Narrative    Not on file     Social History     Socioeconomic History    Marital status:      Spouse name: SANDRA    Number of children: 3    Years of education: COLLEGE   Tobacco Use    Smoking status: Former     Current packs/day: 0.00     Average packs/day: 1 pack/day for 12.7 years (12.7 ttl pk-yrs)     Types: Cigarettes     Start date: 1964     Quit date: 1976     Years since quittin.5    Smokeless tobacco: Never   Vaping Use    Vaping status: Never Used   Substance and Sexual Activity    Alcohol use: Yes     Alcohol/week: 3.0 standard drinks of alcohol     Types: 3 Glasses of wine per week    Drug use: No    Sexual activity: Not Currently     Partners: Male     Birth control/protection: Inserts, Post-menopausal, None       Family History:  Family History   Problem Relation Age of Onset    Hypertension Mother     Osteoporosis Mother     Scoliosis Mother     Arthritis Mother     Hypertension Father     Arthritis Father     Stroke Paternal Aunt  "    Cervical cancer Maternal Grandmother 68    Prostate cancer Brother 74    Hypertension Brother     Hyperlipidemia Brother     Alzheimer's disease Brother     Malig Hyperthermia Neg Hx         Review of Systems  See history of present illness and past medical history.  Patient denies headache, dizziness, syncope, falls, trauma, change in vision, change in hearing, change in taste, changes in weight, changes in appetite, focal weakness, numbness, or paresthesia.  Patient denies chest pain, palpitations, dyspnea, orthopnea, PND, cough, sinus pressure, rhinorrhea, epistaxis, hemoptysis, nausea, vomiting,hematemesis,  constipation or hematochezia.  Denies cold or heat intolerance, polydipsia, polyuria, polyphagia. Denies hematuria, pyuria, dysuria, hesitancy, frequency or urgency. Denies consumption of raw and under cooked meats foods or change in water source.       Objective:  T Max 24 hrs: Temp (24hrs), Av.5 °F (36.4 °C), Min:97 °F (36.1 °C), Max:98.6 °F (37 °C)    Vitals Ranges:   Temp:  [97 °F (36.1 °C)-98.6 °F (37 °C)] 97 °F (36.1 °C)  Heart Rate:  [68-96] 68  Resp:  [16-18] 16  BP: (123-150)/(75-91) 133/83      Exam:  /83 (BP Location: Left arm, Patient Position: Lying)   Pulse 68   Temp 97 °F (36.1 °C) (Oral)   Resp 16   Ht 165.1 cm (65\")   Wt 101 kg (221 lb 12.5 oz)   LMP  (LMP Unknown)   SpO2 95%   BMI 36.91 kg/m²     General Appearance:    Alert, cooperative, no distress, appears stated age   Head:    Normocephalic, without obvious abnormality, atraumatic   Eyes:    PERRL, conjunctivae/corneas clear, EOM's intact, both eyes   Ears:    Normal external ear canals, both ears   Nose:   Nares normal, septum midline, mucosa normal, no drainage    or sinus tenderness   Throat:   Lips, mucosa, and tongue normal   Neck:   Supple, symmetrical, trachea midline, no adenopathy;     thyroid:  no enlargement/tenderness/nodules; no carotid    bruit or JVD   Back:     Symmetric, no curvature, ROM normal, " no CVA tenderness   Lungs:     Decreased breath sounds bilaterally, respirations unlabored   Chest Wall:    No tenderness or deformity    Heart:    Regular rate and rhythm, S1 and S2 normal, no murmur, rub   or gallop   Abdomen:     Soft, nontender, bowel sounds active all four quadrants,     no masses, no hepatomegaly, no splenomegaly   Extremities:   Extremities normal, atraumatic, no cyanosis or edema                       .    Data Review:  Labs in chart were reviewed.  WBC   Date Value Ref Range Status   03/26/2024 5.42 3.40 - 10.80 10*3/mm3 Final     Hemoglobin   Date Value Ref Range Status   03/26/2024 14.4 12.0 - 15.9 g/dL Final     Hematocrit   Date Value Ref Range Status   03/26/2024 42.5 34.0 - 46.6 % Final     Platelets   Date Value Ref Range Status   03/26/2024 209 140 - 450 10*3/mm3 Final     Sodium   Date Value Ref Range Status   03/26/2024 138 136 - 145 mmol/L Final     Potassium   Date Value Ref Range Status   03/26/2024 4.0 3.5 - 5.2 mmol/L Final     Chloride   Date Value Ref Range Status   03/26/2024 104 98 - 107 mmol/L Final     CO2   Date Value Ref Range Status   03/26/2024 24.4 22.0 - 29.0 mmol/L Final     BUN   Date Value Ref Range Status   03/26/2024 12 8 - 23 mg/dL Final     Creatinine   Date Value Ref Range Status   03/26/2024 1.05 (H) 0.57 - 1.00 mg/dL Final     Glucose   Date Value Ref Range Status   03/26/2024 99 65 - 99 mg/dL Final     Calcium   Date Value Ref Range Status   03/26/2024 9.6 8.6 - 10.5 mg/dL Final     Magnesium   Date Value Ref Range Status   03/26/2024 2.2 1.6 - 2.4 mg/dL Final                Imaging Results (All)       Procedure Component Value Units Date/Time    CT Abdomen Pelvis With Contrast [[id number]] Collected: 03/26/24 1811     Updated: 03/26/24 1824    Narrative:      ABDOMEN AND PELVIS CT WITH CONTRAST     HISTORY: Nausea, vomiting fatigue and persistent diarrhea.     TECHNIQUE: Abdomen and pelvis CT was performed using 85 mL Isovue-300 IV  contrast and is  correlated with a CT from 03/20/2024.     FINDINGS: The visualized lung bases are clear. Lower mediastinal  structures appear normal. Prior cholecystectomy with appropriate mild  prominence of the intrahepatic and extrahepatic biliary tree. Parenchyma  of the liver, pancreas, adrenals, and spleen appears normal. Multiple  parapelvic renal cysts bilaterally as well as renal parenchymal cysts  are unchanged. No enhancing renal lesion. No hydronephrosis,  hydroureter, or perinephric stranding. The urinary bladder appears  normal. Uterus and ovaries appear normal.     Large duodenal diverticulum is noted; these are usually of no  significance.     The remainder of the small bowel appears normal. The appendix is not  identified, but there is no inflammation around the cecum. The ascending  and the transverse colon appear normal. There is diverticulosis along  the descending and the sigmoid colon with mild fatty inflammatory change  around several diverticula along the posterior aspect of the descending  colon. No colon wall thickening, abscess, or free air is present. The  rectum appears normal.     Abdominal aorta maintains normal caliber. There is scattered  atheromatous vascular calcification. No mesenteric vascular lesion.  Degenerative change in the spine without compression deformity.     Previous umbilical hernia repair.       Impression:      Diverticulosis along the descending and the sigmoid colon  with new, mild but convincing inflammatory change around diverticula  along the posterior aspect of the descending colon, consistent with  acute, uncomplicated diverticulitis.     I discussed the findings with Dr. Lowe at around 6 p.m.     Radiation dose reduction techniques were utilized, including automated  exposure control and exposure modulation based on body size.        This report was finalized on 3/26/2024 6:21 PM by Dr. Gerard Arzola M.D on Workstation: [id number]                 Assessment:  Active  Hospital Problems    Diagnosis  POA    **Diarrhea [R19.7]  Yes      Resolved Hospital Problems   No resolved problems to display.   Diverticulitis  Hypothyroidism  Abdominal pain      Plan:  Antibiotics  Ask gi to see her  Trend labs  Fluids  Dw patient, ed provider    Maggy Hope MD  3/26/2024  22:33 EDT

## 2024-03-27 NOTE — PLAN OF CARE
Goal Outcome Evaluation:  Plan of Care Reviewed With: patient        Progress: no change  Outcome Evaluation: VSS. GI consult called. Stool sample obtained. Blood cultures obtained. IV abx begun. IVF infusing. PO pain medication for headache with some relief.

## 2024-03-27 NOTE — CONSULTS
[facility]  Initial Inpatient Consult Note    Referring Provider: Dr. Juarez    Reason for Consultation: Nausea, diarrhea    Subjective     History of present illness:    78 y.o. female, a patient of Dr. Dooley, w/ PMHx of GERD, hx o breast cancer, on letrozole, Hx of DVT, PE, on xarelto, hypothyrodism, presented to the ED with with nausea that has been ongoing for last 3-4 weeks and diarrhea that was initially intermittent, but now constant for the last 6 days. Minimal to no abdominal pain. She is unsure what seems to trigger her symptoms, has noticed some correlation with food. No recent abx use, recent travel, sick contacts, rectal bleeding, melena, new medications. She tried imodium Otc that helped marginally.     Colonoscopy 12/2028:  Non-thrombosed external hemorrhoids found on perianal exam. - Diverticulosis in the sigmoid colon and in the descending colon. - Two 4 to 6 mm polyps in the distal descending colon and in the proximal transverse colon, removed piecemeal using a cold biopsy forceps. Resected and retrieved. - The examined portion of the ileum was normal. - Internal hemorrhoids.    EGD:  Z-line irregular, 37 cm from the incisors. Biopsied. - A few non-bleeding erosions in the lower third of the esophagus. - Small hiatal hernia. - Gastritis. Biopsied. - Normal duodenal bulb and second portion of the duodenum. Biopsied    Past Medical History:  Past Medical History:   Diagnosis Date    Arthritis     Arthritis of back 2015    Arthritis of neck     Breast, fat necrosis 12/5/2023    Bulging lumbar disc     L 3-4    Bursitis of hip     Clotting disorder     COVID-19     Encounter for screening mammogram for malignant neoplasm of breast 11/13/2018    Fracture, fibula     Fracture, tibia and fibula     Stress fracture    Frozen shoulder     GERD (gastroesophageal reflux disease)     Hip arthrosis     History of kidney stones     History of MRSA infection 2020    MICHEL EARS     History of transfusion 1971    no reaction    Hx of blood clots 2011    Hypothyroid     Kidney stones     Knee swelling     Left knee pain     Lumbosacral disc disease     Malignant neoplasm of upper-outer quadrant of right breast in female, estrogen receptor positive 12/09/2021    Neck strain     Neuroma of foot 2000    Right foot    Ovarian cyst     Periarthritis of shoulder     PONV (postoperative nausea and vomiting)     states gets really sick lasted for 4 days aafter surgery the last time     Pulmonary embolism, bilateral     Scoliosis 2020    Stress fracture     Tear of meniscus of knee     Thrombopenia     Wears glasses      Past Surgical History:  Past Surgical History:   Procedure Laterality Date    APPENDECTOMY      AUGMENTATION MAMMAPLASTY  1976    BREAST AUGMENTATION      years ago has implants    BREAST EXCISIONAL BIOPSY Right 10/2021    BREAST EXCISIONAL BIOPSY Left     BREAST LUMPECTOMY Right 01/03/2022    Procedure: Right needle-localized, bracketed, partial mastectomy;  Surgeon: Adela Cardoso MD;  Location: [facility] OR;  Service: General;  Laterality: Right;    BREAST SURGERY Bilateral 01/03/2022    Procedure: RIGHT ONCOPLASTIC REDUCTION AND LEFT REDUCTION FOR SYMMETRY, BILATERAL IMPLANT REMOVAL AND  BILATERAL CAPSULECTOMIES;  Surgeon: Delbert Butcher MD;  Location: [facility] MAIN OR;  Service: Plastics;  Laterality: Bilateral;    CHOLECYSTECTOMY      COLONOSCOPY N/A 12/14/2018    Procedure: COLONOSCOPY into cecum/termial ileum with polypectomy;  Surgeon: Jose Daniel Dooley MD;  Location: [facility] ENDOSCOPY;  Service: Gastroenterology    ENDOSCOPY N/A 12/14/2018    Procedure: ESOPHAGOGASTRODUODENOSCOPY with biopsy;  Surgeon: Jose Daniel Dooley MD;  Location: [facility] ENDOSCOPY;  Service: Gastroenterology    EXPLORATORY LAPAROTOMY      bleeding from ruptured ovarian cyst    HERNIA REPAIR      umbilical x 2    OVARIAN CYST DRAINAGE/EXCISION      ruptured ovarian cyst with vblood  accumulatine in abdomin    TONSILLECTOMY      TOTAL KNEE ARTHROPLASTY Left 2021    Procedure: TOTAL KNEE ARTHROPLASTY;  Surgeon: Clarence Suarez MD;  Location: [facility];  Service: Orthopedics;  Laterality: Left;    UMBILICAL HERNIA REPAIR      x2      Social History:   Social History     Tobacco Use    Smoking status: Former     Current packs/day: 0.00     Average packs/day: 1 pack/day for 12.7 years (12.7 ttl pk-yrs)     Types: Cigarettes     Start date: 1964     Quit date: 1976     Years since quittin.6    Smokeless tobacco: Never   Substance Use Topics    Alcohol use: Yes     Alcohol/week: 3.0 standard drinks of alcohol     Types: 3 Glasses of wine per week      Family History:  Family History   Problem Relation Age of Onset    Hypertension Mother     Osteoporosis Mother     Scoliosis Mother     Arthritis Mother     Hypertension Father     Arthritis Father     Stroke Paternal Aunt     Cervical cancer Maternal Grandmother 68    Prostate cancer Brother 74    Hypertension Brother     Hyperlipidemia Brother     Alzheimer's disease Brother     Malig Hyperthermia Neg Hx        Home Meds:  Medications Prior to Admission   Medication Sig Dispense Refill Last Dose    acetaminophen (TYLENOL) 325 MG tablet Take 2 tablets by mouth Every 4 (Four) Hours As Needed for Mild Pain . 60 tablet 0 Past Month    calcium carbonate (TUMS) 500 MG chewable tablet Chew 1 tablet As Needed for Indigestion or Heartburn.   Past Week    cetirizine (zyrTEC) 10 MG tablet Take 1 tablet by mouth Every Night.   3/25/2024    cholecalciferol (VITAMIN D3) 25 MCG (1000 UT) tablet Take 1 tablet by mouth Every Evening.   3/25/2024    letrozole (FEMARA) 2.5 MG tablet TAKE 1 TABLET DAILY 30 tablet 5 3/25/2024    ondansetron (ZOFRAN) 4 MG tablet Take 1-2 tablets by mouth Every 8 (Eight) Hours As Needed for Nausea or Vomiting.   Past Month    SYNTHROID 112 MCG tablet Take 1 tablet by mouth Every Morning.   3/26/2024    Xarelto 20 MG  tablet Take 1 tablet by mouth Daily With Dinner.   3/25/2024    nitrofurantoin, macrocrystal-monohydrate, (MACROBID) 100 MG capsule Take 1 capsule by mouth Every 12 (Twelve) Hours.        Current Meds:   cefTRIAXone, 2,000 mg, Intravenous, Q24H  cetirizine, 10 mg, Oral, Nightly  letrozole, 2.5 mg, Oral, Nightly  levothyroxine, 112 mcg, Oral, Q AM  metroNIDAZOLE, 500 mg, Intravenous, Q8H  rivaroxaban, 20 mg, Oral, Daily With Dinner      Allergies:  Allergies   Allergen Reactions    Penicillins Other (See Comments)     Passes out    Latex Rash    Sulfa Antibiotics Nausea And Vomiting    Wound Dressing Adhesive Rash       Objective     Vital Signs  Temp:  [97 °F (36.1 °C)-97.5 °F (36.4 °C)] 97.2 °F (36.2 °C)  Heart Rate:  [63-76] 65  Resp:  [15-18] 18  BP: (130-150)/(75-85) 130/81  Physical Exam:  General Appearance:     Alert, cooperative, in no acute distress   Abdomen:     Normal bowel sounds, no masses, no organomegaly, soft     nontender, nondistended, no guarding, no rebound                 tenderness   Rectal:     Deferred       Results Review:   I reviewed the patient's new clinical results.    Results from last 7 days   Lab Units 03/27/24  0626 03/26/24  1136   WBC 10*3/mm3 4.13 5.42   HEMOGLOBIN g/dL 11.7* 14.4   HEMATOCRIT % 35.8 42.5   PLATELETS 10*3/mm3 173 209     Results from last 7 days   Lab Units 03/27/24  0626 03/26/24  1136   SODIUM mmol/L 140 138   POTASSIUM mmol/L 4.4 4.0   CHLORIDE mmol/L 109* 104   CO2 mmol/L 25.1 24.4   BUN mg/dL 13 12   CREATININE mg/dL 0.83 1.05*   CALCIUM mg/dL 8.3* 9.6   BILIRUBIN mg/dL  --  0.8   ALK PHOS U/L  --  113   ALT (SGPT) U/L  --  20   AST (SGOT) U/L  --  17   GLUCOSE mg/dL 92 99     Results from last 7 days   Lab Units 03/26/24  1136   INR  1.53*     Lab Results   Lab Value Date/Time    LIPASE 24 03/26/2024 1136    LIPASE 18 12/14/2020 1206       Radiology:  CT Abdomen Pelvis With Contrast   Final Result   Diverticulosis along the descending and the sigmoid colon    with new, mild but convincing inflammatory change around diverticula   along the posterior aspect of the descending colon, consistent with   acute, uncomplicated diverticulitis.       I discussed the findings with Dr. Lowe at around 6 p.m.       Radiation dose reduction techniques were utilized, including automated   exposure control and exposure modulation based on body size.           This report was finalized on 3/26/2024 6:21 PM by Dr. Gerard Arzola M.D on Workstation: [id number]                Assessment:   Diarrhea   Nausea  Acute uncomplicated diverticulitis on imaging- although question if this is colitis given presentation    78 y.o female admitted w/ nausea x 3 weeks and worsening diarrhea for last week or so. CT scan showing uncomplicated descending colon diverticulitis. Stool studies negative for infectious etiology.     Plan:   Agree w/ abx.  Trial of  cholestyramine given constant diarrhea   Diet as tolerated, avoid dairy  Continue IVF, anti-emetics/analgesics     I discussed the patients findings and my recommendations with patient.         Robert Ulrich PA-C  [facility]  [address] [place] [address]  Office: [phone]

## 2024-03-28 VITALS
SYSTOLIC BLOOD PRESSURE: 122 MMHG | HEIGHT: 65 IN | DIASTOLIC BLOOD PRESSURE: 75 MMHG | WEIGHT: 221.78 LBS | BODY MASS INDEX: 36.95 KG/M2 | TEMPERATURE: 97 F | HEART RATE: 60 BPM | RESPIRATION RATE: 16 BRPM | OXYGEN SATURATION: 97 %

## 2024-03-28 PROBLEM — R11.0 NAUSEA: Status: RESOLVED | Noted: 2024-03-27 | Resolved: 2024-03-28

## 2024-03-28 PROBLEM — R10.9 ABDOMINAL PAIN: Status: RESOLVED | Noted: 2024-03-27 | Resolved: 2024-03-28

## 2024-03-28 PROBLEM — R19.7 DIARRHEA: Status: RESOLVED | Noted: 2024-03-26 | Resolved: 2024-03-28

## 2024-03-28 LAB
ANION GAP SERPL CALCULATED.3IONS-SCNC: 7.6 MMOL/L (ref 5–15)
BUN SERPL-MCNC: 10 MG/DL (ref 8–23)
BUN/CREAT SERPL: 11.4 (ref 7–25)
CALCIUM SPEC-SCNC: 8.4 MG/DL (ref 8.6–10.5)
CHLORIDE SERPL-SCNC: 110 MMOL/L (ref 98–107)
CO2 SERPL-SCNC: 23.4 MMOL/L (ref 22–29)
CREAT SERPL-MCNC: 0.88 MG/DL (ref 0.57–1)
DEPRECATED RDW RBC AUTO: 45.3 FL (ref 37–54)
EGFRCR SERPLBLD CKD-EPI 2021: 68.2 ML/MIN/1.73
ERYTHROCYTE [DISTWIDTH] IN BLOOD BY AUTOMATED COUNT: 12.7 % (ref 12.3–15.4)
GLUCOSE SERPL-MCNC: 96 MG/DL (ref 65–99)
HCT VFR BLD AUTO: 34.6 % (ref 34–46.6)
HGB BLD-MCNC: 11.6 G/DL (ref 12–15.9)
MCH RBC QN AUTO: 32.6 PG (ref 26.6–33)
MCHC RBC AUTO-ENTMCNC: 33.5 G/DL (ref 31.5–35.7)
MCV RBC AUTO: 97.2 FL (ref 79–97)
PLATELET # BLD AUTO: 173 10*3/MM3 (ref 140–450)
PMV BLD AUTO: 10.9 FL (ref 6–12)
POTASSIUM SERPL-SCNC: 4.3 MMOL/L (ref 3.5–5.2)
RBC # BLD AUTO: 3.56 10*6/MM3 (ref 3.77–5.28)
SODIUM SERPL-SCNC: 141 MMOL/L (ref 136–145)
WBC NRBC COR # BLD AUTO: 3.49 10*3/MM3 (ref 3.4–10.8)

## 2024-03-28 PROCEDURE — 99214 OFFICE O/P EST MOD 30 MIN: CPT | Performed by: PHYSICIAN ASSISTANT

## 2024-03-28 PROCEDURE — 25010000002 METRONIDAZOLE 500 MG/100ML SOLUTION: Performed by: INTERNAL MEDICINE

## 2024-03-28 PROCEDURE — 80048 BASIC METABOLIC PNL TOTAL CA: CPT | Performed by: INTERNAL MEDICINE

## 2024-03-28 PROCEDURE — G0378 HOSPITAL OBSERVATION PER HR: HCPCS

## 2024-03-28 PROCEDURE — 85027 COMPLETE CBC AUTOMATED: CPT | Performed by: INTERNAL MEDICINE

## 2024-03-28 RX ORDER — CEFPODOXIME PROXETIL 200 MG/1
200 TABLET, FILM COATED ORAL EVERY 12 HOURS
Qty: 10 TABLET | Refills: 0 | Status: SHIPPED | OUTPATIENT
Start: 2024-03-28 | End: 2024-04-02

## 2024-03-28 RX ORDER — FAMOTIDINE 20 MG/1
20 TABLET, FILM COATED ORAL 2 TIMES DAILY PRN
Start: 2024-03-28

## 2024-03-28 RX ORDER — ALUMINUM ZIRCONIUM OCTACHLOROHYDREX GLY 16 G/100G
GEL TOPICAL DAILY
Start: 2024-03-28

## 2024-03-28 RX ORDER — ACETAMINOPHEN 325 MG/1
650 TABLET ORAL EVERY 6 HOURS PRN
Start: 2024-03-28

## 2024-03-28 RX ORDER — METRONIDAZOLE 500 MG/1
500 TABLET ORAL 3 TIMES DAILY
Qty: 15 TABLET | Refills: 0 | Status: SHIPPED | OUTPATIENT
Start: 2024-03-28 | End: 2024-04-02

## 2024-03-28 RX ADMIN — METRONIDAZOLE 500 MG: 500 INJECTION, SOLUTION INTRAVENOUS at 06:15

## 2024-03-28 RX ADMIN — LEVOTHYROXINE SODIUM 112 MCG: 112 TABLET ORAL at 06:15

## 2024-03-28 NOTE — NURSING NOTE
Discharge instructions provided. Patient to receive Rx for abx prior to leaving. She is to follow up with Dr. Dooley in 6 weeks. No questions/concerns at this time.     Patient wanting to  medications on her way out. Pharmacy aware.

## 2024-03-28 NOTE — PROGRESS NOTES
[facility], [facility]  Inpatient Progress Note    Reason for Followup: Colitis    Subjective     Interval History:   Patient significantly improved.  No nausea or vomiting.  Tolerated diet.  Patient reports she did have a formed stool.  No blood in the stool.    Current Facility-Administered Medications:     acetaminophen (TYLENOL) tablet 650 mg, 650 mg, Oral, Q4H PRN, 650 mg at 03/26/24 2211 **OR** acetaminophen (TYLENOL) 160 MG/5ML oral solution 650 mg, 650 mg, Oral, Q4H PRN **OR** acetaminophen (TYLENOL) suppository 650 mg, 650 mg, Rectal, Q4H PRN, Maggy Hope MD    sennosides-docusate (PERICOLACE) 8.6-50 MG per tablet 2 tablet, 2 tablet, Oral, BID PRN **AND** polyethylene glycol (MIRALAX) packet 17 g, 17 g, Oral, Daily PRN **AND** bisacodyl (DULCOLAX) EC tablet 5 mg, 5 mg, Oral, Daily PRN **AND** bisacodyl (DULCOLAX) suppository 10 mg, 10 mg, Rectal, Daily PRN, Maggy Hope MD    cefTRIAXone (ROCEPHIN) 2,000 mg in sodium chloride 0.9 % 100 mL MBP, 2,000 mg, Intravenous, Q24H, Maggy Hope MD, Stopped at 03/28/24 0045    cetirizine (zyrTEC) tablet 10 mg, 10 mg, Oral, Nightly, Kezia Goldman APRN, 10 mg at 03/27/24 2054    cholestyramine (QUESTRAN) packet 1 packet, 1 packet, Oral, Q12H, Robert Ulrich PA-C, 1 packet at 03/27/24 2154    famotidine (PEPCID) tablet 20 mg, 20 mg, Oral, BID PRN, Jeovany Juarez MD    letrozole (FEMARA) tablet 2.5 mg, 2.5 mg, Oral, Nightly, Kezia Goldman APRN, 2.5 mg at 03/27/24 2054    levothyroxine (SYNTHROID, LEVOTHROID) tablet 112 mcg, 112 mcg, Oral, Q AM, Kezia Goldman, APRN, 112 mcg at 03/28/24 0615    metroNIDAZOLE (FLAGYL) IVPB 500 mg, 500 mg, Intravenous, Q8H, StinglMaggy MD, Last Rate: 200 mL/hr at 03/28/24 0615, 500 mg at 03/28/24 0615    ondansetron (ZOFRAN) injection 4 mg, 4 mg, Intravenous, Q4H PRN, Jeovany Juarez MD    prochlorperazine (COMPAZINE) injection 5 mg, 5 mg,  Intravenous, Q6H PRN, Jeovany Juarez MD    rivaroxaban (XARELTO) tablet 20 mg, 20 mg, Oral, Daily With Dinner, Kezia Goldman, APRN, 20 mg at 03/27/24 1742    [COMPLETED] Insert Peripheral IV, , , Once **AND** sodium chloride 0.9 % flush 10 mL, 10 mL, Intravenous, PRN, Giovanni Lowe MD    Facility-Administered Medications Ordered in Other Encounters:     Chlorhexidine Gluconate 2 % pads 1 each, 1 each, Apply externally, Take As Directed, Clarence Suarez MD    Objective     Vital Signs  Temp:  [96.9 °F (36.1 °C)-97.1 °F (36.2 °C)] 97 °F (36.1 °C)  Heart Rate:  [60-78] 60  Resp:  [16-18] 16  BP: (122-126)/(75-79) 122/75  Body mass index is 36.91 kg/m².    Intake/Output Summary (Last 24 hours) at 3/28/2024 0935  Last data filed at 3/28/2024 0616  Gross per 24 hour   Intake 3533.33 ml   Output 1200 ml   Net 2333.33 ml     No intake/output data recorded.     Physical Exam:   General: patient awake, alert and cooperative   Abdomen: soft, nontender, nondistended; normal bowel sounds     Results Review:     I reviewed the patient's new clinical results.    Results from last 7 days   Lab Units 03/28/24  0520 03/27/24  0626 03/26/24  1136   WBC 10*3/mm3 3.49 4.13 5.42   HEMOGLOBIN g/dL 11.6* 11.7* 14.4   HEMATOCRIT % 34.6 35.8 42.5   PLATELETS 10*3/mm3 173 173 209     Results from last 7 days   Lab Units 03/28/24  0520 03/27/24  0626 03/26/24  1136   SODIUM mmol/L 141 140 138   POTASSIUM mmol/L 4.3 4.4 4.0   CHLORIDE mmol/L 110* 109* 104   CO2 mmol/L 23.4 25.1 24.4   BUN mg/dL 10 13 12   CREATININE mg/dL 0.88 0.83 1.05*   CALCIUM mg/dL 8.4* 8.3* 9.6   BILIRUBIN mg/dL  --   --  0.8   ALK PHOS U/L  --   --  113   ALT (SGPT) U/L  --   --  20   AST (SGOT) U/L  --   --  17   GLUCOSE mg/dL 96 92 99     Results from last 7 days   Lab Units 03/26/24  1136   INR  1.53*     Lab Results   Lab Value Date/Time    LIPASE 24 03/26/2024 1136    LIPASE 18 12/14/2020 1206       Radiology:  CT Abdomen Pelvis With Contrast    Final Result   Diverticulosis along the descending and the sigmoid colon   with new, mild but convincing inflammatory change around diverticula   along the posterior aspect of the descending colon, consistent with   acute, uncomplicated diverticulitis.       I discussed the findings with Dr. Lowe at around 6 p.m.       Radiation dose reduction techniques were utilized, including automated   exposure control and exposure modulation based on body size.           This report was finalized on 3/26/2024 6:21 PM by Dr. Gerard Arzola M.D on Workstation: [employer]                Assessment & Plan   Assessment:   Diarrhea   Nausea  Acute uncomplicated diverticulitis on imaging- although question if this is colitis given presentation     78 y.o female admitted w/ nausea x 3 weeks and worsening diarrhea for last week or so. CT scan showing uncomplicated descending colon diverticulitis. Stool studies negative for infectious etiology.   Plan:   Patient to be discharged on cefpodoxime and Flagyl  Will provide her with cholestyramine as needed -advised her to stop if it causes any form of constipation  Discussed low fiber diet with the patient the next couple of weeks continue. Urged her to avoid dairy.  Follow-up with Dr. Dooley or myself in 4-6-weeks.     I discussed the patient's findings and my recommendations with patient.    Dictated utilizing Dragon dictation.        Cata Gerardo PA-C   [facility]  [address] [place] [address] - [address] [place] [address]  Office: [phone]

## 2024-03-28 NOTE — DISCHARGE SUMMARY
[facility] Medicine Services  DISCHARGE SUMMARY    Patient Name: Iris Yee  : 1945  MRN: [id number]    Date of Admission: 3/26/2024 11:13 AM  Date of Discharge:   3/28/2024  Primary Care Physician: Sri Conner MD    Consults       Date and Time Order Name Status Description    3/26/2024 10:30 PM Inpatient Gastroenterology Consult Completed     3/26/2024  3:04 PM A (on-call MD unless specified) Details              Hospital Course       Active Hospital Problems    Diagnosis  POA    **Diverticulitis [K57.92]  Yes    Class 2 severe obesity with serious comorbidity in adult [E66.01]  Yes    Long term current use of anticoagulant therapy [Z79.01]  Not Applicable    Malignant neoplasm of upper-outer quadrant of right breast in female, estrogen receptor positive [C50.411, Z17.0]  Not Applicable    Gastroesophageal reflux disease [K21.9]  Yes    History of pulmonary infarction [Z87.09]  Not Applicable    Acquired hypothyroidism [E03.9]  Yes      Resolved Hospital Problems    Diagnosis Date Resolved POA    Nausea [R11.0] 2024 Yes    Abdominal pain [R10.9] 2024 Yes    Diarrhea [R19.7] 2024 Yes        Diverticulitis  Abdominal pain, nausea, diarrhea  Hypothyroidism  Breast cancer on letrozole  GERD  Hypothyroid  Obese  Dehydration, resolved  History of pulmonary embolism on chronic anticoagulation       Hospital Course:  Iris Yee is a 78 y.o. female presents the hospital with GI symptoms and diverticulitis.  After appropriate antimicrobial and her abdominal pain nausea vomiting and diarrhea have resolved.  She received fluid resuscitation and her dehydration is resolved.  She is having formed stools without any blood in her stools and is tolerating her diet.  She has been cleared by GI consult team to discharge.  She will follow-up closely with her primary care provider.  GI team is planning to arrange follow-up in 4 to 6 weeks.  She agrees to follow-up with primary  care in 1 week.  GI provided her with samples of cholestyramine as needed.  She was cleared for discharge and eager to go home today    At the time of discharge patient was told to take all medications as prescribed, keep all follow-up appointments, and call their doctor or return to the hospital with any worsening or concerning symptoms.    Please note that this note was made using Dragon voice recognition software        Day of Discharge     Subjective: Patient feels drastically better.  She is tolerating her diet well.  No nausea or vomiting.  Minimal discomfort.  She is having formed stools.  No blood in stool.  She agrees to follow-up closely with  primary care.    Vital Signs:   Temp:  [96.9 °F (36.1 °C)-97.2 °F (36.2 °C)] 97 °F (36.1 °C)  Heart Rate:  [60-78] 60  Resp:  [16-18] 16  BP: (122-130)/(75-81) 122/75     Physical Exam:  Constitutional:Awake, alert  Respiratory: No cough or wheezes, normal respirations, nonlabored breathing   Cardiovascular: Pulse rate is normal, palpable radial pulses  Gastrointestinal:  soft, nontender, nondistended  Musculoskeletal: no lower extremity edema, BMI 36  Psychiatric: Appropriate affect, cooperative, conversational  Neurologic: No slurred speech or facial droop, follows commands  Skin: No rashes or jaundice, warm      Pertinent  and/or Most Recent Results     Results from last 7 days   Lab Units 03/28/24  0520 03/27/24  0626 03/26/24  1136   WBC 10*3/mm3 3.49 4.13 5.42   HEMOGLOBIN g/dL 11.6* 11.7* 14.4   HEMATOCRIT % 34.6 35.8 42.5   PLATELETS 10*3/mm3 173 173 209   SODIUM mmol/L 141 140 138   POTASSIUM mmol/L 4.3 4.4 4.0   CHLORIDE mmol/L 110* 109* 104   CO2 mmol/L 23.4 25.1 24.4   BUN mg/dL 10 13 12   CREATININE mg/dL 0.88 0.83 1.05*   GLUCOSE mg/dL 96 92 99   CALCIUM mg/dL 8.4* 8.3* 9.6     Results from last 7 days   Lab Units 03/26/24  1136   BILIRUBIN mg/dL 0.8   ALK PHOS U/L 113   ALT (SGPT) U/L 20   AST (SGOT) U/L 17   PROTIME Seconds 18.6*   INR  1.53*        "    Invalid input(s): \"TG\", \"LDLCALC\", \"LDLREALC\"  Results from last 7 days   Lab Units 03/26/24  2348 03/26/24  1355 03/26/24  1136   HSTROP T ng/L  --  9 10   LACTATE mmol/L 0.6  --   --        Brief Urine Lab Results  (Last result in the past 365 days)        Color   Clarity   Blood   Leuk Est   Nitrite   Protein   CREAT   Urine HCG        03/26/24 1209 Yellow   Cloudy   Moderate (2+)   Large (3+)   Negative   Trace                   Imaging Results (All)       Procedure Component Value Units Date/Time    CT Abdomen Pelvis With Contrast [[id number]] Collected: 03/26/24 1811     Updated: 03/26/24 1824    Narrative:      ABDOMEN AND PELVIS CT WITH CONTRAST     HISTORY: Nausea, vomiting fatigue and persistent diarrhea.     TECHNIQUE: Abdomen and pelvis CT was performed using 85 mL Isovue-300 IV  contrast and is correlated with a CT from 03/20/2024.     FINDINGS: The visualized lung bases are clear. Lower mediastinal  structures appear normal. Prior cholecystectomy with appropriate mild  prominence of the intrahepatic and extrahepatic biliary tree. Parenchyma  of the liver, pancreas, adrenals, and spleen appears normal. Multiple  parapelvic renal cysts bilaterally as well as renal parenchymal cysts  are unchanged. No enhancing renal lesion. No hydronephrosis,  hydroureter, or perinephric stranding. The urinary bladder appears  normal. Uterus and ovaries appear normal.     Large duodenal diverticulum is noted; these are usually of no  significance.     The remainder of the small bowel appears normal. The appendix is not  identified, but there is no inflammation around the cecum. The ascending  and the transverse colon appear normal. There is diverticulosis along  the descending and the sigmoid colon with mild fatty inflammatory change  around several diverticula along the posterior aspect of the descending  colon. No colon wall thickening, abscess, or free air is present. The  rectum appears normal.     Abdominal aorta " maintains normal caliber. There is scattered  atheromatous vascular calcification. No mesenteric vascular lesion.  Degenerative change in the spine without compression deformity.     Previous umbilical hernia repair.       Impression:      Diverticulosis along the descending and the sigmoid colon  with new, mild but convincing inflammatory change around diverticula  along the posterior aspect of the descending colon, consistent with  acute, uncomplicated diverticulitis.     I discussed the findings with Dr. Lowe at around 6 p.m.     Radiation dose reduction techniques were utilized, including automated  exposure control and exposure modulation based on body size.        This report was finalized on 3/26/2024 6:21 PM by Dr. Gerard Arzola M.D on Workstation: [id number]             Results for orders placed during the hospital encounter of 10/09/17    Adult Transthoracic Echo Complete W/ Cont if Necessary Per Protocol    Interpretation Summary  · Left ventricular systolic function is normal. Calculated EF = 63.7%. Estimated EF was in agreement with the calculated EF. Normal left ventricular cavity size and wall thickness noted. All left ventricular wall segments contract normally.  · Left ventricular diastolic function is normal.  · No valvular stenosis or insufficiency.        Discharge Details        Discharge Medications        New Medications        Instructions Start Date   cefpodoxime 200 MG tablet  Commonly known as: VANTIN   200 mg, Oral, Every 12 Hours      famotidine 20 MG tablet  Commonly known as: PEPCID   20 mg, Oral, 2 Times Daily PRN      Metamucil Smooth Texture 58.6 % powder  Generic drug: psyllium   Oral, Daily, OTC      metroNIDAZOLE 500 MG tablet  Commonly known as: Flagyl   500 mg, Oral, 3 Times Daily             Changes to Medications        Instructions Start Date   acetaminophen 325 MG tablet  Commonly known as: TYLENOL  What changed: when to take this   650 mg, Oral, Every 6 Hours PRN              Continue These Medications        Instructions Start Date   calcium carbonate 500 MG chewable tablet  Commonly known as: TUMS   1 tablet, Oral, As Needed      cetirizine 10 MG tablet  Commonly known as: zyrTEC   10 mg, Oral, Nightly      cholecalciferol 25 MCG (1000 UT) tablet   1,000 Units, Oral, Every Evening      letrozole 2.5 MG tablet  Commonly known as: FEMARA   2.5 mg, Oral, Daily      ondansetron 4 MG tablet  Commonly known as: ZOFRAN   1-2 tablets, Oral, Every 8 Hours PRN      Synthroid 112 MCG tablet  Generic drug: levothyroxine   112 mcg, Oral, Every Morning      Xarelto 20 MG tablet  Generic drug: rivaroxaban   20 mg, Oral, Daily With Dinner             Stop These Medications      nitrofurantoin (macrocrystal-monohydrate) 100 MG capsule  Commonly known as: MACROBID              Allergies   Allergen Reactions    Penicillins Other (See Comments)     Passes out    Latex Rash    Sulfa Antibiotics Nausea And Vomiting    Wound Dressing Adhesive Rash         Discharge Disposition:  Home or Self Care    Diet:  Hospital:  Diet Order   Procedures    Diet: Regular/House; Fluid Consistency: Thin (IDDSI 0)       Activity:  Activity Instructions       Activity as Tolerated                   CODE STATUS:    Code Status and Medical Interventions:   Ordered at: 03/26/24 1806     Code Status (Patient has no pulse and is not breathing):    CPR (Attempt to Resuscitate)     Medical Interventions (Patient has pulse or is breathing):    Full Support       Future Appointments   Date Time Provider Department Center   9/26/2024  9:00 AM [facility] 2  [facility]   10/3/2024  9:00 AM Marco Pagan APRN [facility]  [facility]   10/3/2024 10:20 AM LAB CHAIR 2 [facility]  [facility]   10/3/2024 10:40 AM Len Jaramillo MD [facility]           Additional Instructions for the Follow-ups that You Need to Schedule       Discharge Follow-up with PCP   As directed       Currently Documented PCP:    Dalila  MD Sri    PCP Phone Number:    [phone]     Follow Up Details: 1 week, call today        Discharge Follow-up with Specified Provider: Dr Dooley; 6 Weeks   As directed      To: Dr Dooley   Follow Up: 6 Weeks               Follow-up Information       Sri Conner MD .    Specialty: Internal Medicine  Why: 1 week, call today  Contact information:  [address]  [place] [id number]  [phone]                                 Jeovany Juarez MD  03/28/24      Time Spent on Discharge:  I spent greater than 30 minutes on this discharge activity which included: face-to-face encounter with the patient, reviewing the data in the system, coordination of the care with the nursing staff as well as consultants, documentation, and entering orders.

## 2024-03-28 NOTE — PROGRESS NOTES
Case Management Discharge Note      Final Note: Discharged home. Sri Chacon RN         Selected Continued Care - Discharged on 3/28/2024 Admission date: 3/26/2024 - Discharge disposition: Home or Self Care       Transportation Services  Private: Car    Final Discharge Disposition Code: 01 - home or self-care

## 2024-03-28 NOTE — PLAN OF CARE
Goal Outcome Evaluation:  Plan of Care Reviewed With: patient        Progress: improving  Outcome Evaluation: vss, no c/o pain, no diarrhea since last night, iv rocephine and flagyl given, d/c ivfluid, voiding freely, up ad wendi, continue to monitor the pt.

## 2024-04-01 LAB
BACTERIA SPEC AEROBE CULT: NORMAL
BACTERIA SPEC AEROBE CULT: NORMAL

## 2024-05-14 ENCOUNTER — TELEPHONE (OUTPATIENT)
Dept: GASTROENTEROLOGY | Facility: CLINIC | Age: 79
End: 2024-05-14
Payer: OTHER GOVERNMENT

## 2024-05-14 ENCOUNTER — OFFICE VISIT (OUTPATIENT)
Dept: GASTROENTEROLOGY | Facility: CLINIC | Age: 79
End: 2024-05-14
Payer: OTHER GOVERNMENT

## 2024-05-14 VITALS
HEART RATE: 69 BPM | DIASTOLIC BLOOD PRESSURE: 87 MMHG | HEIGHT: 65 IN | SYSTOLIC BLOOD PRESSURE: 141 MMHG | BODY MASS INDEX: 37.15 KG/M2 | OXYGEN SATURATION: 94 % | TEMPERATURE: 96.6 F | WEIGHT: 223 LBS

## 2024-05-14 DIAGNOSIS — K57.92 DIVERTICULITIS: ICD-10-CM

## 2024-05-14 DIAGNOSIS — K63.5 POLYP OF COLON, UNSPECIFIED PART OF COLON, UNSPECIFIED TYPE: ICD-10-CM

## 2024-05-14 DIAGNOSIS — K63.5 POLYP OF COLON, UNSPECIFIED PART OF COLON, UNSPECIFIED TYPE: Primary | ICD-10-CM

## 2024-05-14 DIAGNOSIS — K21.9 GASTROESOPHAGEAL REFLUX DISEASE, UNSPECIFIED WHETHER ESOPHAGITIS PRESENT: Primary | ICD-10-CM

## 2024-05-14 DIAGNOSIS — R19.7 DIARRHEA, UNSPECIFIED TYPE: ICD-10-CM

## 2024-05-14 PROCEDURE — 99213 OFFICE O/P EST LOW 20 MIN: CPT | Performed by: INTERNAL MEDICINE

## 2024-05-14 NOTE — PROGRESS NOTES
Chief Complaint   Patient presents with    Heartburn     gerd    Abdominal Pain    Diverticulitis        Iris Yee is a  78 y.o. female here for a follow up visit for GERD, diverticulitis    HPI this 78-year-old female patient of Dr. Sri Conner presents in follow-up since she was recently hospitalized for diverticulitis in March of this year.  She was treated with antibiotics and released after 2 days.  She states her symptoms have improved but she still has some issues with nausea and intermittent diarrhea.  Her last colonoscopy of record was performed in December 2018 and she did have benign polyps.  I advised given this recent episode of diverticulitis and history of polyps that we consider colonoscopic examination later this year.  She is amenable to this.    Past Medical History:   Diagnosis Date    Arthritis     Arthritis of back 2015    Arthritis of neck     Breast, fat necrosis 12/05/2023    Bulging lumbar disc     L 3-4    Bursitis of hip     Cholelithiasis     Clotting disorder     Colon polyp     COVID-19     Diverticulitis of colon     Encounter for screening mammogram for malignant neoplasm of breast 11/13/2018    Fracture, fibula     Fracture, tibia and fibula     Stress fracture    Frozen shoulder     GERD (gastroesophageal reflux disease)     Hip arthrosis     History of kidney stones     History of MRSA infection 2020    MICHEL EARS    History of transfusion 1971    no reaction    Hx of blood clots 2011    Hypothyroid     Kidney stones     Knee swelling     Left knee pain     Lumbosacral disc disease     Malignant neoplasm of upper-outer quadrant of right breast in female, estrogen receptor positive 12/09/2021    Neck strain     Neuroma of foot 2000    Right foot    Ovarian cyst     Periarthritis of shoulder     PONV (postoperative nausea and vomiting)     states gets really sick lasted for 4 days aafter surgery the last time     Pulmonary embolism, bilateral     Scoliosis 2020    Stress  fracture     Tear of meniscus of knee     Thrombopenia     Wears glasses        Current Outpatient Medications   Medication Sig Dispense Refill    acetaminophen (TYLENOL) 325 MG tablet Take 2 tablets by mouth Every 6 (Six) Hours As Needed for Mild Pain.      calcium carbonate (TUMS) 500 MG chewable tablet Chew 1 tablet As Needed for Indigestion or Heartburn.      cetirizine (zyrTEC) 10 MG tablet Take 1 tablet by mouth Every Night.      cholecalciferol (VITAMIN D3) 25 MCG (1000 UT) tablet Take 1 tablet by mouth Every Evening.      famotidine (PEPCID) 20 MG tablet Take 1 tablet by mouth 2 (Two) Times a Day As Needed for Heartburn.      letrozole (FEMARA) 2.5 MG tablet TAKE 1 TABLET DAILY 30 tablet 5    ondansetron (ZOFRAN) 4 MG tablet Take 1-2 tablets by mouth Every 8 (Eight) Hours As Needed for Nausea or Vomiting.      psyllium (Metamucil Smooth Texture) 58.6 % powder Take  by mouth Daily. OTC      SYNTHROID 112 MCG tablet Take 1 tablet by mouth Every Morning.      Xarelto 20 MG tablet Take 1 tablet by mouth Daily With Dinner.       No current facility-administered medications for this visit.     Facility-Administered Medications Ordered in Other Visits   Medication Dose Route Frequency Provider Last Rate Last Admin    Chlorhexidine Gluconate 2 % pads 1 each  1 each Apply externally Take As Directed Clarence Suarez MD           PRN Meds:.    Allergies   Allergen Reactions    Penicillins Other (See Comments)     Passes out    Latex Rash    Sulfa Antibiotics Nausea And Vomiting    Wound Dressing Adhesive Rash       Social History     Socioeconomic History    Marital status:      Spouse name: SANDRA    Number of children: 3    Years of education: COLLEGE   Tobacco Use    Smoking status: Former     Current packs/day: 0.00     Average packs/day: 1 pack/day for 12.7 years (12.7 ttl pk-yrs)     Types: Cigarettes     Start date: 1964     Quit date: 1976     Years since quittin.7    Smokeless tobacco:  Never   Vaping Use    Vaping status: Never Used   Substance and Sexual Activity    Alcohol use: Yes     Alcohol/week: 3.0 standard drinks of alcohol     Types: 3 Glasses of wine per week    Drug use: No    Sexual activity: Not Currently     Partners: Male     Birth control/protection: I.U.D., Post-menopausal, None, Tubal ligation       Family History   Problem Relation Age of Onset    Hypertension Mother     Osteoporosis Mother     Scoliosis Mother     Arthritis Mother     Hypertension Father     Arthritis Father     Stroke Paternal Aunt     Cervical cancer Maternal Grandmother 68    Prostate cancer Brother 74    Hypertension Brother     Hyperlipidemia Brother     Alzheimer's disease Brother     Malig Hyperthermia Neg Hx        Review of Systems   Constitutional:  Negative for activity change, appetite change, fatigue and unexpected weight change.   HENT:  Negative for congestion, facial swelling, sore throat, trouble swallowing and voice change.    Eyes:  Negative for photophobia and visual disturbance.   Respiratory:  Negative for cough and choking.    Cardiovascular:  Negative for chest pain.   Gastrointestinal:  Positive for diarrhea and nausea. Negative for abdominal distention, abdominal pain, anal bleeding, blood in stool, constipation, rectal pain and vomiting.   Endocrine: Negative for polyphagia.   Musculoskeletal:  Negative for arthralgias, gait problem and joint swelling.   Skin:  Negative for color change, pallor and rash.   Allergic/Immunologic: Negative for food allergies.   Neurological:  Negative for speech difficulty and headaches.   Hematological:  Does not bruise/bleed easily.   Psychiatric/Behavioral:  Negative for agitation, confusion and sleep disturbance.        Vitals:    05/14/24 0914   BP: 141/87   Pulse: 69   Temp: 96.6 °F (35.9 °C)   SpO2: 94%       Physical Exam  Constitutional:       Appearance: She is well-developed.   HENT:      Head: Normocephalic.   Eyes:      Conjunctiva/sclera:  Conjunctivae normal.   Cardiovascular:      Rate and Rhythm: Normal rate and regular rhythm.   Pulmonary:      Breath sounds: Normal breath sounds.   Abdominal:      General: Bowel sounds are normal.      Palpations: Abdomen is soft.   Musculoskeletal:         General: Normal range of motion.      Cervical back: Normal range of motion.   Skin:     General: Skin is warm and dry.   Neurological:      Mental Status: She is alert and oriented to person, place, and time.   Psychiatric:         Behavior: Behavior normal.         ASSESSMENT  #1 GERD: Controlled with histamine blocker  #2 diverticulitis: Slowly recovering  #3 history of polyps      PLAN  Continue current regimen  Schedule colonoscopy      ICD-10-CM ICD-9-CM   1. Gastroesophageal reflux disease, unspecified whether esophagitis present  K21.9 530.81   2. Diarrhea, unspecified type  R19.7 787.91   3. Diverticulitis  K57.92 562.11

## 2024-05-14 NOTE — TELEPHONE ENCOUNTER
ADAM QUINONEZ SCHEDULED CS FOR 10-10-24 ARRIVAL TIME 0700AM, GAVE PREP INSTRUCTIONS TO PT IN OFFICE AND UPLOADED TO [employer]

## 2024-08-19 RX ORDER — LETROZOLE 2.5 MG/1
2.5 TABLET, FILM COATED ORAL DAILY
Qty: 90 TABLET | Refills: 1 | Status: SHIPPED | OUTPATIENT
Start: 2024-08-19

## 2024-09-25 ENCOUNTER — TELEPHONE (OUTPATIENT)
Dept: GASTROENTEROLOGY | Facility: CLINIC | Age: 79
End: 2024-09-25
Payer: MEDICARE

## 2024-09-25 NOTE — TELEPHONE ENCOUNTER
"Reviewed office note.  Per note pts symptoms are controlled with Pepcid.    Pt stated she has a hx of GERD  Pt stated she doesn't take the Pepcid every day \"only when she eats something that she knows will cause her issues.\"  Pt stated when she takes this her GERD is well controlled.  Pt thought she was to get EGD to fu on her GERD, but is ok if you do not think this is necessary.   "

## 2024-09-25 NOTE — TELEPHONE ENCOUNTER
"Hub staff attempted to follow warm transfer process and was unsuccessful     Caller: Iris Yee \"Daniel\"    Relationship to patient: Self    Best call back number: [phone]    Patient is needing: PT IS CALLING TO SEE WHY SHE IS NOT SCHEDULED FOR EGD. SHE SAID SHE THOUGHT SHE WAS SCHEDULED FOR COLONOSCOPY AND EGD. PT WOULD LIKE TO HAVE ONE ADDED TO HER PROCEDURE THAT IS SCHEDULED ON 10/10/2024. PLEASE GIVE PT A CALL BACK AND IF NOT ABLE TO REACH PT. IT IS OKAY TO LVM.         "

## 2024-09-26 ENCOUNTER — HOSPITAL ENCOUNTER (OUTPATIENT)
Dept: MAMMOGRAPHY | Facility: HOSPITAL | Age: 79
Discharge: HOME | End: 2024-09-26
Admitting: NURSE PRACTITIONER
Payer: MEDICARE

## 2024-09-26 DIAGNOSIS — Z17.0 MALIGNANT NEOPLASM OF UPPER-OUTER QUADRANT OF RIGHT BREAST IN FEMALE, ESTROGEN RECEPTOR POSITIVE: ICD-10-CM

## 2024-09-26 DIAGNOSIS — C50.411 MALIGNANT NEOPLASM OF UPPER-OUTER QUADRANT OF RIGHT BREAST IN FEMALE, ESTROGEN RECEPTOR POSITIVE: ICD-10-CM

## 2024-09-26 DIAGNOSIS — Z12.31 ENCOUNTER FOR SCREENING MAMMOGRAM FOR MALIGNANT NEOPLASM OF BREAST: ICD-10-CM

## 2024-09-26 PROCEDURE — 77063 BREAST TOMOSYNTHESIS BI: CPT

## 2024-09-26 PROCEDURE — 77067 SCR MAMMO BI INCL CAD: CPT

## 2024-10-01 ENCOUNTER — PREP FOR SURGERY (OUTPATIENT)
Dept: OTHER | Facility: HOSPITAL | Age: 79
End: 2024-10-01
Payer: MEDICARE

## 2024-10-01 NOTE — TELEPHONE ENCOUNTER
I have called the pt and let her know  Modified case request done, message to  to update schedule to reflect EGD and Colonoscopy

## 2024-10-01 NOTE — PROGRESS NOTES
[facility]     Referring Provider:  Dr. Sri Conner     Chief complaint: breast cancer follow up     HPI:   12/9/21: seen by Dr Cardoso  MsHever Yee is a 77 yo woman, seen at the request of Dr. Sri Conner, for a new diagnosis of right breast cancer. This was initially detected as an imaging abnormality on routine screening. Her work-up is detailed in the oncologic history below. Prior to the biopsies, she denies any breast lumps, pain, skin changes, or nipple discharge. She has a past history of a benign left breast surgical biopsy in 2000. She also has a past history of prepectoral saline implants placed in 1976. She has a past history of an unprovoked pulmonary embolus in 2011, for which she is on Xarelto. She held this over the summer for her knee replacement surgery without any issues. She denies any family history of breast or ovarian cancer, however her brother did have prostate cancer.      1/20/22: seen by Dr Cardoso  She underwent right partial mastectomy with oncoplastic closure and left reduction for symmetry on 1/3/22. See surgery & pathology details below in oncologic history. She has been recovering well, aside from itching and a generalized rash. She has already seen Dr. Jaramillo postoperatively and discussed eventually starting Arimidex.    4/27/22:  She returns today for follow up with no breast concerns, she started arimidex 3/1/22 with no noted side effects.  In late 3/22 she fell with injury to her left knee, no injury to knee implant thankfully.    Her last clinic visit with Dr Celaya was in 2/22:  Iris Yee has recently completed the course of radiation therapy prescribed for the above-mentioned diagnosis    She met with Dr Jaramillo in 1/22:  Based on her thrombotic risk and anticoagulation I have recommended avoiding tamoxifen and using aromatase inhibitor and we will start with Arimidex which we would recommend for 5 years at least.     10/18/22:  She returns  today for follow up with no breast complaints, she is now taking aromasin with no adverse effects.    Her last clinic visit with Dr Jaramillo was in 9/22: She is here with her daughter today who states that her personality change she was achy and very uncomfortable and felt terrible overall and stopped it about a month ago and she is now back to baseline.  Stop Arimidex 1 mg daily changed to Aromasin 25 mg daily    Screening with tomosynthesis on 9/23/22 was stable, BiRads 2.  (see full report below)    4/25/23:  She returns today for follow up with only occasional breast discomfort.  Tolerating aromasin much better than arimidex.    On 4/19/23 she followed up with Dr Celaya: She has been doing well since I last saw her.   I have not scheduled a follow up with me but I asked her to call with any questions or concerns in the future.     She was last seen by Dr Jaramillo in 1/23:  initially on Arimidex for a month.  This was  started In mid March  She stopped the medication because of side effects and at her last visit we initiated Aromasin and she is tolerating this much better.    12/5/23, Interval History:  She returns today for follow up with continued mild post biopsy discomfort.  She developed a rash on her left breast shortly after the breast biopsy.  It was itchy and finally is resolving with use of OTC hydrocortisone ointment, benadryl and cortisone  She is now taking femara since 9/23, did not need to initiate cymbalta for arthralgias..  Follow up scans, both CT and PET scan of neck for enlarged lymph nodes is stable as compared to prior studies.    She was seen by Dr Celaya recently on 11/29/23: 2 week hx of rash over left breast and mid chest which began after a left breast biopsy. I explained I do not think this rash is related to her previous radiation especially in light of fact it is outside the radiation field.  It is most likely due to adhesive from recent left breast biopsy.  I encouraged her to use  hydrocortisone cream and she has a follow up with the surgeon next week to take a look at it.  I have not scheduled a follow up with me but I asked her to call with any questions or concerns in the future.     Her last clinic visit with Dr Jaramillo was in 9/23:  At this point cost of the Aromasin is too much for her and she would rather go back to Arimidex because she does not see a big difference.  I suggested switching to Geraldine for now because I am wanting to avoid tamoxifen because of her hypercoagulable history although the anticoagulation should make it safer relatively   I have asked her to take 1 month break and then try from air and if she has joint pains and arthralgias to try Cymbalta 20 mg daily and I have sent this prescription to her also but she will wait for at least 2 months before starting Cymbalta    9/25/23: bilateral screening mammogram with tomosynthesis, biRAds 0, focal asymmetry left breast for which additional diagnostic imaging is recommended.  (see full report below)  11/2/2023: left diagnostic mammogram/limited US, BiRAds 4 focal asymmetry left breast without sonographic correlate, stereotactic biopsy recommended.  (see full report below)  11/15/23: left breast stereotactic biopsy with benign and concordant results, fat necrosis, routine follow up recommended.  (see full report below)    10/3/2024 interval history  Patient presenting to the office today for routine follow-up.  On 9/26/2024 she had a bilateral screening mammogram that resulted as BI-RADS 2.  She last saw her oncologist in March currently on Femara and tolerating it well.  No changes were made to the treatment plan.     Oncology/Hematology History Overview Note   12/10/2019, Screening MMG with Jorge ([facility]):  Negative mammogram with breast implants in place and showing no change from 07/24/2017.  BI-RADS 1: Negative.     Malignant neoplasm of upper-outer quadrant of right breast in female, estrogen receptor positive    9/20/2021 Initial Diagnosis    Malignant neoplasm of upper-outer quadrant of right breast in female, estrogen receptor positive (HCC)     9/21/2021 Imaging    Screening MMG with Jorge ( [facility]):  Scattered areas of fibroglandular density. There are bilateral retroglandular silicone breast implants with capsular calcifications. There is an area of architectural distortion in the slightly upper central middle to anterior right breast, best appreciated on Tomosynthesis. There are no suspicious masses, calcifications, or areas of architectural distortion in the left breast.  BI-RADS 0: Incomplete.     11/2/2021 Imaging    Right Diagnostic MMG with Jorge & Right Breast US ( [facility]):  MMG:  With additional imaging there is persistence of the area of architectural distortion in the middle third of the right breast lateral to the plane of the nipple.   US:  At the 11 o'clock position on the order 3 cm from the nipple there is a 1.2 cm ill-defined hypoechoic region with posterior acoustic shadowing and mild detectable internal vascularity.   There is an adjacent 0.6 cm ill-defined hypoechoic lesion that is also seen at the 11 o'clock position on the order of 3 cm from the nipple that demonstrates posterior acoustic shadowing.  BI-RADS 4: Suspicious.     11/18/2021 Biopsy    Right Breast, US-Guided Biopsy x 2 ( [facility]):    1. Right Breast at 11:00 o'clock, 3 cm from Nipple (not for calcifications), Core Biopsy:                 A. Multifocal atypical lobular hyperplasia (ALH) with foci of usual ductal hyperplasia (UDH).               B. No ductal carcinoma in situ nor invasive carcinoma identified  -Tribell clip.     2. Right Breast mass at 11:00 o'clock, 3 cm from Nipple (not for calcifications), Core Biopsy:                 A. Invasive lobular carcinoma:                            1. Overall [place] grade I (tubular score=3, nuclear score=1, mitotic score=1).                            2. Invasive carcinoma measures at least  14 mm.                3. No lymphovascular space invasion identified.  B. Associated atypical lobular hyperplasia (ALH) noted.  C. No definitive in situ carcinoma component identified.  D. Focal columnar cell change, usual duct hyperplasia (UDH) and micropapilloma noted.  -Bowtie clip.     ER+ (%, strong)  CO+ (%, strong)  HER2 negative (IHC 1+)  Ki-67 7%     12/3/2021 Imaging    Bilateral Breast MRI ([facility]):  In the right breast centered at the 11:30 position centered on the order of 3 cm from the nipple there are 2 focal signal voids that are  by 1.1 cm. This represents the tri-bell-shaped and bowtie shaped metallic clips. The bowtie-shaped metallic clip is the more medial and slightly posterior metallic clip and represents the site of  invasive lobular carcinoma. There is evidence of an irregularly-shaped T1 hyperintense peripherally enhancing mass that surrounds the biopsy clips that measures on the order of 3.1 cm in anterior to posterior dimension, 1.5 cm in the superior to inferior dimension, and 2.6 cm in the medial to lateral dimension, and is consistent with a hematoma cavity. The bowtie-shaped metallic clip which represents the biopsy-proven site of malignancy is on the order of 0.6 cm anterior to an area of architectural distortion with mild enhancement measures on the order of 1.3 cm in superior to inferior dimension, 1.1 cm in the medial to lateral dimension and 1.0 cm in anterior to posterior dimension. This is most consistent with residual malignancy at the biopsy-proven site of malignancy. The tri-bell-shaped metallic clip is not surrounded by any abnormal enhancement but is within a hematoma  cavity.   No other areas of abnormal enhancement or morphology are seen in the right breast. I see no evidence for abnormal right breast skin, nipple or chest wall enhancement and there is no evidence for right axillary or  internal mammary chain adenopathy.   There are no areas of  abnormal enhancement or morphology in the left breast. I see no evidence for abnormal left breast skin, nipple or chest wall enhancement and there is no evidence for left axillary or internal mammary chain adenopathy.  Bilateral retroglandular silicone implants are noted. The left silicone implant shows irregularity at the posterior margin of the implant, but no evidence for free silicone is appreciated.  BI-RADS 6: Known malignancy.     12/9/2021 Genetic Testing    [employer] [facility] Cancers Panel (37 genes):    Negative     1/3/2022 Surgery    Right needle-localized, bracketed, partial mastectomy with oncoplastic closure and left reduction for symmetry    1. Right Breast, Oriented Needle Localization Lumpectomy (69 grams):  INVASIVE LOBULAR CARCINOMA.               A. Tumor site:  Upper outer quadrant (11:00 o'clock, 11:30).               B. Histologic grade:  [place] score I (tubules=3, nuclei=1, mitosis=1).               C. Tumor size: ~24 mm maximally (present focally in slices 7 and 11 and throughout slices 8-10, slices 6mm).               D. Tumor focality:  Single focus.               E. Ductal carcinoma in-situ (DCIS):  Not identified.               F. Lobular carcinoma in-situ (LCIS):  Present.               G. Tumor extent:  Overlying skin is uninvolved by carcinoma.               H. No lymphovascular invasion identified.               I.  Margins:  Uninvolved by in-situ and invasive carcinoma.                            1. Invasive carcinoma is present 0.3 mm from the original inferior margin, 1.2 mm from the lateral                                margin, 2 mm from the posterior margin, 13 mm from the medial margin, 20 mm from the anterior                                margin and 38 mm from the superior margin of excision in specimen 1.               J. Lymph nodes:  Not submitted.               K. Hormone receptor status:  ER - % Positive, RI - % Positive, Her2/jed - 1+ Negative, Ki67  - 7% (performed on prior biopsy [id number]).    L. Pathologic stage:  pT2, NX.     2. Right Breast, Additional Medial/Superior Margin, Oriented Excision:               A. Benign unremarkable breast tissue.               B. Final medial and superior margin are free of tumor by an additional 15 mm.     3. Right Breast, Additional Inferior/Lateral Margin, Oriented Excision:               A. Benign breast tissue with foci of usual ductal hyperplasia, atypical lobular hyperplasia and lobular carcinoma in-situ (LCIS).               B. No evidence of invasive carcinoma identified.               C. Final inferior and lateral margin are free by an additional 15 mm.     4. Left Breast, Reduction Mammoplasty (163 grams):                A. Benign breast tissue with foci of pseudoangiomatous stromal hyperplasia (PASH).               B. Benign microcalcifications.               C. Unremarkable skin.               D. No evidence of in-situ nor invasive carcinoma identified.     5. Skin, Right Breast, Excision:  Benign unremarkable skin.     6. Right Breast, Capsulectomy:                A. Explanted intact breast prosthesis.               B. Benign fibrous capsule.      7. Left Breast, Capsulectomy:  Benign hyalinized breast capsule with fat necrosis and dystrophic calcification.     2/8/2022 - 2/28/2022 Radiation    Radiation OncologyTreatment Course:  Iris Yee received 4050 cGy in 15 fractions to Right breast     2/9/2022 -  Hormonal Therapy    Anastrozole (Arimidex)     5/12/2022 Cancer Staged    Staging form: Breast, AJCC 8th Edition  - Pathologic: Stage Unknown (pT2, pNX, G1, ER+, NJ+, HER2-) - Signed by Len Jaramillo MD on 5/12/2022 9/23/2022 Imaging    Bilateral screening mammogram with tomosynthesis at [facility]  FINDINGS: Bilateral digital CC and MLO mammographic and digitalTomosynthesis images were obtained. Comparison is made to prior studiesdated 9/21/2021, 11/2/2021 and 11/18/2021 .   The parenchyma of  both breasts is largely fatty-replaced. Postsurgical change of the right breast is noted. There is an area of increased density in the posterior one third of the right breast associated with prior breast implant removal. There is no evidence for axillary lymphadenopathy or nipple retraction.   IMPRESSION:  1. There is no evidence for malignancy in either breast. Routine followup mammography is recommended.   BI-RADS category 2: Benign     5/12/2023 - 5/12/2023 Chemotherapy    OP SUPPORTIVE Denosumab (Prolia) Q6M     9/25/2023 Imaging    Bilateral screening mammogram with tomosynthesis at [facility]  FINDINGS: Bilateral digital CC and MLO mammographic and digital  Tomosynthesis images were obtained. Comparison is made to prior studies  dated 09/23/2022 and 09/21/2021. The parenchyma of both breasts is  largely fatty-replaced. Stable postsurgical change of the right breast  is noted. In the middle third medial aspect of the left breast there is  an area of focal asymmetry. I see no new or dominant masses, areas of  architectural distortion or skin thickening. There is no evidence for  axillary lymphadenopathy or nipple retraction.     IMPRESSION:  1. There is an area of focal asymmetry in the middle third medial aspect of the left breast. Further evaluation with spot compression CC mammographic and tomosynthesis images and possible targeted left breast sonography is recommended.  2. There are no findings suspicious for malignancy in the right breast.   BI-RADS CATEGORY 0: Incomplete - Needs additional imaging evaluation.     11/2/2023 Imaging    Left diagnostic mammogram with tomosynthesis, left breast limited US at [facility]  FINDINGS:     MAMMOGRAM: Left CC spot compression and lateral 2D and tomosynthesis views were obtained.     There are scattered areas of fibroglandular density.   In the slightly lower inner middle left breast, there is a persistent 0.4 cm focal asymmetry. This area was further assessed with ultrasound.    ULTRASOUND:  Targeted sonographic evaluation of the left breast was performed from 8:00 to 9:00 in the region of the mammographic abnormality.  At 930, 8 cm from the nipple, there is an incidental 0.4 x 0.2 x 0.6 cm benign-appearing cyst. No sonographic correlate is identified for the mammographic focal asymmetry.   IMPRESSION:  Suspicious 0.4 cm focal asymmetry in the inner left breast without a sonographic correlate. Recommend for further evaluation with  stereotactic/tomosynthesis guided core needle biopsy.   Findings and recommendations were discussed with the patient in person at the time of her examination. An epic in basket message was sent to MANUEL Mancini on 11/2/2023.   BI-RADS Category 4: Suspicious          11/15/2023 Biopsy    Left breast stereotactic biopsy at [facility]  PROCEDURE NOTE: Informed consent was obtained. Preliminary tomosynthesis images of the left breast were obtained. The overlying skin was prepped in the usual sterile fashion. Local anesthesia was achieved with 1% lidocaine. A nick was made in the skin with a scalpel. Through the nick was inserted an 8 gauge Mammotome vacuum assisted device. Repeat stereotactic/Tomosynthesis images were obtained demonstrating adequate placement of the biopsy needle. Multiple tissue specimens were obtained.  A specimen radiograph was obtained demonstrating a focal area of increased density within the specimen. A bowtie shaped metallic clip was placed to darleen the site. Pressure was applied until bleeding subsided and the overlying skin was cleaned and bandaged. Postbiopsy mammography of the left  breast demonstrates placement of a bowtie shaped metallic clip at the 8 o'clock position.   The pathology result has returned as fat necrosis. This is concordant with imaging findings.  IMPRESSION:  Technically successful tomosynthesis guided Mammotome vacuum assisted left breast biopsy with placement of a bowtie metallic clip. The pathology result has  returned as benign. Routine mammographic follow-up is recommended.        11/15/2023 Biopsy    Pathology left breast stereotactic biopsy  Final Diagnosis   1.  Left breast at 8:00, (not for calcifications), stereotactic core biopsy:               A.  Focal organizing fat necrosis with foreign body giant cell reaction and refractile foreign                        material and stromal fibrosis.               B.  No atypical hyperplasia, carcinoma in-situ nor invasive carcinoma identified.           9/26/2024 Imaging    Bilateral screening mammogram at [facility]  Standard images and R2 computerized assisted detection were obtained  followed by digital tomosynthesis. The breasts are almost entirely fatty  and symmetric except for some post surgical scarring extending near the  chest wall in the upper right breast and associated with multiple  surgical clips and this is unchanged from 09/25/2023. There is a biopsy  clip at the site of a previous very small asymmetric density in the  lower inner left breast that was present on 09/25/2023 and represented  benign disease. No mammographic abnormality is seen in this region.  There are no findings in either breast to suggest malignancy.     CONCLUSION: Benign mammogram as described. Continued annual mammography  is recommended.      BI-RADS CATEGORY  2         Review of Systems:  See interval history.       Medications:    Current Outpatient Medications:     acetaminophen (TYLENOL) 325 MG tablet, Take 2 tablets by mouth Every 6 (Six) Hours As Needed for Mild Pain., Disp: , Rfl:     calcium carbonate (TUMS) 500 MG chewable tablet, Chew 1 tablet As Needed for Indigestion or Heartburn., Disp: , Rfl:     cetirizine (zyrTEC) 10 MG tablet, Take 1 tablet by mouth Every Night., Disp: , Rfl:     cholecalciferol (VITAMIN D3) 25 MCG (1000 UT) tablet, Take 1 tablet by mouth Every Evening., Disp: , Rfl:     famotidine (PEPCID) 20 MG tablet, Take 1 tablet by mouth 2 (Two) Times a Day As Needed  for Heartburn., Disp: , Rfl:     letrozole (FEMARA) 2.5 MG tablet, TAKE 1 TABLET DAILY, Disp: 90 tablet, Rfl: 1    ondansetron (ZOFRAN) 4 MG tablet, Take 1-2 tablets by mouth Every 8 (Eight) Hours As Needed for Nausea or Vomiting., Disp: , Rfl:     psyllium (Metamucil Smooth Texture) 58.6 % powder, Take  by mouth Daily. OTC, Disp: , Rfl:     SYNTHROID 112 MCG tablet, Take 1 tablet by mouth Every Morning., Disp: , Rfl:     Xarelto 20 MG tablet, Take 1 tablet by mouth Daily With Dinner., Disp: , Rfl:   No current facility-administered medications for this visit.    Facility-Administered Medications Ordered in Other Visits:     Chlorhexidine Gluconate 2 % pads 1 each, 1 each, Apply externally, Take As Directed, Clarence Suarez MD      Allergies   Allergen Reactions    Penicillins Other (See Comments)     Passes out    Latex Rash    Sulfa Antibiotics Nausea And Vomiting    Wound Dressing Adhesive Rash       Family History   Problem Relation Age of Onset    Hypertension Mother     Osteoporosis Mother     Scoliosis Mother     Arthritis Mother     Hypertension Father     Arthritis Father     Stroke Paternal Aunt     Cervical cancer Maternal Grandmother 68    Prostate cancer Brother 74    Hypertension Brother     Hyperlipidemia Brother     Alzheimer's disease Brother     Malig Hyperthermia Neg Hx        PHYSICAL EXAMINATION:   There were no vitals filed for this visit.       LMP  (LMP Unknown)     I reviewed physical exam, no changes noted    ECOG 0 - Asymptomatic  General: NAD, well appearing  Psych: a&o x3, normal mood and affect  Eyes: EOMI, no scleral icterus  ENMT: neck supple without masses or thyromegaly, mucous membranes moist, mild fullness left neck  MSK: normal gait, normal ROM in bilateral shoulders  Lymph nodes: no cervical, supraclavicular or axillary lymphadenopathy  Breast:   Right: Sp mastopexy with well-healed incisions. No visible abnormalities on inspection while seated, with arms raised or hands on  hips. No masses, skin changes, or nipple abnormalities.  Left: Sp mastopexy with well-healed incisions, mild scarring of NAC incisions c/w mild keloid changes. No visible abnormalities on inspection while seated, with arms raised or hands on hips. No masses, skin changes, or nipple abnormalities.  Very mild area of resolving rash lower inner breast at biopsy site.      Assessment:   1)   79 y.o. F with a diagnosis of right breast cancer 11/2021: Low grade, invasive lobular carcinoma, ER/[place]+, Her2 negative. She underwent right partial mastectomy with oncoplastic closure and left reduction for symmetry on 1/3/22, pT2Nx. Arimidex, aromasin currently    2)  Obesity, BMI 38    3)  biopsy proven fat necrosis left breast 11/2023, routine follow up recommended      Discussion:  Imaging and pathology findings were reviewed.  Her pathology results confirmed fat necrosis.  I advised her that fat necrosis and oil cysts of the breast is a benign breast condition that happens when an area of the fatty breast tissue is damaged, usually as a result of injury to the breast.  It can also happen after breast surgery or radiation treatment.  The pathology was concordant with imaging findings and routine follow up is recommended.  She will be due her screening mammogram in 12 months and is in agreement with this plan.    The rash on the left breast is nearly resolved, likely r/t either prep or adhesive bandage.     We discussed her follow up which includes clinical breast exam every 6 months for 2 years (11/2023), with mammogram annually then  mammogram and exam annually until 5 years from diagnosis ( 11/26).    BMI is 38  Patient was counseled on the importance of avoidance of obesity for a number of health issues including breast cancer. Diet changes and exercise were recommended.     Class 2 Severe Obesity (BMI >=35 and <=39.9). Obesity-related health conditions include the following: GERD. Obesity is unchanged. BMI is is above  average; BMI management plan is completed. We discussed portion control and increasing exercise.      Plan:  -Continue follow-up with Dr. Jaramillo.  - bilateral screening mammogram with tomosynthesis in 12 months at [facility] followed by exam.  -She was instructed to call sooner with any questions, concerns or changes on BSE.    MANUEL Ward      CC:   Dr. Sri Vasquez MD

## 2024-10-01 NOTE — TELEPHONE ENCOUNTER
ADAM VILLAVICENCIO IN SCHEDULING SHE COMMUNICATED THE ORDER DESCRIPTION HAS TO SAY CLS/EGD  STATED MODIFICATION IS IN INCORRECT-THANK YOU

## 2024-10-02 ENCOUNTER — TELEPHONE (OUTPATIENT)
Dept: GASTROENTEROLOGY | Facility: CLINIC | Age: 79
End: 2024-10-02
Payer: MEDICARE

## 2024-10-02 NOTE — TELEPHONE ENCOUNTER
Called Dr Conner's office spoke to Nanette.  She is going to see if it is ok for her to hold her xarelto for 48 hours prior and call back.

## 2024-10-02 NOTE — TELEPHONE ENCOUNTER
Called pt to see who prescribed her xarelto.  Pt stated it is prescribed by Dr Conner, called office and they are not open.  Will try again in a bit

## 2024-10-03 ENCOUNTER — OFFICE VISIT (OUTPATIENT)
Dept: ONCOLOGY | Facility: CLINIC | Age: 79
End: 2024-10-03
Payer: MEDICARE

## 2024-10-03 ENCOUNTER — LAB (OUTPATIENT)
Dept: LAB | Facility: HOSPITAL | Age: 79
End: 2024-10-03
Payer: MEDICARE

## 2024-10-03 ENCOUNTER — OFFICE VISIT (OUTPATIENT)
Dept: SURGERY | Facility: CLINIC | Age: 79
End: 2024-10-03
Payer: MEDICARE

## 2024-10-03 VITALS
DIASTOLIC BLOOD PRESSURE: 84 MMHG | TEMPERATURE: 97.8 F | BODY MASS INDEX: 37.92 KG/M2 | HEIGHT: 65 IN | HEART RATE: 73 BPM | WEIGHT: 227.6 LBS | OXYGEN SATURATION: 97 % | SYSTOLIC BLOOD PRESSURE: 137 MMHG

## 2024-10-03 VITALS
BODY MASS INDEX: 37.65 KG/M2 | HEART RATE: 68 BPM | HEIGHT: 65 IN | OXYGEN SATURATION: 98 % | DIASTOLIC BLOOD PRESSURE: 84 MMHG | SYSTOLIC BLOOD PRESSURE: 142 MMHG | WEIGHT: 226 LBS

## 2024-10-03 DIAGNOSIS — C50.411 MALIGNANT NEOPLASM OF UPPER-OUTER QUADRANT OF RIGHT BREAST IN FEMALE, ESTROGEN RECEPTOR POSITIVE: Primary | ICD-10-CM

## 2024-10-03 DIAGNOSIS — Z17.0 MALIGNANT NEOPLASM OF UPPER-OUTER QUADRANT OF RIGHT BREAST IN FEMALE, ESTROGEN RECEPTOR POSITIVE: Primary | ICD-10-CM

## 2024-10-03 DIAGNOSIS — C50.411 MALIGNANT NEOPLASM OF UPPER-OUTER QUADRANT OF RIGHT BREAST IN FEMALE, ESTROGEN RECEPTOR POSITIVE: ICD-10-CM

## 2024-10-03 DIAGNOSIS — Z79.811 AROMATASE INHIBITOR USE: ICD-10-CM

## 2024-10-03 DIAGNOSIS — Z17.0 MALIGNANT NEOPLASM OF UPPER-OUTER QUADRANT OF RIGHT BREAST IN FEMALE, ESTROGEN RECEPTOR POSITIVE: ICD-10-CM

## 2024-10-03 DIAGNOSIS — Z12.31 ENCOUNTER FOR SCREENING MAMMOGRAM FOR MALIGNANT NEOPLASM OF BREAST: ICD-10-CM

## 2024-10-03 LAB
ALBUMIN SERPL-MCNC: 4.3 G/DL (ref 3.5–5.2)
ALBUMIN/GLOB SERPL: 1.6 G/DL
ALP SERPL-CCNC: 110 U/L (ref 39–117)
ALT SERPL W P-5'-P-CCNC: 16 U/L (ref 1–33)
ANION GAP SERPL CALCULATED.3IONS-SCNC: 11.5 MMOL/L (ref 5–15)
AST SERPL-CCNC: 21 U/L (ref 1–32)
BASOPHILS # BLD AUTO: 0.05 10*3/MM3 (ref 0–0.2)
BASOPHILS NFR BLD AUTO: 1.1 % (ref 0–1.5)
BILIRUB SERPL-MCNC: 0.5 MG/DL (ref 0–1.2)
BUN SERPL-MCNC: 23 MG/DL (ref 8–23)
BUN/CREAT SERPL: 22.5 (ref 7–25)
CALCIUM SPEC-SCNC: 9.6 MG/DL (ref 8.6–10.5)
CHLORIDE SERPL-SCNC: 106 MMOL/L (ref 98–107)
CO2 SERPL-SCNC: 24.5 MMOL/L (ref 22–29)
CREAT SERPL-MCNC: 1.02 MG/DL (ref 0.57–1)
DEPRECATED RDW RBC AUTO: 49.7 FL (ref 37–54)
EGFRCR SERPLBLD CKD-EPI 2021: 56.8 ML/MIN/1.73
EOSINOPHIL # BLD AUTO: 0.08 10*3/MM3 (ref 0–0.4)
EOSINOPHIL NFR BLD AUTO: 1.7 % (ref 0.3–6.2)
ERYTHROCYTE [DISTWIDTH] IN BLOOD BY AUTOMATED COUNT: 13.4 % (ref 12.3–15.4)
GLOBULIN UR ELPH-MCNC: 2.7 GM/DL
GLUCOSE SERPL-MCNC: 96 MG/DL (ref 65–99)
HCT VFR BLD AUTO: 43.3 % (ref 34–46.6)
HGB BLD-MCNC: 14.3 G/DL (ref 12–15.9)
IMM GRANULOCYTES # BLD AUTO: 0.02 10*3/MM3 (ref 0–0.05)
IMM GRANULOCYTES NFR BLD AUTO: 0.4 % (ref 0–0.5)
LYMPHOCYTES # BLD AUTO: 0.91 10*3/MM3 (ref 0.7–3.1)
LYMPHOCYTES NFR BLD AUTO: 19.7 % (ref 19.6–45.3)
MCH RBC QN AUTO: 33.3 PG (ref 26.6–33)
MCHC RBC AUTO-ENTMCNC: 33 G/DL (ref 31.5–35.7)
MCV RBC AUTO: 100.9 FL (ref 79–97)
MONOCYTES # BLD AUTO: 0.42 10*3/MM3 (ref 0.1–0.9)
MONOCYTES NFR BLD AUTO: 9.1 % (ref 5–12)
NEUTROPHILS NFR BLD AUTO: 3.13 10*3/MM3 (ref 1.7–7)
NEUTROPHILS NFR BLD AUTO: 68 % (ref 42.7–76)
NRBC BLD AUTO-RTO: 0 /100 WBC (ref 0–0.2)
PLATELET # BLD AUTO: 187 10*3/MM3 (ref 140–450)
PMV BLD AUTO: 11.1 FL (ref 6–12)
POTASSIUM SERPL-SCNC: 4.7 MMOL/L (ref 3.5–5.2)
PROT SERPL-MCNC: 7 G/DL (ref 6–8.5)
RBC # BLD AUTO: 4.29 10*6/MM3 (ref 3.77–5.28)
SODIUM SERPL-SCNC: 142 MMOL/L (ref 136–145)
WBC NRBC COR # BLD AUTO: 4.61 10*3/MM3 (ref 3.4–10.8)

## 2024-10-03 PROCEDURE — 36415 COLL VENOUS BLD VENIPUNCTURE: CPT

## 2024-10-03 PROCEDURE — 1159F MED LIST DOCD IN RCRD: CPT | Performed by: NURSE PRACTITIONER

## 2024-10-03 PROCEDURE — 99213 OFFICE O/P EST LOW 20 MIN: CPT | Performed by: NURSE PRACTITIONER

## 2024-10-03 PROCEDURE — 80053 COMPREHEN METABOLIC PANEL: CPT

## 2024-10-03 PROCEDURE — 1160F RVW MEDS BY RX/DR IN RCRD: CPT | Performed by: NURSE PRACTITIONER

## 2024-10-03 PROCEDURE — 85025 COMPLETE CBC W/AUTO DIFF WBC: CPT

## 2024-10-03 RX ORDER — LOTEPREDNOL ETABONATE 5 MG/G
OINTMENT OPHTHALMIC
COMMUNITY
Start: 2024-06-10

## 2024-10-03 NOTE — TELEPHONE ENCOUNTER
Called PCP to kyle on the request to hold xarelto for scope.  Spoke to Taylor who stated they do not have a response yet, but would send another message.

## 2024-10-03 NOTE — PROGRESS NOTES
Subjective     REASON FOR CONSULTATION:  pT2Nx grade 1 lobular carcinoma right breast strongly ER/DE positive HER2 negative postlumpectomy1/3/2022  Provide an opinion on any further workup or treatment                             REQUESTING PHYSICIAN: MD Sri Willis MD    History of Present Illness patient is a 76-year-old lady with a lobular breast cancer low-grade treated with lumpectomy and and radiation and initially on Arimidex for a month.  This was  started In mid March 2022.  She stopped the medication because of side effects and initiated Aromasin in September 2022.  Patient reports she is still been struggling with side effects of arthralgias.  She has a lack of motivation she reports to do tasks, in part because she knows that it will be a struggle physically.I suggested switching to femara for now because I am wanting to avoid tamoxifen because of her hypercoagulable history although the anticoagulation should make it safer relatively-she is scared about doing tamoxifen    We switched to Femara when she was here last and she has been on it now for over 6 months and still has fatigue and aching in her back but most of this is chronic and she tells me she went on vacation and stopped her Femara for 3 weeks and felt no different so I do not think we are going to find a drug that makes her feel better than she does normally    She tried Cymbalta but it made her very ill and she could not take it.      She is complaining of some nausea and GI discomfort in the epigastrium and has been seeing her family doctor to evaluate this.  She had a couple of bouts of diarrhea and was hospitalized briefly for this and I think she has diverticulitis and she is scheduled for colonoscopy soon    Mammogram  in September. 24 was thankfully benign    DEXA scan performed 4/10/2023 showing osteopenia at the right hip though unchanged from previous a year  ago.  Lumbar spine did have 3.9% interval decrease though T score is still just 0.5.      Past Medical History:   Diagnosis Date    Arthritis     Arthritis of back 2015    Arthritis of neck     Breast, fat necrosis 12/05/2023    Bulging lumbar disc     L 3-4    Bursitis of hip     Cholelithiasis     Clotting disorder     Colon polyp     COVID-19     Diverticulitis of colon     Encounter for screening mammogram for malignant neoplasm of breast 11/13/2018    Fracture, fibula     Fracture, tibia and fibula     Stress fracture    Frozen shoulder     GERD (gastroesophageal reflux disease)     Hip arthrosis     History of kidney stones     History of MRSA infection 2020    MICHEL EARS    History of transfusion 1971    no reaction    Hx of blood clots 2011    Hypothyroid     Kidney stones     Knee swelling     Left knee pain     Lumbosacral disc disease     Malignant neoplasm of upper-outer quadrant of right breast in female, estrogen receptor positive 12/09/2021    Neck strain     Neuroma of foot 2000    Right foot    Ovarian cyst     Periarthritis of shoulder     PONV (postoperative nausea and vomiting)     states gets really sick lasted for 4 days aafter surgery the last time     Pulmonary embolism, bilateral     Scoliosis 2020    Stress fracture     Tear of meniscus of knee     Thrombopenia     Wears glasses         Past Surgical History:   Procedure Laterality Date    APPENDECTOMY      AUGMENTATION MAMMAPLASTY  1976    BREAST AUGMENTATION      years ago has implants    BREAST EXCISIONAL BIOPSY Right 10/2021    BREAST EXCISIONAL BIOPSY Left     BREAST LUMPECTOMY Right 01/03/2022    Procedure: Right needle-localized, bracketed, partial mastectomy;  Surgeon: Adela Cardoso MD;  Location: [facility];  Service: General;  Laterality: Right;    BREAST SURGERY Bilateral 01/03/2022    Procedure: RIGHT ONCOPLASTIC REDUCTION AND LEFT REDUCTION FOR SYMMETRY, BILATERAL IMPLANT REMOVAL AND  BILATERAL CAPSULECTOMIES;   Surgeon: Delbert Butcher MD;  Location: [facility];  Service: Plastics;  Laterality: Bilateral;    CHOLECYSTECTOMY      COLONOSCOPY N/A 12/14/2018    Procedure: COLONOSCOPY into cecum/termial ileum with polypectomy;  Surgeon: Jose Daniel Dooley MD;  Location: [facility];  Service: Gastroenterology    ENDOSCOPY N/A 12/14/2018    Procedure: ESOPHAGOGASTRODUODENOSCOPY with biopsy;  Surgeon: Jose Daniel Dooley MD;  Location: [facility];  Service: Gastroenterology    EXPLORATORY LAPAROTOMY      bleeding from ruptured ovarian cyst    HERNIA REPAIR      umbilical x 2    OVARIAN CYST DRAINAGE/EXCISION      ruptured ovarian cyst with vblood accumulatine in abdomin    TONSILLECTOMY      TOTAL KNEE ARTHROPLASTY Left 06/24/2021    Procedure: TOTAL KNEE ARTHROPLASTY;  Surgeon: Clarence Suarez MD;  Location: [facility];  Service: Orthopedics;  Laterality: Left;    UMBILICAL HERNIA REPAIR      x2    UPPER GASTROINTESTINAL ENDOSCOPY        patient is a 76-year-old white female with a 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    Patient reports she was doing her mammograms every 2 years for the last couple of years and had a benign breast biopsy on the left about 10 years ago but otherwise had not had any callbacks or other abnormalities on her imaging.    Patient had diagnostic imaging and a biopsyOn 11/18/2021 which revealed atypical lobular hyperplasia at 11:00 and invasive lobular carcinoma grade 1 measuring 14 mm at 11:00 ER % NJ % HER2 negative Ki-67 of 7%  Patient was referred to Dr. Adela Cardoso who ordered an MRI which confirmed unifocal disease on the right and a biopsy cavity from the [facility] biopsy with no suspicious enhancement and no obvious adenopathy and a normal left breast.  She then  proceeded with a lumpectomy on 1/3/2022 which showed residual 24 mm  low-grade lobular carcinoma with associated lobular carcinoma in situ with no lymphovascular invasion and clear margins no sentinel nodes were removed.  Implants which had been in for over 40 years were both removed    She has had a little problem with some infection in the inframammary area bilaterally but is on antibiotic and this is improving.  She is here to discuss adjuvant treatment options    She is  5 para 3 with 2 miscarriages-first childbirth was at age 21 she did not breast-feed.  Menarche was age 16 menopause at 57 and she took hormones for a few months and stopped.    Family history is completely negative for malignancy except in a maternal grandmother who may have had cervical cancer her 47 gene Invitae panel was negative    She is not a smoker or drinker  She has been on anticoagulation for 7 to 8 years for unprovoked massive PE with a negative work-up  She has had breast implants since  and they were removed at the time of surgery  She has not had a heart attack or stroke    Explained in detail the rationale for surgery and adjuvant treatment with micrometastasis that can give rise to recurrence of her cancer if not treated adequately.  We discussed the fact that the use of hormonal blockade would decrease the risk of recurrence by 30 to 40% depending on grade and histology .    Based on her thrombotic risk and anticoagulation I have recommended avoiding tamoxifen and using aromatase inhibitor and we will start with Arimidex which we would recommend for 5 years at least.  The side effects and toxicities of the Aromatase inhibitors was discussed with the patient including, hot flashes, mood swings and hair thinning.Significant arthralgias and worsening bone density were also discussed. Baseline bone density evaluation was ordered.    She has been referred to radiation and they will decide whether radiation is indicated and  if so she will start her Arimidex towards the end of radiation but if no radiation is planned she can start in about 2 weeks    She is agreeable and I told her to call if she has problems with the hormonal blockade  The patient and her daughter are comfortable with the decision to proceed with hormonal blockade    She will receive return in 3 to 4 months to assess her tolerance with a DEXA scan and we will consider the use of Prolia if her bone density is worse    8/22    She is here with her daughter today who states that her personality change she was achy and very uncomfortable and felt terrible overall and stopped it about a month ago and she is now back to baseline    We discussed why we gave adjuvant therapy and they misunderstood and thought it was to prevent a second cancer in her other breast we talked about micrometastasis and the risk of recurrence the 25 to 30% range with the size of her breast cancer with no additional therapy and we have decided to try exemestane and if that causes problems consider tamoxifen because she is on lifelong anticoagulation and should not have clots and it would also help with her bone density    Her bone density is actually stable even though there is significant osteopenia in her hips and therefore we will hold off on the Prolia for now.  She is taking Tums for calcium but no additional vitamin D except for multivitamin and have asked her to take 1000 units of vitamin D and see how  we do with that        Current Outpatient Medications on File Prior to Visit   Medication Sig Dispense Refill    acetaminophen (TYLENOL) 325 MG tablet Take 2 tablets by mouth Every 6 (Six) Hours As Needed for Mild Pain.      calcium carbonate (TUMS) 500 MG chewable tablet Chew 1 tablet As Needed for Indigestion or Heartburn.      cetirizine (zyrTEC) 10 MG tablet Take 1 tablet by mouth Every Night.      cholecalciferol (VITAMIN D3) 25 MCG (1000 UT) tablet Take 1 tablet by mouth Every Evening.       famotidine (PEPCID) 20 MG tablet Take 1 tablet by mouth 2 (Two) Times a Day As Needed for Heartburn.      letrozole (FEMARA) 2.5 MG tablet TAKE 1 TABLET DAILY 90 tablet 1    Lotemax 0.5 % ointment       psyllium (Metamucil Smooth Texture) 58.6 % powder Take  by mouth Daily. OTC      SYNTHROID 112 MCG tablet Take 1 tablet by mouth Every Morning.      Xarelto 20 MG tablet Take 1 tablet by mouth Daily With Dinner.      [DISCONTINUED] ondansetron (ZOFRAN) 4 MG tablet Take 1-2 tablets by mouth Every 8 (Eight) Hours As Needed for Nausea or Vomiting.       Current Facility-Administered Medications on File Prior to Visit   Medication Dose Route Frequency Provider Last Rate Last Admin    Chlorhexidine Gluconate 2 % pads 1 each  1 each Apply externally Take As Directed Clarence Suarez MD            ALLERGIES:    Allergies   Allergen Reactions    Penicillins Other (See Comments)     Passes out  Beta lactam allergy details  Antibiotic reaction: other  Age at reaction: adult  Dose to reaction time: unknown  Reason for antibiotic: unknown  Epinephrine required for reaction?: no  Tolerated antibiotics: unknown        Latex Rash    Sulfa Antibiotics Nausea And Vomiting    Wound Dressing Adhesive Rash        Social History     Socioeconomic History    Marital status:      Spouse name: SANDRA    Number of children: 3    Years of education: COLLEGE   Tobacco Use    Smoking status: Former     Current packs/day: 0.00     Average packs/day: 1 pack/day for 12.7 years (12.7 ttl pk-yrs)     Types: Cigarettes     Start date: 1964     Quit date: 1976     Years since quittin.1     Passive exposure: Never    Smokeless tobacco: Never   Vaping Use    Vaping status: Never Used   Substance and Sexual Activity    Alcohol use: Yes     Alcohol/week: 3.0 standard drinks of alcohol     Types: 3 Glasses of wine per week    Drug use: No    Sexual activity: Not Currently     Partners: Male     Birth control/protection: I.U.D.,  "Post-menopausal, None, Tubal ligation        Family History   Problem Relation Age of Onset    Hypertension Mother     Osteoporosis Mother     Scoliosis Mother     Arthritis Mother     Hypertension Father     Arthritis Father     Stroke Paternal Aunt     Cervical cancer Maternal Grandmother 68    Prostate cancer Brother 74    Hypertension Brother     Hyperlipidemia Brother     Alzheimer's disease Brother     Malig Hyperthermia Neg Hx         Review of Systems   Constitutional:  Positive for fatigue.   Gastrointestinal:         Reflux symptoms   Musculoskeletal:  Positive for arthralgias.   Skin:  Negative for color change.   Neurological:  Positive for headaches (Migraines).        Objective     Vitals:    10/03/24 1034   BP: 137/84   Pulse: 73   Temp: 97.8 °F (36.6 °C)   TempSrc: Oral   SpO2: 97%   Weight: 103 kg (227 lb 9.6 oz)   Height: 165.1 cm (65\")   PainSc: 0-No pain           3/14/2024    10:07 AM   Current Status   ECOG score 0       Physical Exam       CONSTITUTIONAL:  Vital signs reviewed.  No distress, looks comfortable.  EYES:  Conjunctivae and lids unremarkable.  PERRLA  EARS,NOSE,MOUTH,THROAT:  Ears and nose appear unremarkable.  Lips, teeth, gums appear unremarkable.  RESPIRATORY:  Normal respiratory effort.  Lungs clear to auscultation bilaterally.  BREASTS: Right breast shows a lift and no definite lumpectomy scar left breast   CARDIOVASCULAR:  Normal S1, S2.  No murmurs rubs or gallops.  No significant lower extremity edema.  GASTROINTESTINAL: Abdomen appears unremarkable.  Nontender.  No hepatomegaly.  No splenomegaly.  LYMPHATIC:  No cervical, supraclavicular, axillary lymphadenopathy.  SKIN:  Warm.  No rashes.  PSYCHIATRIC:  Normal judgment and insight.  Normal mood and affect.  I have reexamined the patient and the results are consistent with the previously documented exam. Len Jaramillo MD       RECENT LABS:  Hematology WBC   Date Value Ref Range Status   10/03/2024 4.61 3.40 - 10.80 " 10*3/mm3 Final     RBC   Date Value Ref Range Status   10/03/2024 4.29 3.77 - 5.28 10*6/mm3 Final     Hemoglobin   Date Value Ref Range Status   10/03/2024 14.3 12.0 - 15.9 g/dL Final     Hematocrit   Date Value Ref Range Status   10/03/2024 43.3 34.0 - 46.6 % Final     Platelets   Date Value Ref Range Status   10/03/2024 187 140 - 450 10*3/mm3 Final        Synoptic Checklist     INVASIVE CARCINOMA OF THE BREAST: Resection  8th Edition - Protocol posted: 6/30/2021  INVASIVE CARCINOMA OF THE BREAST: COMPLETE EXCISION - All Specimens  SPECIMEN   Procedure  Excision (less than total mastectomy)    Specimen Laterality  Right    TUMOR   Tumor Site  Upper outer quadrant    Histologic Type  Invasive lobular carcinoma    Histologic Grade (Maryann Histologic Score)     Glandular (Acinar) / Tubular Differentiation  Score 3    Nuclear Pleomorphism  Score 1    Mitotic Rate  Score 1    Overall Grade  Grade 1 (scores of 3, 4 or 5)    Tumor Size  Greatest dimension of largest invasive focus (Millimeters): 24 mm   Tumor Focality  Single focus of invasive carcinoma    Ductal Carcinoma In Situ (DCIS)  Not identified    Lobular Carcinoma In Situ (LCIS)  Present    Tumor Extent     Lymphovascular Invasion  Not identified    Dermal Lymphovascular Invasion  Not identified    Microcalcifications  Present in non-neoplastic tissue    Treatment Effect in the Breast  No known presurgical therapy    MARGINS   Margin Status for Invasive Carcinoma  All margins negative for invasive carcinoma    Distance from Invasive Carcinoma to Closest Margin  2 mm   Closest Margin(s) to Invasive Carcinoma  Posterior    Distance from Invasive Carcinoma to Anterior Margin  20 mm   Distance from Invasive Carcinoma to Posterior Margin  2 mm   Distance from Invasive Carcinoma to Superior Margin  53 mm   Distance from Invasive Carcinoma to Inferior Margin  15.3 mm   Distance from Invasive Carcinoma to Medial Margin  28 mm   Distance from Invasive Carcinoma to  Lateral Margin  16.2 mm   REGIONAL LYMPH NODES   Regional Lymph Node Status  Not applicable (no regional lymph nodes submitted or found)    PATHOLOGIC STAGE CLASSIFICATION (pTNM, AJCC 8th Edition)      pT Category  pT2    pN Category  pN not assigned (no nodes submitted or found)    Breast Biomarker Testing Performed on Previous Biopsy     Estrogen Receptor (ER) Status  Positive (greater than 10% of cells demonstrate nuclear positivity)    Percentage of Cells with Nuclear Positivity  %    Breast Biomarker Testing Performed on Previous Biopsy     Progesterone Receptor (PgR) Status  Positive    Percentage of Cells with Nuclear Positivity  %    Breast Biomarker Testing Performed on Previous Biopsy     HER2 (by immunohistochemistry)  Negative (Score 1+)    Breast Biomarker Testing Performed on Previous Biopsy     Ki-67 Percentage of Positive Nuclei  7 %            Assessment & Plan   1.pT2Nx grade 1 invasive lobular carcinoma right breast strongly ER/MI positive HER2 negative postlumpectomy with associated LCIS and ALH  Arimidex started in 3/22  Stopped in 8/22-changed to Aromasin in 9/22  Tolerating Aromasin well and 1/23  Patient seen 5/15/2023 continuing on Aromasin though reports arthralgias, decreased stamina, and interference with her daily activities.  Patient is asking to hold the medication for 1 month to see if these things improve.  States she is struggled since the switch though it was better than when on Arimidex though she previously has not set anything.  She states her daughter notices that her daily activity is different.  We will hold the Aromasin for 1 month.  The patient will then return to discuss how she is feeling with consideration of either restarting Aromasin or possibly moving to letrozole.  She is not a candidate for tamoxifen due to her previous PE/DVT.  6/13/2023 patient returns today in follow-up having taken a 1 month break from exemestane though and denying improvement to the  extent that she would want to not start back exemestane but she would rather deal with any possible side effects then switch to a new medication.  Stop exemestane due to side effects and the fact that it is too expensive and switch to Femara-if joint pains resume try Cymbalta 20 mg daily      2. DVT and PE on lifelong anticoagulation with Xarelto.      3, hypothyroidism on treatment    4, 37 gene Invitae panel negative    5. osteopenia in the hips on calcium and vitamin D-  DEXA scan stable in 4 /22  DEXA scan stable in 4/2023  hold off on Prolia for now and recheck bone density in 1 year    6.  Tender palpable left submandibular nodes in 10/23 CAT scans ordered that showed borderline nodes PET recommended  PET scan in 10/23 was essentially negative and this is felt to be reactive lymph nodes    Plan  1.   Continue Femara 2.5 mg daily  2.  Continue calcium and vitamin D  3.    Mammogram due again in Se3tember 2025  5  Follow-up with Dr. Jaramillo in 6 months.  DEXA scan in April 2025

## 2024-10-08 ENCOUNTER — TELEPHONE (OUTPATIENT)
Dept: GASTROENTEROLOGY | Facility: CLINIC | Age: 79
End: 2024-10-08
Payer: MEDICARE

## 2024-10-08 NOTE — TELEPHONE ENCOUNTER
"Pt is scheduled for a cs on 10/10 with Dr Dooley.  Sunday night the pt started to have diarrhea, abd discomfort and nausea.  She denies temp.  She reports she had a \"hard BM\" 1 week ago and stated she had some \"bright red blood\" from her rectum at that time, but it stopped.    Please advise on how you would like for her to proceed    Please advise if she needs to reschedule her colonoscopy  "

## 2024-10-08 NOTE — TELEPHONE ENCOUNTER
"    Hub staff attempted to follow warm transfer process and was unsuccessful     Caller: Iris Yee \"Daniel\"    Relationship to patient: Self    Best call back number: [phone]    Patient is needing: PT HAS PROC ON 10.10.24. PT IS REPORTING A DIVERTICULITIS FLARE TODAY. PLEASE CONTACT PT TO ADVISE IF SHE SHOULD RESCHEDULE PROC.         "

## 2024-10-09 ENCOUNTER — TELEPHONE (OUTPATIENT)
Dept: GASTROENTEROLOGY | Facility: CLINIC | Age: 79
End: 2024-10-09
Payer: MEDICARE

## 2024-10-09 ENCOUNTER — TELEPHONE (OUTPATIENT)
Dept: GASTROENTEROLOGY | Facility: CLINIC | Age: 79
End: 2024-10-09

## 2024-10-09 NOTE — TELEPHONE ENCOUNTER
"Hub staff attempted to follow warm transfer process and was unsuccessful     Caller: Iris Yee \"Daniel\"    Relationship to patient: Self    Best call back number: [phone]    Patient is needing: PATIENT CALLED IN AND STATED THAT SHE WAS SUPPOSED TO HAD RECEIVED A CALL BACK ON YESTERDAY TO BE ADVISED IF SHE SHOULD PROCEED WITH COLONOSCOPY SCHEDULED FOR 10/10/2024, BUT SHE DID NOT. PATIENT STATED THAT SHE IS FEELING BETTER TODAY FROM THE DIVERTICULITIS FLARE-UP, BUT PATIENT STATES \"IF PROVIDER DOESN'T FEEL SHE SHOULD HAVE COLONOSCOPY, THEN SHE WOULD LIKE TO KNOW AS SOON AS POSSIBLE SO SHE DOESN'T HAVE TO START THE PREP\". PLEASE CALL BACK AS SOON AS POSSIBLE TO ADVISE.        "
"Hub staff attempted to follow warm transfer process and was unsuccessful     Caller: Iris Yee \"Daniel\"    Relationship to patient: Self    Best call back number: [phone]; OK TO [facility]    Patient is needing: PATIENT CALLED REGARDING HER QUESTION FOR DR. PEREZ FROM YESTERDAY. HER PREP IS SUPPOSED TO START SOON AND NEEDS AN UPDATE BEFORE SHE PROCEEDS. PLEASE RETURN HER CALL AS SOON AS POSSIBLE.  "
See call dated yesterday 10/08/2024.  
- - -

## 2024-10-09 NOTE — TELEPHONE ENCOUNTER
Called pt and advised of Dr Dooley's note.  Pt verb understanding and would like to reschedule her c/s.  Advised will send message to Marylin in scheduling.     Also pt reports she is feeling much better today .  She denies any bleeding and has not had diarrhea since Monday.  She is asking does she need the labs and ct scan or can she hold off on this for now.  Advised will send message to Dr Dooley.

## 2024-10-09 NOTE — TELEPHONE ENCOUNTER
Called pt and advised of Dr Dooley's note. Verb understanding.  Pt is rescheduled for 11/18.  Pt reminded to hold her xarelto for 2 days prior.  Verb understanding.

## 2024-10-09 NOTE — TELEPHONE ENCOUNTER
If she has active diverticulitis then we should not be performing colonoscopy until after this is subsided.  The other way to define this is for her to be evaluated which would mean lab work and possible CT scan.per Dr Dooley.

## 2024-10-09 NOTE — TELEPHONE ENCOUNTER
PT SCHEDULED AT [facility] 11/18/2024 PT VERBALIZES UNDERSTANDING [facility] WILL CALL WITH [facility]T IRENE PREP INFORMATION SENT TO ADDRESS ON FILE VERIFIED BY PT ,PT VERBALIZES UNDERSTANDING DEEPA GILMAN ALSO HIGHLIGHTED ON HER INFO SHEET OK FOR HUB TO RELAY

## 2024-10-09 NOTE — TELEPHONE ENCOUNTER
PT SCHEDULED AT [facility] 11/18/2024 PT VERBALIZES UNDERSTANDING [facility] WILL CALL WITH [employer]T [employer] PREP INFORMATION SENT TO ADDRESS ON FILE VERIFIED BY PT ,PT VERBALIZES UNDERSTANDING DEEPA GILMAN ALSO HIGHLIGHTED ON HER INFO SHEET OK FOR HUB TO RELAY

## 2024-10-09 NOTE — TELEPHONE ENCOUNTER
As long as she is no longer having symptoms I suspect she does not have diverticulitis and can proceed as planned.  Obviously if symptoms recur we will need to do studies and reschedule. Per Dr Dooley.

## 2024-10-17 ENCOUNTER — TELEPHONE (OUTPATIENT)
Dept: GASTROENTEROLOGY | Facility: CLINIC | Age: 79
End: 2024-10-17

## 2024-10-17 NOTE — TELEPHONE ENCOUNTER
Provider: DR PEREZ    Caller: HELEN ALCOCER    Relationship to Patient: SELF    Pharmacy:     Phone Number: [phone]     Reason for Call: PT CALLED TO FIND OUT IF WE ARE USING THE BLOOD TEST BY [facility] THAT WAS RECENTLY APPROVED BY FDA INSTEAD OF HAVING A CLS. IF SO, PT WOULD PREFER THIS METHOD.     PLEASE CALL PT TO ADVISE.

## 2024-10-21 NOTE — TELEPHONE ENCOUNTER
PT STATES THAT INSURANCE WILL COVER BLOOD TEST.   PLEASE LET PT KNOW WHEN SHE CAN HAVE TEST DONE.   [phone] (home)

## 2024-10-21 NOTE — TELEPHONE ENCOUNTER
"  Caller: Iris Yee E \"Daniel\"    Relationship: Self    Best call back number: [phone]    What is the best time to reach you: ANYTIME    Who are you requesting to speak with (clinical staff, provider,  specific staff member): CLINICAL      What was the call regarding: SEE ATTACHED NOTE. PT CALLING STATES SHE HAS NOT HEARD BACK FROM ANYONE ABOUT HER REQUEST. PLEASE CONTACT PT TO ASSIST.         "

## 2024-10-21 NOTE — TELEPHONE ENCOUNTER
Not aware of any blood test that would be considered more sensitive or specific than a colonoscopy.  Would ask the patient to provide some information to review.   Per Dr Dooley.      Called pt and advised of the above. Pt verb understanding. Pt states the test is call guardant 360.  Advised that she should check with her insurance company and see if they will cover this. Also she will need to find out if approved where they would her to have this done.  Pt states since she is on blood thinners , this may be a better option. Advised will update Dr Dooley. Pt is going to let us know what she finds out from her insurance company.

## 2024-11-18 ENCOUNTER — OUTSIDE FACILITY SERVICE (OUTPATIENT)
Dept: GASTROENTEROLOGY | Facility: CLINIC | Age: 79
End: 2024-11-18
Payer: MEDICARE

## 2024-12-16 ENCOUNTER — OUTSIDE FACILITY SERVICE (OUTPATIENT)
Dept: GASTROENTEROLOGY | Facility: CLINIC | Age: 79
End: 2024-12-16
Payer: MEDICARE

## 2024-12-19 ENCOUNTER — TELEPHONE (OUTPATIENT)
Dept: ONCOLOGY | Facility: CLINIC | Age: 79
End: 2024-12-19
Payer: MEDICARE

## 2024-12-19 NOTE — TELEPHONE ENCOUNTER
"  Caller: Iris Yee E \"Daniel\"    Relationship: Self    Best call back number: [phone]    Who are you requesting to speak with (clinical staff, provider,  specific staff member): CLINICAL    What was the call regarding: PT CALLING TO FU ON A DOCUMENT SHE HAD UPLOADED TO [employer] ON 12/13 TO BE SIGNED AND RETURNED    PLEASE ADVISE      "

## 2024-12-20 ENCOUNTER — TELEPHONE (OUTPATIENT)
Dept: ONCOLOGY | Facility: CLINIC | Age: 79
End: 2024-12-20
Payer: MEDICARE

## 2024-12-20 NOTE — TELEPHONE ENCOUNTER
"  Caller: Iris Yee \"Daniel\"    Relationship: Self    Best call back number: [phone]     What is the best time to reach you: ANYTIME    Who are you requesting to speak with (clinical staff, provider,  specific staff member): CLINICAL    What was the call regarding: DANIEL WOULD LIKE TO SPEAK WITH THE NURSE HARDING REGARDING ONLINE FORM THAT NEEDED SIGNATURE.    PLEASE ADVISE.     "

## 2025-01-28 RX ORDER — LETROZOLE 2.5 MG/1
2.5 TABLET, FILM COATED ORAL DAILY
Qty: 90 TABLET | Refills: 3 | Status: SHIPPED | OUTPATIENT
Start: 2025-01-28

## 2025-01-29 ENCOUNTER — TRANSCRIBE ORDERS (OUTPATIENT)
Dept: ADMINISTRATIVE | Facility: HOSPITAL | Age: 80
End: 2025-01-29
Payer: MEDICARE

## 2025-01-29 DIAGNOSIS — I71.40 ABDOMINAL ANEURYSM: Primary | ICD-10-CM

## 2025-02-20 ENCOUNTER — HOSPITAL ENCOUNTER (OUTPATIENT)
Dept: CT IMAGING | Facility: HOSPITAL | Age: 80
Discharge: HOME | End: 2025-02-20
Admitting: INTERNAL MEDICINE
Payer: MEDICARE

## 2025-02-20 DIAGNOSIS — I71.40 ABDOMINAL ANEURYSM: ICD-10-CM

## 2025-02-20 PROCEDURE — 74176 CT ABD & PELVIS W/O CONTRAST: CPT

## 2025-03-11 NOTE — DISCHARGE INSTRUCTIONS
Take the following medications the morning of surgery:    NONE      TIME OF ARRIVAL WILL BE GIVEN TO YOU BY DR SWEENEY'S OFFICE    If you are on prescription narcotic pain medication to control your pain you may also take that medication the morning of surgery.    General Instructions:  • Do not eat solid food after midnight the night before surgery.  • You may drink clear liquids day of surgery but must stop at least one hour before your hospital arrival time.  • It is beneficial for you to have a clear drink that contains carbohydrates the day of surgery.  We suggest a 12 to 20 ounce bottle of Gatorade or Powerade for non-diabetic patients or a 12 to 20 ounce bottle of G2 or Powerade Zero for diabetic patients. (Pediatric patients, are not advised to drink a 12 to 20 ounce carbohydrate drink)    Clear liquids are liquids you can see through.  Nothing red in color.     Plain water                               Sports drinks  Sodas                                   Gelatin (Jell-O)  Fruit juices without pulp such as white grape juice and apple juice  Popsicles that contain no fruit or yogurt  Tea or coffee (no cream or milk added)  Gatorade / Powerade  G2 / Powerade Zero    • Infants may have breast milk up to four hours before surgery.  • Infants drinking formula may drink formula up to six hours before surgery.   • Patients who avoid smoking, chewing tobacco and alcohol for 4 weeks prior to surgery have a reduced risk of post-operative complications.  Quit smoking as many days before surgery as you can.  • Do not smoke, use chewing tobacco or drink alcohol the day of surgery.   • If applicable bring your C-PAP/ BI-PAP machine.  • Bring any papers given to you in the doctor’s office.  • Wear clean comfortable clothes.  • Do not wear contact lenses, false eyelashes or make-up.  Bring a case for your glasses.   • Bring crutches or walker if applicable.  • Remove all piercings.  Leave jewelry and any other valuables  at home.  • Hair extensions with metal clips must be removed prior to surgery.  • The Pre-Admission Testing nurse will instruct you to bring medications if unable to obtain an accurate list in Pre-Admission Testing.        If you were given a blood bank ID arm band remember to bring it with you the day of surgery.    Preventing a Surgical Site Infection:  • For 2 to 3 days before surgery, avoid shaving with a razor because the razor can irritate skin and make it easier to develop an infection.    • Any areas of open skin can increase the risk of a post-operative wound infection by allowing bacteria to enter and travel throughout the body.  Notify your surgeon if you have any skin wounds / rashes even if it is not near the expected surgical site.  The area will need assessed to determine if surgery should be delayed until it is healed.  • The night prior to surgery shower using a fresh bar of anti-bacterial soap (such as Dial) and clean washcloth.  Sleep in a clean bed with clean clothing.  Do not allow pets to sleep with you.  • Shower on the morning of surgery using a fresh bar of anti-bacterial soap (such as Dial) and clean washcloth.  Dry with a clean towel and dress in clean clothing.  • Ask your surgeon if you will be receiving antibiotics prior to surgery.  • Make sure you, your family, and all healthcare providers clean their hands with soap and water or an alcohol based hand  before caring for you or your wound.    Day of surgery:  Your arrival time is approximately two hours before your scheduled surgery time.  Upon arrival, a Pre-op nurse and Anesthesiologist will review your health history, obtain vital signs, and answer questions you may have.  The only belongings needed at this time will be a list of your home medications and if applicable your C-PAP/BI-PAP machine.  A Pre-op nurse will start an IV and you may receive medication in preparation for surgery, including something to help you relax.      Please be aware that surgery does come with discomfort.  We want to make every effort to control your discomfort so please discuss any uncontrolled symptoms with your nurse.   Your doctor will most likely have prescribed pain medications.      If you are going home after surgery you will receive individualized written care instructions before being discharged.  A responsible adult must drive you to and from the hospital on the day of your surgery and stay with you for 24 hours.  Discharge prescriptions can be filled by the hospital pharmacy during regular pharmacy hours.  If you are having surgery late in the day/evening your prescription may be e-prescribed to your pharmacy.  Please verify your pharmacy hours or chose a 24 hour pharmacy to avoid not having access to your prescription because your pharmacy has closed for the day.    If you are staying overnight following surgery, you will be transported to your hospital room following the recovery period.  [facility] has all private rooms.    If you have any questions please call Pre-Admission Testing at [phone].  Deductibles and co-payments are collected on the day of service. Please be prepared to pay the required co-pay, deductible or deposit on the day of service as defined by your plan.    Patient Education for Self-Quarantine Process    Following your COVID testing, we strongly recommend that you do not leave your home after you have been tested for COVID except to get medical care. This includes not going to work, school or to public areas.  If this is not possible for you to do please limit your activities to only required outings.  Be sure to wear a mask when you are with other people, practice social distancing and wash your hands frequently.      The following items provide additional details to keep you safe.  • Wash your hands with soap and water frequently for at least 20 seconds.   • Avoid touching your eyes, nose and mouth  with unwashed hands.  • Do not share anything - utensils, towels, food from the same bowl.   • Have your own utensils, drinking glass, dishes, towels and bedding.   • Do not have visitors.   • Do use FaceTime to stay in touch with family and friends.  • You should stay in a specific room away from others if possible.   • Stay at least 6 feet away from others in the home if you cannot have a dedicated room to yourself.   • Do not snuggle with your pet. While the CDC says there is no evidence that pets can spread COVID-19 or be infected from humans, it is probably best to avoid “petting, snuggling, being kissed or licked and sharing food (during self-quarantine)”, according to the CDC.   • Sanitize household surfaces daily. Include all high touch areas (door handles, light switches, phones, countertops, etc.)  • Do not share a bathroom with others, if possible.   • Wear a mask around others in your home if you are unable to stay in a separate room or 6 feet apart. If  you are unable to wear a mask, have your family member wear a mask if they must be within 6 feet of you.   Call your surgeon immediately if you experience any of the following symptoms:  • Sore Throat  • Shortness of Breath or difficulty breathing  • Cough  • Chills  • Body soreness or muscle pain  • Headache  • Fever  • New loss of taste or smell  • Do not arrive for your surgery ill.  Your procedure will need to be rescheduled to another time.  You will need to call your physician before the day of surgery to avoid any unnecessary exposure to hospital staff as well as other patients.    CHLORHEXIDINE CLOTH INSTRUCTIONS  The morning of surgery follow these instructions using the Chlorhexidine cloths you've been given.  These steps reduce bacteria on the body.  Do not use the cloths near your eyes, ears mouth, genitalia or on open wounds.  Throw the cloths away after use but do not try to flush them down a toilet.      • Open and remove one cloth at a  time from the package.    • Leave the cloth unfolded and begin the bathing.  • Massage the skin with the cloths using gentle pressure to remove bacteria.  Do not scrub harshly.   • Follow the steps below with one 2% CHG cloth per area (6 total cloths).  • One cloth for neck, shoulders and chest.  • One cloth for both arms, hands, fingers and underarms (do underarms last).  • One cloth for the abdomen followed by groin.  • One cloth for right leg and foot including between the toes.  • One cloth for left leg and foot including between the toes.  • The last cloth is to be used for the back of the neck, back and buttocks.    Allow the CHG to air dry 3 minutes on the skin which will give it time to work and decrease the chance of irritation.  The skin may feel sticky until it is dry.  Do not rinse with water or any other liquid or you will lose the beneficial effects of the CHG.  If mild skin irritation occurs, do rinse the skin to remove the CHG.  Report this to the nurse at time of admission.  Do not apply lotions, creams, ointments, deodorants or perfumes after using the clothes. Dress in clean clothes before coming to the hospital.    BACTROBAN NASAL OINTMENT  There are many germs normally in your nose. Bactroban is an ointment that will help reduce these germs. Please follow these instructions for Bactroban use:      ____The day before surgery in the morning  Date________    ____The day before surgery in the evening              Date________    ____The day of surgery in the morning    Date________    **Squirt ½ package of Bactroban Ointment onto a cotton applicator and apply to inside of 1st nostril.  Squirt the remaining Bactroban and apply to the inside of the other nostril.    PERIDEX- ORAL:  Use only if your surgeon has ordered  Use the night before and morning of surgery - Swish, gargle, and spit - do not swallow.   normal...

## 2025-04-14 ENCOUNTER — HOSPITAL ENCOUNTER (OUTPATIENT)
Dept: GENERAL RADIOLOGY | Facility: HOSPITAL | Age: 80
Discharge: HOME | End: 2025-04-14
Payer: MEDICARE

## 2025-04-14 ENCOUNTER — HOSPITAL ENCOUNTER (OUTPATIENT)
Dept: BONE DENSITY | Facility: HOSPITAL | Age: 80
Discharge: HOME | End: 2025-04-14
Payer: MEDICARE

## 2025-04-14 DIAGNOSIS — Z17.0 MALIGNANT NEOPLASM OF UPPER-OUTER QUADRANT OF RIGHT BREAST IN FEMALE, ESTROGEN RECEPTOR POSITIVE: ICD-10-CM

## 2025-04-14 DIAGNOSIS — R07.89 OTHER CHEST PAIN: ICD-10-CM

## 2025-04-14 DIAGNOSIS — Z79.811 AROMATASE INHIBITOR USE: ICD-10-CM

## 2025-04-14 DIAGNOSIS — C50.411 MALIGNANT NEOPLASM OF UPPER-OUTER QUADRANT OF RIGHT BREAST IN FEMALE, ESTROGEN RECEPTOR POSITIVE: ICD-10-CM

## 2025-04-14 PROCEDURE — 77080 DXA BONE DENSITY AXIAL: CPT

## 2025-04-14 PROCEDURE — 71046 X-RAY EXAM CHEST 2 VIEWS: CPT

## 2025-04-22 ENCOUNTER — LAB (OUTPATIENT)
Dept: LAB | Facility: HOSPITAL | Age: 80
End: 2025-04-22
Payer: MEDICARE

## 2025-04-22 ENCOUNTER — OFFICE VISIT (OUTPATIENT)
Dept: ONCOLOGY | Facility: CLINIC | Age: 80
End: 2025-04-22
Payer: MEDICARE

## 2025-04-22 VITALS
BODY MASS INDEX: 38.7 KG/M2 | HEART RATE: 70 BPM | DIASTOLIC BLOOD PRESSURE: 79 MMHG | HEIGHT: 65 IN | TEMPERATURE: 97.7 F | RESPIRATION RATE: 17 BRPM | SYSTOLIC BLOOD PRESSURE: 128 MMHG | OXYGEN SATURATION: 96 % | WEIGHT: 232.3 LBS

## 2025-04-22 DIAGNOSIS — M54.6 BACK PAIN OF THORACOLUMBAR REGION: ICD-10-CM

## 2025-04-22 DIAGNOSIS — Z79.811 AROMATASE INHIBITOR USE: ICD-10-CM

## 2025-04-22 DIAGNOSIS — M54.50 BACK PAIN OF THORACOLUMBAR REGION: ICD-10-CM

## 2025-04-22 DIAGNOSIS — R07.81 RIB PAIN ON LEFT SIDE: ICD-10-CM

## 2025-04-22 DIAGNOSIS — C50.411 MALIGNANT NEOPLASM OF UPPER-OUTER QUADRANT OF RIGHT BREAST IN FEMALE, ESTROGEN RECEPTOR POSITIVE: ICD-10-CM

## 2025-04-22 DIAGNOSIS — C50.411 MALIGNANT NEOPLASM OF UPPER-OUTER QUADRANT OF RIGHT BREAST IN FEMALE, ESTROGEN RECEPTOR POSITIVE: Primary | ICD-10-CM

## 2025-04-22 DIAGNOSIS — Z17.0 MALIGNANT NEOPLASM OF UPPER-OUTER QUADRANT OF RIGHT BREAST IN FEMALE, ESTROGEN RECEPTOR POSITIVE: ICD-10-CM

## 2025-04-22 DIAGNOSIS — Z17.0 MALIGNANT NEOPLASM OF UPPER-OUTER QUADRANT OF RIGHT BREAST IN FEMALE, ESTROGEN RECEPTOR POSITIVE: Primary | ICD-10-CM

## 2025-04-22 LAB
ALBUMIN SERPL-MCNC: 3.9 G/DL (ref 3.5–5.2)
ALBUMIN/GLOB SERPL: 1.5 G/DL
ALP SERPL-CCNC: 113 U/L (ref 39–117)
ALT SERPL W P-5'-P-CCNC: 20 U/L (ref 1–33)
ANION GAP SERPL CALCULATED.3IONS-SCNC: 16.3 MMOL/L (ref 5–15)
AST SERPL-CCNC: 25 U/L (ref 1–32)
BASOPHILS # BLD AUTO: 0.07 10*3/MM3 (ref 0–0.2)
BASOPHILS NFR BLD AUTO: 1 % (ref 0–1.5)
BILIRUB SERPL-MCNC: 0.5 MG/DL (ref 0–1.2)
BUN SERPL-MCNC: 15 MG/DL (ref 8–23)
BUN/CREAT SERPL: 15.2 (ref 7–25)
CALCIUM SPEC-SCNC: 9.3 MG/DL (ref 8.6–10.5)
CHLORIDE SERPL-SCNC: 104 MMOL/L (ref 98–107)
CO2 SERPL-SCNC: 20.7 MMOL/L (ref 22–29)
CREAT SERPL-MCNC: 0.99 MG/DL (ref 0.57–1)
DEPRECATED RDW RBC AUTO: 49.4 FL (ref 37–54)
EGFRCR SERPLBLD CKD-EPI 2021: 58.8 ML/MIN/1.73
EOSINOPHIL # BLD AUTO: 0.07 10*3/MM3 (ref 0–0.4)
EOSINOPHIL NFR BLD AUTO: 1 % (ref 0.3–6.2)
ERYTHROCYTE [DISTWIDTH] IN BLOOD BY AUTOMATED COUNT: 13.5 % (ref 12.3–15.4)
GLOBULIN UR ELPH-MCNC: 2.6 GM/DL
GLUCOSE SERPL-MCNC: 107 MG/DL (ref 65–99)
HCT VFR BLD AUTO: 43.1 % (ref 34–46.6)
HGB BLD-MCNC: 14.3 G/DL (ref 12–15.9)
IMM GRANULOCYTES # BLD AUTO: 0.01 10*3/MM3 (ref 0–0.05)
IMM GRANULOCYTES NFR BLD AUTO: 0.1 % (ref 0–0.5)
LYMPHOCYTES # BLD AUTO: 1.27 10*3/MM3 (ref 0.7–3.1)
LYMPHOCYTES NFR BLD AUTO: 18.6 % (ref 19.6–45.3)
MCH RBC QN AUTO: 32.7 PG (ref 26.6–33)
MCHC RBC AUTO-ENTMCNC: 33.2 G/DL (ref 31.5–35.7)
MCV RBC AUTO: 98.6 FL (ref 79–97)
MONOCYTES # BLD AUTO: 0.68 10*3/MM3 (ref 0.1–0.9)
MONOCYTES NFR BLD AUTO: 10 % (ref 5–12)
NEUTROPHILS NFR BLD AUTO: 4.73 10*3/MM3 (ref 1.7–7)
NEUTROPHILS NFR BLD AUTO: 69.3 % (ref 42.7–76)
NRBC BLD AUTO-RTO: 0 /100 WBC (ref 0–0.2)
PLATELET # BLD AUTO: 213 10*3/MM3 (ref 140–450)
PMV BLD AUTO: 10.7 FL (ref 6–12)
POTASSIUM SERPL-SCNC: 4.2 MMOL/L (ref 3.5–5.2)
PROT SERPL-MCNC: 6.5 G/DL (ref 6–8.5)
RBC # BLD AUTO: 4.37 10*6/MM3 (ref 3.77–5.28)
SODIUM SERPL-SCNC: 141 MMOL/L (ref 136–145)
WBC NRBC COR # BLD AUTO: 6.83 10*3/MM3 (ref 3.4–10.8)

## 2025-04-22 PROCEDURE — 36415 COLL VENOUS BLD VENIPUNCTURE: CPT

## 2025-04-22 PROCEDURE — 85025 COMPLETE CBC W/AUTO DIFF WBC: CPT

## 2025-04-22 PROCEDURE — 80053 COMPREHEN METABOLIC PANEL: CPT

## 2025-04-22 NOTE — PROGRESS NOTES
Subjective     REASON FOR CONSULTATION:  pT2Nx grade 1 lobular carcinoma right breast strongly ER/ID positive HER2 negative postlumpectomy1/3/2022  Provide an opinion on any further workup or treatment                             REQUESTING PHYSICIAN: MD Sri Willis MD    History of Present Illness patient is a 76-year-old lady with a lobular breast cancer low-grade treated with lumpectomy and and radiation and initially on Arimidex for a month.  This was  started In mid March 2022.  She stopped the medication because of side effects and initiated Aromasin in September 2022.  Patient reports she is still been struggling with side effects of arthralgias.  She has a lack of motivation she reports to do tasks, in part because she knows that it will be a struggle physically.I suggested switching to femara for now because I am wanting to avoid tamoxifen because of her hypercoagulable history although the anticoagulation should make it safer relatively-she is scared about doing tamoxifen    We switched to Femara when she was here last and she has been on it now for over 12 months and still has fatigue and aching in her back but most of this is chronic and she tells me she went on vacation and stopped her Femara for 3 weeks and felt no different so I do not think we are going to find a drug that makes her feel better than she does normally    She tried Cymbalta but it made her very ill and she could not take it.    She is complaining of left-sided chest wall pain extending up to the costochondral junction for the last 2 weeks which is very uncomfortable.  She did have upper respiratory infection with a very bad cough but the pain came after she stopped coughing and she is concerned about this and wants this to be evaluated and is with her daughter today    She has had no trauma to this area.  She remains on anticoagulation with Xarelto lifelong for  recurrent DVTs.  She has had no hemoptysis    Mammogram  in September. 24 was thankfully benign    DEXA scan performed 4/10/2023 showing osteopenia at the right hip though unchanged from previous a year ago.  Lumbar spine did have 3.9% interval decrease though T score is still just 0.5.-Repeat DEXA scan this April shows worsening bone density and I think we need to address this but we will wait until she has her imaging for the chest pain before we do this      Past Medical History:   Diagnosis Date    Arthritis     Arthritis of back 2015    Arthritis of neck     Breast, fat necrosis 12/05/2023    Bulging lumbar disc     L 3-4    Bursitis of hip     Cholelithiasis     Clotting disorder     Colon polyp     COVID-19     Diverticulitis of colon     Encounter for screening mammogram for malignant neoplasm of breast 11/13/2018    Fracture, fibula     Fracture, tibia and fibula     Stress fracture    Frozen shoulder     GERD (gastroesophageal reflux disease)     Hip arthrosis     History of kidney stones     History of MRSA infection 2020    MICHEL EARS    History of transfusion 1971    no reaction    Hx of blood clots 2011    Hypothyroid     Kidney stones     Knee swelling     Left knee pain     Lumbosacral disc disease     Malignant neoplasm of upper-outer quadrant of right breast in female, estrogen receptor positive 12/09/2021    Neck strain     Neuroma of foot 2000    Right foot    Ovarian cyst     Periarthritis of shoulder     PONV (postoperative nausea and vomiting)     states gets really sick lasted for 4 days aafter surgery the last time     Pulmonary embolism, bilateral     Scoliosis 2020    Stress fracture     Tear of meniscus of knee     Thrombopenia     Wears glasses         Past Surgical History:   Procedure Laterality Date    APPENDECTOMY      AUGMENTATION MAMMAPLASTY  1976    BREAST AUGMENTATION      years ago has implants    BREAST EXCISIONAL BIOPSY Right 10/2021    BREAST EXCISIONAL BIOPSY Left     BREAST  LUMPECTOMY Right 01/03/2022    Procedure: Right needle-localized, bracketed, partial mastectomy;  Surgeon: Adela Cardoso MD;  Location: [facility] MAIN OR;  Service: General;  Laterality: Right;    BREAST SURGERY Bilateral 01/03/2022    Procedure: RIGHT ONCOPLASTIC REDUCTION AND LEFT REDUCTION FOR SYMMETRY, BILATERAL IMPLANT REMOVAL AND  BILATERAL CAPSULECTOMIES;  Surgeon: Delbert Butcher MD;  Location: [facility] MAIN OR;  Service: Plastics;  Laterality: Bilateral;    CHOLECYSTECTOMY      COLONOSCOPY N/A 12/14/2018    Procedure: COLONOSCOPY into cecum/termial ileum with polypectomy;  Surgeon: Jose Daniel Dooley MD;  Location: [facility] ENDOSCOPY;  Service: Gastroenterology    ENDOSCOPY N/A 12/14/2018    Procedure: ESOPHAGOGASTRODUODENOSCOPY with biopsy;  Surgeon: Jose Daniel Dooley MD;  Location: [facility] ENDOSCOPY;  Service: Gastroenterology    EXPLORATORY LAPAROTOMY      bleeding from ruptured ovarian cyst    HERNIA REPAIR      umbilical x 2    OVARIAN CYST DRAINAGE/EXCISION      ruptured ovarian cyst with vblood accumulatine in abdomin    TONSILLECTOMY      TOTAL KNEE ARTHROPLASTY Left 06/24/2021    Procedure: TOTAL KNEE ARTHROPLASTY;  Surgeon: Clarence Suarez MD;  Location: [facility] MAIN OR;  Service: Orthopedics;  Laterality: Left;    UMBILICAL HERNIA REPAIR      x2    UPPER GASTROINTESTINAL ENDOSCOPY        patient is a 76-year-old white female with a 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    Patient reports she was doing her mammograms every 2 years for the last couple of years and had a benign breast biopsy on the left about 10 years ago but otherwise had not had any callbacks or other abnormalities on her imaging.    Patient had diagnostic imaging and a biopsyOn 11/18/2021 which revealed atypical lobular  hyperplasia at 11:00 and invasive lobular carcinoma grade 1 measuring 14 mm at 11:00 ER % [place] % HER2 negative Ki-67 of 7%  Patient was referred to Dr. Adela Cardoso who ordered an MRI which confirmed unifocal disease on the right and a biopsy cavity from the [facility] biopsy with no suspicious enhancement and no obvious adenopathy and a normal left breast.  She then proceeded with a lumpectomy on 1/3/2022 which showed residual 24 mm  low-grade lobular carcinoma with associated lobular carcinoma in situ with no lymphovascular invasion and clear margins no sentinel nodes were removed.  Implants which had been in for over 40 years were both removed    She has had a little problem with some infection in the inframammary area bilaterally but is on antibiotic and this is improving.  She is here to discuss adjuvant treatment options    She is  5 para 3 with 2 miscarriages-first childbirth was at age 21 she did not breast-feed.  Menarche was age 16 menopause at 57 and she took hormones for a few months and stopped.    Family history is completely negative for malignancy except in a maternal grandmother who may have had cervical cancer her 47 gene Invitae panel was negative    She is not a smoker or drinker  She has been on anticoagulation for 7 to 8 years for unprovoked massive PE with a negative work-up  She has had breast implants since  and they were removed at the time of surgery  She has not had a heart attack or stroke    Explained in detail the rationale for surgery and adjuvant treatment with micrometastasis that can give rise to recurrence of her cancer if not treated adequately.  We discussed the fact that the use of hormonal blockade would decrease the risk of recurrence by 30 to 40% depending on grade and histology .    Based on her thrombotic risk and anticoagulation I have recommended avoiding tamoxifen and using aromatase inhibitor and we will start with Arimidex which we would recommend  for 5 years at least.  The side effects and toxicities of the Aromatase inhibitors was discussed with the patient including, hot flashes, mood swings and hair thinning.Significant arthralgias and worsening bone density were also discussed. Baseline bone density evaluation was ordered.    She has been referred to radiation and they will decide whether radiation is indicated and if so she will start her Arimidex towards the end of radiation but if no radiation is planned she can start in about 2 weeks    She is agreeable and I told her to call if she has problems with the hormonal blockade  The patient and her daughter are comfortable with the decision to proceed with hormonal blockade    She will receive return in 3 to 4 months to assess her tolerance with a DEXA scan and we will consider the use of Prolia if her bone density is worse    8/22    She is here with her daughter today who states that her personality change she was achy and very uncomfortable and felt terrible overall and stopped it about a month ago and she is now back to baseline    We discussed why we gave adjuvant therapy and they misunderstood and thought it was to prevent a second cancer in her other breast we talked about micrometastasis and the risk of recurrence the 25 to 30% range with the size of her breast cancer with no additional therapy and we have decided to try exemestane and if that causes problems consider tamoxifen because she is on lifelong anticoagulation and should not have clots and it would also help with her bone density    Her bone density is actually stable even though there is significant osteopenia in her hips and therefore we will hold off on the Prolia for now.  She is taking Tums for calcium but no additional vitamin D except for multivitamin and have asked her to take 1000 units of vitamin D and see how  we do with that        Current Outpatient Medications on File Prior to Visit   Medication Sig Dispense Refill     acetaminophen (TYLENOL) 325 MG tablet Take 2 tablets by mouth Every 6 (Six) Hours As Needed for Mild Pain.      calcium carbonate (TUMS) 500 MG chewable tablet Chew 1 tablet As Needed for Indigestion or Heartburn.      cetirizine (zyrTEC) 10 MG tablet Take 1 tablet by mouth Every Night.      cholecalciferol (VITAMIN D3) 25 MCG (1000 UT) tablet Take 1 tablet by mouth Every Evening.      famotidine (PEPCID) 20 MG tablet Take 1 tablet by mouth 2 (Two) Times a Day As Needed for Heartburn.      letrozole (FEMARA) 2.5 MG tablet TAKE 1 TABLET DAILY 90 tablet 3    Lotemax 0.5 % ointment       SYNTHROID 112 MCG tablet Take 1 tablet by mouth Every Morning.      Xarelto 20 MG tablet Take 1 tablet by mouth Daily With Dinner.      psyllium (Metamucil Smooth Texture) 58.6 % powder Take  by mouth Daily. OTC       Current Facility-Administered Medications on File Prior to Visit   Medication Dose Route Frequency Provider Last Rate Last Admin    Chlorhexidine Gluconate 2 % pads 1 each  1 each Apply externally Take As Directed Clarence Suarez MD            ALLERGIES:    Allergies   Allergen Reactions    Penicillins Other (See Comments)     Passes out  Beta lactam allergy details  Antibiotic reaction: other  Age at reaction: adult  Dose to reaction time: unknown  Reason for antibiotic: unknown  Epinephrine required for reaction?: no  Tolerated antibiotics: unknown        Latex Rash    Sulfa Antibiotics Nausea And Vomiting    Wound Dressing Adhesive Rash        Social History     Socioeconomic History    Marital status:      Spouse name: SANDRA    Number of children: 3    Years of education: COLLEGE   Tobacco Use    Smoking status: Former     Current packs/day: 0.00     Average packs/day: 1 pack/day for 12.7 years (12.7 ttl pk-yrs)     Types: Cigarettes     Start date: 1964     Quit date: 1976     Years since quittin.6     Passive exposure: Never    Smokeless tobacco: Never   Vaping Use    Vaping status: Never  "Used   Substance and Sexual Activity    Alcohol use: Yes     Alcohol/week: 3.0 standard drinks of alcohol     Types: 3 Glasses of wine per week    Drug use: No    Sexual activity: Not Currently     Partners: Male     Birth control/protection: I.U.D., Post-menopausal, None, Tubal ligation        Family History   Problem Relation Age of Onset    Hypertension Mother     Osteoporosis Mother     Scoliosis Mother     Arthritis Mother     Hypertension Father     Arthritis Father     Stroke Paternal Aunt     Cervical cancer Maternal Grandmother 68    Prostate cancer Brother 74    Hypertension Brother     Hyperlipidemia Brother     Alzheimer's disease Brother     Malig Hyperthermia Neg Hx         Review of Systems   Constitutional:  Positive for fatigue.   Cardiovascular:  Positive for chest pain (Left rib cage).   Gastrointestinal:         Reflux symptoms   Musculoskeletal:  Positive for arthralgias.   Skin:  Negative for color change.   Neurological:  Negative for headaches (Migraines).        Objective     Vitals:    04/22/25 1457   BP: 128/79   Pulse: 70   Resp: 17   Temp: 97.7 °F (36.5 °C)   TempSrc: Oral   SpO2: 96%   Weight: 105 kg (232 lb 4.8 oz)   Height: 165 cm (64.96\")   PainSc: 4    PainLoc: Rib Cage           4/22/2025     2:59 PM   Current Status   ECOG score 0       Physical Exam       CONSTITUTIONAL:  Vital signs reviewed.  No distress, looks comfortable.  EYES:  Conjunctivae and lids unremarkable.  PERRLA  EARS,NOSE,MOUTH,THROAT:  Ears and nose appear unremarkable.  Lips, teeth, gums appear unremarkable.  RESPIRATORY:  Normal respiratory effort.  Lungs clear to auscultation bilaterally.  BREASTS: Right breast shows a lift and no definite lumpectomy scar left breast benign but she is tender in the chest wall over the left costochondral junctions and up to the anterior axillary line with no crepitus  CARDIOVASCULAR:  Normal S1, S2.  No murmurs rubs or gallops.  No significant lower extremity " edema.  GASTROINTESTINAL: Abdomen appears unremarkable.  Nontender.  No hepatomegaly.  No splenomegaly.  LYMPHATIC:  No cervical, supraclavicular, axillary lymphadenopathy.  SKIN:  Warm.  No rashes.  PSYCHIATRIC:  Normal judgment and insight.  Normal mood and affect.  I have reexamined the patient and the results are consistent with the previously documented exam. Len Jaramillo MD       RECENT LABS:  Hematology WBC   Date Value Ref Range Status   04/22/2025 6.83 3.40 - 10.80 10*3/mm3 Final     RBC   Date Value Ref Range Status   04/22/2025 4.37 3.77 - 5.28 10*6/mm3 Final     Hemoglobin   Date Value Ref Range Status   04/22/2025 14.3 12.0 - 15.9 g/dL Final     Hematocrit   Date Value Ref Range Status   04/22/2025 43.1 34.0 - 46.6 % Final     Platelets   Date Value Ref Range Status   04/22/2025 213 140 - 450 10*3/mm3 Final        Synoptic Checklist     INVASIVE CARCINOMA OF THE BREAST: Resection  8th Edition - Protocol posted: 6/30/2021  INVASIVE CARCINOMA OF THE BREAST: COMPLETE EXCISION - All Specimens  SPECIMEN   Procedure  Excision (less than total mastectomy)    Specimen Laterality  Right    TUMOR   Tumor Site  Upper outer quadrant    Histologic Type  Invasive lobular carcinoma    Histologic Grade ([place] Histologic Score)     Glandular (Acinar) / Tubular Differentiation  Score 3    Nuclear Pleomorphism  Score 1    Mitotic Rate  Score 1    Overall Grade  Grade 1 (scores of 3, 4 or 5)    Tumor Size  Greatest dimension of largest invasive focus (Millimeters): 24 mm   Tumor Focality  Single focus of invasive carcinoma    Ductal Carcinoma In Situ (DCIS)  Not identified    Lobular Carcinoma In Situ (LCIS)  Present    Tumor Extent     Lymphovascular Invasion  Not identified    Dermal Lymphovascular Invasion  Not identified    Microcalcifications  Present in non-neoplastic tissue    Treatment Effect in the Breast  No known presurgical therapy    MARGINS   Margin Status for Invasive Carcinoma  All margins  negative for invasive carcinoma    Distance from Invasive Carcinoma to Closest Margin  2 mm   Closest Margin(s) to Invasive Carcinoma  Posterior    Distance from Invasive Carcinoma to Anterior Margin  20 mm   Distance from Invasive Carcinoma to Posterior Margin  2 mm   Distance from Invasive Carcinoma to Superior Margin  53 mm   Distance from Invasive Carcinoma to Inferior Margin  15.3 mm   Distance from Invasive Carcinoma to Medial Margin  28 mm   Distance from Invasive Carcinoma to Lateral Margin  16.2 mm   REGIONAL LYMPH NODES   Regional Lymph Node Status  Not applicable (no regional lymph nodes submitted or found)    PATHOLOGIC STAGE CLASSIFICATION (pTNM, AJCC 8th Edition)      pT Category  pT2    pN Category  pN not assigned (no nodes submitted or found)    Breast Biomarker Testing Performed on Previous Biopsy     Estrogen Receptor (ER) Status  Positive (greater than 10% of cells demonstrate nuclear positivity)    Percentage of Cells with Nuclear Positivity  %    Breast Biomarker Testing Performed on Previous Biopsy     Progesterone Receptor (PgR) Status  Positive    Percentage of Cells with Nuclear Positivity  %    Breast Biomarker Testing Performed on Previous Biopsy     HER2 (by immunohistochemistry)  Negative (Score 1+)    Breast Biomarker Testing Performed on Previous Biopsy     Ki-67 Percentage of Positive Nuclei  7 %            Assessment & Plan   1.pT2Nx grade 1 invasive lobular carcinoma right breast strongly ER/FL positive HER2 negative postlumpectomy with associated LCIS and ALH  Arimidex started in 3/22  Stopped in 8/22-changed to Aromasin in 9/22  Tolerating Aromasin well and 1/23  Patient seen 5/15/2023 continuing on Aromasin though reports arthralgias, decreased stamina, and interference with her daily activities.  Patient is asking to hold the medication for 1 month to see if these things improve.  States she is struggled since the switch though it was better than when on Arimidex  though she previously has not set anything.  She states her daughter notices that her daily activity is different.  We will hold the Aromasin for 1 month.  The patient will then return to discuss how she is feeling with consideration of either restarting Aromasin or possibly moving to letrozole.  She is not a candidate for tamoxifen due to her previous PE/DVT.  6/13/2023 patient returns today in follow-up having taken a 1 month break from exemestane though and denying improvement to the extent that she would want to not start back exemestane but she would rather deal with any possible side effects then switch to a new medication.  Stop exemestane due to side effects and the fact that it is too expensive and switch to Femara-if joint pains resume try Cymbalta 20 mg daily  Tolerating Femara well as of 4/25-new complaints of left chest wall pain worsened by coughing with no trauma rule out rib fracture versus compression of a nerve in the thoracic spine      2. DVT and PE on lifelong anticoagulation with Xarelto.      3, hypothyroidism on treatment    4, 37 gene Invitae panel negative    5. osteopenia in the hips on calcium and vitamin D-  DEXA scan stable in 4 /22  DEXA scan stable in 4/2023  hold off on Prolia for now and recheck bone density in 1 year    6.  Tender palpable left submandibular nodes in 10/23 CAT scans ordered that showed borderline nodes PET recommended  PET scan in 10/23 was essentially negative and this is felt to be reactive lymph nodes    Plan  1.   Continue Femara 2.5 mg daily  2.  Continue calcium and vitamin D  3.    Mammogram due again in Se3tember 2025  4.  Bone scan CAT scans and MRI thoracic spine to evaluate pain the left chest wall which appears to be along the dermatome may be a compression neuropathy  5  Follow-up with Dr. Jaramillo in 1 months.      I spent 40 total minutes, face-to-face, caring for Iris today. Greater than 50% of this time involved counseling and/or coordination of  care as documented within this note.

## 2025-04-23 ENCOUNTER — HOSPITAL ENCOUNTER (OUTPATIENT)
Dept: MRI IMAGING | Facility: HOSPITAL | Age: 80
Discharge: HOME | End: 2025-04-23
Admitting: INTERNAL MEDICINE
Payer: MEDICARE

## 2025-04-23 DIAGNOSIS — C50.411 MALIGNANT NEOPLASM OF UPPER-OUTER QUADRANT OF RIGHT BREAST IN FEMALE, ESTROGEN RECEPTOR POSITIVE: ICD-10-CM

## 2025-04-23 DIAGNOSIS — R07.81 RIB PAIN ON LEFT SIDE: ICD-10-CM

## 2025-04-23 DIAGNOSIS — M54.6 BACK PAIN OF THORACOLUMBAR REGION: ICD-10-CM

## 2025-04-23 DIAGNOSIS — M54.50 BACK PAIN OF THORACOLUMBAR REGION: ICD-10-CM

## 2025-04-23 DIAGNOSIS — Z17.0 MALIGNANT NEOPLASM OF UPPER-OUTER QUADRANT OF RIGHT BREAST IN FEMALE, ESTROGEN RECEPTOR POSITIVE: ICD-10-CM

## 2025-04-23 PROCEDURE — 25510000002 GADOBENATE DIMEGLUMINE 529 MG/ML SOLUTION: Performed by: INTERNAL MEDICINE

## 2025-04-23 PROCEDURE — A9577 INJ MULTIHANCE: HCPCS | Performed by: INTERNAL MEDICINE

## 2025-04-23 PROCEDURE — 72157 MRI CHEST SPINE W/O & W/DYE: CPT

## 2025-04-23 RX ADMIN — GADOBENATE DIMEGLUMINE 20 ML: 529 INJECTION, SOLUTION INTRAVENOUS at 16:30

## 2025-04-25 ENCOUNTER — HOSPITAL ENCOUNTER (OUTPATIENT)
Dept: CT IMAGING | Facility: HOSPITAL | Age: 80
Discharge: HOME | End: 2025-04-25
Payer: MEDICARE

## 2025-04-25 ENCOUNTER — TELEPHONE (OUTPATIENT)
Dept: ONCOLOGY | Facility: CLINIC | Age: 80
End: 2025-04-25
Payer: MEDICARE

## 2025-04-25 DIAGNOSIS — Z17.0 MALIGNANT NEOPLASM OF UPPER-OUTER QUADRANT OF RIGHT BREAST IN FEMALE, ESTROGEN RECEPTOR POSITIVE: ICD-10-CM

## 2025-04-25 DIAGNOSIS — M54.6 BACK PAIN OF THORACOLUMBAR REGION: ICD-10-CM

## 2025-04-25 DIAGNOSIS — R07.81 RIB PAIN ON LEFT SIDE: ICD-10-CM

## 2025-04-25 DIAGNOSIS — M54.50 BACK PAIN OF THORACOLUMBAR REGION: ICD-10-CM

## 2025-04-25 DIAGNOSIS — C50.411 MALIGNANT NEOPLASM OF UPPER-OUTER QUADRANT OF RIGHT BREAST IN FEMALE, ESTROGEN RECEPTOR POSITIVE: ICD-10-CM

## 2025-04-25 LAB — CREAT BLDA-MCNC: 1 MG/DL (ref 0.6–1.3)

## 2025-04-25 PROCEDURE — 25510000001 IOPAMIDOL 61 % SOLUTION: Performed by: INTERNAL MEDICINE

## 2025-04-25 PROCEDURE — 25510000002 DIATRIZOATE MEGLUMINE & SODIUM PER 1 ML: Performed by: INTERNAL MEDICINE

## 2025-04-25 PROCEDURE — 74177 CT ABD & PELVIS W/CONTRAST: CPT

## 2025-04-25 PROCEDURE — 71260 CT THORAX DX C+: CPT

## 2025-04-25 PROCEDURE — 82565 ASSAY OF CREATININE: CPT

## 2025-04-25 RX ORDER — DIATRIZOATE MEGLUMINE AND DIATRIZOATE SODIUM 660; 100 MG/ML; MG/ML
30 SOLUTION ORAL; RECTAL ONCE
Status: COMPLETED | OUTPATIENT
Start: 2025-04-25 | End: 2025-04-25

## 2025-04-25 RX ORDER — IOPAMIDOL 612 MG/ML
100 INJECTION, SOLUTION INTRAVASCULAR
Status: COMPLETED | OUTPATIENT
Start: 2025-04-25 | End: 2025-04-25

## 2025-04-25 RX ADMIN — IOPAMIDOL 95 ML: 612 INJECTION, SOLUTION INTRAVENOUS at 16:57

## 2025-04-25 RX ADMIN — DIATRIZOATE MEGLUMINE AND DIATRIZOATE SODIUM 30 ML: 660; 100 LIQUID ORAL; RECTAL at 16:00

## 2025-04-25 NOTE — TELEPHONE ENCOUNTER
"    Caller: Iris Yee E \"Daniel\"    Relationship: Self    Best call back number: [phone]    Caller requesting test results: SELF    What test was performed: MRI OF THORACIC SPINE    When was the test performed: 04/23    Where was the test performed: [facility]          "

## 2025-04-25 NOTE — TELEPHONE ENCOUNTER
Called pt to review MRI report which showed no evidence of metastatic disease. She is currently in radiology waiting for her CT scan. Let her know she can try to call Monday afternoon for results, but unsure if it will be read yet as it will be the weekend. She v/u

## 2025-04-29 ENCOUNTER — HOSPITAL ENCOUNTER (OUTPATIENT)
Dept: NUCLEAR MEDICINE | Facility: HOSPITAL | Age: 80
Discharge: HOME | End: 2025-04-29
Payer: MEDICARE

## 2025-04-29 ENCOUNTER — TELEPHONE (OUTPATIENT)
Dept: ONCOLOGY | Facility: CLINIC | Age: 80
End: 2025-04-29
Payer: MEDICARE

## 2025-04-29 DIAGNOSIS — M54.6 BACK PAIN OF THORACOLUMBAR REGION: ICD-10-CM

## 2025-04-29 DIAGNOSIS — R07.81 RIB PAIN ON LEFT SIDE: ICD-10-CM

## 2025-04-29 DIAGNOSIS — Z17.0 MALIGNANT NEOPLASM OF UPPER-OUTER QUADRANT OF RIGHT BREAST IN FEMALE, ESTROGEN RECEPTOR POSITIVE: ICD-10-CM

## 2025-04-29 DIAGNOSIS — C50.411 MALIGNANT NEOPLASM OF UPPER-OUTER QUADRANT OF RIGHT BREAST IN FEMALE, ESTROGEN RECEPTOR POSITIVE: ICD-10-CM

## 2025-04-29 DIAGNOSIS — M54.50 BACK PAIN OF THORACOLUMBAR REGION: ICD-10-CM

## 2025-04-29 PROCEDURE — A9503 TC99M MEDRONATE: HCPCS | Performed by: INTERNAL MEDICINE

## 2025-04-29 PROCEDURE — 78306 BONE IMAGING WHOLE BODY: CPT

## 2025-04-29 PROCEDURE — 34310000005 TECHNETIUM MEDRONATE KIT: Performed by: INTERNAL MEDICINE

## 2025-04-29 RX ORDER — TC 99M MEDRONATE 20 MG/10ML
19.8 INJECTION, POWDER, LYOPHILIZED, FOR SOLUTION INTRAVENOUS
Status: COMPLETED | OUTPATIENT
Start: 2025-04-29 | End: 2025-04-29

## 2025-04-29 RX ADMIN — Medication 19.8 MILLICURIE: at 11:10

## 2025-04-29 NOTE — TELEPHONE ENCOUNTER
Provider: Clint  Caller: patient  Relationship to Patient: self  Call Back Phone Number: [phone]  Reason for Call: patient is calling for CT results

## 2025-05-06 ENCOUNTER — OFFICE VISIT (OUTPATIENT)
Dept: ONCOLOGY | Facility: CLINIC | Age: 80
End: 2025-05-06
Payer: MEDICARE

## 2025-05-06 ENCOUNTER — LAB (OUTPATIENT)
Dept: LAB | Facility: HOSPITAL | Age: 80
End: 2025-05-06
Payer: MEDICARE

## 2025-05-06 VITALS
HEART RATE: 78 BPM | BODY MASS INDEX: 38.95 KG/M2 | SYSTOLIC BLOOD PRESSURE: 124 MMHG | RESPIRATION RATE: 17 BRPM | HEIGHT: 65 IN | OXYGEN SATURATION: 96 % | WEIGHT: 233.8 LBS | TEMPERATURE: 97.7 F | DIASTOLIC BLOOD PRESSURE: 81 MMHG

## 2025-05-06 DIAGNOSIS — M54.6 BACK PAIN OF THORACOLUMBAR REGION: ICD-10-CM

## 2025-05-06 DIAGNOSIS — R07.81 RIB PAIN ON LEFT SIDE: ICD-10-CM

## 2025-05-06 DIAGNOSIS — M54.50 BACK PAIN OF THORACOLUMBAR REGION: ICD-10-CM

## 2025-05-06 DIAGNOSIS — Z17.0 MALIGNANT NEOPLASM OF UPPER-OUTER QUADRANT OF RIGHT BREAST IN FEMALE, ESTROGEN RECEPTOR POSITIVE: ICD-10-CM

## 2025-05-06 DIAGNOSIS — Z17.0 MALIGNANT NEOPLASM OF UPPER-OUTER QUADRANT OF RIGHT BREAST IN FEMALE, ESTROGEN RECEPTOR POSITIVE: Primary | ICD-10-CM

## 2025-05-06 DIAGNOSIS — C50.411 MALIGNANT NEOPLASM OF UPPER-OUTER QUADRANT OF RIGHT BREAST IN FEMALE, ESTROGEN RECEPTOR POSITIVE: Primary | ICD-10-CM

## 2025-05-06 DIAGNOSIS — C50.411 MALIGNANT NEOPLASM OF UPPER-OUTER QUADRANT OF RIGHT BREAST IN FEMALE, ESTROGEN RECEPTOR POSITIVE: ICD-10-CM

## 2025-05-06 LAB
ALBUMIN SERPL-MCNC: 4.3 G/DL (ref 3.5–5.2)
ALBUMIN/GLOB SERPL: 1.9 G/DL
ALP SERPL-CCNC: 113 U/L (ref 39–117)
ALT SERPL W P-5'-P-CCNC: 21 U/L (ref 1–33)
ANION GAP SERPL CALCULATED.3IONS-SCNC: 11.9 MMOL/L (ref 5–15)
AST SERPL-CCNC: 23 U/L (ref 1–32)
BASOPHILS # BLD AUTO: 0.04 10*3/MM3 (ref 0–0.2)
BASOPHILS NFR BLD AUTO: 0.7 % (ref 0–1.5)
BILIRUB SERPL-MCNC: 0.7 MG/DL (ref 0–1.2)
BUN SERPL-MCNC: 20 MG/DL (ref 8–23)
BUN/CREAT SERPL: 20 (ref 7–25)
CALCIUM SPEC-SCNC: 9.7 MG/DL (ref 8.6–10.5)
CHLORIDE SERPL-SCNC: 105 MMOL/L (ref 98–107)
CO2 SERPL-SCNC: 24.1 MMOL/L (ref 22–29)
CREAT SERPL-MCNC: 1 MG/DL (ref 0.57–1)
DEPRECATED RDW RBC AUTO: 49.6 FL (ref 37–54)
EGFRCR SERPLBLD CKD-EPI 2021: 58.1 ML/MIN/1.73
EOSINOPHIL # BLD AUTO: 0.07 10*3/MM3 (ref 0–0.4)
EOSINOPHIL NFR BLD AUTO: 1.2 % (ref 0.3–6.2)
ERYTHROCYTE [DISTWIDTH] IN BLOOD BY AUTOMATED COUNT: 13.6 % (ref 12.3–15.4)
GLOBULIN UR ELPH-MCNC: 2.3 GM/DL
GLUCOSE SERPL-MCNC: 114 MG/DL (ref 65–99)
HCT VFR BLD AUTO: 42 % (ref 34–46.6)
HGB BLD-MCNC: 13.8 G/DL (ref 12–15.9)
IMM GRANULOCYTES # BLD AUTO: 0.02 10*3/MM3 (ref 0–0.05)
IMM GRANULOCYTES NFR BLD AUTO: 0.4 % (ref 0–0.5)
LYMPHOCYTES # BLD AUTO: 1.19 10*3/MM3 (ref 0.7–3.1)
LYMPHOCYTES NFR BLD AUTO: 21 % (ref 19.6–45.3)
MAGNESIUM SERPL-MCNC: 2.3 MG/DL (ref 1.6–2.4)
MCH RBC QN AUTO: 32.6 PG (ref 26.6–33)
MCHC RBC AUTO-ENTMCNC: 32.9 G/DL (ref 31.5–35.7)
MCV RBC AUTO: 99.3 FL (ref 79–97)
MONOCYTES # BLD AUTO: 0.58 10*3/MM3 (ref 0.1–0.9)
MONOCYTES NFR BLD AUTO: 10.2 % (ref 5–12)
NEUTROPHILS NFR BLD AUTO: 3.76 10*3/MM3 (ref 1.7–7)
NEUTROPHILS NFR BLD AUTO: 66.5 % (ref 42.7–76)
NRBC BLD AUTO-RTO: 0 /100 WBC (ref 0–0.2)
PHOSPHATE SERPL-MCNC: 3.1 MG/DL (ref 2.5–4.5)
PLATELET # BLD AUTO: 192 10*3/MM3 (ref 140–450)
PMV BLD AUTO: 10.9 FL (ref 6–12)
POTASSIUM SERPL-SCNC: 4.2 MMOL/L (ref 3.5–5.2)
PROT SERPL-MCNC: 6.6 G/DL (ref 6–8.5)
RBC # BLD AUTO: 4.23 10*6/MM3 (ref 3.77–5.28)
SODIUM SERPL-SCNC: 141 MMOL/L (ref 136–145)
WBC NRBC COR # BLD AUTO: 5.66 10*3/MM3 (ref 3.4–10.8)

## 2025-05-06 PROCEDURE — 36415 COLL VENOUS BLD VENIPUNCTURE: CPT

## 2025-05-06 PROCEDURE — 80053 COMPREHEN METABOLIC PANEL: CPT

## 2025-05-06 PROCEDURE — 83735 ASSAY OF MAGNESIUM: CPT

## 2025-05-06 PROCEDURE — 85025 COMPLETE CBC W/AUTO DIFF WBC: CPT

## 2025-05-06 PROCEDURE — 84100 ASSAY OF PHOSPHORUS: CPT

## 2025-05-06 RX ORDER — EZETIMIBE 10 MG/1
10 TABLET ORAL DAILY
COMMUNITY
Start: 2025-03-17

## 2025-05-06 NOTE — PROGRESS NOTES
Subjective     REASON FOR CONSULTATION:  pT2Nx grade 1 lobular carcinoma right breast strongly ER/HI positive HER2 negative postlumpectomy1/3/2022  Provide an opinion on any further workup or treatment                             REQUESTING PHYSICIAN: MD Sri Willis MD    History of Present Illness patient is a 76-year-old lady with a lobular breast cancer low-grade treated with lumpectomy and and radiation and initially on Arimidex for a month.  This was  started In mid March 2022.  She stopped the medication because of side effects and initiated Aromasin in September 2022.  Patient reports she is still been struggling with side effects of arthralgias.  She has a lack of motivation she reports to do tasks, in part because she knows that it will be a struggle physically.I suggested switching to femara for now because I am wanting to avoid tamoxifen because of her hypercoagulable history although the anticoagulation should make it safer relatively-she is scared about doing tamoxifen    We switched to Femara when she was here last and she has been on it now for over 12 months and still has fatigue and aching in her back but most of this is chronic and she tells me she went on vacation and stopped her Femara for 3 weeks and felt no different so I do not think we are going to find a drug that makes her feel better than she does normally.She tried Cymbalta but it made her very ill and she could not take it.        She is complaining of left-sided chest wall pain extending up to the costochondral junction for the last 2 weeks which is very uncomfortable.  She did have upper respiratory infection with a very bad cough but the pain came after she stopped coughing and she is concerned about this and wants this to be evaluated and is is back today having had CAT scans and an MRI of the thoracic spine all of which were negative except for bone scan showing  a rib fracture which was corroborated on CT and this appears to be a benign fracture thankfully    Her bone density has gotten progressively worse with almost osteoporosis in the hips and since she cannot take ibandronate  which causes jaw pain I have recommended trial of Prolia which is a different class of drugs to see if this will help her bone density.  She is taking calcium and vitamin D    She has had no trauma to this area.  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  She has had no hemoptysis    Mammogram  in September. 24 was thankfully benign        Past Medical History:   Diagnosis Date    Arthritis     Arthritis of back 2015    Arthritis of neck     Breast, fat necrosis 12/05/2023    Bulging lumbar disc     L 3-4    Bursitis of hip     Cholelithiasis     Clotting disorder     Colon polyp     COVID-19     Diverticulitis of colon     Encounter for screening mammogram for malignant neoplasm of breast 11/13/2018    Fracture, fibula     Fracture, tibia and fibula     Stress fracture    Frozen shoulder     GERD (gastroesophageal reflux disease)     Hip arthrosis     History of kidney stones     History of MRSA infection 2020    MICHEL EARS    History of transfusion 1971    no reaction    Hx of blood clots 2011    Hypothyroid     Kidney stones     Knee swelling     Left knee pain     Lumbosacral disc disease     Malignant neoplasm of upper-outer quadrant of right breast in female, estrogen receptor positive 12/09/2021    Neck strain     Neuroma of foot 2000    Right foot    Ovarian cyst     Periarthritis of shoulder     PONV (postoperative nausea and vomiting)     states gets really sick lasted for 4 days aafter surgery the last time     Pulmonary embolism, bilateral     Scoliosis 2020    Stress fracture     Tear of meniscus of knee     Thrombopenia     Wears glasses         Past Surgical History:   Procedure Laterality Date    APPENDECTOMY      AUGMENTATION MAMMAPLASTY  1976     BREAST AUGMENTATION      years ago has implants    BREAST EXCISIONAL BIOPSY Right 10/2021    BREAST EXCISIONAL BIOPSY Left     BREAST LUMPECTOMY Right 01/03/2022    Procedure: Right needle-localized, bracketed, partial mastectomy;  Surgeon: Adela Cardoso MD;  Location: [facility] OR;  Service: General;  Laterality: Right;    BREAST SURGERY Bilateral 01/03/2022    Procedure: RIGHT ONCOPLASTIC REDUCTION AND LEFT REDUCTION FOR SYMMETRY, BILATERAL IMPLANT REMOVAL AND  BILATERAL CAPSULECTOMIES;  Surgeon: Delbert Butcher MD;  Location: [facility] MAIN OR;  Service: Plastics;  Laterality: Bilateral;    CHOLECYSTECTOMY      COLONOSCOPY N/A 12/14/2018    Procedure: COLONOSCOPY into cecum/termial ileum with polypectomy;  Surgeon: Jose Daniel Dooley MD;  Location: [facility] ENDOSCOPY;  Service: Gastroenterology    ENDOSCOPY N/A 12/14/2018    Procedure: ESOPHAGOGASTRODUODENOSCOPY with biopsy;  Surgeon: Jose Daniel Dooley MD;  Location: [facility] ENDOSCOPY;  Service: Gastroenterology    EXPLORATORY LAPAROTOMY      bleeding from ruptured ovarian cyst    HERNIA REPAIR      umbilical x 2    OVARIAN CYST DRAINAGE/EXCISION      ruptured ovarian cyst with vblood accumulatine in abdomin    TONSILLECTOMY      TOTAL KNEE ARTHROPLASTY Left 06/24/2021    Procedure: TOTAL KNEE ARTHROPLASTY;  Surgeon: Clarence Suarez MD;  Location: [facility] OR;  Service: Orthopedics;  Laterality: Left;    UMBILICAL HERNIA REPAIR      x2    UPPER GASTROINTESTINAL ENDOSCOPY        patient is a 76-year-old white female with a 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    Patient reports she was doing her mammograms every 2 years for the last couple of years and had a benign breast biopsy on the left about 10 years ago but otherwise had not had any callbacks or  other abnormalities on her imaging.    Patient had diagnostic imaging and a biopsyOn 2021 which revealed atypical lobular hyperplasia at 11:00 and invasive lobular carcinoma grade 1 measuring 14 mm at 11:00 ER % [place] % HER2 negative Ki-67 of 7%  Patient was referred to Dr. Adela Cardoso who ordered an MRI which confirmed unifocal disease on the right and a biopsy cavity from the [facility] biopsy with no suspicious enhancement and no obvious adenopathy and a normal left breast.  She then proceeded with a lumpectomy on 1/3/2022 which showed residual 24 mm  low-grade lobular carcinoma with associated lobular carcinoma in situ with no lymphovascular invasion and clear margins no sentinel nodes were removed.  Implants which had been in for over 40 years were both removed    She has had a little problem with some infection in the inframammary area bilaterally but is on antibiotic and this is improving.  She is here to discuss adjuvant treatment options    She is  5 para 3 with 2 miscarriages-first childbirth was at age 21 she did not breast-feed.  Menarche was age 16 menopause at 57 and she took hormones for a few months and stopped.    Family history is completely negative for malignancy except in a maternal grandmother who may have had cervical cancer her 47 gene Invitae panel was negative    She is not a smoker or drinker  She has been on anticoagulation for 7 to 8 years for unprovoked massive PE with a negative work-up  She has had breast implants since  and they were removed at the time of surgery  She has not had a heart attack or stroke    Explained in detail the rationale for surgery and adjuvant treatment with micrometastasis that can give rise to recurrence of her cancer if not treated adequately.  We discussed the fact that the use of hormonal blockade would decrease the risk of recurrence by 30 to 40% depending on grade and histology .    Based on her thrombotic risk and anticoagulation  I have recommended avoiding tamoxifen and using aromatase inhibitor and we will start with Arimidex which we would recommend for 5 years at least.  The side effects and toxicities of the Aromatase inhibitors was discussed with the patient including, hot flashes, mood swings and hair thinning.Significant arthralgias and worsening bone density were also discussed. Baseline bone density evaluation was ordered.    She has been referred to radiation and they will decide whether radiation is indicated and if so she will start her Arimidex towards the end of radiation but if no radiation is planned she can start in about 2 weeks    She is agreeable and I told her to call if she has problems with the hormonal blockade  The patient and her daughter are comfortable with the decision to proceed with hormonal blockade    She will receive return in 3 to 4 months to assess her tolerance with a DEXA scan and we will consider the use of Prolia if her bone density is worse    8/22    She is here with her daughter today who states that her personality change she was achy and very uncomfortable and felt terrible overall and stopped it about a month ago and she is now back to baseline    We discussed why we gave adjuvant therapy and they misunderstood and thought it was to prevent a second cancer in her other breast we talked about micrometastasis and the risk of recurrence the 25 to 30% range with the size of her breast cancer with no additional therapy and we have decided to try exemestane and if that causes problems consider tamoxifen because she is on lifelong anticoagulation and should not have clots and it would also help with her bone density    Her bone density is actually stable even though there is significant osteopenia in her hips and therefore we will hold off on the Prolia for now.  She is taking Tums for calcium but no additional vitamin D except for multivitamin and have asked her to take 1000 units of vitamin D and see  how  we do with that        Current Outpatient Medications on File Prior to Visit   Medication Sig Dispense Refill    acetaminophen (TYLENOL) 325 MG tablet Take 2 tablets by mouth Every 6 (Six) Hours As Needed for Mild Pain.      calcium carbonate (TUMS) 500 MG chewable tablet Chew 1 tablet As Needed for Indigestion or Heartburn.      cetirizine (zyrTEC) 10 MG tablet Take 1 tablet by mouth Every Night.      cholecalciferol (VITAMIN D3) 25 MCG (1000 UT) tablet Take 1 tablet by mouth Every Evening.      ezetimibe (ZETIA) 10 MG tablet Take 1 tablet by mouth Daily.      famotidine (PEPCID) 20 MG tablet Take 1 tablet by mouth 2 (Two) Times a Day As Needed for Heartburn.      letrozole (FEMARA) 2.5 MG tablet TAKE 1 TABLET DAILY 90 tablet 3    SYNTHROID 112 MCG tablet Take 1 tablet by mouth Every Morning.      Xarelto 20 MG tablet Take 1 tablet by mouth Daily With Dinner.      Lotemax 0.5 % ointment  (Patient not taking: Reported on 5/6/2025)      psyllium (Metamucil Smooth Texture) 58.6 % powder Take  by mouth Daily. OTC       Current Facility-Administered Medications on File Prior to Visit   Medication Dose Route Frequency Provider Last Rate Last Admin    Chlorhexidine Gluconate 2 % pads 1 each  1 each Apply externally Take As Directed Clarence Suarez MD            ALLERGIES:    Allergies   Allergen Reactions    Penicillins Other (See Comments)     Passes out  Beta lactam allergy details  Antibiotic reaction: other  Age at reaction: adult  Dose to reaction time: unknown  Reason for antibiotic: unknown  Epinephrine required for reaction?: no  Tolerated antibiotics: unknown        Latex Rash    Sulfa Antibiotics Nausea And Vomiting    Wound Dressing Adhesive Rash     Glue on breast during biopsy caused rash and awful itching        Social History     Socioeconomic History    Marital status:      Spouse name: SANDRA    Number of children: 3    Years of education: COLLEGE   Tobacco Use    Smoking status: Former      "Current packs/day: 0.00     Average packs/day: 1 pack/day for 12.7 years (12.7 ttl pk-yrs)     Types: Cigarettes     Start date: 1964     Quit date: 1976     Years since quittin.7     Passive exposure: Never    Smokeless tobacco: Never   Vaping Use    Vaping status: Never Used   Substance and Sexual Activity    Alcohol use: Yes     Alcohol/week: 3.0 standard drinks of alcohol     Types: 3 Glasses of wine per week    Drug use: No    Sexual activity: Not Currently     Partners: Male     Birth control/protection: I.U.D., Post-menopausal, None, Tubal ligation        Family History   Problem Relation Age of Onset    Hypertension Mother     Osteoporosis Mother     Scoliosis Mother     Arthritis Mother     Hypertension Father     Arthritis Father     Stroke Paternal Aunt     Cervical cancer Maternal Grandmother 68    Prostate cancer Brother 74    Hypertension Brother     Hyperlipidemia Brother     Alzheimer's disease Brother     Malig Hyperthermia Neg Hx         Review of Systems   Constitutional:  Positive for fatigue.   Cardiovascular:  Positive for chest pain (Left rib cage).   Gastrointestinal:         Reflux symptoms   Musculoskeletal:  Positive for arthralgias.   Skin:  Negative for color change.   Neurological:  Negative for headaches (Migraines).        Objective     Vitals:    25 1421   BP: 124/81   Pulse: 78   Resp: 17   Temp: 97.7 °F (36.5 °C)   TempSrc: Temporal   SpO2: 96%   Weight: 106 kg (233 lb 12.8 oz)   Height: 165 cm (64.96\")   PainSc: 0-No pain           2025     2:20 PM   Current Status   ECOG score 0       Physical Exam       CONSTITUTIONAL:  Vital signs reviewed.  No distress, looks comfortable.  EYES:  Conjunctivae and lids unremarkable.  PERRLA  EARS,NOSE,MOUTH,THROAT:  Ears and nose appear unremarkable.  Lips, teeth, gums appear unremarkable.  RESPIRATORY:  Normal respiratory effort.  Lungs clear to auscultation bilaterally.  BREASTS: Right breast shows a lift and no " definite lumpectomy scar left breast benign but she is tender in the chest wall over the left costochondral junctions and up to the anterior axillary line with no crepitus  CARDIOVASCULAR:  Normal S1, S2.  No murmurs rubs or gallops.  No significant lower extremity edema.  GASTROINTESTINAL: Abdomen appears unremarkable.  Nontender.  No hepatomegaly.  No splenomegaly.  LYMPHATIC:  No cervical, supraclavicular, axillary lymphadenopathy.  SKIN:  Warm.  No rashes.  PSYCHIATRIC:  Normal judgment and insight.  Normal mood and affect.  I have reexamined the patient and the results are consistent with the previously documented exam. Len Jaramillo MD       RECENT LABS:  Hematology WBC   Date Value Ref Range Status   05/06/2025 5.66 3.40 - 10.80 10*3/mm3 Final     RBC   Date Value Ref Range Status   05/06/2025 4.23 3.77 - 5.28 10*6/mm3 Final     Hemoglobin   Date Value Ref Range Status   05/06/2025 13.8 12.0 - 15.9 g/dL Final     Hematocrit   Date Value Ref Range Status   05/06/2025 42.0 34.0 - 46.6 % Final     Platelets   Date Value Ref Range Status   05/06/2025 192 140 - 450 10*3/mm3 Final        Synoptic Checklist     INVASIVE CARCINOMA OF THE BREAST: Resection  8th Edition - Protocol posted: 6/30/2021  INVASIVE CARCINOMA OF THE BREAST: COMPLETE EXCISION - All Specimens  SPECIMEN   Procedure  Excision (less than total mastectomy)    Specimen Laterality  Right    TUMOR   Tumor Site  Upper outer quadrant    Histologic Type  Invasive lobular carcinoma    Histologic Grade ([place] Histologic Score)     Glandular (Acinar) / Tubular Differentiation  Score 3    Nuclear Pleomorphism  Score 1    Mitotic Rate  Score 1    Overall Grade  Grade 1 (scores of 3, 4 or 5)    Tumor Size  Greatest dimension of largest invasive focus (Millimeters): 24 mm   Tumor Focality  Single focus of invasive carcinoma    Ductal Carcinoma In Situ (DCIS)  Not identified    Lobular Carcinoma In Situ (LCIS)  Present    Tumor Extent      Lymphovascular Invasion  Not identified    Dermal Lymphovascular Invasion  Not identified    Microcalcifications  Present in non-neoplastic tissue    Treatment Effect in the Breast  No known presurgical therapy    MARGINS   Margin Status for Invasive Carcinoma  All margins negative for invasive carcinoma    Distance from Invasive Carcinoma to Closest Margin  2 mm   Closest Margin(s) to Invasive Carcinoma  Posterior    Distance from Invasive Carcinoma to Anterior Margin  20 mm   Distance from Invasive Carcinoma to Posterior Margin  2 mm   Distance from Invasive Carcinoma to Superior Margin  53 mm   Distance from Invasive Carcinoma to Inferior Margin  15.3 mm   Distance from Invasive Carcinoma to Medial Margin  28 mm   Distance from Invasive Carcinoma to Lateral Margin  16.2 mm   REGIONAL LYMPH NODES   Regional Lymph Node Status  Not applicable (no regional lymph nodes submitted or found)    PATHOLOGIC STAGE CLASSIFICATION (pTNM, AJCC 8th Edition)      pT Category  pT2    pN Category  pN not assigned (no nodes submitted or found)    Breast Biomarker Testing Performed on Previous Biopsy     Estrogen Receptor (ER) Status  Positive (greater than 10% of cells demonstrate nuclear positivity)    Percentage of Cells with Nuclear Positivity  %    Breast Biomarker Testing Performed on Previous Biopsy     Progesterone Receptor (PgR) Status  Positive    Percentage of Cells with Nuclear Positivity  %    Breast Biomarker Testing Performed on Previous Biopsy     HER2 (by immunohistochemistry)  Negative (Score 1+)    Breast Biomarker Testing Performed on Previous Biopsy     Ki-67 Percentage of Positive Nuclei  7 %            Assessment & Plan   1.pT2Nx grade 1 invasive lobular carcinoma right breast strongly ER/AZ positive HER2 negative postlumpectomy with associated LCIS and ALH  Arimidex started in 3/22  Stopped in 8/22-changed to Aromasin in 9/22  Tolerating Aromasin well and 1/23  Patient seen 5/15/2023 continuing  on Aromasin though reports arthralgias, decreased stamina, and interference with her daily activities.  Patient is asking to hold the medication for 1 month to see if these things improve.  States she is struggled since the switch though it was better than when on Arimidex though she previously has not set anything.  She states her daughter notices that her daily activity is different.  We will hold the Aromasin for 1 month.  The patient will then return to discuss how she is feeling with consideration of either restarting Aromasin or possibly moving to letrozole.  She is not a candidate for tamoxifen due to her previous PE/DVT.  6/13/2023 patient returns today in follow-up having taken a 1 month break from exemestane though and denying improvement to the extent that she would want to not start back exemestane but she would rather deal with any possible side effects then switch to a new medication.  Stop exemestane due to side effects and the fact that it is too expensive and switch to Femara-if joint pains resume try Cymbalta 20 mg daily  Tolerating Femara well as of 4/25-new complaints of left chest wall pain worsened by coughing with no trauma rule out rib fracture versus compression of a nerve in the thoracic spine  CAT scans negative bone scan shows rib fractures left eighth rib nonmalignant      2. DVT and PE on lifelong anticoagulation with Xarelto.      3, hypothyroidism on treatment    4, 37 gene Invitae panel negative    5. osteopenia in the hips on calcium and vitamin D-  DEXA scan stable in 4 /22  DEXA scan stable in 4/2023  hold off on Prolia for now and recheck bone density in 1 year  Bone density worse in the hips Prolia initiated 5/25    6.  Tender palpable left submandibular nodes in 10/23 CAT scans ordered that showed borderline nodes PET recommended  PET scan in 10/23 was essentially negative and this is felt to be reactive lymph nodes    Plan  1.   Continue Femara 2.5 mg daily  2.  Continue  calcium and vitamin D  3.    Mammogram due again in Se3tember 2025  4.   return in 1 week for Prolia   5  Follow-up with Dr. Jaramillo in 6 months.      I spent 40 total minutes, face-to-face, caring for Iris today. Greater than 50% of this time involved counseling and/or coordination of care as documented within this note.

## 2025-05-12 ENCOUNTER — TELEPHONE (OUTPATIENT)
Dept: ONCOLOGY | Facility: CLINIC | Age: 80
End: 2025-05-12
Payer: MEDICARE

## 2025-05-12 NOTE — TELEPHONE ENCOUNTER
Attempted to reach pt back to review her message. Per DR Jaramillo, pt counseled on benefit of prolia at her visit. Pt may cancel this appointment if she has chosen not to receive this. Pt did not answer and voicemail box was full so I was unable to leave message.

## 2025-05-12 NOTE — TELEPHONE ENCOUNTER
"  Caller: Iris Yee \"SELVIN\"    Relationship: Self    Best call back number: [phone]    What is the best time to reach you: ANYTIME    Who are you requesting to speak with (clinical staff, provider,  specific staff member): DAVY / CLINICAL    What was the call regarding: PT RETURNING MISSED CALL TO DAVY      "

## 2025-05-12 NOTE — TELEPHONE ENCOUNTER
"  Caller: Iris Yee \"SELVIN\"    Relationship: Self    Best call back number:   Telephone Information:   Mobile [phone]     What was the call regarding: PATIENT IS NOT WANTING TO GET THE PROLIA INJECTION.  SHE WANTS TO CANCEL THE 5/14 LAB, & INJECTION  AND ON 11/19 JUST CANCEL THE INJECTION.    DR. BURCIAGA IS NOT AWARE OF THIS.   "

## (undated) DEVICE — BNDG ELAS ELITE V/CLOSE 6IN 5YD LF STRL

## (undated) DEVICE — APPL CHLORAPREP HI/LITE 26ML ORNG

## (undated) DEVICE — PENCL E/S ULTRAVAC TELESCP NOSE HOLSTR 10FT

## (undated) DEVICE — OPTIFOAM GENTLE SA, POSTOP, 4X12: Brand: MEDLINE

## (undated) DEVICE — GOWN,SIRUS,NON REINFRCD,LARGE,SET IN SL: Brand: MEDLINE

## (undated) DEVICE — GLV SURG BIOGEL LTX PF 8

## (undated) DEVICE — NDL HYPO ECLPS SFTY 22G 1 1/2IN

## (undated) DEVICE — PK UNIV COMPL 40

## (undated) DEVICE — SYR LL TP 10ML STRL

## (undated) DEVICE — GLV SURG SENSICARE POLYISPRN W/ALOE PF LF 6.5 GRN STRL

## (undated) DEVICE — SKIN PREP TRAY W/CHG: Brand: MEDLINE INDUSTRIES, INC.

## (undated) DEVICE — INTENDED FOR TISSUE SEPARATION, AND OTHER PROCEDURES THAT REQUIRE A SHARP SURGICAL BLADE TO PUNCTURE OR CUT.: Brand: BARD-PARKER ® STAINLESS STEEL BLADES

## (undated) DEVICE — SUT PDS 2/0 CT1 27IN DYED Z339H

## (undated) DEVICE — SPNG LAP 18X18IN LF STRL PK/5

## (undated) DEVICE — ADHS SKIN SURG TISS VISC PREMIERPRO EXOFIN HI/VISC FAST/DRY

## (undated) DEVICE — BIOPATCH™ ANTIMICROBIAL DRESSING WITH CHLORHEXIDINE GLUCONATE IS A HYDROPHILLIC POLYURETHANE ABSORPTIVE FOAM WITH CHLORHEXIDINE GLUCONATE (CHG) WHICH INHIBITS BACTERIAL GROWTH UNDER THE DRESSING. THE DRESSING IS INTENDED TO BE USED TO ABSORB EXUDATE, COVER A WOUND CAUSED BY VASCULAR AND NONVASCULAR PERCUTANEOUS MEDICAL DEVICES DURING SURGERY, AS WELL AS REDUCE LOCAL INFECTION AND COLONIZATION OF MICROORGANISMS.: Brand: BIOPATCH

## (undated) DEVICE — TRAP FLD MINIVAC MEGADYNE 100ML

## (undated) DEVICE — KT INK TISS MARGINMARKER STD 6COLOR

## (undated) DEVICE — PAD,ABDOMINAL,8"X10",ST,LF: Brand: MEDLINE

## (undated) DEVICE — DUAL CUT SAGITTAL BLADE

## (undated) DEVICE — JACKSON-PRATT 100CC BULB RESERVOIR: Brand: CARDINAL HEALTH

## (undated) DEVICE — DRSNG SURESITE WNDW 4X4.5

## (undated) DEVICE — LEGGINGS, PAIR, 31X48, STERILE: Brand: MEDLINE

## (undated) DEVICE — STPLR SKIN VISISTAT WD 35CT

## (undated) DEVICE — GLV SURG SENSICARE PI MIC PF SZ6.5 LF STRL

## (undated) DEVICE — PK KN TOTL 40

## (undated) DEVICE — Device: Brand: DEFENDO AIR/WATER/SUCTION AND BIOPSY VALVE

## (undated) DEVICE — PATIENT RETURN ELECTRODE, SINGLE-USE, CONTACT QUALITY MONITORING, ADULT, WITH 9FT CORD, FOR PATIENTS WEIGING OVER 33LBS. (15KG): Brand: MEGADYNE

## (undated) DEVICE — SUT VIC 2/0 CT2 27IN J269H

## (undated) DEVICE — GLV SURG SENSICARE W/ALOE PF LF 8.5 STRL

## (undated) DEVICE — FRCP BX RADJAW4 NDL 2.8 240CM LG OG BX40

## (undated) DEVICE — TUBING, SUCTION, 1/4" X 10', STRAIGHT: Brand: MEDLINE

## (undated) DEVICE — SUT MNCRYL 3/0 PS2 18IN MCP497G

## (undated) DEVICE — SUT ETHLN 3/0 PS1 18IN 1663H

## (undated) DEVICE — SOL NACL 0.9PCT 100ML SGL

## (undated) DEVICE — BANDAGE,GAUZE,BULKEE II,4.5"X4.1YD,STRL: Brand: MEDLINE

## (undated) DEVICE — ZIP 24 SURGICAL SKIN CLOSURE DEVICE, PSA: Brand: ZIP 24 SURGICAL SKIN CLOSURE DEVICE

## (undated) DEVICE — BITEBLOCK OMNI BLOC

## (undated) DEVICE — SUT MNCRYL PLS ANTIB UD 4/0 PS2 18IN

## (undated) DEVICE — UNDERCAST PADDING: Brand: DEROYAL

## (undated) DEVICE — 1010 S-DRAPE TOWEL DRAPE 10/BX: Brand: STERI-DRAPE™

## (undated) DEVICE — PREP SOL POVIDONE/IODINE BT 4OZ

## (undated) DEVICE — ANTIBACTERIAL UNDYED BRAIDED (POLYGLACTIN 910), SYNTHETIC ABSORBABLE SUTURE: Brand: COATED VICRYL

## (undated) DEVICE — Device

## (undated) DEVICE — GLV SURG PREMIERPRO ORTHO LTX PF SZ8 BRN

## (undated) DEVICE — GLV SURG BIOGEL LTX PF 8 1/2

## (undated) DEVICE — GLV SURG SIGNATURE ESSENTIAL PF LTX SZ8.5

## (undated) DEVICE — SOL ISO/ALC RUB 70PCT 4OZ

## (undated) DEVICE — CANN NASL CO2 TRULINK W/O2 A/

## (undated) DEVICE — DRSNG TELFA PAD NONADH STR 1S 3X8IN

## (undated) DEVICE — NEEDLE, QUINCKE 22GX3.5": Brand: MEDLINE INDUSTRIES, INC.

## (undated) DEVICE — ELECTRD BLD EZ CLN MOD XLNG 2.75IN

## (undated) DEVICE — LOU MINOR PROCEDURE: Brand: MEDLINE INDUSTRIES, INC.

## (undated) DEVICE — MARKR SKIN W/RULR AND LBL

## (undated) DEVICE — THE TORRENT IRRIGATION SCOPE CONNECTOR IS USED WITH THE TORRENT IRRIGATION TUBING TO PROVIDE IRRIGATION FLUIDS SUCH AS STERILE WATER DURING GASTROINTESTINAL ENDOSCOPIC PROCEDURES WHEN USED IN CONJUNCTION WITH AN IRRIGATION PUMP (OR ELECTROSURGICAL UNIT).: Brand: TORRENT